# Patient Record
Sex: MALE | Race: WHITE | NOT HISPANIC OR LATINO | Employment: OTHER | URBAN - METROPOLITAN AREA
[De-identification: names, ages, dates, MRNs, and addresses within clinical notes are randomized per-mention and may not be internally consistent; named-entity substitution may affect disease eponyms.]

---

## 2017-03-22 ENCOUNTER — MEDICAL CORRESPONDENCE (OUTPATIENT)
Dept: HEALTH INFORMATION MANAGEMENT | Facility: CLINIC | Age: 72
End: 2017-03-22

## 2017-03-22 ENCOUNTER — OFFICE VISIT (OUTPATIENT)
Dept: FAMILY MEDICINE | Facility: CLINIC | Age: 72
End: 2017-03-22

## 2017-03-22 VITALS
HEIGHT: 64 IN | TEMPERATURE: 97.6 F | BODY MASS INDEX: 33.73 KG/M2 | DIASTOLIC BLOOD PRESSURE: 94 MMHG | WEIGHT: 197.6 LBS | OXYGEN SATURATION: 97 % | HEART RATE: 106 BPM | SYSTOLIC BLOOD PRESSURE: 153 MMHG

## 2017-03-22 DIAGNOSIS — J45.909 UNCOMPLICATED ASTHMA, UNSPECIFIED ASTHMA SEVERITY: ICD-10-CM

## 2017-03-22 DIAGNOSIS — F41.8 DEPRESSION WITH ANXIETY: ICD-10-CM

## 2017-03-22 DIAGNOSIS — K21.9 GASTROESOPHAGEAL REFLUX DISEASE WITHOUT ESOPHAGITIS: Primary | ICD-10-CM

## 2017-03-22 DIAGNOSIS — R73.03 PREDIABETES: ICD-10-CM

## 2017-03-22 DIAGNOSIS — I26.99 OTHER PULMONARY EMBOLISM WITHOUT ACUTE COR PULMONALE, UNSPECIFIED CHRONICITY (H): ICD-10-CM

## 2017-03-22 DIAGNOSIS — B37.2 YEAST INFECTION OF THE SKIN: ICD-10-CM

## 2017-03-22 DIAGNOSIS — E03.4 HYPOTHYROIDISM DUE TO ACQUIRED ATROPHY OF THYROID: Primary | ICD-10-CM

## 2017-03-22 DIAGNOSIS — E78.5 HYPERLIPIDEMIA, UNSPECIFIED HYPERLIPIDEMIA TYPE: ICD-10-CM

## 2017-03-22 DIAGNOSIS — J45.40 MODERATE PERSISTENT ASTHMA, UNCOMPLICATED: ICD-10-CM

## 2017-03-22 DIAGNOSIS — N18.30 CKD (CHRONIC KIDNEY DISEASE) STAGE 3, GFR 30-59 ML/MIN (H): ICD-10-CM

## 2017-03-22 DIAGNOSIS — D64.9 ANEMIA, UNSPECIFIED TYPE: ICD-10-CM

## 2017-03-22 DIAGNOSIS — E03.4 HYPOTHYROIDISM DUE TO ACQUIRED ATROPHY OF THYROID: ICD-10-CM

## 2017-03-22 DIAGNOSIS — E78.5 HYPERLIPIDEMIA LDL GOAL <130: ICD-10-CM

## 2017-03-22 LAB
% GRANULOCYTES: 71.1 %G (ref 40–75)
BUN SERPL-MCNC: 27 MG/DL (ref 7–21)
CALCIUM SERPL-MCNC: 9.9 MG/DL (ref 8.5–10.1)
CHLORIDE SERPLBLD-SCNC: 105.4 MMOL/L (ref 98–110)
CHOLEST SERPL-MCNC: 249.3 MG/DL (ref 0–200)
CHOLEST/HDLC SERPL: 6.8 {RATIO} (ref 0–5)
CO2 SERPL-SCNC: 26.7 MMOL/L (ref 20–32)
CREAT SERPL-MCNC: 1.4 MG/DL (ref 0.7–1.3)
GFR SERPL CREATININE-BSD FRML MDRD: 52.9 ML/MIN/1.7 M2
GLUCOSE SERPL-MCNC: 101.9 MG'DL (ref 70–99)
GRANULOCYTES #: 4.3 K/UL (ref 1.6–8.3)
HBA1C MFR BLD: 5.7 % (ref 4.1–5.7)
HCT VFR BLD AUTO: 42.4 % (ref 40–53)
HDLC SERPL-MCNC: 36.4 MG/DL
HEMOGLOBIN: 13.4 G/DL (ref 13.3–17.7)
LDLC SERPL CALC-MCNC: 178 MG/DL (ref 0–129)
LYMPHOCYTES # BLD AUTO: 1.3 K/UL (ref 0.8–5.3)
LYMPHOCYTES NFR BLD AUTO: 21.2 %L (ref 20–48)
MCH RBC QN AUTO: 30.4 PG (ref 26.5–35)
MCHC RBC AUTO-ENTMCNC: 31.6 G/DL (ref 32–36)
MCV RBC AUTO: 96.2 FL (ref 78–100)
MID #: 0.5 K/UL (ref 0–2.2)
MID %: 7.7 %M (ref 0–20)
PLATELET # BLD AUTO: 255 K/UL (ref 150–450)
POTASSIUM SERPL-SCNC: 3.8 MMOL/DL (ref 3.2–4.6)
RBC # BLD AUTO: 4.4 M/UL (ref 4.4–5.9)
SODIUM SERPL-SCNC: 142.7 MMOL/L (ref 132–142)
TRIGL SERPL-MCNC: 175.5 MG/DL (ref 0–150)
TSH SERPL DL<=0.05 MIU/L-ACNC: 14.7 UIU/ML (ref 0.3–5)
VLDL CHOLESTEROL: 35.1 MG/DL (ref 7–32)
WBC # BLD AUTO: 6 K/UL (ref 4–11)

## 2017-03-22 RX ORDER — PANTOPRAZOLE SODIUM 40 MG/1
40 TABLET, DELAYED RELEASE ORAL
Qty: 30 TABLET | Status: CANCELLED | OUTPATIENT
Start: 2017-03-22

## 2017-03-22 RX ORDER — FAMOTIDINE 40 MG/1
40 TABLET, FILM COATED ORAL AT BEDTIME
Qty: 30 TABLET | Refills: 1 | Status: SHIPPED | OUTPATIENT
Start: 2017-03-22 | End: 2017-08-08

## 2017-03-22 RX ORDER — WARFARIN SODIUM 5 MG/1
5 TABLET ORAL DAILY
Qty: 30 TABLET | Refills: 3 | Status: CANCELLED | OUTPATIENT
Start: 2017-03-22

## 2017-03-22 RX ORDER — SIMVASTATIN 40 MG
40 TABLET ORAL DAILY
Qty: 90 TABLET | Refills: 0 | Status: CANCELLED | OUTPATIENT
Start: 2017-03-22

## 2017-03-22 RX ORDER — LEVOTHYROXINE SODIUM 200 UG/1
TABLET ORAL
Qty: 90 TABLET | Refills: 1 | Status: SHIPPED | OUTPATIENT
Start: 2017-03-22 | End: 2017-03-23

## 2017-03-22 RX ORDER — FLUOXETINE 40 MG/1
40 CAPSULE ORAL DAILY
Qty: 90 CAPSULE | Refills: 1 | Status: SHIPPED | OUTPATIENT
Start: 2017-03-22 | End: 2018-02-06

## 2017-03-22 RX ORDER — ATORVASTATIN CALCIUM 80 MG/1
80 TABLET, FILM COATED ORAL DAILY
Qty: 90 TABLET | Refills: 1 | Status: SHIPPED | OUTPATIENT
Start: 2017-03-22 | End: 2018-02-06

## 2017-03-22 RX ORDER — ALBUTEROL SULFATE 90 UG/1
2 AEROSOL, METERED RESPIRATORY (INHALATION) EVERY 6 HOURS
Qty: 1 INHALER | Refills: 11 | Status: SHIPPED | OUTPATIENT
Start: 2017-03-22 | End: 2018-02-06

## 2017-03-22 NOTE — PATIENT INSTRUCTIONS
1)  Stop the Warfarin  --Keep taking the aspirin   --Keep going for walks.    2)  For the acid reflux  --Stop taking the pantoprazole  --Start taking the famotidine  3)  For the depression  --Increase the dose of prozac to 40mg per day.    4)  For the stomach and bowels:  --Eat more greens!  --Taking 1/2 cap of metamucil everyday.    --Cream for on your bottom-put on bottom and scrotum.    5)  Come back in 2-3 weeks to see Dr. Nowak or Dr Pruett to discuss the rest of your lab results and bottom.

## 2017-03-22 NOTE — MR AVS SNAPSHOT
After Visit Summary   3/22/2017    Michael D McArdle    MRN: [id number]           Patient Information     Date Of Birth          1945        Visit Information        Provider Department      3/22/2017 2:30 PM Jacqueline Nowak MD [facility]        Today's Diagnoses     Gastroesophageal reflux disease without esophagitis    -  1    Hypothyroidism due to acquired atrophy of thyroid        CKD (chronic kidney disease) stage 3, GFR 30-59 ml/min        Anemia, unspecified type        Prediabetes        Hyperlipidemia, unspecified hyperlipidemia type        Hyperlipidemia LDL goal <130        Depression        Moderate persistent asthma, uncomplicated        Other pulmonary embolism without acute cor pulmonale, unspecified chronicity (H)        Depression with anxiety          Care Instructions    1)  Stop the Warfarin  --Keep taking the aspirin   --Keep going for walks.    2)  For the acid reflux  --Stop taking the pantoprazole  --Start taking the famotidine  3)  For the depression  --Increase the dose of prozac to 40mg per day.    4)  For the stomach and bowels:  --Eat more greens!  --Taking 1/2 cap of metamucil everyday.    --Cream for on your bottom-put on bottom and scrotum.    5)  Come back in 2-3 weeks to see Dr. Nowak or Dr Pruett to discuss the rest of your lab results and bottom.          Follow-ups after your visit        Who to contact     Please call your clinic at [phone] to:    Ask questions about your health    Make or cancel appointments    Discuss your medicines    Learn about your test results    Speak to your doctor   If you have compliments or concerns about an experience at your clinic, or if you wish to file a complaint, please contact [facility] Patient Relations at [phone] or email us at [email or web address]         Additional Information About Your Visit        MyChart Information     [employer] gives you secure access to  "your electronic health record. If you see a primary care provider, you can also send messages to your care team and make appointments. If you have questions, please call your primary care clinic.  If you do not have a primary care provider, please call [phone] and they will assist you.      [facility] is an electronic gateway that provides easy, online access to your medical records. With [facility], you can request a clinic appointment, read your test results, renew a prescription or communicate with your care team.     To access your existing account, please contact your [facility] or call [phone] for assistance.        Care EveryWhere ID     This is your Care EveryWhere ID. This could be used by other organizations to access your [place] medical records  [id number]        Your Vitals Were     Pulse Temperature Height Pulse Oximetry BMI (Body Mass Index)       106 97.6  F (36.4  C) (Oral) 5' 4.25\" (163.2 cm) 97% 33.65 kg/m2        Blood Pressure from Last 3 Encounters:   03/22/17 (!) 153/94   10/24/16 (!) 160/93   09/20/16 (!) 155/92    Weight from Last 3 Encounters:   03/22/17 197 lb 9.6 oz (89.6 kg)   10/24/16 206 lb (93.4 kg)   09/20/16 206 lb 3.2 oz (93.5 kg)              We Performed the Following     Basic Metabolic Panel ([place])     CBC with Diff Plt ([facility])     Hemoglobin A1c ([facility])     Lipid Panel ([place])     TSH  Sensitive ([facility])          Today's Medication Changes          These changes are accurate as of: 3/22/17  3:16 PM.  If you have any questions, ask your nurse or doctor.               Start taking these medicines.        Dose/Directions    atorvastatin 80 MG tablet   Commonly known as:  LIPITOR   Used for:  Hyperlipidemia LDL goal <130   Started by:  Jacqueline Nowak MD        Dose:  80 mg   Take 1 tablet (80 mg) by mouth daily   Quantity:  90 tablet   Refills:  1       famotidine 40 MG tablet   Commonly known as:  PEPCID   Used for:  " Gastroesophageal reflux disease without esophagitis   Started by:  Jacqueline Nowak MD        Dose:  40 mg   Take 1 tablet (40 mg) by mouth At Bedtime   Quantity:  30 tablet   Refills:  1         These medicines have changed or have updated prescriptions.        Dose/Directions    FLUoxetine 40 MG capsule   Commonly known as:  PROzac   This may have changed:    - medication strength  - how much to take   Used for:  Depression with anxiety   Changed by:  Jacqueline Nowak MD        Dose:  40 mg   Take 1 capsule (40 mg) by mouth daily   Quantity:  90 capsule   Refills:  1       levothyroxine 200 MCG tablet   Commonly known as:  SYNTHROID/LEVOTHROID   This may have changed:  Another medication with the same name was removed. Continue taking this medication, and follow the directions you see here.   Used for:  Hypothyroidism due to acquired atrophy of thyroid   Changed by:  Jacqueline Nowak MD        Take one tablet daily.  Also take a 25 mcg tablet daily for a total dose of 225 mcg per day   Quantity:  90 tablet   Refills:  1       warfarin 5 MG tablet   Commonly known as:  COUMADIN   This may have changed:  Another medication with the same name was removed. Continue taking this medication, and follow the directions you see here.   Used for:  Other pulmonary embolism without acute cor pulmonale, unspecified chronicity (H)   Changed by:  Nereyda Eisenberg, [facility]        Dose:  5 mg   Take 1 tablet (5 mg) by mouth daily   Quantity:  30 tablet   Refills:  3         Stop taking these medicines if you haven't already. Please contact your care team if you have questions.     LORazepam 2 MG tablet   Commonly known as:  ATIVAN   Stopped by:  Jacqueline Nowak MD           pantoprazole 40 MG EC tablet   Commonly known as:  PROTONIX   Stopped by:  Jacqueline Nowak MD                Where to get your medicines      These medications were sent to [facility] [address] [facility] [address] AT [facility]  [address]  [address] [facility] [address]    Hours:  24-hours Phone:  [phone]     albuterol 108 (90 BASE) MCG/ACT Inhaler    Aspirin 81 MG Tbdp    atorvastatin 80 MG tablet    beclomethasone 80 MCG/ACT Inhaler    famotidine 40 MG tablet    FLUoxetine 40 MG capsule    levothyroxine 200 MCG tablet                Primary Care Provider Office Phone # Fax #    Ervin Pruett [phone]       [facility] [place] [address] [facility] [address]        Thank you!     Thank you for choosing [facility]  for your care. Our goal is always to provide you with excellent care. Hearing back from our patients is one way we can continue to improve our services. Please take a few minutes to complete the written survey that you may receive in the mail after your visit with us. Thank you!             Your Updated Medication List - Protect others around you: Learn how to safely use, store and throw away your medicines at www.disposemymeds.org.          This list is accurate as of: 3/22/17  3:16 PM.  Always use your most recent med list.                   Brand Name Dispense Instructions for use    ACETAMINOPHEN PO      Take 500 mg by mouth       AEROCHAMBER MV Misc     1 Device    1 Device as needed Use as directed       albuterol 108 (90 BASE) MCG/ACT Inhaler    PROAIR HFA/PROVENTIL HFA/VENTOLIN HFA    1 Inhaler    Inhale 2 puffs into the lungs every 6 hours       Aspirin 81 MG Tbdp     100 tablet    Take 1 tablet by mouth daily       atorvastatin 80 MG tablet    LIPITOR    90 tablet    Take 1 tablet (80 mg) by mouth daily       azelastine 0.05 % Soln ophthalmic solution    OPTIVAR    1 Bottle    Apply 1 drop to eye 2 times daily       beclomethasone 80 MCG/ACT Inhaler    QVAR    1 Inhaler    Inhale 1 puff into the lungs 2 times daily       famotidine 40 MG tablet    PEPCID    30 tablet    Take 1 tablet (40 mg) by mouth At Bedtime       FLUoxetine 40 MG capsule    PROzac    90  capsule    Take 1 capsule (40 mg) by mouth daily       levothyroxine 200 MCG tablet    SYNTHROID/LEVOTHROID    90 tablet    Take one tablet daily.  Also take a 25 mcg tablet daily for a total dose of 225 mcg per day       nicotine 21 MG/24HR 24 hr patch    RA NICOTINE    30 patch    Place 1 patch onto the skin every 24 hours       simvastatin 40 MG tablet    ZOCOR    90 tablet    Take 1 tablet (40 mg) by mouth daily       warfarin 5 MG tablet    COUMADIN    30 tablet    Take 1 tablet (5 mg) by mouth daily

## 2017-03-22 NOTE — LETTER
March 23, 2017      Michael D McArdle  [address]   [place] [address]        Dear Isiah,    Please see below for your test results.    Here is a copy of your lab results.  Your thyroid testing is still too low.  You need a higher dose of thyroid medication.  I have sent a second pill that I want you to take IN ADDITION TO your regular thyroid medication.  Your kidney tests are stable.  Your cholesterol is high, but I think the new cholesterol medication will help with that.  Your hemoglobin and diabetes tests are good.  We'll talk about these results and review the plan at your follow up visit.        Resulted Orders   TSH  Sensitive ([facility])   Result Value Ref Range    TSH 14.70 (H) 0.30 - 5.00 uIU/mL    Narrative    Test performed by:  [facility]  [address] [place] [address]   Basic Metabolic Panel ([place])   Result Value Ref Range    Urea Nitrogen 27.0 (H) 7.0 - 21.0 mg/dL    Calcium 9.9 8.5 - 10.1 mg/dL    Chloride 105.4 98.0 - 110.0 mmol/L    Carbon Dioxide 26.7 20.0 - 32.0 mmol/L    Creatinine 1.4 (H) 0.7 - 1.3 mg/dL    Glucose 101.9 (H) 70.0 - 99.0 mg'dL    Potassium 3.8 3.2 - 4.6 mmol/dL    Sodium 142.7 (H) 132.0 - 142.0 mmol/L    GFR Estimate 52.9 (L) >60.0 mL/min/1.7 m2    GFR Estimate If Black 64.1 >60.0 mL/min/1.7 m2   CBC with Diff Plt ([facility])   Result Value Ref Range    WBC 6.0 4.0 - 11.0 K/uL    Lymphocytes # 1.3 0.8 - 5.3 K/uL    % Lymphocytes 21.2 20.0 - 48.0 %L    Mid # 0.5 0.0 - 2.2 K/uL    Mid % 7.7 0.0 - 20.0 %M    GRANULOCYTES # 4.3 1.6 - 8.3 K/uL    % Granulocytes 71.1 40.0 - 75.0 %G    RBC 4.4 4.4 - 5.9 M/uL    Hemoglobin 13.4 13.3 - 17.7 g/dL    Hematocrit 42.4 40.0 - 53.0 %    MCV 96.2 78.0 - 100.0 fL    MCH 30.4 26.5 - 35.0    MCHC 31.6 (L) 32.0 - 36.0 g/dL    Platelets 255.0 150.0 - 450.0 K/uL   Lipid Panel ([place])   Result Value Ref Range    Cholesterol 249.3 (H) 0.0 - 200.0 mg/dL    Cholesterol/HDL Ratio 6.8 (H) 0.0 - 5.0    HDL  Cholesterol 36.4 (L) >40.0 mg/dL    LDL Cholesterol Calculated 178 (H) 0 - 129 mg/dL    Triglycerides 175.5 (H) 0.0 - 150.0 mg/dL    VLDL Cholesterol 35.1 (H) 7.0 - 32.0 mg/dL   Hemoglobin A1c ([facility])   Result Value Ref Range    Hemoglobin A1C 5.7 4.1 - 5.7 %       If you have any questions, please call the clinic to make an appointment.    Sincerely,    Jacqueline Nowak MD

## 2017-03-23 DIAGNOSIS — E03.4 HYPOTHYROIDISM DUE TO ACQUIRED ATROPHY OF THYROID: ICD-10-CM

## 2017-03-23 DIAGNOSIS — J45.40 MODERATE PERSISTENT ASTHMA, UNCOMPLICATED: ICD-10-CM

## 2017-03-23 RX ORDER — LEVOTHYROXINE SODIUM 25 UG/1
25 TABLET ORAL DAILY
Qty: 90 TABLET | Refills: 3 | Status: SHIPPED | OUTPATIENT
Start: 2017-03-23 | End: 2018-02-06

## 2017-03-23 RX ORDER — LEVOTHYROXINE SODIUM 200 UG/1
TABLET ORAL
Qty: 90 TABLET | Refills: 1 | Status: SHIPPED | OUTPATIENT
Start: 2017-03-23 | End: 2018-02-06

## 2017-03-23 NOTE — PROGRESS NOTES
"    There are no exam notes on file for this visit.  Chief Complaint   Patient presents with     Physical     Blood pressure (!) 153/94, pulse 106, temperature 97.6  F (36.4  C), temperature source Oral, height 5' 4.25\" (163.2 cm), weight 197 lb 9.6 oz (89.6 kg), SpO2 97 %.    Patient Active Problem List   Diagnosis     Chronic Reflux esophagitis     Depressive disorder, not elsewhere classified     Diastolic Dysfunction      Fainting (Sync[place])     smoking      Prediabetes     Asthma     Hyperlipidemia     Obese     Adenocarcinoma of rectum (H)     Pulmonary embolism and infarction (H)     Esophageal reflux     Hypothyroidism due to acquired atrophy of thyroid     ACP (advance care planning)     CKD (chronic kidney disease) stage 3, GFR 30-59 ml/min       SUBJECTIVE:  This is a 71-year-old male (w/chronic medical problems listed above) who presents to clinic today for complete physical.  He has been out of town for the last couple of months vacationing in [place].  He reports that he had a great time, but now that he is back in town he would like to get a checkup.  There are a number of issues going on that were discussed today beside the normal physical.     1.  He continues to have pain in the ankle.  He was told that he can't have surgery because he wouldn't do well.  They recommended keeping an eye on the ankle, despite the fact that bone juts out and give him pain, as he is able to get around okay.  He uses a walker w/seat that he got from friends.  He continues to remain active and he walks two miles t.i.d.  He has a [place] that he traverses from assisted living to a nearby shopping center, and he can make it through most of the walk, although sometimes he has to take a break, based on his feet.  He had some difficulty w/episodes of passing out previously, but he said these were very few now.  He had one in the last month to month and a half, and he feels they have gotten better.     2.  He has history of " "colon cancer.  He said he went back to see the specialists in November.  They did some type of rectal testing, which patient said wasn't a colonoscopy, but he described it as \"a tube being put in his bottom while they released air\".  He was unsure about the results, but following that procedure, he had worsening difficulty with stool incontinence.  He chronically wears adult diapers.  Stools are frequently very soft and have small pieces and them.  He had pain when he had a few bowel movements.  Actually, he got occasional blood, which he said came from known hemorrhoids in the area; sometimes the bleeding was quite significant.  He used fiber, mostly Fiber One bars, as well as powdered fiber because when he got constipated, it was very uncomfortable to have bowel movements.  He denied eating greens, because they could affect his INR.  He continued to express frustration today.  It looks raw and irritated outside of his bottom, and he has pain on the back of his testicles from this as well.  He would like refills of Pullups.      3.  He has been on Coumadin secondary to a large bilateral PE two years ago following colon cancer surgery.  Because of that, he was coded x2 and he was on chronic Coumadin ever since; however, with the recent trip to [place], he stopped taking Coumadin because he didn't bring it with him, No difficulty on the trip. He still takes aspirin daily.  He takes significant walks on daily basis.  He changed his diet and avoids greens because of the Coumadin.  He denies lower extremity swelling or calf pain.  No increased difficulty w/breathing, although he already had difficulty breathing at baseline.  We discussed whether or not he should continue on warfarin.  He had only one episode of clots, and it was provoked, but it was a significant episode.  He is okay with stopping Coumadin today, but I think it would be good for him to discuss this with Dr. Pruett, his primary, in the future.   " "    4.  He reported breathing was okay.  He gets tired sometimes w/walking.  He reported regularly using his inhalers, both QVAR and albuterol; however, when he went to [place], he forgot to bring the inhalers and he didn't use them at all during that time.  He had an occasional cough, but no significant troubles.     5.  He moved into a new assisted living apartment.  He was in an efficiency apartment, but now he is in a one bedroom in the same complex.  He really likes the  change.  He continues to get multiple home services, including meals, cleaning services, safety checks, and monthly vitals.  He continues to administer his own medications because he \"doesn't trust them to do this for him.\"  He eats his midday meal out at the shopping center, the other two meals in the dining frances. He is no longer involved with his wife, but he is involved with his son and daughter.  He is quite excited that she graduated with an LPN degree and recently had a baby.  He spends lots of time reading books and playing video games, especially Wii golf and bowling.  He enjoys being around people.  He plans to get involved with Cancer Center again, and doing support groups for other people who have cancer.     6.  He has more difficulty with depression.  He said that he tries to stay upbeat, but it is difficult.  He had more episodes recently during the last month where he \"flew off the handle.\"  He began seeing a psychologist, and has seen them for a month;  he felt that it was helpful.  He took Prozac for a while and he wondered if he could increase the dose.  He would like to have a little more zip.  He noticed that he slept up to 14 hours a day.  He felt better when he was out and spending time with people, but that has been more difficult recently due to his increasing depression symptoms.       7.  He continues to smoke a pipe.  He had one pouch, which lasted him two days.  He didn't like the patches, so he DC'd them.  He didn't " like the gum, but he was okay with trying lozenges.  He would like to continue to work on cutting down on smoking.  We extensively discussed the risks of blood clots increasing w/cigarette use.       8.  Medications.  He brought list today of what he takes.  We compared this w/current list;  And list was updated with what he actually takes at home.     OBJECTIVE:   VITAL SIGNS:  Reviewed.  Blood pressure was elevated today, although on recheck it was better at 145/89.  Pulse was mildly tachycardic initially;  it came down during the visit and was about 95 when I listened to him.  O2 sats were 97%.   GENERAL:  Comfortable, pleasant gentleman in no acute distress.  He walked very slowly and appeared to be in some discomfort when doing so.   PSYCHIATRIC:  Mood and affect were appropriate.  He was bright and engaged today.   HEENT:  Pupils were equal and reactive.  He is due to see the eye doctor and he'll do this.  He has a new hearing aid in the left ear, which was helpful.  He had cerumen present on that side.  Right side  appeared normal.  Bilateral mild nasal congestion.  No sinus tenderness.  Throat was clear and nonerythematous.   NECK:  No palpable lymphadenopathy.  Thyroid was normal on palpation.   CARDIAC:  Heart was tachycardic in the 90s.  No murmurs, rubs, or gallops.   RESPIRATORY:  Overall, lungs were clear.  Air flow was somewhat diminished bilaterally at the bases.  No rhonchi or wheezes.   ABDOMEN:  Belly was soft.  There was tenderness bilaterally in the lower quadrant adjacent to a surgical scar.  It was a little worse in the right lower quadrant than the left.  There was some guarding, but no rebound.  No CVA tenderness.  Bowel sounds were present and active.   GENITOURINARY:  On genital exam, penis was normal, w/o ulcerations or lesions.  There was a rash in the back of the scrotum that extended downward to the rectal opening.  It was red and erythematous, consistent w/yeast vs contact irritant.    EXTREMITIES:  Warm and well-perfused.  No lower extremity edema.  No tenderness of the calves.  Negative Homans' sign.  Pulses were strong and symmetric bilaterally.  Strength was 5/5 in the upper and lower extremities.     LABORATORY DATA:  Please see listed below.     ASSESSMENT AND PLAN:  This is a 71-year-old male presenting for general physical w/other f/u needed today.  We started to address multiple issues:   1.  Hypertension.  Blood pressure was still somewhat elevated on recheck.  We'll see if as pain in his bottom improves, blood pressure returns to normal, but I would consider adding additional agent if still elevated at his next visit.   2.  Acid reflux.  He feels that the ranitidine that he takes OTC works better than omeprazole.  Since this isn't covered by insurance, we'll try prescribing famotine and DC pantoprazole.   3.  History of bilateral PE, on warfarin.  He hasn't taken this for a while.  He only had one clot, which was provoke.  I'm not sure of his current cancer status, but he has been off of the Coumadin for a while and I think it was worth discussion about whether it was necessary.  We'll stop Coumadin today.  We'll continue aspirin.   4.  CAD Risk. We'll switch statins over to high dose, given elevated LDL levels. Continue aspirin.  Discussed smoking cessation.  Continues to smoke a pipe, but cutting back on amount.  Encouraged cessation.     4.  Worsening depression.  He'll continue with Psychology.  We'll increase Prozac to 40 mg daily   5.  Chronic diarrhea.  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  Now that he isn't on Coumadin, I think that he will be able to eat more greens. Increase fiber.   He'll take 1/2 cup of powdered Metamucil every day.  I'll prescribe Nystatin/Stomahesive combination for his bottom and scrotum.  I think this will help significantly with the rash and possible yeast infection.    6.  Hypothyroidism.  Recheck thryoid  levels.  He is supposed to be on an additional 25mcg, which he is not taking.  We will likely need to restart this, but best if accompanied by good patient education.      We didn't have time to discuss the rest of his chronic medical problems.  I recommend f/u to see me or Dr. Pruett for follow up in two or three weeks.     I spent 45 min with patient >50% on counseling and coordination of care.      Jacqueline Nowak      Results for orders placed or performed in visit on 03/22/17   TSH  Sensitive ([facility])   Result Value Ref Range    TSH 14.70 (H) 0.30 - 5.00 uIU/mL    Narrative    Test performed by:  [facility] [address] [place] [address]   Basic Metabolic Panel ([place])   Result Value Ref Range    Urea Nitrogen 27.0 (H) 7.0 - 21.0 mg/dL    Calcium 9.9 8.5 - 10.1 mg/dL    Chloride 105.4 98.0 - 110.0 mmol/L    Carbon Dioxide 26.7 20.0 - 32.0 mmol/L    Creatinine 1.4 (H) 0.7 - 1.3 mg/dL    Glucose 101.9 (H) 70.0 - 99.0 mg'dL    Potassium 3.8 3.2 - 4.6 mmol/dL    Sodium 142.7 (H) 132.0 - 142.0 mmol/L    GFR Estimate 52.9 (L) >60.0 mL/min/1.7 m2    GFR Estimate If Black 64.1 >60.0 mL/min/1.7 m2   CBC with Diff Plt ([facility])   Result Value Ref Range    WBC 6.0 4.0 - 11.0 K/uL    Lymphocytes # 1.3 0.8 - 5.3 K/uL    % Lymphocytes 21.2 20.0 - 48.0 %L    Mid # 0.5 0.0 - 2.2 K/uL    Mid % 7.7 0.0 - 20.0 %M    GRANULOCYTES # 4.3 1.6 - 8.3 K/uL    % Granulocytes 71.1 40.0 - 75.0 %G    RBC 4.4 4.4 - 5.9 M/uL    Hemoglobin 13.4 13.3 - 17.7 g/dL    Hematocrit 42.4 40.0 - 53.0 %    MCV 96.2 78.0 - 100.0 fL    MCH 30.4 26.5 - 35.0    MCHC 31.6 (L) 32.0 - 36.0 g/dL    Platelets 255.0 150.0 - 450.0 K/uL   Lipid Panel ([place])   Result Value Ref Range    Cholesterol 249.3 (H) 0.0 - 200.0 mg/dL    Cholesterol/HDL Ratio 6.8 (H) 0.0 - 5.0    HDL Cholesterol 36.4 (L) >40.0 mg/dL    LDL Cholesterol Calculated 178 (H) 0 - 129 mg/dL    Triglycerides 175.5 (H) 0.0 - 150.0 mg/dL    VLDL Cholesterol 35.1 (H)  7.0 - 32.0 mg/dL   Hemoglobin A1c ([facility])   Result Value Ref Range    Hemoglobin A1C 5.7 4.1 - 5.7 %           Patient Instructions   1)  Stop the Warfarin  --Keep taking the aspirin   --Keep going for walks.    2)  For the acid reflux  --Stop taking the pantoprazole  --Start taking the famotidine  3)  For the depression  --Increase the dose of prozac to 40mg per day.    4)  For the stomach and bowels:  --Eat more greens!  --Taking 1/2 cap of metamucil everyday.    --Cream for on your bottom-put on bottom and scrotum.    5)  Come back in 2-3 weeks to see Dr. Nowak or Dr Pruett to discuss the rest of your lab results and bottom.

## 2017-03-23 NOTE — TELEPHONE ENCOUNTER
Qvar not covered by insurance.  Will prescribe Arnuity Ellipta.  Prescription sent to pharmacy.      Jacqueline Nowak

## 2017-03-23 NOTE — PROGRESS NOTES
Michael D McArdle-    Here is a copy of your lab results.  Your thyroid testing is still too low.  You need a higher dose of thyroid medication.  I have sent a second pill that I want you to take IN ADDITION TO your regular thyroid medication.  Your kidney tests are stable.  Your cholesterol is high, but I think the new cholesterol medication will help with that.  Your hemoglobin and diabetes tests are good.  We'll talk about these results and review the plan at your follow up visit.      Jacqueline Nowak    Please send results to patient.

## 2017-03-24 ASSESSMENT — ASTHMA QUESTIONNAIRES: ACT_TOTALSCORE: 17

## 2017-04-13 ENCOUNTER — OFFICE VISIT (OUTPATIENT)
Dept: FAMILY MEDICINE | Facility: CLINIC | Age: 72
End: 2017-04-13

## 2017-04-13 VITALS
HEART RATE: 83 BPM | TEMPERATURE: 97.6 F | WEIGHT: 194 LBS | BODY MASS INDEX: 33.04 KG/M2 | OXYGEN SATURATION: 98 % | DIASTOLIC BLOOD PRESSURE: 76 MMHG | SYSTOLIC BLOOD PRESSURE: 136 MMHG

## 2017-04-13 DIAGNOSIS — Z00.00 PREVENTATIVE HEALTH CARE: Primary | ICD-10-CM

## 2017-04-13 DIAGNOSIS — Z23 NEED FOR VACCINATION: ICD-10-CM

## 2017-04-13 NOTE — MR AVS SNAPSHOT
After Visit Summary   4/13/2017    Michael D McArdle    MRN: [id number]           Patient Information     Date Of Birth          1945        Visit Information        Provider Department      4/13/2017 1:30 PM Jacqueline Nowak MD [facility]        Today's Diagnoses     Preventative health care    -  1      Care Instructions    We will wait for the oncology results to discuss coumadin  Schedule the AAA screening.  Pneumonia shot today  Blood pressure is good today--please have the nurse check once weekly  Continue to take the increased dose of the thyroid medicine.    For kidneys:  Drink more fluid, watch blood pressure, keep cutting down on the pipe smoking.    Keep it up with the therapist.      Follow up in 1 month.  Will recheck BP and thyroid and discuss results.          Follow-ups after your visit        Future tests that were ordered for you today     Open Future Orders        Priority Expected Expires Ordered    [employer] AAA SCREENING Routine  4/13/2018 4/13/2017            Who to contact     Please call your clinic at [phone] to:    Ask questions about your health    Make or cancel appointments    Discuss your medicines    Learn about your test results    Speak to your doctor   If you have compliments or concerns about an experience at your clinic, or if you wish to file a complaint, please contact [facility] Patient Relations at [phone] or email us at [email or web address]         Additional Information About Your Visit        [employer] Information     [employer] gives you secure access to your electronic health record. If you see a primary care provider, you can also send messages to your care team and make appointments. If you have questions, please call your primary care clinic.  If you do not have a primary care provider, please call [phone] and they will assist you.      We is an electronic gateway that provides  easy, online access to your medical records. With [employer], you can request a clinic appointment, read your test results, renew a prescription or communicate with your care team.     To access your existing account, please contact your [facility] or call [phone] for assistance.        Care EveryWhere ID     This is your Care EveryWhere ID. This could be used by other organizations to access your [place] medical records  [id number]        Your Vitals Were     Pulse Temperature Pulse Oximetry BMI (Body Mass Index)          83 97.6  F (36.4  C) (Oral) 98% 33.04 kg/m2         Blood Pressure from Last 3 Encounters:   04/13/17 136/76   03/22/17 (!) 153/94   10/24/16 (!) 160/93    Weight from Last 3 Encounters:   04/13/17 194 lb (88 kg)   03/22/17 197 lb 9.6 oz (89.6 kg)   10/24/16 206 lb (93.4 kg)               Primary Care Provider Office Phone # Fax #    Ervin Pruett [phone]       [address] [address]        Thank you!     Thank you for choosing [facility]  for your care. Our goal is always to provide you with excellent care. Hearing back from our patients is one way we can continue to improve our services. Please take a few minutes to complete the written survey that you may receive in the mail after your visit with us. Thank you!             Your Updated Medication List - Protect others around you: Learn how to safely use, store and throw away your medicines at www.disposemymeds.org.          This list is accurate as of: 4/13/17  1:51 PM.  Always use your most recent med list.                   Brand Name Dispense Instructions for use    ACETAMINOPHEN PO      Take 500 mg by mouth       albuterol 108 (90 BASE) MCG/ACT Inhaler    PROAIR HFA/PROVENTIL HFA/VENTOLIN HFA    1 Inhaler    Inhale 2 puffs into the lungs every 6 hours       Aspirin 81 MG Tbdp     100 tablet    Take 1 tablet by mouth daily       atorvastatin 80  MG tablet    LIPITOR    90 tablet    Take 1 tablet (80 mg) by mouth daily       BUTT PASTE - REGULAR    DR LOVE POOP GOOP BUTT PASTE FORMULA    105 g    Please combine stomahesive and nystatin per pharmacy recipe.  Apply up to 6x daily PRN for rectal irritation.       famotidine 40 MG tablet    PEPCID    30 tablet    Take 1 tablet (40 mg) by mouth At Bedtime       FLUoxetine 40 MG capsule    PROzac    90 capsule    Take 1 capsule (40 mg) by mouth daily       fluticasone furoate 200 MCG/ACT inhalation powder    ARNUITY ELLIPTA    3 Inhaler    Inhale 1 puff into the lungs daily       * levothyroxine 200 MCG tablet    SYNTHROID/LEVOTHROID    90 tablet    Take one tablet daily.  Also take a 25 mcg tablet daily for a total dose of 225 mcg per day       * levothyroxine 25 MCG tablet    SYNTHROID/LEVOTHROID    90 tablet    Take 1 tablet (25 mcg) by mouth daily       * Notice:  This list has 2 medication(s) that are the same as other medications prescribed for you. Read the directions carefully, and ask your doctor or other care provider to review them with you.

## 2017-04-13 NOTE — PATIENT INSTRUCTIONS
We will wait for the oncology results to discuss coumadin  Schedule the AAA screening.  Pneumonia shot today  Blood pressure is good today--please have the nurse check once weekly  Continue to take the increased dose of the thyroid medicine.    For kidneys:  Drink more fluid, watch blood pressure, keep cutting down on the pipe smoking.    Keep it up with the therapist.      Follow up in 1 month.  Will recheck BP and thyroid and discuss results.      Your referral has been scheduled for:    [facility]  [address] [place] [address]  [phone]  Ultrasound (AAA)  Date: Monday April 17 2018  Time: 9:15 AM   Nothing to eat or drink 6 hours prior to exam.    If you have any questions or need to reschedule please call the number listed above.  Any other concerns please call our referral coordinator at [phone]    Le  Care Coordinator     GAVE TO PATIENT

## 2017-04-14 ASSESSMENT — PATIENT HEALTH QUESTIONNAIRE - PHQ9: SUM OF ALL RESPONSES TO PHQ QUESTIONS 1-9: 7

## 2017-04-14 NOTE — PROGRESS NOTES
There are no exam notes on file for this visit.  Chief Complaint   Patient presents with     Results     Blood pressure 136/76, pulse 83, temperature 97.6  F (36.4  C), temperature source Oral, weight 194 lb (88 kg), SpO2 98 %.    SUBJECTIVE:  This 71-year-old gentleman, well-known to me, with past medical history of hypothyroidism, asthma, prediabetes, hypertension, and colon cancer with possible metastasis, and history of bilateral PE.  He presents today for followup after his complete physical 2 weeks ago.     Mental health.  He does feel tired all the time and he is sleeping 12-14 hours but still feels tired when he wakes up.  He continues to see a counselor regularly and finds this helpful.  The increase in his antidepressants and has been only mildly helpful for him.  Has increase his thyroid dose as instructed, but has not noted improvement with this either.   He continues to walk regularly.  He is doing a one mile loop 4 times a day and feels better when he is moving, as well as when he is around other people.  He continues to have intermittent neuropathy symptoms as well as aching pain in his ankle, but it does not slow him down from remaining active.     Loose Stools:   Also his bottom is feeling much better with the new cream.  There is less irritation and skin breakdown going on there.  Still has a lot of loose soft stools 4-5 times a day and has to wear adult diapers with that.  He has been using more fiber and this has been somewhat helpful, but again still having significant difficulties with diarrhea.      Hypertension:  He is very happy that his blood pressure is better today.  It was 136/76.  He reports taking his medication regularly.  It is typically in the 150s/90s at home.  He shares that his daughter recently got her LPN degree and so will be helping with checking information more regularly.  He is okay with doing blood pressure at least weekly to see whether or not he is at goal.     He  continues to smoke.  He is working on cutting back.  He is doing less than a bowl of tobacco now, but he is not interested in cutting down further he reports today.  He reports that he is using his inhalers regularly and taking his controller and he needs to use his albuterol almost every day at night.  He has allergy symptoms, which she says happens at this time of year every year.       We discussed his Coumadin again today.  He denies any lower extremity edema.  No swelling, no increased difficulty breathing.  Discussed options such as Cymbalta and he is not interested in that.  He does have concerns given the anemia that his mother had on pain medication and that this might be a problem for him.  I was unable to get copies of records from the  folks.  He did sign a release today to get information from his oncologist.  He is scheduled for followup with them.     OBJECTIVE:   VITAL SIGNS:  Reviewed.  Blood pressure 136/76, pulse 83, O2 sats 98% on room air.  Weight is 194 pounds.   GENERAL:  Comfortable-appearing,  gentleman, in no acute distress.   PSYCHIATRIC:  Mood and affect are bright and engaging.  He is in good spirits today.   CARDIAC:  Heart is regular rate and rhythm, no murmurs, rubs or gallops.   RESPIRATORY:  Lungs are clear to auscultation bilaterally, no crackles or wheezes.   EXTREMITIES:  Warm and well-perfused.  No lower extremity edema.     LABORATORY DATA:  These were discussed from his last visit.  They showed a creatinine of 1.4, which was discussed with him.  Glucose is good.  TSH shows a low thyroid levels.  No significant anemia and the rest of his electrolytes are normal.      ASSESSMENT:  A 71-year-old gentleman presenting for followup today.   1.  Major depression.  Continue with the same dose of the fluoxetine.  Continue with his regular therapy.  I encouraged him to continue to remain connected with activities that give him dionicio.   2.  Chronic kidney disease.  Discussed  that if his creatinine continues to increase that keeping his blood pressure under control and quitting smoking as well as getting enough food on a regular basis to help with this.   3.  Hypothyroidism.  Continue increased dose of levothyroxine.  He'll return in the next few weeks to recheck that level after he has been stable on the medication.   4.  Asthma.  Using appropriately.  He is taking albuterol.  It would be good to check his inhaler technique at the time of his next visit.   5.  Gastrointestinal symptoms secondary to his colon surgery.  Continue to increase fiber.  Rash is improving.  We will have him sign a release of the oncologist today to try to get the reports and see if there is residual colon cancer present.  Should restart coumadin if the cancer is present.  He will follow up in one month for a recheck.   6. Preventative.  Referral placed for AAA screening.      Jacqueline Nowak    Spoke with Dr. Pruett, pt's primary.  May have had more than 1 episode of clotting, and most recent imaging per Dr. Pruett's knowledge was suspicious for metastatic disease, so coumadin may be the safest choice.  Recommend scheduled Imodium for stools if these are still a problem.      Jacqueline Nowak        Patient Instructions   We will wait for the oncology results to discuss coumadin  Schedule the AAA screening.  Pneumonia shot today  Blood pressure is good today--please have the nurse check once weekly  Continue to take the increased dose of the thyroid medicine.    For kidneys:  Drink more fluid, watch blood pressure, keep cutting down on the pipe smoking.    Keep it up with the therapist.      Follow up in 1 month.  Will recheck BP and thyroid and discuss results.      Your referral has been scheduled for:    [facility]  [address] [place] [address]  [phone]  Ultrasound (AAA)  Date: Monday April 17 2018  Time: 9:15 AM   Nothing to eat or drink 6 hours prior to exam.    If you have  any questions or need to reschedule please call the number listed above.  Any other concerns please call our referral coordinator at [phone]    Le  Care Coordinator     GAVE TO PATIENT

## 2017-04-17 DIAGNOSIS — Z00.00 PREVENTATIVE HEALTH CARE: ICD-10-CM

## 2017-04-17 NOTE — PROGRESS NOTES
Michael D McArdle-    Your AAA screening was normal.  This is good news.  No further follow up needed.      Jacqueline Nowak    Please send results to patient.

## 2017-04-17 NOTE — LETTER
April 18, 2017      Michael D McArdle  [address]   [facility] [address]        Dear Isiah,    Your AAA screening was normal.  This is good news.  No further follow up needed.       If you have any questions, please call the clinic to make an appointment.    Sincerely,    Jacqueline Nowak MD

## 2017-08-08 DIAGNOSIS — K21.9 GASTROESOPHAGEAL REFLUX DISEASE WITHOUT ESOPHAGITIS: ICD-10-CM

## 2017-08-09 RX ORDER — FAMOTIDINE 40 MG/1
40 TABLET, FILM COATED ORAL AT BEDTIME
Qty: 90 TABLET | Refills: 3 | Status: SHIPPED | OUTPATIENT
Start: 2017-08-09 | End: 2018-12-13 | Stop reason: ALTCHOICE

## 2017-10-09 ENCOUNTER — OFFICE VISIT (OUTPATIENT)
Dept: FAMILY MEDICINE | Facility: CLINIC | Age: 72
End: 2017-10-09

## 2017-10-09 VITALS
HEART RATE: 96 BPM | BODY MASS INDEX: 33.28 KG/M2 | SYSTOLIC BLOOD PRESSURE: 144 MMHG | TEMPERATURE: 97.7 F | DIASTOLIC BLOOD PRESSURE: 74 MMHG | WEIGHT: 195.4 LBS

## 2017-10-09 DIAGNOSIS — R03.0 ELEVATED SYSTOLIC BLOOD PRESSURE READING WITHOUT DIAGNOSIS OF HYPERTENSION: Primary | ICD-10-CM

## 2017-10-09 DIAGNOSIS — G57.11 LATERAL FEMORAL CUTANEOUS NEUROPATHY, RIGHT: ICD-10-CM

## 2017-10-09 NOTE — MR AVS SNAPSHOT
After Visit Summary   10/9/2017    Michael D McArdle    MRN: [id number]           Patient Information     Date Of Birth          1945        Visit Information        Provider Department      10/9/2017 10:40 AM Ervin Pruett MD [facility]        Care Instructions    Please make an appointment to see your cancer doctor.  If he gets another CT scan, please ask them to send me a copy of the results.    Let's follow your blood pressure and let us know the results            Follow-ups after your visit        Who to contact     Please call your clinic at [phone] to:    Ask questions about your health    Make or cancel appointments    Discuss your medicines    Learn about your test results    Speak to your doctor   If you have compliments or concerns about an experience at your clinic, or if you wish to file a complaint, please contact [facility] Patient Relations at [phone] or email us at [email or web address]         Additional Information About Your Visit        MyChart Information     [employer] gives you secure access to your electronic health record. If you see a primary care provider, you can also send messages to your care team and make appointments. If you have questions, please call your primary care clinic.  If you do not have a primary care provider, please call [phone] and they will assist you.      [employer] is an electronic gateway that provides easy, online access to your medical records. With [employer], you can request a clinic appointment, read your test results, renew a prescription or communicate with your care team.     To access your existing account, please contact your [facility] or call [phone] for assistance.        Care EveryWhere ID     This is your Care EveryWhere ID. This could be used by other organizations to access your [place] medical records  [id number]        Your Vitals Were      Pulse Temperature BMI (Body Mass Index)             96 97.7  F (36.5  C) (Oral) 33.28 kg/m2          Blood Pressure from Last 3 Encounters:   10/09/17 144/74   04/13/17 136/76   03/22/17 (!) 153/94    Weight from Last 3 Encounters:   10/09/17 195 lb 6.4 oz (88.6 kg)   04/13/17 194 lb (88 kg)   03/22/17 197 lb 9.6 oz (89.6 kg)              Today, you had the following     No orders found for display       Primary Care Provider Office Phone # Fax #    Ervin Pruett [phone]       [address] [address]        Equal Access to Services     LA ALMEIDA : Duglas herzogo Sotyrone, waaxda luqadaha, qaybta kaalmada adeegyada, omar sales . So [facility] [phone].    ATENCIÓN: Si habla español, tiene a garcia disposición servicios gratuitos de asistencia lingüística. Jez al [phone].    We comply with applicable federal civil rights laws and [place] laws. We do not discriminate on the basis of race, color, national origin, age, disability, sex, sexual orientation, or gender identity.            Thank you!     Thank you for choosing [facility]  for your care. Our goal is always to provide you with excellent care. Hearing back from our patients is one way we can continue to improve our services. Please take a few minutes to complete the written survey that you may receive in the mail after your visit with us. Thank you!             Your Updated Medication List - Protect others around you: Learn how to safely use, store and throw away your medicines at www.disposemymeds.org.          This list is accurate as of: 10/9/17 11:32 AM.  Always use your most recent med list.                   Brand Name Dispense Instructions for use Diagnosis    ACETAMINOPHEN PO      Take 500 mg by mouth        albuterol 108 (90 BASE) MCG/ACT Inhaler    PROAIR HFA/PROVENTIL HFA/VENTOLIN HFA    1 Inhaler    Inhale 2 puffs into the lungs every 6 hours     Moderate persistent asthma, uncomplicated       Aspirin 81 MG Tbdp     100 tablet    Take 1 tablet by mouth daily    Prediabetes       atorvastatin 80 MG tablet    LIPITOR    90 tablet    Take 1 tablet (80 mg) by mouth daily    Hyperlipidemia LDL goal <130       BUTT PASTE - REGULAR    DR LOVE POOP GONICOLA BUTT PASTE FORMULA    105 g    Please combine stomahesive and nystatin per pharmacy recipe.  Apply up to 6x daily PRN for rectal irritation.    Yeast infection of the skin       famotidine 40 MG tablet    PEPCID    90 tablet    Take 1 tablet (40 mg) by mouth At Bedtime    Gastroesophageal reflux disease without esophagitis       FLUoxetine 40 MG capsule    PROzac    90 capsule    Take 1 capsule (40 mg) by mouth daily    Depression with anxiety       fluticasone furoate 200 MCG/ACT inhalation powder    ARNUITY ELLIPTA    3 Inhaler    Inhale 1 puff into the lungs daily    Moderate persistent asthma, uncomplicated       * levothyroxine 200 MCG tablet    SYNTHROID/LEVOTHROID    90 tablet    Take one tablet daily.  Also take a 25 mcg tablet daily for a total dose of 225 mcg per day    Hypothyroidism due to acquired atrophy of thyroid       * levothyroxine 25 MCG tablet    SYNTHROID/LEVOTHROID    90 tablet    Take 1 tablet (25 mcg) by mouth daily    Hypothyroidism due to acquired atrophy of thyroid       * Notice:  This list has 2 medication(s) that are the same as other medications prescribed for you. Read the directions carefully, and ask your doctor or other care provider to review them with you.

## 2017-10-09 NOTE — PATIENT INSTRUCTIONS
Please make an appointment to see your cancer doctor.  If he gets another CT scan, please ask them to send me a copy of the results.    Let's follow your blood pressure and let us know the results

## 2017-10-09 NOTE — PROGRESS NOTES
"There are no exam notes on file for this visit.  Chief Complaint   Patient presents with     RECHECK     f/u on Blood pressure and needs all medication refilled      Blood pressure 144/74, pulse 96, temperature 97.7  F (36.5  C), temperature source Oral, weight 195 lb 6.4 oz (88.6 kg).    Patient comes in today for a blood pressure check.    He lives in an assisted living facility. They check his blood pressure about once a week. Unfortunately, he forgot to bring his log with blood pressures. He believes that they have run around 144/80.    The patient uses a walker and walks about 4 times a day. He notes that occasionally he gets \"dizzy\" when getting up. He describes this as a sensation of lightheadedness. It resolves after he stands for a minute. He is not had any syncopal episodes. He does not have any headache, chest pain, or change in vision.    He also complains of discomfort on his right side. Scribed this as his \"hip\". But he points to his anterior superior iliac crest as the source of his pain.    Associated with this he has a discomfort in his right upper leg. He can't lay on that side at night. He describes this as a warmth, numbness, or a \"sharp needle \"and sedation. It is located in his anterior and lateral thigh on the right. This seems to be worse after he sleeps or redness. He has been taking acetaminophen for this, and this is helpful. He also uses a heating pad and massage chair which he also finds helpful.    The patient tells me to bend about a year since he followed up with his oncologist. The patient is not interested in stopping smoking today.    Objective: This is a well-nourished well-developed male who is in no acute distress. His vital signs are as noted above and revealed an elevated blood pressure. His heart has a regular rate and rhythm. His lungs are clear to auscultation. There are no wheezes, rales, or rhonchi. Straight leg test is negative. Internal and external rotation of the hips " is not painful. He points to the distribution of the lateral femoral cutaneous nerve as the source for his discomfort. His knees have a normal range of motion.    Assessment: #1 elevated blood pressure #2 lateral femoral cutaneous neuropathy    Plan: I'm concerned about this anterior superior iliac crest discomfort. I would like the patient to follow-up with his oncologist. The patient is agreeable to this. I believe the patient likely needs another CT scan of the abdomen and pelvis to characterize his colonic cancer. I discussed with the patient that if the oncologist does not want any further imaging studies, I will order them myself to evaluate this iliac pain. I will hold off on ordering the CT today, in case then oncologist wants a different study. The patient is to follow-up with me after he sees his oncologist.    I discussed with the patient that the current guidelines allow for systolic blood pressures up to 150 in his age group. He will continue to monitor his blood pressures weekly at the assisted living facility. He will bring his log in at the time of his next visit so that we can review his pressures.    Elevated systolic blood pressure reading without diagnosis of hypertension    Lateral femoral cutaneous neuropathy, right    The patient was actively involved in the decision making process, and all the questions were answered to their satisfaction prior to leaving.

## 2017-11-28 ENCOUNTER — TRANSFERRED RECORDS (OUTPATIENT)
Dept: HEALTH INFORMATION MANAGEMENT | Facility: CLINIC | Age: 72
End: 2017-11-28

## 2017-12-05 ENCOUNTER — TRANSFERRED RECORDS (OUTPATIENT)
Dept: HEALTH INFORMATION MANAGEMENT | Facility: CLINIC | Age: 72
End: 2017-12-05

## 2018-01-31 ENCOUNTER — TRANSFERRED RECORDS (OUTPATIENT)
Dept: HEALTH INFORMATION MANAGEMENT | Facility: CLINIC | Age: 73
End: 2018-01-31

## 2018-02-01 ENCOUNTER — TRANSFERRED RECORDS (OUTPATIENT)
Dept: HEALTH INFORMATION MANAGEMENT | Facility: CLINIC | Age: 73
End: 2018-02-01

## 2018-02-06 ENCOUNTER — OFFICE VISIT (OUTPATIENT)
Dept: FAMILY MEDICINE | Facility: CLINIC | Age: 73
End: 2018-02-06
Payer: COMMERCIAL

## 2018-02-06 VITALS
SYSTOLIC BLOOD PRESSURE: 147 MMHG | HEART RATE: 89 BPM | WEIGHT: 194 LBS | TEMPERATURE: 97.7 F | DIASTOLIC BLOOD PRESSURE: 75 MMHG | BODY MASS INDEX: 33.04 KG/M2 | OXYGEN SATURATION: 97 %

## 2018-02-06 DIAGNOSIS — E03.4 HYPOTHYROIDISM DUE TO ACQUIRED ATROPHY OF THYROID: ICD-10-CM

## 2018-02-06 DIAGNOSIS — D72.819 LEUKOPENIA, UNSPECIFIED TYPE: Primary | ICD-10-CM

## 2018-02-06 DIAGNOSIS — E78.5 HYPERLIPIDEMIA LDL GOAL <130: ICD-10-CM

## 2018-02-06 DIAGNOSIS — F41.8 DEPRESSION WITH ANXIETY: ICD-10-CM

## 2018-02-06 DIAGNOSIS — B37.2 YEAST INFECTION OF THE SKIN: ICD-10-CM

## 2018-02-06 DIAGNOSIS — R10.84 ABDOMINAL PAIN, GENERALIZED: ICD-10-CM

## 2018-02-06 DIAGNOSIS — R73.03 PREDIABETES: ICD-10-CM

## 2018-02-06 DIAGNOSIS — J45.40 MODERATE PERSISTENT ASTHMA, UNCOMPLICATED: ICD-10-CM

## 2018-02-06 LAB
BASOPHILS # BLD AUTO: 0 THOU/UL (ref 0–0.2)
BASOPHILS NFR BLD AUTO: 1 % (ref 0–2)
EOSINOPHIL # BLD AUTO: 0 THOU/UL (ref 0–0.4)
EOSINOPHIL NFR BLD AUTO: 1 % (ref 0–6)
ERYTHROCYTE [DISTWIDTH] IN BLOOD BY AUTOMATED COUNT: 13.2 % (ref 11–14.5)
HCT VFR BLD AUTO: 37.3 % (ref 40–54)
HGB BLD-MCNC: 12.5 G/DL (ref 14–18)
LYMPHOCYTES # BLD AUTO: 0.8 THOU/UL (ref 0.8–4.4)
LYMPHOCYTES NFR BLD AUTO: 15 % (ref 20–40)
MCH RBC QN AUTO: 31.2 PG (ref 27–34)
MCHC RBC AUTO-ENTMCNC: 33.5 G/DL (ref 32–36)
MCV RBC AUTO: 93 FL (ref 80–100)
MONOCYTES # BLD AUTO: 0.6 THOU/UL (ref 0–0.9)
MONOCYTES NFR BLD AUTO: 11 % (ref 2–10)
NEUTROPHILS # BLD AUTO: 4 THOU/UL (ref 2–7.7)
NEUTROPHILS NFR BLD AUTO: 73 % (ref 50–70)
PLATELET # BLD AUTO: 243 THOU/UL (ref 140–440)
PMV BLD AUTO: 10.4 FL (ref 8.5–12.5)
RBC # BLD AUTO: 4.01 MILL/UL (ref 4.4–6.2)
WBC # BLD AUTO: 5.5 THOU/UL (ref 4–11)

## 2018-02-06 RX ORDER — LEVOTHYROXINE SODIUM 25 UG/1
25 TABLET ORAL DAILY
Qty: 90 TABLET | Refills: 3 | Status: SHIPPED | OUTPATIENT
Start: 2018-02-06 | End: 2018-05-22

## 2018-02-06 RX ORDER — ATORVASTATIN CALCIUM 80 MG/1
80 TABLET, FILM COATED ORAL DAILY
Qty: 90 TABLET | Refills: 1 | Status: SHIPPED | OUTPATIENT
Start: 2018-02-06 | End: 2018-02-07

## 2018-02-06 RX ORDER — LEVOTHYROXINE SODIUM 200 UG/1
TABLET ORAL
Qty: 90 TABLET | Refills: 1 | Status: SHIPPED | OUTPATIENT
Start: 2018-02-06 | End: 2019-02-26

## 2018-02-06 RX ORDER — FLUOXETINE 40 MG/1
40 CAPSULE ORAL DAILY
Qty: 90 CAPSULE | Refills: 1 | Status: SHIPPED | OUTPATIENT
Start: 2018-02-06 | End: 2019-04-04

## 2018-02-06 RX ORDER — POLYETHYLENE GLYCOL 3350 17 G/17G
POWDER, FOR SOLUTION ORAL
Qty: 510 G | Refills: 1 | COMMUNITY
Start: 2018-02-06 | End: 2024-03-11

## 2018-02-06 RX ORDER — ALBUTEROL SULFATE 90 UG/1
2 AEROSOL, METERED RESPIRATORY (INHALATION) EVERY 6 HOURS
Qty: 1 INHALER | Refills: 11 | Status: SHIPPED | OUTPATIENT
Start: 2018-02-06 | End: 2018-05-22

## 2018-02-06 NOTE — PROGRESS NOTES
There are no exam notes on file for this visit.  Chief Complaint   Patient presents with     RECHECK     f/u alvarado eddeital he is still having issues going to the bathroom      Blood pressure 147/75, pulse 89, temperature 97.7  F (36.5  C), temperature source Oral, weight 194 lb (88 kg), SpO2 97 %.      Patient comes in today to follow-up from his recent hospitalization.    The patient was hospitalized between 1/31/2018 and 2/1/2018.  At that the patient had a CT scan which showed a question of a early partial small bowel obstruction.  It was thought to be distal to the area when he has an abdominal hernia.  During the hospitalization general surgery was consulted.  They recommended that the patient to lose about 40 pounds to minimize risk for future hernia repair.  They thought that the hernia was not at all contributing to the patient's symptoms.  They did not think the patient had a partial partial small bowel obstruction.  During that hospitalization the patient did have an AK I which responded to IV fluids.  Please see scanned discharge summary for further suggestions.    The patient also had a CBC during his hospitalization.  His initial WBC was 7100 but the follow-up CBC at the day of discharge was 2900.    Since discharge from the hospital the patient has been using MiraLAX.  He notes that he takes it every other day.  He notes that with this he has a regular small soft bowel movements.    The patient tells me that he has recently seen his oncologist and was told that he is currently cancer free.  He was instructed to return to clinic in 8 months for follow-up.    The patient notes that he does have some abdominal discomfort if he sits for too long, or if he coughs.  He points to the area of his ventral hernia as the source for his pain.    The patient brings with him a blood pressures obtained at his facility.  They include: 144/84, 117/75, 117/78, 131/75, 144/81, 111/65.    The patient is quite happy that  his daughter is going to graduate in May and become an RN.    The patient tells me that his wife Nai had an injection in her voice box which then paralyzed her vocal cords which then caused her to have a tracheostomy on the patient tells me that she is currently terminal.    Objective: This is a well-developed obese male who is in no acute distress.  Vital signs are as noted above and are within normal limits.  His heart has a regular rate and rhythm.  His lungs are clear to auscultation.  There are no wheezes, rales, or rhonchi.  His abdomen is soft.  He has no tenderness, guarding, or rebound.  He has no masses or organomegaly.  He does have a ventral hernia.    Assessment: #1 generalized abdominal pain with question of very early partial small bowel obstruction #2 constipation #3 leukopenia noted on the day of discharge from the hospital #4 hyperlipidemia    Plan: For the leukopenia I will check a CBC with differential today.  For his hyperlipidemia the patient atorvastatin is refilled.  For the abdominal pain, I would like the patient just to continue with his MiraLAX.  He should call or return to clinic if he gets any further symptoms or if this is not effective.    Patient's medication for his thyroid and asthma are refilled for him.  I have also refilled his aspirin and fluoxetine.    Leukopenia, unspecified type  -     CBC w/ Diff and Plt ([facility])    Hyperlipidemia LDL goal <130  -     atorvastatin (LIPITOR) 80 MG tablet; Take 1 tablet (80 mg) by mouth daily    Abdominal pain, generalized    Hypothyroidism due to acquired atrophy of thyroid  -     levothyroxine (SYNTHROID/LEVOTHROID) 200 MCG tablet; Take one tablet daily.  Also take a 25 mcg tablet daily for a total dose of 225 mcg per day  -     levothyroxine (SYNTHROID/LEVOTHROID) 25 MCG tablet; Take 1 tablet (25 mcg) by mouth daily    Moderate persistent asthma, uncomplicated  -     fluticasone furoate (ARNUITY ELLIPTA) 200 MCG/ACT inhalation  powder; Inhale 1 puff into the lungs daily  -     albuterol (PROAIR HFA/PROVENTIL HFA/VENTOLIN HFA) 108 (90 BASE) MCG/ACT Inhaler; Inhale 2 puffs into the lungs every 6 hours    Depression with anxiety  -     FLUoxetine (PROZAC) 40 MG capsule; Take 1 capsule (40 mg) by mouth daily    Prediabetes  -     Aspirin 81 MG TBDP; Take 1 tablet by mouth daily    The patient was actively involved in the decision making process, and all the questions were answered to their satisfaction prior to leaving.

## 2018-02-06 NOTE — MR AVS SNAPSHOT
After Visit Summary   2/6/2018    Michael D McArdle    MRN: [id number]           Patient Information     Date Of Birth          1945        Visit Information        Provider Department      2/6/2018 10:20 AM Ervin Pruett MD [facility]        Today's Diagnoses     Leukopenia, unspecified type    -  1    Hyperlipidemia LDL goal <130        Abdominal pain, generalized        Hypothyroidism due to acquired atrophy of thyroid          Care Instructions    Arnuity Ellipta (orange/white inhaler) is your CONTROLLER inhaler to use every single day.  Never use any extra puffs of this one    Albuterol (Proair; red inhaler) is your RESCUE inhaler.  Only use it when you have trouble breathing, coughing or wheezing.    Thank you for coming to [facility].  **If you had lab testing today and your results are reassuring or normal they will be be mailed to you within 7 days.   **If the lab tests need quick action we will call you with the results.  The phone number we will call with results is # [phone] (home) . If this is not the best number please call our clinic and change the number.  If you need any refills please call your pharmacy and they will contact us.  If you have any concerns about today's visit or wish to schedule another appointment please call our office during normal business hours [phone] (8-5:00 M-F)  If you have urgent medical concerns please call [phone] at any time of the day.  If you a medical emergency please call [phone]  Again thank you for choosing [facility] and please let us know how we can best partner with you to improve you and your family's health.              Follow-ups after your visit        Who to contact     Please call your clinic at [phone] to:    Ask questions about your health    Make or cancel appointments    Discuss your medicines    Learn about your test results    Speak to your doctor   If you have compliments or concerns about an  experience at your clinic, or if you wish to file a complaint, please contact [facility] Patient Relations at [phone] or email us at [email or web address]         Additional Information About Your Visit        [employer] Information     [employer] gives you secure access to your electronic health record. If you see a primary care provider, you can also send messages to your care team and make appointments. If you have questions, please call your primary care clinic.  If you do not have a primary care provider, please call [phone] and they will assist you.      [employer] is an electronic gateway that provides easy, online access to your medical records. With [employer], you can request a clinic appointment, read your test results, renew a prescription or communicate with your care team.     To access your existing account, please contact your [facility] or call [phone] for assistance.        Care EveryWhere ID     This is your Care EveryWhere ID. This could be used by other organizations to access your [facility] medical records  [id number]        Your Vitals Were     Pulse Temperature Pulse Oximetry BMI (Body Mass Index)          89 97.7  F (36.5  C) (Oral) 97% 33.04 kg/m2         Blood Pressure from Last 3 Encounters:   02/06/18 147/75   10/09/17 144/74   04/13/17 136/76    Weight from Last 3 Encounters:   02/06/18 194 lb (88 kg)   10/09/17 195 lb 6.4 oz (88.6 kg)   04/13/17 194 lb (88 kg)              We Performed the Following     CBC w/ Diff and Plt ([facility])          Where to get your medicines      These medications were sent to [id number] - [place], [place] - [address] AT [facility] [address]  [address], [facility] [address]    Hours:  24-hours Phone:  [phone]     atorvastatin 80 MG tablet          Primary Care Provider Office Phone # Fax #    Ervin Pruett [phone]  [phone]       [facility] [address] [facility] [address]        Equal Access to Services     LA ALMEIDA : Hadii julieta hutton hadcassjeannie Sotyrone, waaxda luqadaha, qaybta kaalmada raadgamaliel, omar patel lukecat biswaschristinakaya ponce. So [facility] [phone].    ATENCIÓN: Si habla español, tiene a garcia disposición servicios gratuitos de asistencia lingüística. Llame al [phone].    We comply with applicable federal civil rights laws and [place] laws. We do not discriminate on the basis of race, color, national origin, age, disability, sex, sexual orientation, or gender identity.            Thank you!     Thank you for choosing [facility]  for your care. Our goal is always to provide you with excellent care. Hearing back from our patients is one way we can continue to improve our services. Please take a few minutes to complete the written survey that you may receive in the mail after your visit with us. Thank you!             Your Updated Medication List - Protect others around you: Learn how to safely use, store and throw away your medicines at www.disposemymeds.org.          This list is accurate as of 2/6/18 10:46 AM.  Always use your most recent med list.                   Brand Name Dispense Instructions for use Diagnosis    ACETAMINOPHEN PO      Take 500 mg by mouth        albuterol 108 (90 BASE) MCG/ACT Inhaler    PROAIR HFA/PROVENTIL HFA/VENTOLIN HFA    1 Inhaler    Inhale 2 puffs into the lungs every 6 hours    Moderate persistent asthma, uncomplicated       Aspirin 81 MG Tbdp     100 tablet    Take 1 tablet by mouth daily    Prediabetes       atorvastatin 80 MG tablet    LIPITOR    90 tablet    Take 1 tablet (80 mg) by mouth daily    Hyperlipidemia LDL goal <130       BUTT PASTE - REGULAR    DR LOVE POOP GOOP BUTT PASTE FORMULA    105 g    Please combine stomahesive and nystatin per pharmacy recipe.  Apply up to 6x daily PRN for rectal irritation.    Yeast infection of the skin       famotidine  40 MG tablet    PEPCID    90 tablet    Take 1 tablet (40 mg) by mouth At Bedtime    Gastroesophageal reflux disease without esophagitis       FLUoxetine 40 MG capsule    PROzac    90 capsule    Take 1 capsule (40 mg) by mouth daily    Depression with anxiety       fluticasone furoate 200 MCG/ACT inhalation powder    ARNUITY ELLIPTA    3 Inhaler    Inhale 1 puff into the lungs daily    Moderate persistent asthma, uncomplicated       * levothyroxine 200 MCG tablet    SYNTHROID/LEVOTHROID    90 tablet    Take one tablet daily.  Also take a 25 mcg tablet daily for a total dose of 225 mcg per day    Hypothyroidism due to acquired atrophy of thyroid       * levothyroxine 25 MCG tablet    SYNTHROID/LEVOTHROID    90 tablet    Take 1 tablet (25 mcg) by mouth daily    Hypothyroidism due to acquired atrophy of thyroid       MIRALAX powder   Generic drug:  polyethylene glycol     510 g    Take 1 capful every other day        * Notice:  This list has 2 medication(s) that are the same as other medications prescribed for you. Read the directions carefully, and ask your doctor or other care provider to review them with you.

## 2018-02-06 NOTE — PATIENT INSTRUCTIONS
Arnuity Ellipta (orange/white inhaler) is your CONTROLLER inhaler to use every single day.  Never use any extra puffs of this one    Albuterol (Proair; red inhaler) is your RESCUE inhaler.  Only use it when you have trouble breathing, coughing or wheezing.    Thank you for coming to [facility].  **If you had lab testing today and your results are reassuring or normal they will be be mailed to you within 7 days.   **If the lab tests need quick action we will call you with the results.  The phone number we will call with results is # [phone] (home) . If this is not the best number please call our clinic and change the number.  If you need any refills please call your pharmacy and they will contact us.  If you have any concerns about today's visit or wish to schedule another appointment please call our office during normal business hours [phone] (8-5:00 M-F)  If you have urgent medical concerns please call [phone] at any time of the day.  If you a medical emergency please call [phone]  Again thank you for choosing [facility] and please let us know how we can best partner with you to improve you and your family's health.

## 2018-02-06 NOTE — LETTER
February 8, 2018      Michael D McArdle  [address]   [facility] [address]        Dear Isiah,    Please see below for your test results.  Your wbc count was low in the hospital.  It is now normal.  We do not need to follow this up further     Resulted Orders   CBC w/ Diff and Plt ([facility])   Result Value Ref Range    WBC 5.5 4.0 - 11.0 thou/uL    RBC 4.01 (L) 4.40 - 6.20 mill/uL    Hemoglobin 12.5 (L) 14.0 - 18.0 g/dL    Hematocrit 37.3 (L) 40.0 - 54.0 %    MCV 93 80 - 100 fL    MCH 31.2 27.0 - 34.0 pg    MCHC 33.5 32.0 - 36.0 g/dL    RDW 13.2 11.0 - 14.5 %    Platelets 243 140 - 440 thou/uL    Mean Platelet Volume 10.4 8.5 - 12.5 fL    % Neutrophils 73 (H) 50 - 70 %    % Lymphocytes 15 (L) 20 - 40 %    % Monocytes 11 (H) 2 - 10 %    % Eosinophils 1 0 - 6 %    % Basophils 1 0 - 2 %    Neutrophils (Absolute) 4.0 2.0 - 7.7 thou/uL    Lymphs (Absolute) 0.8 0.8 - 4.4 thou/uL    Monocytes(Absolute) 0.6 0.0 - 0.9 thou/uL    Eos (Absolute) 0.0 0.0 - 0.4 thou/uL    Baso (Absolute) 0.0 0.0 - 0.2 thou/uL    Narrative    Test performed by:  [facility] [address] [place] [address]       If you have any questions, please call the clinic to make an appointment.    Sincerely,    Ervin Pruett MD

## 2018-02-06 NOTE — PROGRESS NOTES
Medication Management Note                                                       Isiah was referred by Dr. Can for pharmacy services for med management.    MEDICATION REVIEW:  Discussed all medication indications, dosage and effectiveness, adverse effects, and adherence with patient/caregiver.    Pt had meds with them: no  Pt had med list with them: no  Pt was knowledgeable about meds: yes  Medications set up by: self  Medications administered by someone else (e.g., LTCF): No  Pt uses a medication box or automated dispenser: no- he has a system with his bottles lined up that he has used for a long time and it works for him  Called pharmacy to obtain or clarify med list:  no  Called HHN or LTCF to obtain or clarify med list:  no    Medication Discrepancies  Medications on EMR med list that pt is NOT taking:  none  Medications pt IS taking that are NOT on EMR med list (e.g., from specialist, hospital): yes, miralax (Rx in hospital)  OTC meds/ dietary supplements pt taking on own that are NOT on EMR med list:  none  Dosage listed differently than how patient is taking: yes, taking Arnuity Ellipta 2 puffs at a time  Frequency listed differently than how patient is taking: yes, taking Arnuity Ellipta only PRN, not regularly, using albuterol 1 puff once daily regularly  Duplicate medication on list (two occurrences of the same medication):  none  TOTAL NUMBER OF MEDICATION DISCREPANCIES:  4    Subjective                                                       Patient reports the following problems or concerns with their medications:  yes, Miralax not covered so had to get it OTC; said it was very expensive  Patient reports the following adverse reactions to medications:  none  Pt reports missing doses:  never  Additional subjective information (e.g., reason for visit, frequency of PRNs, reasons meds were D/C ed):    He was confused and switched which inhaler was his controller vs rescue.  Using the Proair as a  controller 1 puff once daily (plus 2 puffs as needed 2-3x/wk) and using the Arnuity 2 puffs as needed (has symptoms and uses about 2-3x/wk)    Objective                                                           Patient Active Problem List   Diagnosis     Chronic Reflux esophagitis     Depressive disorder, not elsewhere classified     Diastolic Dysfunction      Fainting (Syncope)     smoking      Prediabetes     Asthma     Hyperlipidemia     Obese     Adenocarcinoma of rectum (H)     Pulmonary embolism and infarction (H)     Esophageal reflux     Hypothyroidism due to acquired atrophy of thyroid     ACP (advance care planning)     CKD (chronic kidney disease) stage 3, GFR 30-59 ml/min       Current Outpatient Prescriptions   Medication Sig Dispense Refill     atorvastatin (LIPITOR) 80 MG tablet Take 1 tablet (80 mg) by mouth daily 90 tablet 1     polyethylene glycol (MIRALAX) powder Take 1 capful every other day 510 g 1     levothyroxine (SYNTHROID/LEVOTHROID) 200 MCG tablet Take one tablet daily.  Also take a 25 mcg tablet daily for a total dose of 225 mcg per day 90 tablet 1     fluticasone furoate (ARNUITY ELLIPTA) 200 MCG/ACT inhalation powder Inhale 1 puff into the lungs daily 3 Inhaler 3     levothyroxine (SYNTHROID/LEVOTHROID) 25 MCG tablet Take 1 tablet (25 mcg) by mouth daily 90 tablet 3     FLUoxetine (PROZAC) 40 MG capsule Take 1 capsule (40 mg) by mouth daily 90 capsule 1     Aspirin 81 MG TBDP Take 1 tablet by mouth daily 100 tablet 3     albuterol (PROAIR HFA/PROVENTIL HFA/VENTOLIN HFA) 108 (90 BASE) MCG/ACT Inhaler Inhale 2 puffs into the lungs every 6 hours 1 Inhaler 11     famotidine (PEPCID) 40 MG tablet Take 1 tablet (40 mg) by mouth At Bedtime 90 tablet 3     [DISCONTINUED] levothyroxine (SYNTHROID/LEVOTHROID) 200 MCG tablet Take one tablet daily.  Also take a 25 mcg tablet daily for a total dose of 225 mcg per day 90 tablet 1     [DISCONTINUED] levothyroxine (SYNTHROID/LEVOTHROID) 25 MCG tablet  Take 1 tablet (25 mcg) by mouth daily 90 tablet 3     BUTT PASTE - REGULAR (DR LOVE POOP GOOP BUTT PASTE FORMULA) Please combine stomahesive and nystatin per pharmacy recipe.  Apply up to 6x daily PRN for rectal irritation. 105 g 1     [DISCONTINUED] FLUoxetine (PROZAC) 40 MG capsule Take 1 capsule (40 mg) by mouth daily 90 capsule 1     [DISCONTINUED] albuterol (PROAIR HFA/PROVENTIL HFA/VENTOLIN HFA) 108 (90 BASE) MCG/ACT Inhaler Inhale 2 puffs into the lungs every 6 hours 1 Inhaler 11     [DISCONTINUED] atorvastatin (LIPITOR) 80 MG tablet Take 1 tablet (80 mg) by mouth daily 90 tablet 1     ACETAMINOPHEN PO Take 500 mg by mouth         Social History   Substance Use Topics     Smoking status: Current Every Day Smoker     Types: Pipe     Smokeless tobacco: Never Used      Comment: has been cut down a lot, is on the nicotine patch     Alcohol use 0.0 oz/week     0 Standard drinks or equivalent per week         Lab Results   Component Value Date    A1C 5.7 03/22/2017    A1C 5.5 04/09/2014     Last Basic Metabolic Panel:  Lab Results   Component Value Date    .7 03/22/2017      Lab Results   Component Value Date    POTASSIUM 3.8 03/22/2017     Lab Results   Component Value Date    CHLORIDE 105.4 03/22/2017     Lab Results   Component Value Date    ABDIRASHID 9.9 03/22/2017     Lab Results   Component Value Date    CO2 26.7 03/22/2017     Lab Results   Component Value Date    BUN 27.0 03/22/2017     Lab Results   Component Value Date    CR 1.4 03/22/2017     Lab Results   Component Value Date    .9 03/22/2017       In the hospital, his WBC count dropped in 1 day: 7.1 on 1/31, then 2.9 on 2/1.      BP Readings from Last 3 Encounters:   02/06/18 147/75   10/09/17 144/74   04/13/17 136/76       The 10-year ASCVD risk score (Richmondfrancisco javier KING Jr, et al., 2013) is: 36.3%    Values used to calculate the score:      Age: 72 years      Sex: Male      Is Non- : No      Diabetic: No      Tobacco smoker: Yes    "   Systolic Blood Pressure: 147 mmHg      Is BP treated: No      HDL Cholesterol: 36.4 mg/dL      Total Cholesterol: 249.3 mg/dL    PHQ-9 score:    PHQ-9 SCORE 4/13/2017   Total Score -   Total Score 7         Assessment                                                       SBO -  controlled     Miralax is expensive for him- he got brand name b/c he didn't know there was generic and was told to get \"Miralax\"    Asthma      Using his inhalers incorrectly (using albuterol as controller & Arnuity as rescue)    Low WBC      Day of D/C, WBC was 2.9      Plan/Recommendations                                                       Updated medication list in the EMR; deleted meds patient no longer taking and added meds patient is now taking, and changed doses where there was a dose discrepancy.    All medications were reviewed and found to be indicated, effective, safe and convenient/ affordable unless drug therapy problem(s) was/were identified, as are described below.      Completed at this visit     SBO    Educated pt that there is generic Miralax, so he can get this for much less expensive from now on    Asthma    Educated pt on proper use of inhalers- Arnuity is a controller and should never be used as a rescue, and albuterol should only be used as a rescue and never as a controller.  Pt verbalized understanding.    Low WBC    Recommend check WBC today; informed Dr. Pruett.    Options for treatment and/or follow-up care were reviewed with the patient.  Isiah was engaged and actively involved in the decision making process, verbalized understanding of the options discussed, and was satisfied with the final plan.    Follow-up                                                       Patient was provided with written instructions/medication list via AVS.     Dr. Can was provided the recommendations above  in clinic today and Dr. Can was available for supervision during this visit and is the authorizing " prescriber for this visit through the pharmacist collaborative practice agreement.    Therese Robles, JasonD      Drug therapy problems identified  1. Med: miralax - Compliance - cost is a barrier - Resolution: Educate patient on generic; resolved  2. Med: Arnuity - Compliance  - using incorrectly - Resolution: Educate patient; resolved   3. Med: albuterol - Compliance - using incorrectly - Resolution: Educate patient; resolved     # of medical conditions addressed: 2  # of medications addressed: 9  # of medication discrepancies identified: 4  # of DTP identified: 3  Time spent: 20 minutes  Level of service: 3NC

## 2018-02-07 DIAGNOSIS — E78.5 HYPERLIPIDEMIA LDL GOAL <130: ICD-10-CM

## 2018-02-07 RX ORDER — ATORVASTATIN CALCIUM 80 MG/1
80 TABLET, FILM COATED ORAL DAILY
Qty: 90 TABLET | Refills: 1 | Status: SHIPPED | OUTPATIENT
Start: 2018-02-07 | End: 2019-03-02

## 2018-02-07 NOTE — PROGRESS NOTES
Your wbc count was low in the hospital.  It is now normal.  We do not need to follow this up further

## 2018-02-27 ENCOUNTER — MEDICAL CORRESPONDENCE (OUTPATIENT)
Dept: HEALTH INFORMATION MANAGEMENT | Facility: CLINIC | Age: 73
End: 2018-02-27

## 2018-02-27 ENCOUNTER — TRANSFERRED RECORDS (OUTPATIENT)
Dept: HEALTH INFORMATION MANAGEMENT | Facility: CLINIC | Age: 73
End: 2018-02-27

## 2018-05-22 ENCOUNTER — MEDICAL CORRESPONDENCE (OUTPATIENT)
Dept: HEALTH INFORMATION MANAGEMENT | Facility: CLINIC | Age: 73
End: 2018-05-22

## 2018-05-22 ENCOUNTER — OFFICE VISIT (OUTPATIENT)
Dept: FAMILY MEDICINE | Facility: CLINIC | Age: 73
End: 2018-05-22
Payer: COMMERCIAL

## 2018-05-22 VITALS
OXYGEN SATURATION: 95 % | HEART RATE: 81 BPM | SYSTOLIC BLOOD PRESSURE: 163 MMHG | TEMPERATURE: 97.5 F | BODY MASS INDEX: 34.27 KG/M2 | RESPIRATION RATE: 20 BRPM | WEIGHT: 201.2 LBS | DIASTOLIC BLOOD PRESSURE: 90 MMHG

## 2018-05-22 DIAGNOSIS — J45.40 MODERATE PERSISTENT ASTHMA, UNCOMPLICATED: ICD-10-CM

## 2018-05-22 DIAGNOSIS — E78.5 HYPERLIPIDEMIA LDL GOAL <100: ICD-10-CM

## 2018-05-22 DIAGNOSIS — E03.4 HYPOTHYROIDISM DUE TO ACQUIRED ATROPHY OF THYROID: ICD-10-CM

## 2018-05-22 DIAGNOSIS — B37.2 YEAST INFECTION OF THE SKIN: ICD-10-CM

## 2018-05-22 DIAGNOSIS — Z86.2 HISTORY OF ANEMIA: ICD-10-CM

## 2018-05-22 DIAGNOSIS — J44.1 COPD EXACERBATION (H): Primary | ICD-10-CM

## 2018-05-22 DIAGNOSIS — N18.30 CKD (CHRONIC KIDNEY DISEASE) STAGE 3, GFR 30-59 ML/MIN (H): ICD-10-CM

## 2018-05-22 LAB
ALBUMIN SERPL BCP-MCNC: 4.1 G/DL (ref 3.5–5)
ALP SERPL-CCNC: 75 U/L (ref 45–120)
ALT SERPL W/O P-5'-P-CCNC: 11 U/L (ref 0–45)
ANION GAP SERPL CALCULATED.3IONS-SCNC: 11 MMOL/L (ref 5–18)
AST SERPL-CCNC: 19 U/L (ref 0–40)
BASOPHILS # BLD AUTO: 0 THOU/UL (ref 0–0.2)
BASOPHILS NFR BLD AUTO: 1 % (ref 0–2)
BILIRUB SERPL-MCNC: 0.6 MG/DL (ref 0–1)
BUN SERPL-MCNC: 24 MG/DL (ref 8–28)
CALCIUM SERPL-MCNC: 9.3 MG/DL (ref 8.5–10.5)
CHLORIDE SERPL-SCNC: 106 MMOL/L (ref 98–107)
CHOLEST SERPL-MCNC: 220 MG/DL
CO2 SERPL-SCNC: 22 MMOL/L (ref 22–31)
CREAT SERPL-MCNC: 1.45 MG/DL (ref 0.7–1.3)
EOSINOPHIL # BLD AUTO: 0.1 THOU/UL (ref 0–0.4)
EOSINOPHIL NFR BLD AUTO: 3 % (ref 0–6)
ERYTHROCYTE [DISTWIDTH] IN BLOOD BY AUTOMATED COUNT: 13.1 % (ref 11–14.5)
FASTING?: NO
GLUCOSE SERPL-MCNC: 106 MG/DL (ref 70–125)
HCT VFR BLD AUTO: 41 % (ref 40–54)
HDLC SERPL-MCNC: 31 MG/DL
HGB BLD-MCNC: 13.5 G/DL (ref 14–18)
LDLC SERPL CALC-MCNC: 171 MG/DL
LYMPHOCYTES # BLD AUTO: 1 THOU/UL (ref 0.8–4.4)
LYMPHOCYTES NFR BLD AUTO: 20 % (ref 20–40)
MCH RBC QN AUTO: 30.8 PG (ref 27–34)
MCHC RBC AUTO-ENTMCNC: 32.9 G/DL (ref 32–36)
MCV RBC AUTO: 93 FL (ref 80–100)
MONOCYTES # BLD AUTO: 0.5 THOU/UL (ref 0–0.9)
MONOCYTES NFR BLD AUTO: 10 % (ref 2–10)
NEUTROPHILS # BLD AUTO: 3.2 THOU/UL (ref 2–7.7)
NEUTROPHILS NFR BLD AUTO: 67 % (ref 50–70)
PLATELET # BLD AUTO: 263 THOU/UL (ref 140–440)
PMV BLD AUTO: 10.6 FL (ref 8.5–12.5)
POTASSIUM SERPL-SCNC: 4.4 MMOL/L (ref 3.5–5)
PROT SERPL-MCNC: 7.4 G/DL (ref 6–8)
RBC # BLD AUTO: 4.39 MILL/UL (ref 4.4–6.2)
SODIUM SERPL-SCNC: 139 MMOL/L (ref 136–145)
TRIGL SERPL-MCNC: 89 MG/DL
TSH SERPL DL<=0.05 MIU/L-ACNC: 2.97 UIU/ML (ref 0.3–5)
WBC # BLD AUTO: 4.8 THOU/UL (ref 4–11)

## 2018-05-22 RX ORDER — LEVOFLOXACIN 500 MG/1
500 TABLET, FILM COATED ORAL DAILY
Qty: 7 TABLET | Refills: 0 | Status: SHIPPED | OUTPATIENT
Start: 2018-05-22 | End: 2019-04-26

## 2018-05-22 RX ORDER — ALBUTEROL SULFATE 90 UG/1
2 AEROSOL, METERED RESPIRATORY (INHALATION) EVERY 6 HOURS
Qty: 1 INHALER | Refills: 11 | Status: SHIPPED | OUTPATIENT
Start: 2018-05-22 | End: 2019-11-06

## 2018-05-22 RX ORDER — PREDNISONE 20 MG/1
40 TABLET ORAL DAILY
Qty: 14 TABLET | Refills: 0 | Status: SHIPPED | OUTPATIENT
Start: 2018-05-22 | End: 2018-05-29

## 2018-05-22 RX ORDER — LEVOTHYROXINE SODIUM 25 UG/1
25 TABLET ORAL DAILY
Qty: 90 TABLET | Refills: 3 | Status: SHIPPED | OUTPATIENT
Start: 2018-05-22 | End: 2018-07-13

## 2018-05-22 NOTE — PATIENT INSTRUCTIONS
COPD Flare    You have had a flare-up of your COPD.  COPD, or chronic obstructive pulmonary disease, is a common lung disease. It causes your airways to become irritated and narrower. This makes it harder for you to breathe. Emphysema and chronic bronchitis are both types of COPD. This is a chronic condition, which means you always have it. Sometimes it gets worse. When this happens, it is called a flare-up.  Symptoms of COPD  People with COPD may have symptoms most of the time. In a flare-up, your symptoms get worse. These symptoms may mean you are having a flare-up:    Shortness of breath, shallow or rapid breathing, or wheezing that gets worse    Lung infection    Cough that gets worse    More mucus, thicker mucus or mucus of a different color    Tiredness, decreased energy, or trouble doing your usual activities    Fever    Chest tightness    Your symptoms don t get better even when you use your usual medicines, inhalers, and nebulizer    Trouble talking    You feel confused  Causes of flare-ups  Unfortunately, a flare-up can happen even though you did everything right, and you followed your doctor s instructions. Some causes of flare-ups are:    Smoking or secondhand smoke    Colds, the flu, or respiratory infections    Air pollution    Sudden change in the weather    Dust, irritating chemicals, or strong fumes    Not taking your medicines as prescribed  Home care  Here are some things you can do at home to treat a flare-up:    Try not to panic. This makes it harder to breathe, and keeps you from doing the right things.    Don t smoke or be around others who are smoking.    Try to drink more fluids than usual during a flare-up, unless your doctor has told you not to because of heart and kidney problems. More fluids can help loosen the mucus.    Use your inhalers and nebulizer, if you have one, as you have been told to.    If you were given antibiotics, take them until they are used up or your doctor tells you  to stop. It s important to finish the antibiotics, even though you feel better. This will make sure the infection has cleared.    If you were given prednisone or another steroid, finish it even if you feel better.  Preventing a flare-up  Even though flare-ups happen, the best way to treat one is to prevent it before it starts. Here are some pointers:    Don t smoke or be around others who are smoking.    Take your medicines as you have been told.    Talk with your doctor about getting a flu shot every year. Also find out if you need a pneumonia shot.    If there is a weather advisory warning to stay indoors, try to stay inside when possible.    Try to eat healthy and get plenty of sleep.    Try to avoid things that usually set you off, like dust, chemical fumes, hairsprays, or strong perfumes.  Follow-up care  Follow up with your healthcare provider, or as advised.  If a culture was done, you will be told if your treatment needs to be changed. You can call as directed for the results.  If X-rays were done, you will be notified of any new findings that may affect your care.  Call 911  Call 911 if any of these occur:    You have trouble breathing    You feel confused or it s difficult to wake you up    You faint or lose consciousness    You have a rapid heart rate    You have new pain in your chest, arm, shoulder, neck or upper back  When to seek medical advice  Call your healthcare provider right away if any of these occur:    Wheezing or shortness of breath gets worse    You need to use your inhalers more often than usual without relief    Fever of 100.4 F (38 C) or higher, or as directed by your healthcare provider    Coughing up lots of dark-colored or bloody mucus (sputum)    Chest pain with each breath    You do not start to get better within 24 hours    Swelling of your ankles gets worse    Dizziness or weakness  Date Last Reviewed: 9/1/2016 2000-2017 [employer]. [address] [facility],  [place] [address]. All rights reserved. This information is not intended as a substitute for professional medical care. Always follow your healthcare professional's instructions.

## 2018-05-22 NOTE — LETTER
May 23, 2018      Michael D McArdle  [address]  [facility] [facility] [address]        Dear Isiah,    Please see below for your test results.   Your kidney tests are about the same as they were a year ago.     Your cholesterol is not good.  Your cholesterol levels, together with your age and smoking, give you a 43% chance of a heart attack or stroke in the next ten years.  Please try to be consistent in taking the Lipitor (which helps bring down your cholesterol).     The 10-year ASCVD risk score (Do FERNANDO Jr, et al., 2013) is: 43.1%     Values used to calculate the score:       Age: 73 years       Sex: Male       Is Non- : No       Diabetic: No       Tobacco smoker: Yes       Systolic Blood Pressure: 163 mmHg       Is BP treated: No       HDL Cholesterol: 31 mg/dL       Total Cholesterol: 220 mg/dL     Your thyroid test is good.     You have a very mild anemia which is improved over the past 3 months     Resulted Orders   TSH  Sensitive ([facility])   Result Value Ref Range    TSH 2.97 0.30 - 5.00 uIU/mL    Narrative    Test performed by:  [facility]  [address] [place] [address]   CBC w/ Diff and Plt ([facility])   Result Value Ref Range    WBC 4.8 4.0 - 11.0 thou/uL    RBC 4.39 (L) 4.40 - 6.20 mill/uL    Hemoglobin 13.5 (L) 14.0 - 18.0 g/dL    Hematocrit 41.0 40.0 - 54.0 %    MCV 93 80 - 100 fL    MCH 30.8 27.0 - 34.0 pg    MCHC 32.9 32.0 - 36.0 g/dL    RDW 13.1 11.0 - 14.5 %    Platelets 263 140 - 440 thou/uL    Mean Platelet Volume 10.6 8.5 - 12.5 fL    % Neutrophils 67 50 - 70 %    % Lymphocytes 20 20 - 40 %    % Monocytes 10 2 - 10 %    % Eosinophils 3 0 - 6 %    % Basophils 1 0 - 2 %    Neutrophils (Absolute) 3.2 2.0 - 7.7 thou/uL    Lymphs (Absolute) 1.0 0.8 - 4.4 thou/uL    Monocytes(Absolute) 0.5 0.0 - 0.9 thou/uL    Eos (Absolute) 0.1 0.0 - 0.4 thou/uL    Baso (Absolute) 0.0 0.0 - 0.2 thou/uL    Narrative    Test performed by:  [facility]  [address] [place] [address]   Lipid [place] ([facility]) - Results > 1 hr   Result Value Ref Range    Cholesterol 220 (H) <=199 mg/dL    Triglycerides 89 <=149 mg/dL    HDL Cholesterol 31 (L) >=40 mg/dL    LDL Cholesterol Calculated 171 (H) <=129 mg/dL    Fasting? No     Narrative    Test performed by:  [facility]  [address] [place] [address]   Comprehensive Metabolic ([facility]) - Results > 1 hr   Result Value Ref Range    Sodium 139 136 - 145 mmol/L    Potassium 4.4 3.5 - 5.0 mmol/L    Chloride 106 98 - 107 mmol/L    CO2, Total 22 22 - 31 mmol/L    Anion Gap 11 5 - 18 mmol/L    Glucose 106 70 - 125 mg/dL    Urea Nitrogen 24 8 - 28 mg/dL    Creatinine 1.45 (H) 0.70 - 1.30 mg/dL    GFR Estimate If Black 58 (L) >60 mL/min/1.73m2    GFR Estimate 48 (L) >60 mL/min/1.73m2    Bilirubin Total 0.6 0.0 - 1.0 mg/dL    Calcium 9.3 8.5 - 10.5 mg/dL    Protein Total 7.4 6.0 - 8.0 g/dL    Albumin 4.1 3.5 - 5.0 g/dL    Alkaline Phosphatase 75 45 - 120 U/L    AST (SGOT) 19 0 - 40 U/L    ALT (SGPT) 11 0 - 45 U/L    Narrative    Test performed by:  [facility]  [address] [place] [address]  Fasting Glucose reference range is 70-99 mg/dL per  American Diabetes Association (ADA) guidelines.       If you have any questions, please call the clinic to make an appointment.    Sincerely,    Ervin Pruett MD

## 2018-05-22 NOTE — PROGRESS NOTES
"There are no exam notes on file for this visit.  Chief Complaint   Patient presents with     Cough     started 2 weeks ago, plus he is SOB as well      Hemorrhoids     his bottom hurts so bad he feels like it is hemorrhoids     Blood pressure 163/90, pulse 81, temperature 97.5  F (36.4  C), temperature source Oral, resp. rate 20, weight 201 lb 3.2 oz (91.3 kg), SpO2 95 %.    Patient comes in today with 2 primary concerns.    Patient's first concern is that he has had a cough.  This is been present over the past 3 weeks.  He believes it is gradually worsening.  Associated with this, is shortness of breath with activity.  He states that his cough produces either green or dark brown sputum.  He has not had fever or chills.  He has noticed wheezing.  He notes that sometimes she coughs so much that his stomach muscles hurt.  The cough seems to be worse at night.    This patient's second concern is that he believes he has hemorrhoids.  He does not want me to examine him today, but would like a prescription for butt paste.  He tells me that this is the only thing that is been effective for him in the past.  He does note hard stools, and has been using his MiraLAX only every other day.    He has a follow-up appointment with his oncologist the first of the month.  He sees the oncologist every 6 months.  He tells me that his last CT scan was 1 January 2018.  And it was \"fine\".    The patient is currently continuing to live in assisted living.  He uses his walker all of the time now he has joint meals together, but recently has been having brought up to his room because he does not want to give this illness to other people at his living situation.    The patient has not had any falls in the last 2 months      Objective: This is a well-nourished, well-developed, male who is in no acute distress.  His vital signs are as noted above, and a refill of reveal an elevated blood pressure.  His heart has a regular rate and rhythm " without murmurs, rubs, or gallops.  His lungs are clear to auscultation.  There are no wheezes, rales, or rhonchi.  Breath sounds are distant.  He does not have peripheral edema.  His abdomen is soft with positive bowel sounds.  There is no tenderness, guarding, or rebound.    Assessment: #1 exacerbation of COPD in a patient with penicillin allergies #2 multiple medical problems including CKD, hypothyroidism, #3 bleeding and perirectal pain consistent with hemorrhoids    Plan: The patient's last PFTs were in 2014.  It showed only mild disease at that time.  Patient does continue to smoke.  The he is not interested in further interventions to try and decrease his smoking.  I do believe this illness is consistent with an exacerbation of COPD.  + GOLD criteria.  Therefore I will start him both on prednisone and levofloxacin.  I would like the patient to have short-term follow-up with me next week.    For his hemorrhoids the patient will increase his MiraLAX to daily use and to try the butt paste.  Again we will follow this up next week.  Patient is to call or return to clinic if he has any further questions or concerns.    COPD exacerbation (H)  -     predniSONE (DELTASONE) 20 MG tablet; Take 2 tablets (40 mg) by mouth daily for 7 days  -     levofloxacin (LEVAQUIN) 500 MG tablet; Take 1 tablet (500 mg) by mouth daily    Hypothyroidism due to acquired atrophy of thyroid  -     levothyroxine (SYNTHROID/LEVOTHROID) 25 MCG tablet; Take 1 tablet (25 mcg) by mouth daily  -     TSH  Sensitive ([facility])    Yeast infection of the skin    Moderate persistent asthma, uncomplicated  -     albuterol (PROAIR HFA/PROVENTIL HFA/VENTOLIN HFA) 108 (90 Base) MCG/ACT Inhaler; Inhale 2 puffs into the lungs every 6 hours  -     fluticasone furoate (ARNUITY ELLIPTA) 200 MCG/ACT inhalation powder; Inhale 1 puff into the lungs daily    CKD (chronic kidney disease) stage 3, GFR 30-59 ml/min  -     Comprehensive Metabolic Panel  ([place])    Hyperlipidemia LDL goal <100  -     Comprehensive Metabolic Panel ([place])  -     Lipid [place] ([facility]) - Results > 1 hr    History of anemia  -     CBC w/ Diff and Plt ([facility])    The patient was actively involved in the decision making process, and all the questions were answered to their satisfaction prior to leaving.

## 2018-05-22 NOTE — MR AVS SNAPSHOT
After Visit Summary   5/22/2018    Michael D McArdle    MRN: [id number]           Patient Information     Date Of Birth          1945        Visit Information        Provider Department      5/22/2018 8:00 AM Ervin Pruett MD [facility]        Today's Diagnoses     COPD exacerbation (H)    -  1    Hypothyroidism due to acquired atrophy of thyroid        Yeast infection of the skin        Moderate persistent asthma, uncomplicated        CKD (chronic kidney disease) stage 3, GFR 30-59 ml/min        Hyperlipidemia LDL goal <100        History of anemia          Care Instructions      COPD Flare    You have had a flare-up of your COPD.  COPD, or chronic obstructive pulmonary disease, is a common lung disease. It causes your airways to become irritated and narrower. This makes it harder for you to breathe. Emphysema and chronic bronchitis are both types of COPD. This is a chronic condition, which means you always have it. Sometimes it gets worse. When this happens, it is called a flare-up.  Symptoms of COPD  People with COPD may have symptoms most of the time. In a flare-up, your symptoms get worse. These symptoms may mean you are having a flare-up:    Shortness of breath, shallow or rapid breathing, or wheezing that gets worse    Lung infection    Cough that gets worse    More mucus, thicker mucus or mucus of a different color    Tiredness, decreased energy, or trouble doing your usual activities    Fever    Chest tightness    Your symptoms don t get better even when you use your usual medicines, inhalers, and nebulizer    Trouble talking    You feel confused  Causes of flare-ups  Unfortunately, a flare-up can happen even though you did everything right, and you followed your doctor s instructions. Some causes of flare-ups are:    Smoking or secondhand smoke    Colds, the flu, or respiratory infections    Air pollution    Sudden change in the weather    Dust, irritating chemicals, or strong  fumes    Not taking your medicines as prescribed  Home care  Here are some things you can do at home to treat a flare-up:    Try not to panic. This makes it harder to breathe, and keeps you from doing the right things.    Don t smoke or be around others who are smoking.    Try to drink more fluids than usual during a flare-up, unless your doctor has told you not to because of heart and kidney problems. More fluids can help loosen the mucus.    Use your inhalers and nebulizer, if you have one, as you have been told to.    If you were given antibiotics, take them until they are used up or your doctor tells you to stop. It s important to finish the antibiotics, even though you feel better. This will make sure the infection has cleared.    If you were given prednisone or another steroid, finish it even if you feel better.  Preventing a flare-up  Even though flare-ups happen, the best way to treat one is to prevent it before it starts. Here are some pointers:    Don t smoke or be around others who are smoking.    Take your medicines as you have been told.    Talk with your doctor about getting a flu shot every year. Also find out if you need a pneumonia shot.    If there is a weather advisory warning to stay indoors, try to stay inside when possible.    Try to eat healthy and get plenty of sleep.    Try to avoid things that usually set you off, like dust, chemical fumes, hairsprays, or strong perfumes.  Follow-up care  Follow up with your healthcare provider, or as advised.  If a culture was done, you will be told if your treatment needs to be changed. You can call as directed for the results.  If X-rays were done, you will be notified of any new findings that may affect your care.  Call 911  Call 911 if any of these occur:    You have trouble breathing    You feel confused or it s difficult to wake you up    You faint or lose consciousness    You have a rapid heart rate    You have new pain in your chest, arm, shoulder,  neck or upper back  When to seek medical advice  Call your healthcare provider right away if any of these occur:    Wheezing or shortness of breath gets worse    You need to use your inhalers more often than usual without relief    Fever of 100.4 F (38 C) or higher, or as directed by your healthcare provider    Coughing up lots of dark-colored or bloody mucus (sputum)    Chest pain with each breath    You do not start to get better within 24 hours    Swelling of your ankles gets worse    Dizziness or weakness  Date Last Reviewed: 9/1/2016 2000-2017 [employer]. [address] [place] [address]. All rights reserved. This information is not intended as a substitute for professional medical care. Always follow your healthcare professional's instructions.                Follow-ups after your visit        Who to contact     Please call your clinic at [phone] to:    Ask questions about your health    Make or cancel appointments    Discuss your medicines    Learn about your test results    Speak to your doctor            Additional Information About Your Visit        [employer] Information     [employer] gives you secure access to your electronic health record. If you see a primary care provider, you can also send messages to your care team and make appointments. If you have questions, please call your primary care clinic.  If you do not have a primary care provider, please call [phone] and they will assist you.      [employer] is an electronic gateway that provides easy, online access to your medical records. With [employer], you can request a clinic appointment, read your test results, renew a prescription or communicate with your care team.     To access your existing account, please contact your [facility] or call [phone] for assistance.        Care EveryWhere ID     This is your Care EveryWhere ID. This could be used by other organizations to access your  [facility] medical records  [id number]        Your Vitals Were     Pulse Temperature Respirations Pulse Oximetry BMI (Body Mass Index)       81 97.5  F (36.4  C) (Oral) 20 95% 34.27 kg/m2        Blood Pressure from Last 3 Encounters:   05/22/18 163/90   02/06/18 147/75   10/09/17 144/74    Weight from Last 3 Encounters:   05/22/18 201 lb 3.2 oz (91.3 kg)   02/06/18 194 lb (88 kg)   10/09/17 195 lb 6.4 oz (88.6 kg)              We Performed the Following     CBC w/ Diff and Plt ([facility])     Comprehensive Metabolic Panel ([place])     Lipid [place] ([facility]) - Results > 1 hr     [facility]          Today's Medication Changes          These changes are accurate as of 5/22/18  8:16 AM.  If you have any questions, ask your nurse or doctor.               Start taking these medicines.        Dose/Directions    levofloxacin 500 MG tablet   Commonly known as:  LEVAQUIN   Used for:  COPD exacerbation (H)   Started by:  Ervin Pruett MD        Dose:  500 mg   Take 1 tablet (500 mg) by mouth daily   Quantity:  7 tablet   Refills:  0       predniSONE 20 MG tablet   Commonly known as:  DELTASONE   Used for:  COPD exacerbation (H)   Started by:  Ervin Pruett MD        Dose:  40 mg   Take 2 tablets (40 mg) by mouth daily for 7 days   Quantity:  14 tablet   Refills:  0            Where to get your medicines      These medications were sent to [facility] [address] [place] [address] [address] AT [facility] [address]  [address], [facility] [address]     Phone:  [phone]     albuterol 108 (90 Base) MCG/ACT Inhaler    fluticasone furoate 200 MCG/ACT inhalation powder    levofloxacin 500 MG tablet    levothyroxine 25 MCG tablet    predniSONE 20 MG tablet                Primary Care Provider Office Phone # Fax #    Ervin Pruett [phone]       [address] [place] [address]        Equal Access to Services     LA ALMEIDA AH: Duglas  julieta Ramirez, wadonavanda luqadaha, qaybta kaalgeorgette trevino, omar idiin hayjaskarancat biswaschristinakaya sales kris. So [facility] [phone].    ATENCIÓN: Si habla español, tiene a garcia disposición servicios gratuitos de asistencia lingüística. Abiolaame al [phone].    We comply with applicable federal civil rights laws and [place] laws. We do not discriminate on the basis of race, color, national origin, age, disability, sex, sexual orientation, or gender identity.            Thank you!     Thank you for choosing [facility]  for your care. Our goal is always to provide you with excellent care. Hearing back from our patients is one way we can continue to improve our services. Please take a few minutes to complete the written survey that you may receive in the mail after your visit with us. Thank you!             Your Updated Medication List - Protect others around you: Learn how to safely use, store and throw away your medicines at www.disposemymeds.org.          This list is accurate as of 5/22/18  8:16 AM.  Always use your most recent med list.                   Brand Name Dispense Instructions for use Diagnosis    ACETAMINOPHEN PO      Take 500 mg by mouth        albuterol 108 (90 Base) MCG/ACT Inhaler    PROAIR HFA/PROVENTIL HFA/VENTOLIN HFA    1 Inhaler    Inhale 2 puffs into the lungs every 6 hours    Moderate persistent asthma, uncomplicated       Aspirin 81 MG Tbdp     100 tablet    Take 1 tablet by mouth daily    Prediabetes       atorvastatin 80 MG tablet    LIPITOR    90 tablet    Take 1 tablet (80 mg) by mouth daily    Hyperlipidemia LDL goal <130       BUTT PASTE - REGULAR    DR LOVE POOP GOOP BUTT PASTE FORMULA    105 g    Please combine stomahesive and nystatin per pharmacy recipe.  Apply up to 6x daily PRN for rectal irritation.    Yeast infection of the skin       famotidine 40 MG tablet    PEPCID    90 tablet    Take 1 tablet (40 mg) by mouth At Bedtime    Gastroesophageal reflux disease without  esophagitis       FLUoxetine 40 MG capsule    PROzac    90 capsule    Take 1 capsule (40 mg) by mouth daily    Depression with anxiety       fluticasone furoate 200 MCG/ACT inhalation powder    ARNUITY ELLIPTA    3 Inhaler    Inhale 1 puff into the lungs daily    Moderate persistent asthma, uncomplicated       levofloxacin 500 MG tablet    LEVAQUIN    7 tablet    Take 1 tablet (500 mg) by mouth daily    COPD exacerbation (H)       * levothyroxine 200 MCG tablet    SYNTHROID/LEVOTHROID    90 tablet    Take one tablet daily.  Also take a 25 mcg tablet daily for a total dose of 225 mcg per day    Hypothyroidism due to acquired atrophy of thyroid       * levothyroxine 25 MCG tablet    SYNTHROID/LEVOTHROID    90 tablet    Take 1 tablet (25 mcg) by mouth daily    Hypothyroidism due to acquired atrophy of thyroid       MIRALAX powder   Generic drug:  polyethylene glycol     510 g    Take 1 capful every other day        predniSONE 20 MG tablet    DELTASONE    14 tablet    Take 2 tablets (40 mg) by mouth daily for 7 days    COPD exacerbation (H)       * Notice:  This list has 2 medication(s) that are the same as other medications prescribed for you. Read the directions carefully, and ask your doctor or other care provider to review them with you.

## 2018-05-29 ENCOUNTER — RECORDS - HEALTHEAST (OUTPATIENT)
Dept: ADMINISTRATIVE | Facility: OTHER | Age: 73
End: 2018-05-29

## 2018-05-29 ENCOUNTER — OFFICE VISIT (OUTPATIENT)
Dept: FAMILY MEDICINE | Facility: CLINIC | Age: 73
End: 2018-05-29
Payer: COMMERCIAL

## 2018-05-29 VITALS
BODY MASS INDEX: 34.34 KG/M2 | SYSTOLIC BLOOD PRESSURE: 158 MMHG | DIASTOLIC BLOOD PRESSURE: 91 MMHG | OXYGEN SATURATION: 96 % | WEIGHT: 201.6 LBS | HEART RATE: 84 BPM | TEMPERATURE: 97.6 F

## 2018-05-29 DIAGNOSIS — J44.9 CHRONIC OBSTRUCTIVE PULMONARY DISEASE, UNSPECIFIED COPD TYPE (H): Primary | ICD-10-CM

## 2018-05-29 DIAGNOSIS — B37.2 YEAST INFECTION OF THE SKIN: ICD-10-CM

## 2018-05-29 DIAGNOSIS — E78.5 HYPERLIPIDEMIA, UNSPECIFIED HYPERLIPIDEMIA TYPE: ICD-10-CM

## 2018-05-29 NOTE — PROGRESS NOTES
There are no exam notes on file for this visit.  Chief Complaint   Patient presents with     RECHECK     f/u from last week he said he is feeling a lot better     Medication Request     needs a Rx for butt paste      Blood pressure (!) 158/91, pulse 84, temperature 97.6  F (36.4  C), temperature source Oral, weight 201 lb 9.6 oz (91.4 kg), SpO2 96 %.  Patient comes in today to follow-up his recent COPD exacerbation.    The patient states that he is much better.  His cough has improved.  He still gets shortness of breath as he walks up a hill, but the shortness of breath is also improved.  Patient notes that the hot weather is currently quite bothersome and makes his breathing worse, so he is spending a lot of time indoors with air conditioning.    The patient tells me that he would like a prescription for butt paste.  I sent one to his pharmacy at the time of the last visit.  Apparently there was some problem with this, so he would like another one.  This is printed for him and given to him today.    The patient would like me to review the results of his lab work from the last visit.    Objective: This is a well-developed, male who is in no acute distress.  His vital signs reveal an elevated blood pressure today.  His heart has a regular rate and rhythm.  There are no murmurs, rubs, or gallops.  His lungs are clear to auscultation.  There are no wheezes, rales, or rhonchi.  He does not have peripheral edema.  All of the laboratory work noted below was reviewed with the patient, and my result notes were read to him.        Results for orders placed or performed in visit on 05/22/18   TSH  Sensitive ([facility])   Result Value Ref Range    TSH 2.97 0.30 - 5.00 uIU/mL    Narrative    Test performed by:  [facility]  [address] [place] [address]   CBC w/ Diff and Plt ([facility])   Result Value Ref Range    WBC 4.8 4.0 - 11.0 thou/uL    RBC 4.39 (L) 4.40 - 6.20 mill/uL    Hemoglobin 13.5 (L) 14.0 -  18.0 g/dL    Hematocrit 41.0 40.0 - 54.0 %    MCV 93 80 - 100 fL    MCH 30.8 27.0 - 34.0 pg    MCHC 32.9 32.0 - 36.0 g/dL    RDW 13.1 11.0 - 14.5 %    Platelets 263 140 - 440 thou/uL    Mean Platelet Volume 10.6 8.5 - 12.5 fL    % Neutrophils 67 50 - 70 %    % Lymphocytes 20 20 - 40 %    % Monocytes 10 2 - 10 %    % Eosinophils 3 0 - 6 %    % Basophils 1 0 - 2 %    Neutrophils (Absolute) 3.2 2.0 - 7.7 thou/uL    Lymphs (Absolute) 1.0 0.8 - 4.4 thou/uL    Monocytes(Absolute) 0.5 0.0 - 0.9 thou/uL    Eos (Absolute) 0.1 0.0 - 0.4 thou/uL    Baso (Absolute) 0.0 0.0 - 0.2 thou/uL    Narrative    Test performed by:  [facility]  [address] [place] [address]   Lipid [place] ([facility]) - Results > 1 hr   Result Value Ref Range    Cholesterol 220 (H) <=199 mg/dL    Triglycerides 89 <=149 mg/dL    HDL Cholesterol 31 (L) >=40 mg/dL    LDL Cholesterol Calculated 171 (H) <=129 mg/dL    Fasting? No     Narrative    Test performed by:  [facility]  [address] [place] [address]   Comprehensive Metabolic ([facility]) - Results > 1 hr   Result Value Ref Range    Sodium 139 136 - 145 mmol/L    Potassium 4.4 3.5 - 5.0 mmol/L    Chloride 106 98 - 107 mmol/L    CO2, Total 22 22 - 31 mmol/L    Anion Gap 11 5 - 18 mmol/L    Glucose 106 70 - 125 mg/dL    Urea Nitrogen 24 8 - 28 mg/dL    Creatinine 1.45 (H) 0.70 - 1.30 mg/dL    GFR Estimate If Black 58 (L) >60 mL/min/1.73m2    GFR Estimate 48 (L) >60 mL/min/1.73m2    Bilirubin Total 0.6 0.0 - 1.0 mg/dL    Calcium 9.3 8.5 - 10.5 mg/dL    Protein Total 7.4 6.0 - 8.0 g/dL    Albumin 4.1 3.5 - 5.0 g/dL    Alkaline Phosphatase 75 45 - 120 U/L    AST (SGOT) 19 0 - 40 U/L    ALT (SGPT) 11 0 - 45 U/L    Narrative    Test performed by:  [facility] [address] [place] [address]  Fasting Glucose reference range is 70-99 mg/dL per  American Diabetes Association (ADA) guidelines.     Assessment: #1 COPD improved #2 yeast infection of the  skin #3 hyperlipidemia with very high ASCVD risk    Plan: Again I discussed the importance of adherence with the Lipitor to the patient.  We also discussed the imperative of stopping smoking.  He voices understanding and will make an attempt to do so.  He is not ready to set a quit date.  A written prescription for butt paste is given to him.  He has not had pulmonary function tests since 2014.  These are ordered today.    Patient will follow-up with me in 3-6 months, sooner as needed.      Chronic obstructive pulmonary disease, unspecified COPD type (H)  -     Pulmonary Function Test (PFT) Referral; Future    Yeast infection of the skin  -     BUTT PASTE - REGULAR (DR LOVE POOP GOOP BUTT PASTE FORMULA); Please combine stomahesive and nystatin per pharmacy recipe.  Apply up to 6x daily PRN for rectal irritation.    Hyperlipidemia, unspecified hyperlipidemia type      Patient Instructions   Stop at the  to get the appointment for the pulmonary function tests    Thank you for coming to [facility].    The phone number we will call with results is # [phone] (home) . If this is not the best number please call our clinic and change the number.  If you need any refills please call your pharmacy and they will contact us.  If you have any concerns about today's visit or wish to schedule another appointment please call our office during normal business hours [phone] (8-5:00 M-F)  If you have urgent medical concerns please call [phone] at any time of the day.  If you a medical emergency please call [phone]  Again thank you for choosing [facility] and please let us know how we can best partner with you to improve you and your family's health.    PFT (PULMONARY):  Orders and demographics faxed to [facility] and they will contact patient for scheduling.  PH:  [phone]   FAX:  [phone]  Jennifer Thompson  5/29/18        The patient was actively involved in the decision making process,  and all the questions were answered to their satisfaction prior to leaving.

## 2018-05-29 NOTE — PATIENT INSTRUCTIONS
Stop at the  to get the appointment for the pulmonary function tests    Thank you for coming to [facility].    The phone number we will call with results is # [phone] (home) . If this is not the best number please call our clinic and change the number.  If you need any refills please call your pharmacy and they will contact us.  If you have any concerns about today's visit or wish to schedule another appointment please call our office during normal business hours [phone] (8-5:00 M-F)  If you have urgent medical concerns please call [phone] at any time of the day.  If you a medical emergency please call [phone]  Again thank you for choosing [facility] and please let us know how we can best partner with you to improve you and your family's health.    PFT (PULMONARY):  Orders and demographics faxed to [facility] and they will contact patient for scheduling.  PH:  [phone]   FAX:  [phone]  Jennifer Thompson  5/29/18

## 2018-05-29 NOTE — MR AVS SNAPSHOT
After Visit Summary   5/29/2018    Michael D McArdle    MRN: [id number]           Patient Information     Date Of Birth          1945        Visit Information        Provider Department      5/29/2018 10:00 AM Ervin Pruett MD [facility]        Today's Diagnoses     Chronic obstructive pulmonary disease, unspecified COPD type (H)    -  1    Yeast infection of the skin          Care Instructions    Stop at the  to get the appointment for the pulmonary function tests    Thank you for coming to [facility].    The phone number we will call with results is # [phone] (home) . If this is not the best number please call our clinic and change the number.  If you need any refills please call your pharmacy and they will contact us.  If you have any concerns about today's visit or wish to schedule another appointment please call our office during normal business hours [phone] (8-5:00 M-F)  If you have urgent medical concerns please call [phone] at any time of the day.  If you a medical emergency please call [phone]  Again thank you for choosing [facility] and please let us know how we can best partner with you to improve you and your family's health.              Follow-ups after your visit        Additional Services     Pulmonary Function Test (PFT) Referral       Patient to stop at the [facility] Desk    Reason for Referral: COPD     needed: No  Language: English    May leave message on voicemail: Yes    ([place] Only) Referral should be tracked (Yes/No)?                  Your next 10 appointments already scheduled     May 29, 2018 10:00 AM CDT   Return Visit with Ervin Pruett MD   [facility] ([facility])    [address] [place] [address]   [phone]              Future tests that were ordered for you today     Open Future Orders        Priority Expected Expires Ordered    Pulmonary Function Test (PFT) Referral Routine  5/29/2019  5/29/2018            Who to contact     Please call your clinic at [phone] to:    Ask questions about your health    Make or cancel appointments    Discuss your medicines    Learn about your test results    Speak to your doctor            Additional Information About Your Visit        [employer] Information     [employer] gives you secure access to your electronic health record. If you see a primary care provider, you can also send messages to your care team and make appointments. If you have questions, please call your primary care clinic.  If you do not have a primary care provider, please call [phone] and they will assist you.      [employer] is an electronic gateway that provides easy, online access to your medical records. With [employer], you can request a clinic appointment, read your test results, renew a prescription or communicate with your care team.     To access your existing account, please contact your [facility] or call [phone] for assistance.        Care EveryWhere ID     This is your Care EveryWhere ID. This could be used by other organizations to access your [place] medical records  [id number]        Your Vitals Were     Pulse Temperature Pulse Oximetry BMI (Body Mass Index)          84 97.6  F (36.4  C) (Oral) 96% 34.34 kg/m2         Blood Pressure from Last 3 Encounters:   05/29/18 (!) 158/91   05/22/18 163/90   02/06/18 147/75    Weight from Last 3 Encounters:   05/29/18 201 lb 9.6 oz (91.4 kg)   05/22/18 201 lb 3.2 oz (91.3 kg)   02/06/18 194 lb (88 kg)                 Where to get your medicines      Some of these will need a paper prescription and others can be bought over the counter.  Ask your nurse if you have questions.     Bring a paper prescription for each of these medications     BUTT PASTE - REGULAR          Primary Care Provider Office Phone # Fax #    Ervin Pruett -[phone]       [address] [place] [address]         Equal Access to Services     [facility] : Hadii julieta hutton raulito Ramirez, waaxda luqadaha, qaybta kaalmaamy trevino, omar ponce. So [facility] [phone].    ATENCIÓN: Si habla español, tiene a garcia disposición servicios gratuitos de asistencia lingüística. Abiolaame al [phone].    We comply with applicable federal civil rights laws and [place] laws. We do not discriminate on the basis of race, color, national origin, age, disability, sex, sexual orientation, or gender identity.            Thank you!     Thank you for choosing [facility]  for your care. Our goal is always to provide you with excellent care. Hearing back from our patients is one way we can continue to improve our services. Please take a few minutes to complete the written survey that you may receive in the mail after your visit with us. Thank you!             Your Updated Medication List - Protect others around you: Learn how to safely use, store and throw away your medicines at www.disposemymeds.org.          This list is accurate as of 5/29/18  9:53 AM.  Always use your most recent med list.                   Brand Name Dispense Instructions for use Diagnosis    ACETAMINOPHEN PO      Take 500 mg by mouth        albuterol 108 (90 Base) MCG/ACT Inhaler    PROAIR HFA/PROVENTIL HFA/VENTOLIN HFA    1 Inhaler    Inhale 2 puffs into the lungs every 6 hours    Moderate persistent asthma, uncomplicated       Aspirin 81 MG Tbdp     100 tablet    Take 1 tablet by mouth daily    Prediabetes       atorvastatin 80 MG tablet    LIPITOR    90 tablet    Take 1 tablet (80 mg) by mouth daily    Hyperlipidemia LDL goal <130       BUTT PASTE - REGULAR    DR LOVE POOP GONICOLA BUTT PASTE FORMULA    105 g    Please combine stomahesive and nystatin per pharmacy recipe.  Apply up to 6x daily PRN for rectal irritation.    Yeast infection of the skin       famotidine 40 MG tablet    PEPCID    90 tablet    Take 1 tablet (40 mg) by mouth At  Bedtime    Gastroesophageal reflux disease without esophagitis       FLUoxetine 40 MG capsule    PROzac    90 capsule    Take 1 capsule (40 mg) by mouth daily    Depression with anxiety       fluticasone furoate 200 MCG/ACT inhalation powder    ARNUITY ELLIPTA    3 Inhaler    Inhale 1 puff into the lungs daily    Moderate persistent asthma, uncomplicated       levofloxacin 500 MG tablet    LEVAQUIN    7 tablet    Take 1 tablet (500 mg) by mouth daily    COPD exacerbation (H)       * levothyroxine 200 MCG tablet    SYNTHROID/LEVOTHROID    90 tablet    Take one tablet daily.  Also take a 25 mcg tablet daily for a total dose of 225 mcg per day    Hypothyroidism due to acquired atrophy of thyroid       * levothyroxine 25 MCG tablet    SYNTHROID/LEVOTHROID    90 tablet    Take 1 tablet (25 mcg) by mouth daily    Hypothyroidism due to acquired atrophy of thyroid       MIRALAX powder   Generic drug:  polyethylene glycol     510 g    Take 1 capful every other day        predniSONE 20 MG tablet    DELTASONE    14 tablet    Take 2 tablets (40 mg) by mouth daily for 7 days    COPD exacerbation (H)       * Notice:  This list has 2 medication(s) that are the same as other medications prescribed for you. Read the directions carefully, and ask your doctor or other care provider to review them with you.

## 2018-06-27 ENCOUNTER — HOSPITAL ENCOUNTER (OUTPATIENT)
Dept: RESPIRATORY THERAPY | Facility: HOSPITAL | Age: 73
Discharge: HOME | End: 2018-06-27
Attending: FAMILY MEDICINE

## 2018-06-27 ENCOUNTER — TRANSFERRED RECORDS (OUTPATIENT)
Dept: HEALTH INFORMATION MANAGEMENT | Facility: CLINIC | Age: 73
End: 2018-06-27

## 2018-06-27 DIAGNOSIS — J44.9 CHRONIC OBSTRUCTIVE PULMONARY DISEASE, UNSPECIFIED COPD TYPE (H): ICD-10-CM

## 2018-06-27 LAB — HGB BLD-MCNC: 12.5 G/DL (ref 14–18)

## 2018-07-13 ENCOUNTER — OFFICE VISIT (OUTPATIENT)
Dept: FAMILY MEDICINE | Facility: CLINIC | Age: 73
End: 2018-07-13
Payer: COMMERCIAL

## 2018-07-13 VITALS
RESPIRATION RATE: 16 BRPM | OXYGEN SATURATION: 98 % | TEMPERATURE: 98.2 F | SYSTOLIC BLOOD PRESSURE: 145 MMHG | WEIGHT: 200.2 LBS | DIASTOLIC BLOOD PRESSURE: 90 MMHG | BODY MASS INDEX: 34.1 KG/M2 | HEART RATE: 88 BPM
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
## 2018-07-13 DIAGNOSIS — E03.4 HYPOTHYROIDISM DUE TO ACQUIRED ATROPHY OF THYROID: ICD-10-CM

## 2018-07-13 RX ORDER — LEVOTHYROXINE SODIUM 25 UG/1
25 TABLET ORAL DAILY
Qty: 90 TABLET | Refills: 3 | Status: SHIPPED | OUTPATIENT
Start: 2018-07-13 | End: 2019-08-22

## 2018-07-13 ASSESSMENT — PAIN SCALES - GENERAL: PAINLEVEL: SEVERE PAIN (6)

## 2018-07-13 NOTE — MR AVS SNAPSHOT
After Visit Summary   7/13/2018    Michael D McArdle    MRN: [id number]           Patient Information     Date Of Birth          1945        Visit Information        Provider Department      7/13/2018 3:30 PM Ervin Pruett MD [facility]        Today's Diagnoses     Hypothyroidism due to acquired atrophy of thyroid          Care Instructions    Come back for your wellness visits     * HEAD INJURY, no wake-up (Adult)    You have had a head injury. It does not appear serious at this time. Sometimes symptoms of a more serious problem (concussion, bruising or bleeding in the brain) may appear later. Therefore, watch for the warning signs listed below.  HOME CARE:      During the next 24 hours someone must stay with you to check for the signs below. It is not necessary to stay awake or be awakened during the night.    If you have swelling of the face or scalp, apply an ice pack (ice cubes in a plastic bag, wrapped in a towel) for 20 minutes. Do this every 1-2 hours until the swelling starts to go down.    You may use acetaminophen (Tylenol) 650-1000 mg every 6 hours or ibuprofen (Motrin, Advil) 600 mg every 6-8 hours with food to control pain, if you are able to take these medicines. [NOTE: If you have chronic liver or kidney disease or ever had a stomach ulcer or GI bleeding, talk with your doctor before using these medicines.] Do not take aspirin after a head injury.    For the next 24 hours:  ? Do not take alcohol, sedatives or medicines that make you sleepy.  ? Do not drive or operate machinery.  ? Avoid strenuous activities. No lifting or straining.    If you have had any symptoms of a concussion today (nausea, vomiting, dizziness, confusion, headache, memory loss or if you were knocked out), do not return to sports or any activity that could result in another head injury until 2 full weeks after all symptoms are gone and you have been cleared by your doctor. A second head injury before  fully recovering from the first one can lead to serious brain injury.  FOLLOW UP with your doctor if symptoms are not improving after 24 hours, or as directed.  GET PROMPT MEDICAL ATTENTION if any of the following warning signs occur:    Repeated vomiting    Severe or worsening headache or dizziness    Unusual drowsiness, or unable to awaken as usual    Confusion or change in behavior or speech, memory loss, blurred vision    Convulsion (seizure)    Increasing scalp or face swelling    Redness, warmth or pus from the swollen area    Fluid drainage or bleeding from the nose or ears    1552-3697 [employer]. [address] [place] [address]. All rights reserved. This information is not intended as a substitute for professional medical care. Always follow your healthcare professional's instructions.  This information has been modified by your health care provider with permission from the publisher.            Follow-ups after your visit        Your next 10 appointments already scheduled     Jul 19, 2018  9:00 AM CDT   65+ Annual Wellness with Ervin Pruett MD   [facility] ([facility])    [address] [place] [address]   [phone]              Who to contact     Please call your clinic at [phone] to:    Ask questions about your health    Make or cancel appointments    Discuss your medicines    Learn about your test results    Speak to your doctor            Additional Information About Your Visit        [employer] Information     [employer] gives you secure access to your electronic health record. If you see a primary care provider, you can also send messages to your care team and make appointments. If you have questions, please call your primary care clinic.  If you do not have a primary care provider, please call [phone] and they will assist you.      [employer] is an electronic gateway that provides easy, online access to your medical records. With [employer], you can  request a clinic appointment, read your test results, renew a prescription or communicate with your care team.     To access your existing account, please contact your [facility] or call [phone] for assistance.        Care EveryWhere ID     This is your Care EveryWhere ID. This could be used by other organizations to access your [place] medical records  [id number]        Your Vitals Were     Pulse Temperature Respirations Pulse Oximetry BMI (Body Mass Index)       88 98.2  F (36.8  C) (Oral) 16 98% 34.1 kg/m2        Blood Pressure from Last 3 Encounters:   07/13/18 145/90   05/29/18 (!) 158/91   05/22/18 163/90    Weight from Last 3 Encounters:   07/13/18 200 lb 3.2 oz (90.8 kg)   05/29/18 201 lb 9.6 oz (91.4 kg)   05/22/18 201 lb 3.2 oz (91.3 kg)              Today, you had the following     No orders found for display         Where to get your medicines      These medications were sent to [email or web address] [address] [place], [place] [address] AT [facility] [address]  [address] [facility] [address]     Phone:  [phone]     levothyroxine 25 MCG tablet          Primary Care Provider Office Phone # Fax #    Ervin Pruett [phone]       [address] [place] [address]        Equal Access to Services     JOSE ALBERTO ALMEIDA : Hadii aad ku hadasho Soomaali, waaxda luqadaha, qaybta kaalmada belinda, omar ponce. So [facility] [phone].    ATENCIÓN: Si habla español, tiene a garcia disposición servicios gratuitos de asistencia lingüística. Llame al [phone].    We comply with applicable federal civil rights laws and [place] laws. We do not discriminate on the basis of race, color, national origin, age, disability, sex, sexual orientation, or gender identity.            Thank you!     Thank you for choosing [facility]  for your care. Our goal is always to provide you with excellent care.  Hearing back from our patients is one way we can continue to improve our services. Please take a few minutes to complete the written survey that you may receive in the mail after your visit with us. Thank you!             Your Updated Medication List - Protect others around you: Learn how to safely use, store and throw away your medicines at www.disposemymeds.org.          This list is accurate as of 7/13/18  3:31 PM.  Always use your most recent med list.                   Brand Name Dispense Instructions for use Diagnosis    ACETAMINOPHEN PO      Take 500 mg by mouth        albuterol 108 (90 Base) MCG/ACT Inhaler    PROAIR HFA/PROVENTIL HFA/VENTOLIN HFA    1 Inhaler    Inhale 2 puffs into the lungs every 6 hours    Moderate persistent asthma, uncomplicated       Aspirin 81 MG Tbdp     100 tablet    Take 1 tablet by mouth daily    Prediabetes       atorvastatin 80 MG tablet    LIPITOR    90 tablet    Take 1 tablet (80 mg) by mouth daily    Hyperlipidemia LDL goal <130       BUTT PASTE - REGULAR    DR LOVE POOP GOOP BUTT PASTE FORMULA    105 g    Please combine stomahesive and nystatin per pharmacy recipe.  Apply up to 6x daily PRN for rectal irritation.    Yeast infection of the skin       famotidine 40 MG tablet    PEPCID    90 tablet    Take 1 tablet (40 mg) by mouth At Bedtime    Gastroesophageal reflux disease without esophagitis       FLUoxetine 40 MG capsule    PROzac    90 capsule    Take 1 capsule (40 mg) by mouth daily    Depression with anxiety       fluticasone furoate 200 MCG/ACT inhalation powder    ARNUITY ELLIPTA    3 Inhaler    Inhale 1 puff into the lungs daily    Moderate persistent asthma, uncomplicated       levofloxacin 500 MG tablet    LEVAQUIN    7 tablet    Take 1 tablet (500 mg) by mouth daily    COPD exacerbation (H)       * levothyroxine 200 MCG tablet    SYNTHROID/LEVOTHROID    90 tablet    Take one tablet daily.  Also take a 25 mcg tablet daily for a total dose of 225 mcg per day     Hypothyroidism due to acquired atrophy of thyroid       * levothyroxine 25 MCG tablet    SYNTHROID/LEVOTHROID    90 tablet    Take 1 tablet (25 mcg) by mouth daily    Hypothyroidism due to acquired atrophy of thyroid       MIRALAX powder   Generic drug:  polyethylene glycol     510 g    Take 1 capful every other day        * Notice:  This list has 2 medication(s) that are the same as other medications prescribed for you. Read the directions carefully, and ask your doctor or other care provider to review them with you.

## 2018-07-13 NOTE — PATIENT INSTRUCTIONS
Come back for your wellness visits     * HEAD INJURY, no wake-up (Adult)    You have had a head injury. It does not appear serious at this time. Sometimes symptoms of a more serious problem (concussion, bruising or bleeding in the brain) may appear later. Therefore, watch for the warning signs listed below.  HOME CARE:      During the next 24 hours someone must stay with you to check for the signs below. It is not necessary to stay awake or be awakened during the night.    If you have swelling of the face or scalp, apply an ice pack (ice cubes in a plastic bag, wrapped in a towel) for 20 minutes. Do this every 1-2 hours until the swelling starts to go down.    You may use acetaminophen (Tylenol) 650-1000 mg every 6 hours or ibuprofen (Motrin, Advil) 600 mg every 6-8 hours with food to control pain, if you are able to take these medicines. [NOTE: If you have chronic liver or kidney disease or ever had a stomach ulcer or GI bleeding, talk with your doctor before using these medicines.] Do not take aspirin after a head injury.    For the next 24 hours:  ? Do not take alcohol, sedatives or medicines that make you sleepy.  ? Do not drive or operate machinery.  ? Avoid strenuous activities. No lifting or straining.    If you have had any symptoms of a concussion today (nausea, vomiting, dizziness, confusion, headache, memory loss or if you were knocked out), do not return to sports or any activity that could result in another head injury until 2 full weeks after all symptoms are gone and you have been cleared by your doctor. A second head injury before fully recovering from the first one can lead to serious brain injury.  FOLLOW UP with your doctor if symptoms are not improving after 24 hours, or as directed.  GET PROMPT MEDICAL ATTENTION if any of the following warning signs occur:    Repeated vomiting    Severe or worsening headache or dizziness    Unusual drowsiness, or unable to awaken as usual    Confusion or change  in behavior or speech, memory loss, blurred vision    Convulsion (seizure)    Increasing scalp or face swelling    Redness, warmth or pus from the swollen area    Fluid drainage or bleeding from the nose or ears    7198-6871 [employer]. [address] [place] [address], [place] [address]. All rights reserved. This information is not intended as a substitute for professional medical care. Always follow your healthcare professional's instructions.  This information has been modified by your health care provider with permission from the publisher.

## 2018-07-16 NOTE — PROGRESS NOTES
There are no exam notes on file for this visit.    SUBJECTIVE  Michael D McArdle is a 73 year old male with past medical history significant for    Patient Active Problem List   Diagnosis     Chronic Reflux esophagitis     Depressive disorder, not elsewhere classified     Diastolic Dysfunction      Fainting (Syncope)     smoking      Prediabetes     Asthma     Hyperlipidemia     Obese     Adenocarcinoma of rectum (H)     Pulmonary embolism and infarction (H)     Esophageal reflux     Hypothyroidism due to acquired atrophy of thyroid     ACP (advance care planning)     CKD (chronic kidney disease) stage 3, GFR 30-59 ml/min       Others present at the visit:  None    Presents for   Chief Complaint   Patient presents with     RECHECK     f/u [facility], his eye is still in pain, he still has some pain in his head, had a dual headache yesterday and all he has been doing is sleeping      Subjective: The patient comes into follow-up with me following a fall. The patient was seen in the emergency department on 7/8/18 for this fall. Please see that ER visit for further information.    The patient was walking outside his senior living center with a friend, drinking a cup of coffee. He started to cough while drinking the coffee, then lost his balance, and sustained a mechanical fall forward onto the sidewalk with trauma to the right side of his face, and an abrasion to his right knee. The patient did not lose consciousness. EMS was called, and the patient was transported to the emergency department.    While in the emergency department, the patient complained of facial pain. He rated this as a 3 out of 10 in severity. He denied dizziness, chest pain, or dyspnea.    The ER visit was remarkable for a CT scan of the head which showed a small right infraorbital soft tissue swelling, but no intracranial hemorrhage, mass lesions, hydrocephalus, or CT evidence for an acute infarct. A CT of his facial bones and mandible showed  no facial bone or mandible fracture. A CT scan of his C-spine showed stable cervicals spine compared to 2/27/18. No fractures or subluxations were seen. Apparently, the patient refused chest x-ray and knee x-ray.    Also in the emergency department the patient had a hemoglobin of 13.2, and a BMP which was remarkable for a minimally elevated creatinine at 1.42 and a elevated glucose at 164. A troponin was negative.    Since discharge from the hospital, the patient is done quite well. He has not had dizziness or loss of consciousness. He has not had nausea vomiting. He has not had any alteration in his neurologic status. He does not have any diplopia or decreased vision. A neurologic review of systems is completely within normal limits.    Also, since that visit, the patient has not had chest pain, shortness of breath, or recurrence of his fall.    The patient requests a refill of his 25  g levothyroxine.      OBJECTIVE:  Vitals: /90  Pulse 88  Temp 98.2  F (36.8  C) (Oral)  Resp 16  Wt 200 lb 3.2 oz (90.8 kg)  SpO2 98%  BMI 34.1 kg/m2  BMI= Body mass index is 34.1 kg/(m^2).    This is a well-nourished, well-developed, male who is in no acute distress. His vital signs are as noted above, and are within normal limits, with the exception of a mildly elevated blood pressure, and his weight. His heart has a regular rate and rhythm. I do not appreciate any murmurs, rubs, or gallops. His lungs are clear to auscultation. I do not appreciate any wheezes, rales, or rhonchi. The patient does have an ecchymosis above his right eye. His extraocular movements are intact. His pupils were equal round and reactive to light. His visual acuity is grossly within normal limits. His cranial nerves II through XII are within normal limits.      ASSESSMENT AND PLAN:      Fall, initial encounter    The patient and I discussed precautions to take after a fall. He voices understanding, and all of his questions are answered to his  satisfaction prior to him leaving. He is actively involved in decision making process. The patient does relay a history of mechanical fall, and I do not believe that further workup for syncope is indicated at this point. He will call or return to clinic if he has recurrence of this fall, or if he develops a syncopal episodes.    Hypothyroidism due to acquired atrophy of thyroid  -     levothyroxine (SYNTHROID/LEVOTHROID) 25 MCG tablet; Take 1 tablet (25 mcg) by mouth daily        Patient Instructions   Come back for your wellness visits     * HEAD INJURY, no wake-up (Adult)    You have had a head injury. It does not appear serious at this time. Sometimes symptoms of a more serious problem (concussion, bruising or bleeding in the brain) may appear later. Therefore, watch for the warning signs listed below.  HOME CARE:      During the next 24 hours someone must stay with you to check for the signs below. It is not necessary to stay awake or be awakened during the night.    If you have swelling of the face or scalp, apply an ice pack (ice cubes in a plastic bag, wrapped in a towel) for 20 minutes. Do this every 1-2 hours until the swelling starts to go down.    You may use acetaminophen (Tylenol) 650-1000 mg every 6 hours or ibuprofen (Motrin, Advil) 600 mg every 6-8 hours with food to control pain, if you are able to take these medicines. [NOTE: If you have chronic liver or kidney disease or ever had a stomach ulcer or GI bleeding, talk with your doctor before using these medicines.] Do not take aspirin after a head injury.    For the next 24 hours:  ? Do not take alcohol, sedatives or medicines that make you sleepy.  ? Do not drive or operate machinery.  ? Avoid strenuous activities. No lifting or straining.    If you have had any symptoms of a concussion today (nausea, vomiting, dizziness, confusion, headache, memory loss or if you were knocked out), do not return to sports or any activity that could result in  another head injury until 2 full weeks after all symptoms are gone and you have been cleared by your doctor. A second head injury before fully recovering from the first one can lead to serious brain injury.  FOLLOW UP with your doctor if symptoms are not improving after 24 hours, or as directed.  GET PROMPT MEDICAL ATTENTION if any of the following warning signs occur:    Repeated vomiting    Severe or worsening headache or dizziness    Unusual drowsiness, or unable to awaken as usual    Confusion or change in behavior or speech, memory loss, blurred vision    Convulsion (seizure)    Increasing scalp or face swelling    Redness, warmth or pus from the swollen area    Fluid drainage or bleeding from the nose or ears    [id number] [employer]. [address] [place] [address]. All rights reserved. This information is not intended as a substitute for professional medical care. Always follow your healthcare professional's instructions.  This information has been modified by your health care provider with permission from the publisher.        Follow up in 3-6 months for routine healthcare    Ervin Pruett

## 2018-07-23 ENCOUNTER — OFFICE VISIT (OUTPATIENT)
Dept: FAMILY MEDICINE | Facility: CLINIC | Age: 73
End: 2018-07-23
Payer: COMMERCIAL

## 2018-07-23 VITALS
DIASTOLIC BLOOD PRESSURE: 80 MMHG | OXYGEN SATURATION: 96 % | TEMPERATURE: 97.6 F | BODY MASS INDEX: 33.42 KG/M2 | HEART RATE: 85 BPM | RESPIRATION RATE: 12 BRPM | HEIGHT: 65 IN | WEIGHT: 200.6 LBS | SYSTOLIC BLOOD PRESSURE: 153 MMHG

## 2018-07-23 DIAGNOSIS — Z00.00 ENCOUNTER FOR WELLNESS EXAMINATION: Primary | ICD-10-CM

## 2018-07-23 NOTE — PATIENT INSTRUCTIONS
MEDICARE PERSONAL PREVENTIVE SERVICES PLAN - IMMUNIZATIONS     Here are your recommended immunizations.  Take this home for your reference.                                                    IMMUNIZATIONS Description Recommend today?     Influenza (Flu shot) Prevents flu; should get every year No: is not indicated today.   PCV 13 Pneumonia vaccination; you get it once No: is not indicated today.   PPSV 23 Second pneumonia vaccination; usually get it 1 year after PCV 13 No: is not indicated today.   Zoster (Shingles) Prevents shingles; you get it once  (Check with Part D insurance for coverage, must receive at a pharmacy, not clinic) You should get this done at a pharmacy   Tetanus Prevents tetanus; once every 10 years No; is up to date.

## 2018-07-23 NOTE — MR AVS SNAPSHOT
After Visit Summary   7/23/2018    Michael D McArdle    MRN: [id number]           Patient Information     Date Of Birth          1945        Visit Information        Provider Department      7/23/2018 3:30 PM Ervin Pruett MD [facility]        Care Instructions    MEDICARE PERSONAL PREVENTIVE SERVICES PLAN - IMMUNIZATIONS     Here are your recommended immunizations.  Take this home for your reference.                                                    IMMUNIZATIONS Description Recommend today?     Influenza (Flu shot) Prevents flu; should get every year No: is not indicated today.   PCV 13 Pneumonia vaccination; you get it once No: is not indicated today.   PPSV 23 Second pneumonia vaccination; usually get it 1 year after PCV 13 No: is not indicated today.   Zoster (Shingles) Prevents shingles; you get it once  (Check with Part D insurance for coverage, must receive at a pharmacy, not clinic) You should get this done at a pharmacy   Tetanus Prevents tetanus; once every 10 years No; is up to date.               Follow-ups after your visit        Who to contact     Please call your clinic at [phone] to:    Ask questions about your health    Make or cancel appointments    Discuss your medicines    Learn about your test results    Speak to your doctor            Additional Information About Your Visit        [employer] Information     [employer] gives you secure access to your electronic health record. If you see a primary care provider, you can also send messages to your care team and make appointments. If you have questions, please call your primary care clinic.  If you do not have a primary care provider, please call [phone] and they will assist you.      [employer] is an electronic gateway that provides easy, online access to your medical records. With [employer], you can request a clinic appointment, read your test results, renew a prescription or communicate with your care team.     To  "access your existing account, please contact your [facility] or call [phone] for assistance.        Care EveryWhere ID     This is your Care EveryWhere ID. This could be used by other organizations to access your [facility] medical records  [id number]        Your Vitals Were     Pulse Temperature Respirations Height Pulse Oximetry BMI (Body Mass Index)    85 97.6  F (36.4  C) (Oral) 12 5' 5\" (165.1 cm) 96% 33.38 kg/m2       Blood Pressure from Last 3 Encounters:   07/23/18 153/80   07/13/18 145/90   05/29/18 (!) 158/91    Weight from Last 3 Encounters:   07/23/18 200 lb 9.6 oz (91 kg)   07/13/18 200 lb 3.2 oz (90.8 kg)   05/29/18 201 lb 9.6 oz (91.4 kg)              Today, you had the following     No orders found for display       Primary Care Provider Office Phone # Fax #    Ervin Pruett [phone]       [address] [place] [address]        Equal Access to Services     [facility] : Hadii julieta ku hadasho Sotyrone, waaxda luqadaha, qaybta kaalmada belinda, omar ponce. So [facility] [phone].    ATENCIÓN: Si habla español, tiene a garcia disposición servicios gratuitos de asistencia lingüística. Jez al [phone].    We comply with applicable federal civil rights laws and [place] laws. We do not discriminate on the basis of race, color, national origin, age, disability, sex, sexual orientation, or gender identity.            Thank you!     Thank you for choosing [facility]  for your care. Our goal is always to provide you with excellent care. Hearing back from our patients is one way we can continue to improve our services. Please take a few minutes to complete the written survey that you may receive in the mail after your visit with us. Thank you!             Your Updated Medication List - Protect others around you: Learn how to safely use, store and throw away your medicines at www.disposemymeds.org.        "   This list is accurate as of 7/23/18  4:04 PM.  Always use your most recent med list.                   Brand Name Dispense Instructions for use Diagnosis    ACETAMINOPHEN PO      Take 500 mg by mouth        albuterol 108 (90 Base) MCG/ACT Inhaler    PROAIR HFA/PROVENTIL HFA/VENTOLIN HFA    1 Inhaler    Inhale 2 puffs into the lungs every 6 hours    Moderate persistent asthma, uncomplicated       Aspirin 81 MG Tbdp     100 tablet    Take 1 tablet by mouth daily    Prediabetes       atorvastatin 80 MG tablet    LIPITOR    90 tablet    Take 1 tablet (80 mg) by mouth daily    Hyperlipidemia LDL goal <130       BUTT PASTE - REGULAR    DR LOVE POOP GOOP BUTT PASTE FORMULA    105 g    Please combine stomahesive and nystatin per pharmacy recipe.  Apply up to 6x daily PRN for rectal irritation.    Yeast infection of the skin       famotidine 40 MG tablet    PEPCID    90 tablet    Take 1 tablet (40 mg) by mouth At Bedtime    Gastroesophageal reflux disease without esophagitis       FLUoxetine 40 MG capsule    PROzac    90 capsule    Take 1 capsule (40 mg) by mouth daily    Depression with anxiety       fluticasone furoate 200 MCG/ACT inhalation powder    ARNUITY ELLIPTA    3 Inhaler    Inhale 1 puff into the lungs daily    Moderate persistent asthma, uncomplicated       levofloxacin 500 MG tablet    LEVAQUIN    7 tablet    Take 1 tablet (500 mg) by mouth daily    COPD exacerbation (H)       * levothyroxine 200 MCG tablet    SYNTHROID/LEVOTHROID    90 tablet    Take one tablet daily.  Also take a 25 mcg tablet daily for a total dose of 225 mcg per day    Hypothyroidism due to acquired atrophy of thyroid       * levothyroxine 25 MCG tablet    SYNTHROID/LEVOTHROID    90 tablet    Take 1 tablet (25 mcg) by mouth daily    Hypothyroidism due to acquired atrophy of thyroid       MIRALAX powder   Generic drug:  polyethylene glycol     510 g    Take 1 capful every other day        * Notice:  This list has 2 medication(s) that are  the same as other medications prescribed for you. Read the directions carefully, and ask your doctor or other care provider to review them with you.

## 2018-07-23 NOTE — PROGRESS NOTES
65+ Annual Wellness Visit         HPI     This 73 year old male presents as an established patient of  Ervin Pruett who presents for a Subsequent Medicare Wellness Visit    Other issues patient wants to be addressed today:    Chief Complaint   Patient presents with     Wellness Visit     65+ Wellness visit       Patient Active Problem List   Diagnosis     Chronic Reflux esophagitis     Depressive disorder, not elsewhere classified     Diastolic Dysfunction      Fainting (Syncope)     smoking      Prediabetes     Asthma     Hyperlipidemia     Obese     Adenocarcinoma of rectum (H)     Pulmonary embolism and infarction (H)     Esophageal reflux     Hypothyroidism due to acquired atrophy of thyroid     ACP (advance care planning)     CKD (chronic kidney disease) stage 3, GFR 30-59 ml/min       Past Medical History:   Diagnosis Date     Cancer (H)      Depressive disorder      Kidney disease      Status post rotator cuff repair      Thyroid disease      Uncomplicated asthma         Family History   Problem Relation Age of Onset     Breast Cancer Mother      Heart Failure Father      Diabetes Other      daughters      Cancer Other      wife      HEART DISEASE No family hx of      Coronary Artery Disease No family hx of      Hypertension No family hx of      Hyperlipidemia No family hx of      Cerebrovascular Disease No family hx of      Colon Cancer No family hx of      Prostate Cancer No family hx of      Other Cancer No family hx of      Depression No family hx of      Anxiety Disorder No family hx of      Mental Illness No family hx of      Substance Abuse No family hx of      Anesthesia Reaction No family hx of      Asthma No family hx of      Osteoperosis No family hx of      Genetic Disorder No family hx of      Thyroid Disease No family hx of      Obesity No family hx of      Unknown/Adopted No family hx of          Problem List, Family History and past Medical History reviewed and unchanged/updated.  There  "are no exam notes on file for this visit.        Frailty Assessment            1. (1 for Yes, 0 for No) : Yes    2. (1 for Yes, 0 for No) : Yes    3. (If \"Yes\" and >5% weight loss, then score 1.  Score 0, if <5% weight loss or \"No\" weight loss) :No    4. (\"1\" or \"2\" are scored 1, others 0) : yes    5. Count number of illnesses. (0-4 = score 0, 5-11= score 1) : total =5    Total score: 4    Frailty screen score (3-5 = frail; consider interdisciplinary assessment and care.  1-2 = prefrail; at risk for adverse health events; 0 = robust)      EVALUATION OF COGNITIVE FUNCTION     Mood/affect:Good most of the time. Sometimes doesn't feel good, has to force himself exercise sometimes which he used to enjoy  Appearance:Normal  Family member/caregiver input: Normal    Mini Cog Scoring   3 points   Clock Draw Test result:  Normal   Cognitive screen is:Negative      Other Assessments     CV Risk based on Pooled Cohort Risk (consider assessing every 4-6 years; consider statin in patients with 10-yr risk > 7.5%):   The 10-year ASCVD risk score (Do DC Jr, et al., 2013) is: 39.6%    Values used to calculate the score:      Age: 73 years      Sex: Male      Is Non- : No      Diabetic: No      Tobacco smoker: Yes      Systolic Blood Pressure: 153 mmHg      Is BP treated: No      HDL Cholesterol: 31 mg/dL      Total Cholesterol: 220 mg/dL     Advance Directives: Discussed with patient and family as appropriate.  Has patient completed advance directives and/or a living will?  No. Has had discussion with family about his wishes but does not have any documentation.  We discussed the importance of documentation    Immunization History   Administered Date(s) Administered     FLU 6-35 months 10/14/2009     HEPA 03/03/2000, 01/27/2012     HepB 11/03/1999, 03/03/2000, 12/17/2001     Influenza (IIV3) PF 12/08/2006, 11/18/2008, 11/22/2010, 10/25/2011, 09/11/2012     Pneumo Conj 13-V (2010&after) 11/18/2015     " "Pneumococcal 23 valent 11/17/2006, 04/13/2017     Tdap (Adacel,Boostrix) 01/27/2012     Reviewed Immunization Record Today         Physical Exam   Vitals: /80 (BP Location: Left arm, Patient Position: Sitting, Cuff Size: Adult Regular)  Pulse 85  Temp 97.6  F (36.4  C) (Oral)  Resp 12  Ht 5' 5\" (165.1 cm)  Wt 200 lb 9.6 oz (91 kg)  SpO2 96%  BMI 33.38 kg/m2  BMI= Body mass index is 33.38 kg/(m^2).  EXAM:  Constitutional: healthy, alert, no distress and over weight   Cardiovascular: negative, PMI normal. No lifts, heaves, or thrills. RRR. No murmurs, clicks gallops or rub  Respiratory: Percussion normal. Good diaphragmatic excursion. Lungs clear  Head: Normocephalic. No masses, lesions, tenderness or abnormalities  Neck: Neck supple. No adenopathy. Thyroid symmetric, normal size,  ENT: ENT exam normal, no neck nodes or sinus tenderness  Abdomen: Abdomen soft, non-tender. BS normal. No masses, organomegaly, positive findings: right lower ventral wall hernia  NEURO: Gait normal. Reflexes normal and symmetric. Sensation grossly WNL., positive findings: muscular weakness in left lower extremity 4/5 strength  SKIN: Darker pigmentation on arms bilaterally. Patches of hyperpigmentation on lower right arm on extensor surface.   LYMPH: Normal cervical lymph nodes  JOINT/EXTREMITIES: extremities normal-gait normal and normal muscle tone. Small pink lesion on right lateral malleolus due to hardware from previous ankle fracture. No pain with palpation or surrounding erythema.        Assessment and Plan       Reviewed Preventive Services and Plan form with patient as specified in Patient Instructions.    Positive findings on assessment:  Right ventral wall hernia noted. FRAIL score of 4, consider interdisciplinary assessment and care.     Encounter for wellness examination    Options for treatment and follow-up care were reviewed with the Michael D McArdle and/or guardian engaged in the decision making process and " verbalized understanding of the options discussed and agreed with the final plan.    Susanna Gonzalez, MS3  [employer]    The medical student acted as a scribe and the encounter documented above was performed completely by me and the documentation accurately reflects the work I have performed today. MD Ervin Riddle MD

## 2018-07-24 NOTE — NURSING NOTE
Medicare Wellness Visit  Health Risk Assessment        Visual Acuity:  Right Eye: 10/20   Left Eye: 10/32  Both Eyes: 10/20        FALL RISK ASSESSMENT 7/23/2018   Fallen 2 or more times in the past year? Yes   Any fall with injury in the past year? No   Timed Up and Go Test/Seconds (13.5 is a fall risk; contact physician) 15            Health Risk Assessment / Review of Systems     Constitutional: Any fevers or night sweats? No     Eyes:  Vision problems    YES     Hearing Do you feel you have hearing loss?   YES     Cardiovascular: Any chest pain, fast or irregular heart beat, calf pain with walking?     No           Respiratory:   Any breathing problems or cough?    YES     Gastrointestinal: Any stomach or stool problems?    YES Acid reflux     Genitourinary: Do you have difficulty controlling urination?   No     Muscles and Joints: Any joint stiffness or soreness?   No     Skin: Any concerning lesions or moles?   No     Nervous System: Any loss of strength or feeling, numbness or tingling, shaking, dizziness, or headache?   YES getting up from chair or bed, dizzy    Mental Health: Any depression, anxiety or problems sleeping?     YES sleep too much    Cognition: Do you have any problems with your memory?  No     PHQ-2 Score:   PHQ-2 ( 1999 Pfizer) 7/23/2018 9/20/2016   Q1: Little interest or pleasure in doing things 1 0   Q2: Feeling down, depressed or hopeless 1 0   PHQ-2 Score 2 0       PHQ-9 Score:   No flowsheet data found.         Medical Care     What other specialists or organizations are involved in your medical care?    Patient Care Team       Relationship Specialty Notifications Start End    Ervin Pruett MD PCP - General Family Practice  3/31/14     Phone: [phone] Fax: [phone]         [address] [place] [address]    Reina Parada     5/8/15     Comment:  Care Coordinator    Phone: [phone] Fax: [phone]                     Social History / Home Safety     Social History  "  Substance Use Topics     Smoking status: Current Every Day Smoker     Types: Pipe     Smokeless tobacco: Never Used      Comment: has been cut down a lot, is on the nicotine patch     Alcohol use 0.0 oz/week     0 Standard drinks or equivalent per week     Marital Status:  Who lives in your household? Self    Does your home have any of the following safety concerns? Loose rugs in the hallway, no grab bars in the bathroom, no handrails on the stairs or have poorly lit areas?  No     Do you feel threatened or controlled by a partner, ex-partner or anyone in your life? No     Has anyone hurt you physically, for example by pushing, hitting, slapping or kicking you   or forcing you to have sex? No          Functional Status     Do you need help with dressing yourself, bathing, or walking? No    Do you need help with the phone, transportation, shopping, preparing meals, housework, laundry, medications or managing money? YES DTR help      Risk Behaviors and Healthy Habits     History   Smoking Status     Current Every Day Smoker     Types: Pipe   Smokeless Tobacco     Never Used     Comment: has been cut down a lot, is on the nicotine patch     How many servings of fruits and vegetables do you eat a day? 3-4    Exercise: 6-7 days/week for an average of Daily walk 3 miles walking      Do you frequently drive without a seatbelt?  Yes    Do you use any other drugs?  YES Pipe        Do you use alcohol?No      Frailty Assessment            1. By yourself and note using aids, do you have difficulty walking up 10 steps without resting?   YES  (1 for Yes, 0 for No)    2. By yourself and not using mobility aids, do you have any difficulty walking several hundred yards?  YES  (1 for Yes, 0 for No)    3. Have you lost 10 or more pounds unintentionally in the previous year? No  (If \"Yes\" and >5% weight loss, then score 1.  Score 0, if <5% weight loss or \"No\" weight loss)    4. How much of the times during the past 3 weeks did you " "feel tired? 1. All of the time (\"1\" or \"2\" are scored 1, others 0)    5.  A doctor told the patient they had the following illnesses:  High blood pressure  Chronic lung disease  Asthma  Kidney disease  Cancer (not minor skin cancer) (0-4 = score 0, 5-11= score 1)      Praveena Hatfield CMA    "

## 2018-08-03 ENCOUNTER — TRANSFERRED RECORDS (OUTPATIENT)
Dept: HEALTH INFORMATION MANAGEMENT | Facility: CLINIC | Age: 73
End: 2018-08-03

## 2018-08-08 ENCOUNTER — TRANSFERRED RECORDS (OUTPATIENT)
Dept: HEALTH INFORMATION MANAGEMENT | Facility: CLINIC | Age: 73
End: 2018-08-08

## 2018-10-11 ENCOUNTER — MEDICAL CORRESPONDENCE (OUTPATIENT)
Dept: HEALTH INFORMATION MANAGEMENT | Facility: CLINIC | Age: 73
End: 2018-10-11

## 2018-12-13 ENCOUNTER — OFFICE VISIT (OUTPATIENT)
Dept: FAMILY MEDICINE | Facility: CLINIC | Age: 73
End: 2018-12-13
Payer: COMMERCIAL

## 2018-12-13 ENCOUNTER — RECORDS - HEALTHEAST (OUTPATIENT)
Dept: ADMINISTRATIVE | Facility: OTHER | Age: 73
End: 2018-12-13

## 2018-12-13 VITALS
SYSTOLIC BLOOD PRESSURE: 146 MMHG | WEIGHT: 205.2 LBS | HEART RATE: 107 BPM | BODY MASS INDEX: 34.15 KG/M2 | RESPIRATION RATE: 24 BRPM | DIASTOLIC BLOOD PRESSURE: 94 MMHG | TEMPERATURE: 97.6 F | OXYGEN SATURATION: 95 %

## 2018-12-13 DIAGNOSIS — K43.9 VENTRAL HERNIA WITHOUT OBSTRUCTION OR GANGRENE: Primary | ICD-10-CM

## 2018-12-13 DIAGNOSIS — K21.9 GASTROESOPHAGEAL REFLUX DISEASE WITHOUT ESOPHAGITIS: ICD-10-CM

## 2018-12-13 NOTE — PROGRESS NOTES
"There are no exam notes on file for this visit.  Chief Complaint   Patient presents with     RECHECK     he is feeling a little better, but going to the bathroon he still hurts to go and has some pain in the abdominal area      Referral   Patient comes in today to follow-up his recent emergency department visit.  Patient was seen in the emergency department at [facility] approximately 10 days ago.  Please see that note for further details.  That note is reviewed, and discussed with the patient in detail.  The patient's recollection of this encounter is that he was quite constipated.  He also developed abdominal pain.  Patient tells me that he \"passed out\" because he was weak.  He tells me that he was given IV fluids in the emergency department, and that imaging at that point revealed that he had a problem with his hernia.    The patient and I reviewed the fact that he is previously been seen by surgery and they did not believe that repair of his ventral hernia was indicated.  The patient is quite sure that the discomfort that he had was at least partially related to his hernia.  He notes that the hernia was sticking out quite a bit, and that this was quite uncomfortable for him.  He tells me that the ER physician recommended that he be reevaluated by surgery.    The patient will follow up with his oncologist in July.  At that point it will be 3years since his cancer, and the patient tells me that he will be released from the oncologist care if he remains cancer free at that visit.    The patient tells me that he is been under stress recently.  He has been seen by a counselor, who he continues to see every 2 weeks.  He states this is quite helpful for him.  He notes that his Prozac was recently increased to 40 mg.  He finds this helpful as well.    Objective: This is a well-nourished, well-developed, male who is in no acute distress.  His initial vital signs revealed an elevated blood pressure.  Blood " pressure rechecked by me reveals a mildly elevated blood pressure as noted below.  Blood pressure (!) 146/94, pulse 107, temperature 97.6  F (36.4  C), temperature source Oral, resp. rate 24, weight 93.1 kg (205 lb 3.2 oz), SpO2 95 %.  His heart has a regular rate and rhythm.  There are no, rubs, or gallops.  His lungs are clear to auscultation.  There are no wheezes, rales, or rhonchi.  His abdomen is soft.  There is a horizontal right lower quadrant scar with a ventral hernia present.  I do not appreciate any tenderness at this site.  The hernia is easily reducible.  Otherwise, his abdomen does not have any tenderness, guarding, rebound.  I do not appreciate any masses or organomegaly.    Assessment and plan:    Ventral hernia without obstruction or gangrene  -     GENERAL SURG ADULT REFERRAL; Future    The patient and I discussed the surgeon's reluctance to operate on this ventral hernia.  We discussed the fact that both the surgeon and I thought it was likely asymptomatic.  The patient is quite sure this is contributing to his symptoms.  The patient and I also discussed the fact that at his current weight, and habitus, it will be a surgery that has a higher likelihood of failure.  He voices understanding of this.  Nonetheless, he would like to see another surgeon for another opinion of repair is indicated.  A referral for this is placed.    Gastroesophageal reflux disease without esophagitis  -     ranitidine (ZANTAC) 150 MG tablet; Take 1 tablet (150 mg) by mouth 2 times daily    Patient has been on ranitidine, and this is controlled he has gastroesophageal reflux.  He has run out of the ranitidine, and would like more.  A refill for this is placed.    Patient will follow up with me if I can be of further assistance.        Patient Instructions   Have your assisted living check your blood pressure a few times a week, and let us know the results in about a month    Stop at the  for the referral to  surgery    Thank you for coming to [facility].    The phone number we will call with results is # [phone] (home) . If this is not the best number please call our clinic and change the number.  If you need any refills please call your pharmacy and they will contact us.  If you have any concerns about today's visit or wish to schedule another appointment please call our office during normal business hours [phone] (8-5:00 M-F)  If you have urgent medical concerns please call [phone] at any time of the day.  If you a medical emergency please call [phone]  Again thank you for choosing [facility] and please let us know how we can best partner with you to improve you and your family's health.          The patient was actively involved in the decision making process, and all the questions were answered to their satisfaction prior to leaving.

## 2018-12-13 NOTE — PATIENT INSTRUCTIONS
Have your assisted living check your blood pressure a few times a week, and let us know the results in about a month    Stop at the  for the referral to surgery    Thank you for coming to [facility].    The phone number we will call with results is # [phone] (home) . If this is not the best number please call our clinic and change the number.  If you need any refills please call your pharmacy and they will contact us.  If you have any concerns about today's visit or wish to schedule another appointment please call our office during normal business hours [phone] (8-5:00 M-F)  If you have urgent medical concerns please call [phone] at any time of the day.  If you a medical emergency please call [phone]  Again thank you for choosing [facility] and please let us know how we can best partner with you to improve you and your family's health.    GENERAL SURGERY CONSULT:  Call patient after 11:00 AM  Jennifer Thompson  12/13/18  Orders, demographics and progress notes faxed to:  [facility]  PH:  [phone]   FAX:  [phone]  They will contact patient to schedule.  Jennifer Thompson  1/3/18

## 2019-01-04 ENCOUNTER — AMBULATORY - HEALTHEAST (OUTPATIENT)
Dept: SURGERY | Facility: CLINIC | Age: 74
End: 2019-01-04

## 2019-01-04 DIAGNOSIS — K40.20 BILATERAL INGUINAL HERNIA: ICD-10-CM

## 2019-02-26 DIAGNOSIS — E03.4 HYPOTHYROIDISM DUE TO ACQUIRED ATROPHY OF THYROID: ICD-10-CM

## 2019-02-26 RX ORDER — LEVOTHYROXINE SODIUM 200 UG/1
TABLET ORAL
Qty: 90 TABLET | Refills: 1 | Status: SHIPPED | OUTPATIENT
Start: 2019-02-26 | End: 2020-01-28

## 2019-02-27 ENCOUNTER — TELEPHONE (OUTPATIENT)
Dept: FAMILY MEDICINE | Facility: CLINIC | Age: 74
End: 2019-02-27

## 2019-02-27 NOTE — TELEPHONE ENCOUNTER
[facility] phone call message- general phone call:    Reason for call: Trisha would like to report that pt fell.  He has syncope issues.  His family was present when it happened.  He is taking some male enhancement pills that he found down the road.  She advised him to bring them to his doctor to see if they were safe to take with his BP. She are wondering if this might be part of the problem.    Return call needed: Yes    OK to leave a message on voice mail? Yes    Primary language: English      needed? No    Call taken on February 27, 2019 at 10:13 AM by Morgan Ca

## 2019-02-27 NOTE — TELEPHONE ENCOUNTER
ELEN w/ Nurse Rico--pt should be seen in clinic to discuss this.     [facility] w/ the pt--should schedule an appt in clinic given syncope issues and medications questions.     Routed to Dr. Montgomery. /GARTH Tavarez

## 2019-02-28 DIAGNOSIS — E78.5 HYPERLIPIDEMIA LDL GOAL <130: ICD-10-CM

## 2019-03-02 RX ORDER — ATORVASTATIN CALCIUM 80 MG/1
80 TABLET, FILM COATED ORAL DAILY
Qty: 90 TABLET | Refills: 1 | Status: SHIPPED | OUTPATIENT
Start: 2019-03-02 | End: 2019-11-06

## 2019-04-04 DIAGNOSIS — Z00.00 HEALTH CARE MAINTENANCE: Primary | ICD-10-CM

## 2019-04-04 DIAGNOSIS — J45.40 MODERATE PERSISTENT ASTHMA, UNCOMPLICATED: ICD-10-CM

## 2019-04-04 DIAGNOSIS — F41.8 DEPRESSION WITH ANXIETY: ICD-10-CM

## 2019-04-04 RX ORDER — ASPIRIN 81 MG/1
81 TABLET, CHEWABLE ORAL DAILY
Qty: 90 TABLET | Refills: 3 | Status: SHIPPED | OUTPATIENT
Start: 2019-04-04 | End: 2022-08-30

## 2019-04-04 RX ORDER — FLUOXETINE 40 MG/1
40 CAPSULE ORAL DAILY
Qty: 90 CAPSULE | Refills: 1 | Status: SHIPPED | OUTPATIENT
Start: 2019-04-04 | End: 2020-01-28

## 2019-04-09 ENCOUNTER — OFFICE VISIT (OUTPATIENT)
Dept: FAMILY MEDICINE | Facility: CLINIC | Age: 74
End: 2019-04-09
Payer: COMMERCIAL

## 2019-04-09 DIAGNOSIS — R26.81 UNSTEADY GAIT: ICD-10-CM

## 2019-04-09 DIAGNOSIS — R73.03 PREDIABETES: ICD-10-CM

## 2019-04-09 DIAGNOSIS — K43.9 VENTRAL HERNIA WITHOUT OBSTRUCTION OR GANGRENE: ICD-10-CM

## 2019-04-09 DIAGNOSIS — E03.4 HYPOTHYROIDISM DUE TO ACQUIRED ATROPHY OF THYROID: ICD-10-CM

## 2019-04-09 DIAGNOSIS — H91.93 DECREASED HEARING OF BOTH EARS: ICD-10-CM

## 2019-04-09 DIAGNOSIS — E78.00 PURE HYPERCHOLESTEROLEMIA: ICD-10-CM

## 2019-04-09 DIAGNOSIS — Z00.00 MEDICARE ANNUAL WELLNESS VISIT, SUBSEQUENT: Primary | ICD-10-CM

## 2019-04-09 DIAGNOSIS — H54.3 DECREASED VISION IN BOTH EYES: ICD-10-CM

## 2019-04-09 LAB
ALBUMIN SERPL-MCNC: 4.5 MG/DL (ref 3.3–4.9)
ALP SERPL-CCNC: 61.7 U/L (ref 40–150)
ALT SERPL-CCNC: <15 U/L (ref 0–45)
AST SERPL-CCNC: 18.3 U/L (ref 0–55)
BILIRUB SERPL-MCNC: 0.4 MG/DL (ref 0.2–1.3)
BUN SERPL-MCNC: 24.9 MG/DL (ref 7–21)
CALCIUM SERPL-MCNC: 9.6 MG/DL (ref 8.5–10.1)
CHLORIDE SERPLBLD-SCNC: 107.5 MMOL/L (ref 98–110)
CHOLEST SERPL-MCNC: 199.6 MG/DL (ref 0–200)
CHOLEST/HDLC SERPL: 6.1 {RATIO} (ref 0–5)
CO2 SERPL-SCNC: 27.3 MMOL/L (ref 20–32)
CREAT SERPL-MCNC: 1.4 MG/DL (ref 0.7–1.3)
GFR SERPL CREATININE-BSD FRML MDRD: 52.7 ML/MIN/1.7 M2
GLUCOSE SERPL-MCNC: 114.5 MG'DL (ref 70–99)
HBA1C MFR BLD: 5.7 % (ref 4.1–5.7)
HDLC SERPL-MCNC: 32.6 MG/DL
LDLC SERPL CALC-MCNC: 148 MG/DL (ref 0–129)
POTASSIUM SERPL-SCNC: 4.1 MMOL/DL (ref 3.2–4.6)
PROT SERPL-MCNC: 7.3 G/DL (ref 6.8–8.8)
SODIUM SERPL-SCNC: 141.3 MMOL/L (ref 132–142)
TRIGL SERPL-MCNC: 93.8 MG/DL (ref 0–150)
TSH SERPL DL<=0.05 MIU/L-ACNC: 3.25 UIU/ML (ref 0.3–5)
VLDL CHOLESTEROL: 18.8 MG/DL (ref 7–32)

## 2019-04-09 NOTE — PROGRESS NOTES
65+ Annual Wellness Visit         HPI     This 73 year old male presents as an established patient  Ervin Pruett who presents for a Subsequent Medicare Wellness Visit    Other issues patient wants to be addressed today:    Chief Complaint   Patient presents with     Wellness Visit         Patient Active Problem List   Diagnosis     Chronic Reflux esophagitis     Depressive disorder, not elsewhere classified     Diastolic Dysfunction      Fainting (Syncope)     smoking      Prediabetes     Asthma     Hyperlipidemia     Obese     Adenocarcinoma of rectum (H)     Pulmonary embolism and infarction (H)     Esophageal reflux     Hypothyroidism due to acquired atrophy of thyroid     ACP (advance care planning)     CKD (chronic kidney disease) stage 3, GFR 30-59 ml/min (H)       Past Medical History:   Diagnosis Date     Cancer (H)      Depressive disorder      Kidney disease      Status post rotator cuff repair      Thyroid disease      Uncomplicated asthma         Family History   Problem Relation Age of Onset     Breast Cancer Mother      Heart Failure Father      Diabetes Other         daughters      Cancer Other         wife      Heart Disease No family hx of      Coronary Artery Disease No family hx of      Hypertension No family hx of      Hyperlipidemia No family hx of      Cerebrovascular Disease No family hx of      Colon Cancer No family hx of      Prostate Cancer No family hx of      Other Cancer No family hx of      Depression No family hx of      Anxiety Disorder No family hx of      Mental Illness No family hx of      Substance Abuse No family hx of      Anesthesia Reaction No family hx of      Asthma No family hx of      Osteoporosis No family hx of      Genetic Disorder No family hx of      Thyroid Disease No family hx of      Obesity No family hx of      Unknown/Adopted No family hx of          Problem List, Family History and past Medical History reviewed and unchanged/updated.  Nursing Notes:    Jayla Kaplan  4/9/2019 10:05 AM  Signed  Medicare Wellness Visit  Health Risk Assessment        Visual Acuity:  Right Eye: 10/40   Left Eye: NA  Both Eyes: 10/40        FALL RISK ASSESSMENT 4/9/2019 7/23/2018   Fallen 2 or more times in the past year? Yes Yes   Any fall with injury in the past year? Yes No   Timed Up and Go Test/Seconds (13.5 is a fall risk; contact physician) 30.95 15            Health Risk Assessment / Review of Systems     Constitutional: Any fevers or night sweats? No     Eyes:  Vision problems    YES     Hearing Do you feel you have hearing loss?   YES     Cardiovascular: Any chest pain, fast or irregular heart beat, calf pain with walking?      NO       Respiratory:   Any breathing problems or cough?    YES SOB while walking, asthma     Gastrointestinal: Any stomach or stool problems?   No      Genitourinary: Do you have difficulty controlling urination?   No     Muscles and Joints: Any joint stiffness or soreness?   No     Skin: Any concerning lesions or moles?   No     Nervous System: Any loss of strength or feeling, numbness or tingling, shaking, dizziness, or headache?   YES tingling in his hands and legs     Mental Health: Any depression, anxiety or problems sleeping?     YES depression and anxiety    Cognition: Do you have any problems with your memory?  No     PHQ-2 Score:   PHQ-2 ( 1999 Pfizer) 4/9/2019 7/23/2018   Q1: Little interest or pleasure in doing things 0 1   Q2: Feeling down, depressed or hopeless 0 1   PHQ-2 Score 0 2       PHQ-9 Score:   [facility] Follow-up to PHQ 4/13/2017   PHQ-9 9. Suicide Ideation past 2 weeks Not at all            Medical Care     What other specialists or organizations are involved in your medical care?    Patient Care Team       Relationship Specialty Notifications Start End    Ervin Pruett MD PCP - General Family Practice  3/31/14     Phone: [phone] Fax: [phone]         [address] [place] [address]    Reina Parada    5/8/[birth year]      Care Coordinator    Phone: [phone] Fax: [phone]                     Social History / Home Safety     Social History     Tobacco Use     Smoking status: Current Every Day Smoker     Types: Pipe     Smokeless tobacco: Never Used     Tobacco comment: has been cut down a lot, is on the nicotine patch   Substance Use Topics     Alcohol use: Yes     Alcohol/week: 0.0 oz     Marital Status:Single  Who lives in your household? himself    Does your home have any of the following safety concerns? Loose rugs in the hallway, no grab bars in the bathroom, no handrails on the stairs or have poorly lit areas?  No     Do you feel threatened or controlled by a partner, ex-partner or anyone in your life? No     Has anyone hurt you physically, for example by pushing, hitting, slapping or kicking you   or forcing you to have sex? No          Functional Status     Do you need help with dressing yourself, bathing, or walking? YES walker     Do you need help with the phone, transportation, shopping, preparing meals, housework, laundry, medications or managing money?No       Risk Behaviors and Healthy Habits     History   Smoking Status     Current Every Day Smoker     Types: Pipe   Smokeless Tobacco     Never Used     Comment: has been cut down a lot, is on the nicotine patch     How many servings of fruits and vegetables do you eat a day? Every meal he has veggies and fruit in the morning    Exercise: 6-7 days/week for an average of 45-60 minutes walking      Do you frequently drive without a seatbelt? No     Do you use any other drugs? No         Do you use alcohol?No      Frailty Assessment            1. By yourself and note using aids, do you have difficulty walking up 10 steps without resting?  No  (1 for Yes, 0 for No)    2. By yourself and not using mobility aids, do you have any difficulty walking several hundred yards? No  (1 for Yes, 0 for No)    3. Have you lost 10 or more pounds unintentionally in the previous year?   "YES    (If \"Yes\" and >5% weight loss, then score 1.  Score 0, if <5% weight loss or \"No\" weight loss)    4. How much of the times during the past 3 weeks did you feel tired? 2. Most of the time (\"1\" or \"2\" are scored 1, others 0)    5.  A doctor told the patient they had the following illnesses:  Asthma  Kidney disease (0-4 = score 0, 5-11= score 1)        Jayla Kaplan        Frailty Assessment            1.  0 for No    2. 1 for Yes    3. If \"Yes\" and >5% weight loss, then score 1.      4. \"1\" or \"2\" are scored 1    5. Count number of illnesses. 0-4 = score 0    Total score: 3    Frailty screen score 3-5 = frail; consider interdisciplinary assessment and care.         EVALUATION OF COGNITIVE FUNCTION     Mood/affect:Normal  Appearance:Normal  Family member/caregiver input: None are available for discussion.    Mini Cog Scoring   3 points Patient will return to clinic for a MiniCog or MOCA  Clock Draw Test result:  Normal     Cognitive screen is:Negative      Other Assessments     CV Risk based on Pooled Cohort Risk (consider assessing every 4-6 years; consider statin in patients with 10-yr risk > 7.5%):   The 10-year ASCVD risk score (Do FERNANDO Jr., et al., 2013) is: 37.2%    Values used to calculate the score:      Age: 73 years      Sex: Male      Is Non- : No      Diabetic: No      Tobacco smoker: Yes      Systolic Blood Pressure: 146 mmHg      Is BP treated: No      HDL Cholesterol: 31 mg/dL      Total Cholesterol: 220 mg/dL    Advance Directives: Discussed with patient and family as appropriate.  Has patient completed advance directives and/or a living will?  no.  This was discussed with the patient.  He is not willing to complete formal advanced directives at this time.  He has discussed his wishes with his daughter, who he would want to be his primary decision maker if he did not have capacity to make decisions himself.    Immunization History   Administered Date(s) Administered     " FLU 6-35 months 10/14/2009     HEPA 03/03/2000, 01/27/2012     HepB 11/03/1999, 03/03/2000, 12/17/2001     Influenza (IIV3) PF 12/08/2006, 11/18/2008, 11/22/2010, 10/25/2011, 09/11/2012     Pneumo Conj 13-V (2010&after) 11/18/2015     Pneumococcal 23 valent 11/17/2006, 04/13/2017     Tdap (Adacel,Boostrix) 01/27/2012     Reviewed Immunization Record Today         Physical Exam     Vitals: There were no vitals taken for this visit.  BMI= There is no height or weight on file to calculate BMI.  EXAM:  Constitutional: healthy, alert, no distress and over weight  The abdomen is soft.  There are positive bowel sounds.  There is no guarding or rebound.  There is a large ventral hernia present.  This is easily reducible, and nontender.        Assessment and Plan       Reviewed Preventive Services and Plan form with patient as specified in Patient Instructions.    Positive findings on assessment:    Annual Medicare Wellness Visit    Pure hypercholesterolemia  -     Lipid Panel ([facility])  -     Comprehensive Metabolic Panel ([facility])    The patient has a diagnosis of hypercholesterolemia.  He is at very high risk for having a cardiovascular event.  We will recheck his lipid panel today.    The patient tells me that he has been adherent with his atorvastatin.  He has had problems with adherence in the past.    Hypothyroidism due to acquired atrophy of thyroid  -     Thyroid Independence ([facility])    The patient is on chronic thyroid replacement.  He needs to have a follow-up thyroid test performed.  He is willing to have that done today.    Prediabetes  -     Hemoglobin A1c ([facility])    The patient has a diagnosis of prediabetes.  We will check an A1c today to ensure that this has not progressed.    Decreased vision in both eyes  -     OPHTHALMOLOGY ADULT REFERRAL; Future    The patient complains of decreased vision in both eyes.  On examination today we are unable to get visual acuity on the left eye.  His right eye is also  not good.  He is referred to ophthalmology for their evaluation, and potential treatment.    Decreased hearing of both ears  -     OTOLARYNGOLOGY REFERRAL; Future    The patient has a long story about how he originally had hearing aids.  These apparently have been misplaced.  Additionally, the patient believes that his hearing has worsened over the past year.  He is willing to have an audiology evaluation, and to be refitted for hearing aids.  Referral is placed.    Ventral hernia without obstruction or gangrene  -     GENERAL SURG ADULT REFERRAL; Future    The patient is convinced that his ventral hernia is causing to have him to have abdominal discomfort.  His exam today is quite benign, other than the presence of the hernia.  I discussed with him that his surgeon told him that the surgery was unlikely to improve his symptoms, and that he was a poor surgical candidate due to his weight, and his comorbidities.  Nonetheless, the patient would like to have another opinion.  He would like to see the surgeon again.  Referral is placed.    Unsteady gait  -     order for DME; Equipment being ordered: four wheeled walker    The patient has an unsteady gait.  He has used a walker for years.  He does have a history of falls.  Apparently, his walker is no longer functional, and he has been borrowing a friend's walker.  A prescription for a new walker is provided to the patient.  He will obtain this from his medical supply company.    Options for treatment and follow-up care were reviewed with the Michael D McArdle and/or guardian engaged in the decision making process and verbalized understanding of the options discussed and agreed with the final plan.    Ervin Pruett MD

## 2019-04-09 NOTE — PATIENT INSTRUCTIONS
MEDICARE PERSONAL PREVENTIVE SERVICES PLAN - IMMUNIZATIONS     Here are your recommended immunizations.  Take this home for your reference.                                                    IMMUNIZATIONS Description Recommend today?     Influenza (Flu shot) Prevents flu; should get every year Recommeded and patient declined.    PCV 13 Pneumonia vaccination; you get it once No; is up to date.   PPSV 23 Second pneumonia vaccination; usually get it 1 year after PCV 13 No; is up to date.   Zoster (Shingles) Prevents shingles; you get it once  (Check with Part D insurance for coverage, must receive at a pharmacy, not clinic) Recommeded and patient declined.    Tetanus Prevents tetanus; once every 10 years No; is up to date.       MEDICARE PERSONAL PREVENTIVE SERVICES PLAN - SERVICES     Review these tests with your doctor then decide which ones you want and take this page home for your reference                                                    IMMUNIZATIONS Description Recommend today?     Hepatitis B  If you have any of the following risk factors you should be immunized for hepatitis B: severe kidney disease, people who live in the same house as a carrier of Hepatitis B virus, people who live in  institutions (e.g. nursing homes or group homes), homosexual men, patients with hemophilia who received Factor VIII or IX concentrates, abusers of illicit injectable drugs  The patient declines this today.     SCREENING TESTS     Description   Year of Last Screening   Recommended Today?   Heart disease screening blood tests    Cholesterol level Reducing cholesterol can reduce your risk of heart attacks by 25%.  Screening is recommended yearly if you are at risk of heart disease otherwise every 4-5 years 2018 Yes; Recommended and ordered.   Diabetes screening tests    Hemoglobin A1c blood test   Finding and treating diabetes early can reduce complications.  Screening recommended/covered yearly if you have high blood pressure,  high cholesterol, obesity (BMI >30), or a history of high blood glucose tests; or 2 of the following: family history of diabetes, overweight (BMI >25 but <30), age 65 years or older, and a history of diabetes of pregnancy or gave birth to baby weighing more than 9 lbs. 2018 Yes; Recommended and ordered.   Hepatitis B screening Finding hepatitis B early can reduce complications.  Screening is recommended for persons with selected risk factors.  The patient declines this today.   Hepatitis C screening Finding hepatitis C early can reduce complications.  Screening is recommended for all persons born from 1945 through 1965 and for those with selected other risk factors.   The patient declines this today.   HIV screening Finding HIV early can reduce complications.  Screening is recommended for persons with risk factors for HIV infection.  The patient declines this today.   Glaucoma screening Early detection of glaucoma can prevent blindness.   Please talk to your eye doctor about this.       SCREENING TESTS     Description   Year of Last Screening   Recommended Today?   Colorectal cancer screening    Fecal occult blood test     Screening colonoscopy Screening for colon cancer has been shown to reduce death from colon cancer by 25-30%. Screening recommended to start at 50 years and continuing until age 75 years.    The patient declines this today.he has a history of colorectal cancer.  He will follow-up with his oncologist in July.  He believes that he does not want any further screening at this point.   Breast Cancer Screening (women)    Mammogram Mammogram screening for breast cancer has been shown to reduce the risk of dying from breast cancer and prolong life. Screening is recommended every 1-2 years for women aged 50 to 74 years.   No: is not indicated today.   Cervical Cancer screening (women)    Pap Cervical pap smears can reduce cervical cancer. Screening is recommended annually if high risk (history of abnormal  pap smears) otherwise every 2-3 years, stop screening at 65 years of age if history of normal paps.  No: is not indicated today.   Screening for Osteoporosis:  Bone mass measurements (women)    Dexa Scan Screening and treating Osteoporosis can reduce the risk of hip and spine fractures. Screening is recommended in women 65 years or older and in women and men at risk of osteoporosis.  No: is not indicated today.   Screening for Lung Cancer     Low-dose CT scanning Screening can reduce mortality in persons aged 55-80 who have smoked at least 30 pack-years and who are either still smoking or have quit in the past 15 years.  The patient declines this today.   Abdominal Aortic Aneurysm (AAA) screening    Ultrasound (US)   An aneurysm treated before rupture is very safe -a ruptured aneurysm can be fatal.  Screening  by US for AAA is limited to patients who meet one of the following criteria:    Men who are 65-75 years old and have smoked more than 100 cigarettes in their lifetime    Anyone with a family history of abdominal aortic aneurysm  The patient declines this today.             MEDICARE WELLNESS EXAM PATIENT INSTRUCTIONS    Yearly exam:     See your health care provider every year in order to review changes in your health, review medicines that you take, and discuss preventive care needs such as immunizations and cancer screening.    Get a flu shot each year.     Advance Directives:    If you have not done so, you are encouraged to complete advance directives and/or a living will.   More information about advance directives can be found at: http://www.[place]med.org/advocacy/Key-Issues/Advance-Directives    Nutrition:     Eat at least 5 servings of fruits and vegetables each day.     Eat whole-grain bread, whole-wheat pasta and brown rice instead of white grains and rice.     Talk to your doctor about Calcium and Vitamin D.     Lifestyle:    Exercise for at least 150 minutes a week (30 minutes a day, 5 days a week).  This will help you control your weight and prevent disease.     Limit alcohol to one drink per day.     If you smoke, try to quit - your doctor will be happy to help.     Wear sunscreen to prevent skin cancer.     See your dentist every six months for an exam and cleaning.     See your eye doctor every 1 to 2 years to screen for conditions such as glaucoma, macular degeneration and cataracts.              Your opthamology referral information:  [facility] [address] [address]  [phone]    DATE: Wednesday, May 15th  TIME: 10:45am with Dr Miramontes      Your otolaryngology referral information:  [facility]  [address] [place], [place] [id number] [phone]    DATE: Wednesday, April 24th  TIME: 10am with an audiologist and then Dr Jin Wiley.    If you have any questions or need to help rescheduling this appointment please call us at [phone].    Diane      April 9, 2019  Per patient would like appointment any day of the week mid morning. Ness FORD    GENERAL SURG ADULT REFERRAL     [address], [address]  [place] [address]  Phone: [phone]    Appointment: Monday April 15, 2019  Arrival Time: 10:05am  Provider: Dr. Trevino    Please bring Insurance and ID card.  Ness FORD  Patient aware of appointment, no further questions at this time.

## 2019-04-09 NOTE — NURSING NOTE
Medicare Wellness Visit  Health Risk Assessment        Visual Acuity:  Right Eye: 10/40   Left Eye: NA  Both Eyes: 10/40        FALL RISK ASSESSMENT 4/9/2019 7/23/2018   Fallen 2 or more times in the past year? Yes Yes   Any fall with injury in the past year? Yes No   Timed Up and Go Test/Seconds (13.5 is a fall risk; contact physician) 30.95 15            Health Risk Assessment / Review of Systems     Constitutional: Any fevers or night sweats? No     Eyes:  Vision problems    YES     Hearing Do you feel you have hearing loss?   YES     Cardiovascular: Any chest pain, fast or irregular heart beat, calf pain with walking?      NO       Respiratory:   Any breathing problems or cough?    YES SOB while walking, asthma     Gastrointestinal: Any stomach or stool problems?   No      Genitourinary: Do you have difficulty controlling urination?   No     Muscles and Joints: Any joint stiffness or soreness?   No     Skin: Any concerning lesions or moles?   No     Nervous System: Any loss of strength or feeling, numbness or tingling, shaking, dizziness, or headache?   YES tingling in his hands and legs     Mental Health: Any depression, anxiety or problems sleeping?     YES depression and anxiety    Cognition: Do you have any problems with your memory?  No     PHQ-2 Score:   PHQ-2 ( 1999 [employer]) 4/9/2019 7/23/2018   Q1: Little interest or pleasure in doing things 0 1   Q2: Feeling down, depressed or hopeless 0 1   PHQ-2 Score 0 2       PHQ-9 Score:   [facility] Follow-up to PHQ 4/13/2017   PHQ-9 9. Suicide Ideation past 2 weeks Not at all            Medical Care     What other specialists or organizations are involved in your medical care?    Patient Care Team       Relationship Specialty Notifications Start End    Ervin Pruett MD PCP - General Family Practice  3/31/14     Phone: [phone] Fax: [phone]         [address] [place] [address]    Reina Parada    5/8/15     Care Coordinator    Phone: [phone] Fax:  "[phone]                     Social History / Home Safety     Social History     Tobacco Use     Smoking status: Current Every Day Smoker     Types: Pipe     Smokeless tobacco: Never Used     Tobacco comment: has been cut down a lot, is on the nicotine patch   Substance Use Topics     Alcohol use: Yes     Alcohol/week: 0.0 oz     Marital Status:Single  Who lives in your household? himself    Does your home have any of the following safety concerns? Loose rugs in the hallway, no grab bars in the bathroom, no handrails on the stairs or have poorly lit areas?  No     Do you feel threatened or controlled by a partner, ex-partner or anyone in your life? No     Has anyone hurt you physically, for example by pushing, hitting, slapping or kicking you   or forcing you to have sex? No          Functional Status     Do you need help with dressing yourself, bathing, or walking? YES walker     Do you need help with the phone, transportation, shopping, preparing meals, housework, laundry, medications or managing money?No       Risk Behaviors and Healthy Habits     History   Smoking Status     Current Every Day Smoker     Types: Pipe   Smokeless Tobacco     Never Used     Comment: has been cut down a lot, is on the nicotine patch     How many servings of fruits and vegetables do you eat a day? Every meal he has veggies and fruit in the morning    Exercise: 6-7 days/week for an average of 45-60 minutes walking      Do you frequently drive without a seatbelt? No     Do you use any other drugs? No         Do you use alcohol?No      Frailty Assessment            1. By yourself and note using aids, do you have difficulty walking up 10 steps without resting?  No  (1 for Yes, 0 for No)    2. By yourself and not using mobility aids, do you have any difficulty walking several hundred yards? No  (1 for Yes, 0 for No)    3. Have you lost 10 or more pounds unintentionally in the previous year?  YES    (If \"Yes\" and >5% weight loss, then " "score 1.  Score 0, if <5% weight loss or \"No\" weight loss)    4. How much of the times during the past 3 weeks did you feel tired? 2. Most of the time (\"1\" or \"2\" are scored 1, others 0)    5.  A doctor told the patient they had the following illnesses:  Asthma  Kidney disease (0-4 = score 0, 5-11= score 1)        Jayla Kaplan  "

## 2019-04-09 NOTE — LETTER
April 10, 2019      Michael D McArdle  [address]   [place] [address]        Dear Isiah,    Please see below for your test results.  Your thyroid test results are normal.     Your liver tests are also normal.     Your kidney test is about the same that it is been over the past 2 years.     Your cholesterol results are not good.  Have you been taking your atorvastatin?  If not, please resume taking your atorvastatin.  This is important to help prevent heart attacks and strokes.     The A1c is a test for diabetes.  Your result is normal.     Resulted Orders   Lipid Panel ([facility])   Result Value Ref Range    Cholesterol 199.6 0.0 - 200.0 mg/dL    Cholesterol/HDL Ratio 6.1 (H) 0.0 - 5.0    HDL Cholesterol 32.6 (L) >40.0 mg/dL    LDL Cholesterol Calculated 148 (H) 0 - 129 mg/dL    Triglycerides 93.8 0.0 - 150.0 mg/dL    VLDL Cholesterol 18.8 7.0 - 32.0 mg/dL   Comprehensive Metabolic Panel ([facility])   Result Value Ref Range    Albumin 4.5 3.3 - 4.9 mg/dL    Alkaline Phosphatase 61.7 40.0 - 150.0 U/L    ALT <15 0.0 - 45.0 U/L    AST 18.3 0.0 - 55.0 U/L    Bilirubin Total 0.4 0.2 - 1.3 mg/dL    Urea Nitrogen 24.9 (H) 7.0 - 21.0 mg/dL    Calcium 9.6 8.5 - 10.1 mg/dL    Chloride 107.5 98.0 - 110.0 mmol/L    Carbon Dioxide 27.3 20.0 - 32.0 mmol/L    Creatinine 1.4 (H) 0.7 - 1.3 mg/dL    Glucose 114.5 (H) 70.0 - 99.0 mg'dL    Potassium 4.1 3.2 - 4.6 mmol/dL    Sodium 141.3 132.0 - 142.0 mmol/L    Protein Total 7.3 6.8 - 8.8 g/dL    GFR Estimate 52.7 (L) >60.0 mL/min/1.7 m2    GFR Estimate If Black 63.7 >60.0 mL/min/1.7 m2   Hemoglobin A1c ([facility] )   Result Value Ref Range    Hemoglobin A1C 5.7 4.1 - 5.7 %   Thyroid [facility] ([facility])   Result Value Ref Range    TSH 3.25 0.30 - 5.00 uIU/mL    Narrative    Test performed by:  [facility] [address] [place] [address]       If you have any questions, please call the clinic to make an appointment.    Sincerely,    Ervin Pruett MD

## 2019-04-10 NOTE — RESULT ENCOUNTER NOTE
Your thyroid test results are normal.    Your liver tests are also normal.    Your kidney test is about the same that it is been over the past 2 years.    Your cholesterol results are not good.  Have you been taking your atorvastatin?  If not, please resume taking your atorvastatin.  This is important to help prevent heart attacks and strokes.    The A1c is a test for diabetes.  Your result is normal.

## 2019-04-15 ENCOUNTER — OFFICE VISIT - HEALTHEAST (OUTPATIENT)
Dept: SURGERY | Facility: CLINIC | Age: 74
End: 2019-04-15

## 2019-04-15 DIAGNOSIS — K43.2 INCISIONAL HERNIA, WITHOUT OBSTRUCTION OR GANGRENE: ICD-10-CM

## 2019-04-15 ASSESSMENT — MIFFLIN-ST. JEOR: SCORE: 1566.33

## 2019-04-26 ENCOUNTER — OFFICE VISIT (OUTPATIENT)
Dept: FAMILY MEDICINE | Facility: CLINIC | Age: 74
End: 2019-04-26
Payer: COMMERCIAL

## 2019-04-26 VITALS
BODY MASS INDEX: 33.61 KG/M2 | OXYGEN SATURATION: 96 % | HEART RATE: 83 BPM | TEMPERATURE: 97.6 F | DIASTOLIC BLOOD PRESSURE: 94 MMHG | SYSTOLIC BLOOD PRESSURE: 170 MMHG | WEIGHT: 202 LBS | RESPIRATION RATE: 20 BRPM

## 2019-04-26 DIAGNOSIS — Z01.818 PREOP GENERAL PHYSICAL EXAM: Primary | ICD-10-CM

## 2019-04-26 LAB
% GRANULOCYTES: 77.4 %G (ref 40–75)
BUN SERPL-MCNC: 19.8 MG/DL (ref 7–21)
CALCIUM SERPL-MCNC: 8.9 MG/DL (ref 8.5–10.1)
CHLORIDE SERPLBLD-SCNC: 105.2 MMOL/L (ref 98–110)
CO2 SERPL-SCNC: 23.3 MMOL/L (ref 20–32)
CREAT SERPL-MCNC: 1.3 MG/DL (ref 0.7–1.3)
GFR SERPL CREATININE-BSD FRML MDRD: 57.4 ML/MIN/1.7 M2
GLUCOSE SERPL-MCNC: 101.7 MG'DL (ref 70–99)
GRANULOCYTES #: 4.6 K/UL (ref 1.6–8.3)
HCT VFR BLD AUTO: 40.3 % (ref 40–53)
HEMOGLOBIN: 12.7 G/DL (ref 13.3–17.7)
LYMPHOCYTES # BLD AUTO: 0.9 K/UL (ref 0.8–5.3)
LYMPHOCYTES NFR BLD AUTO: 15.2 %L (ref 20–48)
MCH RBC QN AUTO: 30.6 PG (ref 26.5–35)
MCHC RBC AUTO-ENTMCNC: 31.5 G/DL (ref 32–36)
MCV RBC AUTO: 97.2 FL (ref 78–100)
MID #: 0.4 K/UL (ref 0–2.2)
MID %: 7.4 %M (ref 0–20)
PLATELET # BLD AUTO: 229 K/UL (ref 150–450)
POTASSIUM SERPL-SCNC: 3.7 MMOL/DL (ref 3.2–4.6)
RBC # BLD AUTO: 4.2 M/UL (ref 4.4–5.9)
SODIUM SERPL-SCNC: 139.7 MMOL/L (ref 132–142)
WBC # BLD AUTO: 5.9 K/UL (ref 4–11)

## 2019-04-26 NOTE — PROGRESS NOTES
[address] [address]  Phone: [phone]  Fax: [phone]    4/26/2019    Adult PRE-OP Evaluation:    Michael D McArdle, 1945 presents for pre-operative evaluation and assessment as requested by Dr. Calderon, prior to undergoing surgery/procedure for treatment of  incisional hernia .    Proposed procedure: Repair of incisional hernia    Date of Surgery/ Procedure: 5/10/19  Hospital/Surgical Facility: [facility], Fax: [phone]     Primary Physician: Ervin Pruett  Type of Anesthesia Anticipated: General  History of anesthesia complications: NONE  History of  abnormal bleeding: NONE   History of blood transfusions: NO  Patient has a Health Care Directive or Living Will:  NO    Preoperative Questions   1. NO - Do you have a history of heart attack, stroke, stent, bypass or surgery on an artery in the head, neck, heart or legs?  2. NO - Do you ever have any pain or discomfort in your chest?  3. NO - Have you ever had a severe pain across the front of your chest lasting for half an hour or more?  4. NO - Do you have a history of Congestive Heart Failure?  5. YES - Are you troubled by shortness of breath when: walking on the level/ up a slight hill/ at night? Short of breath walking up hills  6. YES - Does your chest ever sound wheezy or whistling? Has wheezing sometimes  7. NO - Do you currently have a cold, bronchitis or other respiratory infection?  8. NO - Have you had a cold, bronchitis or other respiratory infection within the last 2 weeks?  9. YES - Do you usually have a cough? Occasional cough  10. NO - Do you sometimes get pains in the calves of your legs when you walk?  11. YES - Do you or anyone in your family have previous history of blood clots? Had blood clot with PE after colon resection  12. NO - Do you or does anyone in your family have a serious bleeding problem such as prolonged bleeding following surgeries or cuts?  13. NO - Have you ever had  problems with anemia or been told to take iron pills?  14. NO - Have you had any abnormal blood loss such as black, tarry or bloody stools, or abnormal vaginal bleeding?  15. NO - Have you ever had a blood transfusion?  16. NO - Have you or any of your relatives ever had problems with anesthesia?  17. NO - Do you have sleep apnea, excessive snoring or daytime drowsiness?  18. NO - Do you have any prosthetic heart valves?  19. NO - Do you have prosthetic joints?  20. NO - Is there any chance that you may be pregnant?    Patient Active Problem List   Diagnosis     Chronic Reflux esophagitis     Depressive disorder, not elsewhere classified     Diastolic Dysfunction      Fainting (Syncope)     smoking      Prediabetes     Asthma     Hyperlipidemia     Obese     Adenocarcinoma of rectum (H)     Pulmonary embolism and infarction (H)     Esophageal reflux     Hypothyroidism due to acquired atrophy of thyroid     ACP (advance care planning)     CKD (chronic kidney disease) stage 3, GFR 30-59 ml/min (H)     Decreased vision in both eyes     Decreased hearing of both ears     Ventral hernia     Unsteady gait         Current Outpatient Medications on File Prior to Visit:  ACETAMINOPHEN PO Take 500 mg by mouth   albuterol (PROAIR HFA/PROVENTIL HFA/VENTOLIN HFA) 108 (90 Base) MCG/ACT Inhaler Inhale 2 puffs into the lungs every 6 hours   aspirin (ASA) 81 MG chewable tablet Take 1 tablet (81 mg) by mouth daily Chew and swallow 1 tablet daily   Aspirin 81 MG TBDP Take 1 tablet by mouth daily   atorvastatin (LIPITOR) 80 MG tablet Take 1 tablet (80 mg) by mouth daily   BUTT PASTE - REGULAR (DR LOVE POOP GOOP BUTT PASTE FORMULA) Please combine stomahesive and nystatin per pharmacy recipe.  Apply up to 6x daily PRN for rectal irritation.   FLUoxetine (PROZAC) 40 MG capsule Take 1 capsule (40 mg) by mouth daily   fluticasone furoate (ARNUITY ELLIPTA) 200 MCG/ACT inhalation powder Inhale 1 puff into the lungs daily   levothyroxine  (SYNTHROID/LEVOTHROID) 200 MCG tablet Take one tablet daily.  Also take a 25 mcg tablet daily for a total dose of 225 mcg per day   levothyroxine (SYNTHROID/LEVOTHROID) 25 MCG tablet Take 1 tablet (25 mcg) by mouth daily   order for DME Equipment being ordered: four wheeled walker   polyethylene glycol (MIRALAX) powder Take 1 capful every other day   ranitidine (ZANTAC) 150 MG tablet Take 1 tablet (150 mg) by mouth 2 times daily     No current facility-administered medications on file prior to visit.     OTC products: ranitidine and acetamoniphen    Allergies   Allergen Reactions     Penicillins Swelling     Latex Allergy: NO    Social History     Socioeconomic History     Marital status:      Spouse name: Not on file     Number of children: Not on file     Years of education: Not on file     Highest education level: Not on file   Occupational History     Not on file   Social Needs     Financial resource strain: Not on file     Food insecurity:     Worry: Not on file     Inability: Not on file     Transportation needs:     Medical: Not on file     Non-medical: Not on file   Tobacco Use     Smoking status: Current Every Day Smoker     Types: Pipe     Smokeless tobacco: Never Used     Tobacco comment: has been cut down a lot, is on the nicotine patch   Substance and Sexual Activity     Alcohol use: Yes     Alcohol/week: 0.0 oz     Drug use: No     Sexual activity: Yes     Partners: Female   Lifestyle     Physical activity:     Days per week: Not on file     Minutes per session: Not on file     Stress: Not on file   Relationships     Social connections:     Talks on phone: Not on file     Gets together: Not on file     Attends [employer] service: Not on file     Active member of club or organization: Not on file     Attends meetings of clubs or organizations: Not on file     Relationship status: Not on file     Intimate partner violence:     Fear of current or ex partner: Not on file     Emotionally abused: Not  on file     Physically abused: Not on file     Forced sexual activity: Not on file   Other Topics Concern     Not on file   Social History Narrative     Not on file       REVIEW OF SYSTEMS:   Constitutional, HEENT, cardiovascular, pulmonary, gi and gu systems are negative, except as otherwise noted.    EXAM:     Patient Vitals for the past 24 hrs:   BP Temp Temp src Pulse Resp SpO2 Weight   04/26/19 1434 (!) 170/94 97.6  F (36.4  C) Oral 83 20 96 % 91.6 kg (202 lb)     Body mass index is 33.61 kg/m .  GENERAL: healthy, alert and no distress  EYES: Eyes grossly normal to inspection, extraocular movements - intact, and PERRL  HENT: ear canals- normal; TMs- normal; Nose- normal; Mouth- no ulcers, no lesions  NECK: no tenderness, no adenopathy, no asymmetry, no masses, no stiffness; thyroid- normal to palpation  RESP: lungs clear to auscultation - no rales, no rhonchi, no wheezes  CV: regular rates and rhythm, normal S1 S2, no S3 or S4 and no murmur, no click or rub -  ABDOMEN: soft, no tenderness, no  hepatosplenomegaly, no masses, normal bowel sounds, moderate incisional hernia which is nontender  MS: extremities- no gross deformities noted, no edema  SKIN: no suspicious lesions, no rashes  NEURO: strength and tone- normal, sensory exam- grossly normal, mentation- intact, speech- normal, reflexes- symmetric  BACK: no CVA tenderness, no paralumbar tenderness  PSYCH: Alert and oriented; speech- coherent , normal rate and volume; able to articulate logical thoughts  LYMPHATICS: ant. cervical- normal, post. cervical- normal    DIAGNOSTICS:      Results for orders placed or performed in visit on 04/26/19   CBC with Diff Plt ([facility])   Result Value Ref Range    WBC 5.9 4.0 - 11.0 K/uL    Lymphocytes # 0.9 0.8 - 5.3 K/uL    % Lymphocytes 15.2 (L) 20.0 - 48.0 %L    Mid # 0.4 0.0 - 2.2 K/uL    Mid % 7.4 0.0 - 20.0 %M    GRANULOCYTES # 4.6 1.6 - 8.3 K/uL    % Granulocytes 77.4 (H) 40.0 - 75.0 %G    RBC 4.2 (L) 4.4 - 5.9 M/uL     Hemoglobin 12.7 (L) 13.3 - 17.7 g/dL    Hematocrit 40.3 40.0 - 53.0 %    MCV 97.2 78.0 - 100.0 fL    MCH 30.6 26.5 - 35.0    MCHC 31.5 (L) 32.0 - 36.0 g/dL    Platelets 229.0 150.0 - 450.0 K/uL   Basic Metabolic Panel ([facility])  - Results < 1 hr   Result Value Ref Range    Urea Nitrogen 19.8 7.0 - 21.0 mg/dL    Calcium 8.9 8.5 - 10.1 mg/dL    Chloride 105.2 98.0 - 110.0 mmol/L    Carbon Dioxide 23.3 20.0 - 32.0 mmol/L    Creatinine 1.3 0.7 - 1.3 mg/dL    Glucose 101.7 (H) 70.0 - 99.0 mg'dL    Potassium 3.7 3.2 - 4.6 mmol/dL    Sodium 139.7 132.0 - 142.0 mmol/L    GFR Estimate 57.4 (L) >60.0 mL/min/1.7 m2    GFR Estimate If Black 69.4 >60.0 mL/min/1.7 m2        EKG: NSR, LAD, IVCD    RISK ASSESSMENT:     Cardiovascular Risk:  -Patient is able to perform ADL's without assistance without chest pain.  -The patient does not have chest pain with exertion.  -Patient does not have a history of congestive heart failure.    -The patient does not have a history of stroke and does not have a history of valvular disease.    Pulmonary Risk:  -In terms of risk factors for pulmonary complication, the patient is older then 60    Perioperative Complications:  -The patient has a history of bleeding or clotting problems in the past.    -The patient has had the following complications of surgery: DVT with PE and incisional hernia following colon CA resection.    -The patient does not have a family history of any anesthesia or surgical complications.      IMPRESSION:   Reason for surgery/procedure: Incisional hernia    The proposed surgical procedure is considered INTERMEDIATE risk.    For above listed surgery and anesthesia:   Patient is at moderate risk for surgery/procedure and perioperative/procedure complications.    RECOMMENDATIONS:     Labs:  See above    Fasting:  Must be NPO after [place] preoperatively per surgical recommendations.    Preop Plan:  --Approval given to proceed with proposed procedure, without further diagnostic  evaluation    Medications:  Patient should take their regular medications the morning of surgery unless otherwise instructed.    Hold aspirin 7 days prior to surgery.      Ervin Pruett MD    Please contact our office if there are any further questions or information required about this patient.

## 2019-05-09 ENCOUNTER — ANESTHESIA - HEALTHEAST (OUTPATIENT)
Dept: SURGERY | Facility: HOSPITAL | Age: 74
End: 2019-05-09

## 2019-05-10 ENCOUNTER — MEDICAL CORRESPONDENCE (OUTPATIENT)
Dept: HEALTH INFORMATION MANAGEMENT | Facility: CLINIC | Age: 74
End: 2019-05-10

## 2019-05-10 ENCOUNTER — SURGERY - HEALTHEAST (OUTPATIENT)
Dept: SURGERY | Facility: HOSPITAL | Age: 74
End: 2019-05-10

## 2019-05-10 ASSESSMENT — MIFFLIN-ST. JEOR
SCORE: 1563.61
SCORE: 1566.16

## 2019-05-11 ASSESSMENT — MIFFLIN-ST. JEOR: SCORE: 1577.22

## 2019-05-23 ENCOUNTER — OFFICE VISIT - HEALTHEAST (OUTPATIENT)
Dept: SURGERY | Facility: CLINIC | Age: 74
End: 2019-05-23

## 2019-05-23 DIAGNOSIS — Z48.89 POSTOPERATIVE VISIT: ICD-10-CM

## 2019-05-23 ASSESSMENT — MIFFLIN-ST. JEOR: SCORE: 1531.86

## 2019-05-30 ENCOUNTER — OFFICE VISIT (OUTPATIENT)
Dept: FAMILY MEDICINE | Facility: CLINIC | Age: 74
End: 2019-05-30
Payer: COMMERCIAL

## 2019-05-30 VITALS
DIASTOLIC BLOOD PRESSURE: 79 MMHG | HEART RATE: 96 BPM | TEMPERATURE: 98.5 F | SYSTOLIC BLOOD PRESSURE: 160 MMHG | WEIGHT: 194 LBS | OXYGEN SATURATION: 96 % | RESPIRATION RATE: 20 BRPM | BODY MASS INDEX: 32.28 KG/M2

## 2019-05-30 DIAGNOSIS — I10 ESSENTIAL HYPERTENSION: ICD-10-CM

## 2019-05-30 DIAGNOSIS — K43.9 VENTRAL HERNIA WITHOUT OBSTRUCTION OR GANGRENE: Primary | ICD-10-CM

## 2019-05-30 RX ORDER — AMLODIPINE BESYLATE 2.5 MG/1
2.5 TABLET ORAL DAILY
Qty: 30 TABLET | Refills: 1 | Status: SHIPPED | OUTPATIENT
Start: 2019-05-30 | End: 2019-06-19

## 2019-05-30 NOTE — PROGRESS NOTES
There are no exam notes on file for this visit.  Chief Complaint   Patient presents with     Surgical Followup       Subjective: The patient comes in today to follow-up his recent hernia surgery.    The patient states he is doing quite well.  He notes that he was vomiting the day after surgery.  He also notes that he had problems with oxygen saturation.  He tells me that he has already had a follow-up visit with the surgeon, and that he has a follow-up on July 23, 2019.  He tells me that the surgeon is quite [place] with his progress.  He is quite pleased with his progress.    The patient tells me that during the hospitalization there was concern for sleep apnea.  I do not see this reflected in the discharge summary which I reviewed, nor the discharge summary from the hospitalist which I also reviewed.  Patient otherwise does not have any complaints.  He is not currently taking any narcotic pain medications.  Patient tells me that his pain is well controlled, and that the narcotics made him constipated.    Objective:    Blood pressure 160/79, pulse 96, temperature 98.5  F (36.9  C), temperature source Oral, resp. rate 20, weight 88 kg (194 lb), SpO2 96 %.  Body mass index is 32.28 kg/m .    General:  Well nourished, and in no acute distress.  The vital signs are reviewed  Heart:  RRR.  There are no murmurs, rubs, or gallups,   Lungs:  CTAB, no wheezes/rales/rhonchi, good air movement  Abdomen: soft, no tenderness, no masses, no guarding, no rebound, BS normal.  There are 3 small incisions which are healing well on his anterior abdominal wall  Extremities: no cyanosis, edema or clubbing  Psych: Euthymic           Assessment and plan:        Ventral hernia without obstruction or gangrene    The patient is doing quite well from his surgery.  He did have episode of hyperkalemia, as well as problems with wheezing during his hospitalization.  He did have a postoperative ileus, and required a nasogastric tube.  This was  subsequently removed, and he was discharged from the hospital without complications on the postoperative day 3.    As noted above, there is no mention of obstructive sleep apnea.  However, the patient does have a suggestive history, and I would like to have a sleep study performed.  The patient tells me that he is not willing to do that at this point, but will consider it in the future.    Essential hypertension  -     amLODIPine (NORVASC) 2.5 MG tablet; Take 1 tablet (2.5 mg) by mouth daily    The patient's blood pressures have been consistently elevated.  The patient blames this on our office.  He is otherwise asymptomatic from this point.  He is willing to start on antihypertensive.  The patient currently lives in assisted living.  We will have the assisted-living check his blood pressure 3 times a week, and have the patient return to see me in 2 weeks.  I will start him on a very small dose of amlodipine (2.5 mg), and see how he reacts to this.    We will readdress the issue of a sleep study at the time of his next visit.        Patient Instructions     Please check your blood pressure three times a week, and bring the results with you for your next visit  Come back to see me in about two weeks  Start taking the amlodipine to help your blood pressure    Thank you for coming to [facility].  **If you had lab testing today and your results are reassuring or normal they will be be mailed to you within 7 days.   **If the lab tests need quick action we will call you with the results.  The phone number we will call with results is # [phone] (home) . If this is not the best number please call our clinic and change the number.  If you need any refills please call your pharmacy and they will contact us.  If you have any concerns about today's visit or wish to schedule another appointment please call our office during normal business hours [phone] (8-5:00 M-F)  If you have urgent medical concerns please call  [phone] at any time of the day.  If you a medical emergency please call 911  Again thank you for choosing [facility] and please let us know how we can best partner with you to improve you and your family's health.    Patient Education     What is High Blood Pressure?  High blood pressure (also called hypertension) is known as the  silent killer.  This is because most of the time it doesn t cause symptoms. In fact, many people don t know they have it until other problems develop. In most cases, high blood pressure often requires lifelong treatment.  Understanding blood pressure  The circulatory system is made up of the heart and blood vessels that carry blood through the body. Your heart is the pump for this system. With each heartbeat (contraction), the heart sends blood out through large blood vessels called arteries. Blood pressure is a measure of how hard the moving blood pushes against the walls of the arteries.  High blood pressure can harm your health  In a healthy blood vessel, the blood moves smoothly through the vessel and puts normal pressure on the vessel walls.       High blood pressure occurs when blood pushes too hard against artery walls. This causes damage to the artery walls and then the formation of scar tissue as it heals. This makes the arteries stiff and weak. Plaque sticks to the scarred tissue narrowing and hardening the arteries. High blood pressure also causes your heart to work harder to get blood out to the body. High blood pressure raises your risk of heart attack, also known as acute myocardial infarction, or AMI, heart failure, and stroke. It can also lead to kidney disease, and blindness. In general, if you have high blood pressure, keeping your blood pressure below 130/80 mmHg may help prevent these problems. Your healthcare provider may prescribe medicine to help control blood pressure if lifestyle changes are not enough.  It's important to know your blood pressure numbers.  Blood pressure measurements are given as 2 numbers. Systolic blood pressure is the upper number. This is the pressure when the heart contracts. Diastolic blood pressure is the lower number. This is the pressure when the heart relaxes between beats.  Blood pressure is categorized as normal, elevated, or stage 1 or stage 2 high blood pressure:    Normal blood pressure is systolic of less than 120 and diastolic of less than 80 (120/80)    Elevated blood pressure is systolic of 120 to 129 and diastolic less than 80    Stage 1 high blood pressure is systolic is 130 to 139 or diastolic between 80 to 89    Stage 2 high blood pressure is when systolic is 140 or higher or the diastolic is 90 or higher  High blood pressure is diagnosed when multiple, separate readings show blood pressure above 130/80 mmHg. Talk with your healthcare provider if you have questions or concerns about your blood pressure readings.  Measuring blood pressure  An example of a blood pressure measurement is 120/70 (120 over 70). The top number is the pressure of blood against the artery walls during a heartbeat (systolic). The bottom number is the pressure of blood against artery walls between heartbeats (diastolic). Talk with your healthcare provider to find out what your blood pressure goals should be.   Controlling blood pressure  If your blood pressure is too high, work with your doctor on a plan for lowering it. Below are steps you can take that will help lower your blood pressure.    Choose heart-healthy foods. Eating healthier meals helps you control your blood pressure. Ask your doctor about the DASH eating plan. This plan helps reduce blood pressure by limiting the amount of sodium (salt) you have in your diet. DASH also encourages eating plenty of fruits and vegetables, low-fat or non-fat dairy, whole-grains, and foods high in fiber, and low in fat. This also provides an enhanced amount of potassium which can also help lower blood  "pressure.    Reduce sodium. Reducing sodium in your diet reduces fluid retention. Fluid retention caused by too much salt increases blood volume and blood pressure. The American Heart Association s (AHA) \"ideal\" sodium intake recommendation is 1,500 milligrams per day.  However, since American's eat so much salt, the AHA says a positive change can occur by cutting back to even 2,400 milligrams of sodium a day.    Maintain a healthy weight. Being overweight makes you more likely to have high blood pressure. Losing excess weight helps lower blood pressure.    Exercise regularly. Daily exercise helps your heart and blood vessels work better and stay healthier. It can help lower your blood pressure.    Stop smoking. Smoking increases blood pressure and damages blood vessels.    Limit alcohol. Drinking too much alcohol can raise blood pressure. Men should have no more than 2 drinks a day. Women should have no more than 1. (A drink is equal to 1 beer, or a small glass of wine, or a shot of liquor.)    Control stress. Stress makes your heart work harder and beat faster. Controlling stress helps you control your blood pressure.  Facts about high blood pressure    Feeling OK does not mean that blood pressure is under control. Likewise, feeling bad doesn t mean it s out of control. The only way to know for sure is to check your pressure regularly.    Medicine is only one part of controlling high blood pressure. You also need to manage your weight, get regular exercise, and adjust your eating habits.    High blood pressure is usually a lifelong problem. But it can be controlled with healthy lifestyle changes and medicine.    Hypertension is not the same as stress. Although stress may be a factor in high blood pressure, it s only one part of the story.    Blood pressure medicines need to be taken every day. Stopping suddenly may cause a dangerous increase in pressure.   Date Last Reviewed: 4/27/2016 2000-2018 [employer]. [address] [place] [address]. All rights reserved. This information is not intended as a substitute for professional medical care. Always follow your healthcare professional's instructions.               The patient was actively involved in the decision making process, and all the questions were answered to their satisfaction prior to leaving.

## 2019-05-30 NOTE — PATIENT INSTRUCTIONS
Please check your blood pressure three times a week, and bring the results with you for your next visit  Come back to see me in about two weeks  Start taking the amlodipine to help your blood pressure    Thank you for coming to [facility].  **If you had lab testing today and your results are reassuring or normal they will be be mailed to you within 7 days.   **If the lab tests need quick action we will call you with the results.  The phone number we will call with results is # [phone] (home) . If this is not the best number please call our clinic and change the number.  If you need any refills please call your pharmacy and they will contact us.  If you have any concerns about today's visit or wish to schedule another appointment please call our office during normal business hours [phone] (8-5:00 M-F)  If you have urgent medical concerns please call [phone] at any time of the day.  If you a medical emergency please call [phone]  Again thank you for choosing [facility] and please let us know how we can best partner with you to improve you and your family's health.    Patient Education     What is High Blood Pressure?  High blood pressure (also called hypertension) is known as the  silent killer.  This is because most of the time it doesn t cause symptoms. In fact, many people don t know they have it until other problems develop. In most cases, high blood pressure often requires lifelong treatment.  Understanding blood pressure  The circulatory system is made up of the heart and blood vessels that carry blood through the body. Your heart is the pump for this system. With each heartbeat (contraction), the heart sends blood out through large blood vessels called arteries. Blood pressure is a measure of how hard the moving blood pushes against the walls of the arteries.  High blood pressure can harm your health  In a healthy blood vessel, the blood moves smoothly through the vessel and puts normal  pressure on the vessel walls.       High blood pressure occurs when blood pushes too hard against artery walls. This causes damage to the artery walls and then the formation of scar tissue as it heals. This makes the arteries stiff and weak. Plaque sticks to the scarred tissue narrowing and hardening the arteries. High blood pressure also causes your heart to work harder to get blood out to the body. High blood pressure raises your risk of heart attack, also known as acute myocardial infarction, or AMI, heart failure, and stroke. It can also lead to kidney disease, and blindness. In general, if you have high blood pressure, keeping your blood pressure below 130/80 mmHg may help prevent these problems. Your healthcare provider may prescribe medicine to help control blood pressure if lifestyle changes are not enough.  It's important to know your blood pressure numbers. Blood pressure measurements are given as 2 numbers. Systolic blood pressure is the upper number. This is the pressure when the heart contracts. Diastolic blood pressure is the lower number. This is the pressure when the heart relaxes between beats.  Blood pressure is categorized as normal, elevated, or stage 1 or stage 2 high blood pressure:    Normal blood pressure is systolic of less than 120 and diastolic of less than 80 (120/80)    Elevated blood pressure is systolic of 120 to 129 and diastolic less than 80    Stage 1 high blood pressure is systolic is 130 to 139 or diastolic between 80 to 89    Stage 2 high blood pressure is when systolic is 140 or higher or the diastolic is 90 or higher  High blood pressure is diagnosed when multiple, separate readings show blood pressure above 130/80 mmHg. Talk with your healthcare provider if you have questions or concerns about your blood pressure readings.  Measuring blood pressure  An example of a blood pressure measurement is 120/70 (120 over 70). The top number is the pressure of blood against the artery  "walls during a heartbeat (systolic). The bottom number is the pressure of blood against artery walls between heartbeats (diastolic). Talk with your healthcare provider to find out what your blood pressure goals should be.   Controlling blood pressure  If your blood pressure is too high, work with your doctor on a plan for lowering it. Below are steps you can take that will help lower your blood pressure.    Choose heart-healthy foods. Eating healthier meals helps you control your blood pressure. Ask your doctor about the DASH eating plan. This plan helps reduce blood pressure by limiting the amount of sodium (salt) you have in your diet. DASH also encourages eating plenty of fruits and vegetables, low-fat or non-fat dairy, whole-grains, and foods high in fiber, and low in fat. This also provides an enhanced amount of potassium which can also help lower blood pressure.    Reduce sodium. Reducing sodium in your diet reduces fluid retention. Fluid retention caused by too much salt increases blood volume and blood pressure. The American Heart Association s (AHA) \"ideal\" sodium intake recommendation is 1,500 milligrams per day.  However, since American's eat so much salt, the AHA says a positive change can occur by cutting back to even 2,400 milligrams of sodium a day.    Maintain a healthy weight. Being overweight makes you more likely to have high blood pressure. Losing excess weight helps lower blood pressure.    Exercise regularly. Daily exercise helps your heart and blood vessels work better and stay healthier. It can help lower your blood pressure.    Stop smoking. Smoking increases blood pressure and damages blood vessels.    Limit alcohol. Drinking too much alcohol can raise blood pressure. Men should have no more than 2 drinks a day. Women should have no more than 1. (A drink is equal to 1 beer, or a small glass of wine, or a shot of liquor.)    Control stress. Stress makes your heart work harder and beat " faster. Controlling stress helps you control your blood pressure.  Facts about high blood pressure    Feeling OK does not mean that blood pressure is under control. Likewise, feeling bad doesn t mean it s out of control. The only way to know for sure is to check your pressure regularly.    Medicine is only one part of controlling high blood pressure. You also need to manage your weight, get regular exercise, and adjust your eating habits.    High blood pressure is usually a lifelong problem. But it can be controlled with healthy lifestyle changes and medicine.    Hypertension is not the same as stress. Although stress may be a factor in high blood pressure, it s only one part of the story.    Blood pressure medicines need to be taken every day. Stopping suddenly may cause a dangerous increase in pressure.   Date Last Reviewed: 4/27/2016 2000-2018 [employer]. [address] [place] [address], [place] [address]. All rights reserved. This information is not intended as a substitute for professional medical care. Always follow your healthcare professional's instructions.

## 2019-06-19 ENCOUNTER — OFFICE VISIT (OUTPATIENT)
Dept: FAMILY MEDICINE | Facility: CLINIC | Age: 74
End: 2019-06-19
Payer: COMMERCIAL

## 2019-06-19 VITALS
DIASTOLIC BLOOD PRESSURE: 77 MMHG | HEART RATE: 82 BPM | WEIGHT: 196.6 LBS | OXYGEN SATURATION: 97 % | SYSTOLIC BLOOD PRESSURE: 144 MMHG | BODY MASS INDEX: 32.72 KG/M2 | TEMPERATURE: 97.5 F | RESPIRATION RATE: 14 BRPM

## 2019-06-19 DIAGNOSIS — I10 ESSENTIAL HYPERTENSION: ICD-10-CM

## 2019-06-19 DIAGNOSIS — G47.30 SLEEP APNEA, UNSPECIFIED TYPE: Primary | ICD-10-CM

## 2019-06-19 RX ORDER — AMLODIPINE BESYLATE 2.5 MG/1
2.5 TABLET ORAL DAILY
Qty: 90 TABLET | Refills: 1 | Status: SHIPPED | OUTPATIENT
Start: 2019-06-19 | End: 2019-07-19

## 2019-06-19 ASSESSMENT — PATIENT HEALTH QUESTIONNAIRE - PHQ9
SUM OF ALL RESPONSES TO PHQ QUESTIONS 1-9: 6
5. POOR APPETITE OR OVEREATING: NOT AT ALL

## 2019-06-19 ASSESSMENT — ANXIETY QUESTIONNAIRES
1. FEELING NERVOUS, ANXIOUS, OR ON EDGE: NOT AT ALL
5. BEING SO RESTLESS THAT IT IS HARD TO SIT STILL: NOT AT ALL
2. NOT BEING ABLE TO STOP OR CONTROL WORRYING: NOT AT ALL
6. BECOMING EASILY ANNOYED OR IRRITABLE: NOT AT ALL
3. WORRYING TOO MUCH ABOUT DIFFERENT THINGS: NOT AT ALL
GAD7 TOTAL SCORE: 0
7. FEELING AFRAID AS IF SOMETHING AWFUL MIGHT HAPPEN: NOT AT ALL

## 2019-06-19 NOTE — PROGRESS NOTES
Nursing Notes:   Jw Perez CMA  6/19/2019 11:14 AM  Signed  Primary Care PTSD Screen    In your life, have you ever had any experience that was so frightening, horrible, or upsetting that, in the past month, you...    1.) Have had nightmares about it or thought about it when you did not want to? NO    2.) Tried hard not to think about it or went out of your way to avoid situations that remind you of it? NO    3.) Were constantly on guard, watchful, or easily startled? NO    4.) Felt numb or detached from others, activities, or your surroundings? NO        Have you experienced sustained periods of feeling uncharacteristically energetic?  NO    Have you had periods of not sleeping, but not feeling tired?  NO    Have you felt that your thoughts were racing and couldn't be slowed down?  NO    Have you had periods where you were excessive in sexual interest, spending money, or taking unusual risks?  NO        Chief Complaint   Patient presents with     other     come here today to talk about irregular blood pressure per patient.      other     want to talk about sleep study per patient.      Medication Reconciliation     reviewed.      Refill Request     need a prescription for some medication per patient.        Subjective: The patient returns today for follow-up of his previous visit.    At that visit, we started amlodipine 2.5 mg/day.  Subsequently, he has had his blood pressure checked on 5 occasions at his assisted living center.  He brings with him a sheet with those values.  The average systolic blood pressure, including today's reading, is 135.    The patient and I discussed goals for adequate treatment of hypertension.  He would prefer to stay with the current therapy at this point.  He is tolerating the medication well without side effects.  He is quite happy that his blood pressure has improved.        Objective:    Blood pressure 144/77, pulse 82, temperature 97.5  F (36.4  C), temperature source Oral,  resp. rate 14, weight 89.2 kg (196 lb 9.6 oz), SpO2 97 %.  Body mass index is 32.72 kg/m .    General:  Well nourished, and in no acute distress.  The vital signs are reviewed  Psych: Euthymic           Assessment and plan:        Sleep apnea, unspecified type  -     SLEEP EVALUATION & MANAGEMENT REFERRAL - ADULT -Other (Respond in commments) (Most convenient location); Future    Patient did have presumed sleep apnea during his recent hospitalization for ventral hernia repair.  He  has now healed well enough from that surgery that he feels comfortable proceeding with a sleep study.  This is ordered for him today.  The importance of follow-up was discussed with him.    Essential hypertension  -     order for DME; Equipment being ordered: automatic blood pressure monitor  -     amLODIPine (NORVASC) 2.5 MG tablet; Take 1 tablet (2.5 mg) by mouth daily    As noted above the patient prefers to continue with the current dose of amlodipine.  The patient would like to follow-up with me in 1 month.  I have ordered a blood pressure cuff as DME so that he can begin checking his blood pressures at home.  He will check them once or twice a week, and bring those results with him at the time of his next visit in 1 month.    Patient will call return to clinic sooner if I can be of further assistance.        Patient Instructions   Thank you for coming to [facility].  **If you had lab testing today and your results are reassuring or normal they will be be mailed to you within 7 days.   **If the lab tests need quick action we will call you with the results.  The phone number we will call with results is # [phone] (home) . If this is not the best number please call our clinic and change the number.  If you need any refills please call your pharmacy and they will contact us.  If you have any concerns about today's visit or wish to schedule another appointment please call our office during normal business hours [phone]  (8-5:00 M-F)  If you have urgent medical concerns please call [phone] at any time of the day.  If you a medical emergency please call [phone]  Again thank you for choosing [facility] and please let us know how we can best partner with you to improve you and your family's health.    Patient Education     Step-by-Step  Checking Your Blood Pressure    Date Last Reviewed: 4/27/2016 2000-2018 [employer]. [address] [place] [address]. All rights reserved. This information is not intended as a substitute for professional medical care. Always follow your healthcare professional's instructions.               The patient was actively involved in the decision making process, and all the questions were answered to their satisfaction prior to leaving.

## 2019-06-19 NOTE — PATIENT INSTRUCTIONS
Thank you for coming to [facility].  **If you had lab testing today and your results are reassuring or normal they will be be mailed to you within 7 days.   **If the lab tests need quick action we will call you with the results.  The phone number we will call with results is # [phone] (home) . If this is not the best number please call our clinic and change the number.  If you need any refills please call your pharmacy and they will contact us.  If you have any concerns about today's visit or wish to schedule another appointment please call our office during normal business hours [phone] (8-5:00 M-F)  If you have urgent medical concerns please call [phone] at any time of the day.  If you a medical emergency please call [phone]  Again thank you for choosing [facility] and please let us know how we can best partner with you to improve you and your family's health.    Patient Education     Step-by-Step  Checking Your Blood Pressure    Date Last Reviewed: 4/27/2016 2000-2018 [employer]. [address] [place] [address]. All rights reserved. This information is not intended as a substitute for professional medical care. Always follow your healthcare professional's instructions.           SLEEP EVALUATION & MANAGEMENT REFERRAL - ADULT  June 25, 2019 Referral, demographics and medication list faxed to [facility] at [phone] who will contact patient to schedule. Phones are answered at [phone] from 8:30 am to 5:00 pm Monday - Friday. Jayla Kaplan    [facility]  Phone: [phone]  Fax: [phone]    [facility]  [address], [address]  [place] [address]    [place] [facility]   [address], [address]  [place] [address]    Faxed Referral over, they will contact the patient. SHAWN Pineda

## 2019-06-19 NOTE — NURSING NOTE
Primary Care PTSD Screen    In your life, have you ever had any experience that was so frightening, horrible, or upsetting that, in the past month, you...    1.) Have had nightmares about it or thought about it when you did not want to? NO    2.) Tried hard not to think about it or went out of your way to avoid situations that remind you of it? NO    3.) Were constantly on guard, watchful, or easily startled? NO    4.) Felt numb or detached from others, activities, or your surroundings? NO        Have you experienced sustained periods of feeling uncharacteristically energetic?  NO    Have you had periods of not sleeping, but not feeling tired?  NO    Have you felt that your thoughts were racing and couldn't be slowed down?  NO    Have you had periods where you were excessive in sexual interest, spending money, or taking unusual risks?  NO

## 2019-06-20 ASSESSMENT — ANXIETY QUESTIONNAIRES: GAD7 TOTAL SCORE: 0

## 2019-06-20 ASSESSMENT — ASTHMA QUESTIONNAIRES: ACT_TOTALSCORE: 13

## 2019-06-21 ENCOUNTER — TELEPHONE (OUTPATIENT)
Dept: FAMILY MEDICINE | Facility: CLINIC | Age: 74
End: 2019-06-21

## 2019-06-21 DIAGNOSIS — R26.81 UNSTEADY GAIT: ICD-10-CM

## 2019-06-21 NOTE — TELEPHONE ENCOUNTER
[facility] phone call message- general phone call:    Reason for call: For   He needs a call back re a 4 wheel walker he needs a prescription for one sent to a supply store for him in [place] fax #[phone].    Return call needed: Yes    OK to leave a message on voice mail? Yes    Primary language: English      needed? No    Call taken on June 21, 2019 at 10:54 AM by Ana Coburn

## 2019-06-24 DIAGNOSIS — R55 SYNCOPE AND COLLAPSE: Primary | ICD-10-CM

## 2019-06-24 DIAGNOSIS — R26.81 UNSTEADY GAIT: ICD-10-CM

## 2019-06-24 NOTE — TELEPHONE ENCOUNTER
Patient would like the new Rx to go to [facility].   Fax# [phone]  SHAWN Pineda

## 2019-06-27 ENCOUNTER — TRANSFERRED RECORDS (OUTPATIENT)
Dept: HEALTH INFORMATION MANAGEMENT | Facility: CLINIC | Age: 74
End: 2019-06-27

## 2019-06-27 ENCOUNTER — AMBULATORY - HEALTHEAST (OUTPATIENT)
Dept: SLEEP MEDICINE | Facility: CLINIC | Age: 74
End: 2019-06-27

## 2019-06-27 DIAGNOSIS — R26.81 UNSTEADY GAIT: Primary | ICD-10-CM

## 2019-06-27 DIAGNOSIS — H54.3 DECREASED VISION IN BOTH EYES: ICD-10-CM

## 2019-06-27 DIAGNOSIS — I50.33 DIASTOLIC CHF, ACUTE ON CHRONIC (H): ICD-10-CM

## 2019-06-27 DIAGNOSIS — R55 SYNCOPE AND COLLAPSE: ICD-10-CM

## 2019-06-27 DIAGNOSIS — G47.30 SLEEP APNEA: ICD-10-CM

## 2019-06-27 NOTE — PROGRESS NOTES
Request for reprint of DME.  Printed and sent to medical supply in [facility]

## 2019-07-11 ENCOUNTER — TRANSFERRED RECORDS (OUTPATIENT)
Dept: HEALTH INFORMATION MANAGEMENT | Facility: CLINIC | Age: 74
End: 2019-07-11

## 2019-07-19 ENCOUNTER — OFFICE VISIT (OUTPATIENT)
Dept: FAMILY MEDICINE | Facility: CLINIC | Age: 74
End: 2019-07-19
Payer: COMMERCIAL

## 2019-07-19 VITALS
HEART RATE: 73 BPM | SYSTOLIC BLOOD PRESSURE: 162 MMHG | DIASTOLIC BLOOD PRESSURE: 88 MMHG | BODY MASS INDEX: 33.18 KG/M2 | RESPIRATION RATE: 20 BRPM | WEIGHT: 199.4 LBS | OXYGEN SATURATION: 97 % | TEMPERATURE: 97.4 F

## 2019-07-19 DIAGNOSIS — I10 ESSENTIAL HYPERTENSION: ICD-10-CM

## 2019-07-19 LAB
BUN SERPL-MCNC: 15.3 MG/DL (ref 7–21)
CALCIUM SERPL-MCNC: 9.1 MG/DL (ref 8.5–10.1)
CHLORIDE SERPLBLD-SCNC: 106.2 MMOL/L (ref 98–110)
CO2 SERPL-SCNC: 25.9 MMOL/L (ref 20–32)
CREAT SERPL-MCNC: 1.4 MG/DL (ref 0.7–1.3)
GFR SERPL CREATININE-BSD FRML MDRD: 52.5 ML/MIN/1.7 M2
GLUCOSE SERPL-MCNC: 111.5 MG'DL (ref 70–99)
POTASSIUM SERPL-SCNC: 3.9 MMOL/DL (ref 3.2–4.6)
SODIUM SERPL-SCNC: 136.7 MMOL/L (ref 132–142)

## 2019-07-19 RX ORDER — AMLODIPINE BESYLATE 5 MG/1
5 TABLET ORAL DAILY
Qty: 90 TABLET | Refills: 3 | Status: SHIPPED | OUTPATIENT
Start: 2019-07-19 | End: 2020-08-07

## 2019-07-19 NOTE — PATIENT INSTRUCTIONS
Please stop by the pharmacy to  the new medication  Call us when you know when the eye surgery will be and set up a preoperative examination    Thank you for coming to [facility].  **If you had lab testing today and your results are reassuring or normal they will be be mailed to you within 7 days.   **If the lab tests need quick action we will call you with the results.  The phone number we will call with results is # [phone] (home) . If this is not the best number please call our clinic and change the number.  If you need any refills please call your pharmacy and they will contact us.  If you have any concerns about today's visit or wish to schedule another appointment please call our office during normal business hours [phone] (8-5:00 M-F)  If you have urgent medical concerns please call [phone] at any time of the day.  If you a medical emergency please call [phone]  Again thank you for choosing [facility] and please let us know how we can best partner with you to improve you and your family's health.

## 2019-07-19 NOTE — RESULT ENCOUNTER NOTE
Your kidney test is about the same as it has been over the past 2 years.    Your electrolytes (blood salts) are all normal.    This is good news.  Please continue on the new blood pressure medicine like we discussed at your office visit.    I will see her back for the preoperative exam after you get your surgery scheduled.

## 2019-07-19 NOTE — PROGRESS NOTES
"There are no exam notes on file for this visit.  Chief Complaint   Patient presents with     Hypertension     f/u bp       Subjective: The patient comes in today to follow-up several medical issues.    The patient tells me that he has seen his eye doctor.  He tells me that he has \"severe\" cataracts, and that he needs surgery.  He tells me that the eye doctor has told him that he will not need to wear glasses following the surgery.  He has an appointment next week with the surgeon (Dr. Reynolds) to set a date for the surgery.  He has been told that he will need a preoperative examination prior to that.    The patient tells me that he has contacted the people for a sleep study.  His study is set up for September 19.    The patient tells me that he has been taking his blood pressure at night.  He does not bring the meter with him.  He does not bring a log with him.  He tells me that the blood pressure ranges from 138-139/70.    The patient tells me that he has decreased his coffee consumption down to 2 cups of coffee per day.  He otherwise has no complaints.    Objective:    Blood pressure 162/88, pulse 73, temperature 97.4  F (36.3  C), temperature source Oral, resp. rate 20, weight 90.4 kg (199 lb 6.4 oz), SpO2 97 %.  Body mass index is 33.18 kg/m .    General:  Well nourished, and in no acute distress.  The vital signs are reviewed, and reveal a elevated blood pressure despite repeat testing  Psych: Euthymic     Assessment and plan:      Essential hypertension  -     amLODIPine (NORVASC) 5 MG tablet; Take 1 tablet (5 mg) by mouth daily  -     Basic Metabolic Panel ([place])    With today's elevated blood pressure, and with no evidence of low blood pressures at home, I feel comfortable increasing his amlodipine.  We will go up to 5 mg/day.  Patient will  his prescription today, and discard the previous pills.    The patient believes that his preoperative examination will be in the next few weeks.  We will recheck " his blood pressure at that visit.  If his blood pressure is not yet well controlled, will increase his amlodipine to 10 mg/day.    The patient's last BMP showed an elevation of his creatinine.  I would like to check another BMP today to ensure that his creatinine is stable if not improved.  Patient is willing to do so and will stop in the lab on his way out today for that blood test.      Patient Instructions   Please stop by the pharmacy to  the new medication  Call us when you know when the eye surgery will be and set up a preoperative examination    Thank you for coming to [facility].  **If you had lab testing today and your results are reassuring or normal they will be be mailed to you within 7 days.   **If the lab tests need quick action we will call you with the results.  The phone number we will call with results is # [phone] (home) . If this is not the best number please call our clinic and change the number.  If you need any refills please call your pharmacy and they will contact us.  If you have any concerns about today's visit or wish to schedule another appointment please call our office during normal business hours [phone] (8-5:00 M-F)  If you have urgent medical concerns please call [phone] at any time of the day.  If you a medical emergency please call [phone]  Again thank you for choosing [facility] and please let us know how we can best partner with you to improve you and your family's health.        The patient was actively involved in the decision making process, and all the questions were answered to their satisfaction prior to leaving.

## 2019-07-19 NOTE — LETTER
July 22, 2019      Michael D McArdle  [address]   [place] [address]        Dear Isiah,    Please see below for your test results.   Your kidney test is about the same as it has been over the past 2 years.     Your electrolytes (blood salts) are all normal.     This is good news.  Please continue on the new blood pressure medicine like we discussed at your office visit.     I will see her back for the preoperative exam after you get your surgery scheduled.     Resulted Orders   Basic Metabolic Panel ([place])   Result Value Ref Range    Urea Nitrogen 15.3 7.0 - 21.0 mg/dL    Calcium 9.1 8.5 - 10.1 mg/dL    Chloride 106.2 98.0 - 110.0 mmol/L    Carbon Dioxide 25.9 20.0 - 32.0 mmol/L    Creatinine 1.4 (H) 0.7 - 1.3 mg/dL    Glucose 111.5 (H) 70.0 - 99.0 mg'dL    Potassium 3.9 3.2 - 4.6 mmol/dL    Sodium 136.7 132.0 - 142.0 mmol/L    GFR Estimate 52.5 (L) >60.0 mL/min/1.7 m2    GFR Estimate If Black 63.5 >60.0 mL/min/1.7 m2       If you have any questions, please call the clinic to make an appointment.    Sincerely,    Ervin Pruett MD

## 2019-07-29 ENCOUNTER — TRANSFERRED RECORDS (OUTPATIENT)
Dept: HEALTH INFORMATION MANAGEMENT | Facility: CLINIC | Age: 74
End: 2019-07-29

## 2019-07-29 ENCOUNTER — RECORDS - HEALTHEAST (OUTPATIENT)
Dept: ADMINISTRATIVE | Facility: OTHER | Age: 74
End: 2019-07-29

## 2019-07-31 ENCOUNTER — OFFICE VISIT (OUTPATIENT)
Dept: FAMILY MEDICINE | Facility: CLINIC | Age: 74
End: 2019-07-31
Payer: COMMERCIAL

## 2019-07-31 VITALS
TEMPERATURE: 97.5 F | RESPIRATION RATE: 18 BRPM | OXYGEN SATURATION: 97 % | WEIGHT: 199 LBS | SYSTOLIC BLOOD PRESSURE: 127 MMHG | DIASTOLIC BLOOD PRESSURE: 81 MMHG | BODY MASS INDEX: 33.12 KG/M2 | HEART RATE: 83 BPM

## 2019-07-31 DIAGNOSIS — R55 SYNCOPE AND COLLAPSE: ICD-10-CM

## 2019-07-31 DIAGNOSIS — S05.91XD RIGHT EYE INJURY, SUBSEQUENT ENCOUNTER: Primary | ICD-10-CM

## 2019-07-31 RX ORDER — ERYTHROMYCIN 5 MG/G
1 OINTMENT OPHTHALMIC DAILY
COMMUNITY
Start: 2019-07-29 | End: 2019-08-28

## 2019-07-31 NOTE — PROGRESS NOTES
S: Michael D McArdle is a 74 year old male who returns for follow up of right eye trauma, s/p fall 3 days ago, Was in ED/CAT  Head was  negative  Apparently passed out and hit left side of head    Because of ey pain He Was seen yesterday at Urgent care, eye was flushed out and given erythromycin, today he feels better Reports fluorecin was negative for abrasions   Visin is normal/ has cataracts ans wear glasses  Patients states that main concern today is  Follow-up  Right eye trauma     PMHX/PSHX/MEDS/ALLERGIES/SHX/FHX reviewed and updated in Epic.      ROS:  General: No fevers, chills  Head: No headache  Ears: No acute change in hearing.    CV: No chest pain or palpitations.  Resp: No shortness of breath.  No cough. No hemoptysis.  GI: No nausea, vomiting, constipation, diarrhea  : No urinary pains    O: /81   Pulse 83   Temp 97.5  F (36.4  C) (Oral)   Resp 18   Wt 90.3 kg (199 lb)   SpO2 97%   BMI 33.12 kg/m     Gen:  Well nourished and in NAD  HEENT: PERRLA; TMs normal color and landmarks; nasopharynx pink and moist; oropharynx pink and moist  R eye: diffuse subconjunctival hemorrhage EOM: intact  Neck: supple without lymphadenopathy  CV:  RRR  - no murmurs, rubs, or gallups,   Pulm:  CTAB, no wheezes/rales/rhonchi, good air entry   ABD: soft, nontender, no masses, no rebound, BS intact throughout  Extrem: no cyanosis, edema or clubbing  Psych: Euthymic   Walks with cane, neuro intact-ED [facility]   CT/head Ok  Fluorescin negative for abrasions-Urgent care   1 Right eye contusion    S/p Fall Tia    Erythromycin   ophthalmology 8/2/19  .    RTC to see PCP coordination of care or sooner if develops new or worsening symptoms.    Gilberto Jones

## 2019-08-22 DIAGNOSIS — E03.4 HYPOTHYROIDISM DUE TO ACQUIRED ATROPHY OF THYROID: ICD-10-CM

## 2019-08-22 RX ORDER — LEVOTHYROXINE SODIUM 25 UG/1
25 TABLET ORAL DAILY
Qty: 90 TABLET | Refills: 3 | Status: SHIPPED | OUTPATIENT
Start: 2019-08-22 | End: 2022-08-03

## 2019-08-28 ENCOUNTER — OFFICE VISIT (OUTPATIENT)
Dept: FAMILY MEDICINE | Facility: CLINIC | Age: 74
End: 2019-08-28
Payer: COMMERCIAL

## 2019-08-28 VITALS
RESPIRATION RATE: 16 BRPM | TEMPERATURE: 97.6 F | DIASTOLIC BLOOD PRESSURE: 88 MMHG | SYSTOLIC BLOOD PRESSURE: 160 MMHG | HEART RATE: 78 BPM | OXYGEN SATURATION: 98 % | BODY MASS INDEX: 33.38 KG/M2 | WEIGHT: 200.6 LBS

## 2019-08-28 DIAGNOSIS — Z01.818 PREOP GENERAL PHYSICAL EXAM: Primary | ICD-10-CM

## 2019-08-28 ASSESSMENT — PATIENT HEALTH QUESTIONNAIRE - PHQ9: SUM OF ALL RESPONSES TO PHQ QUESTIONS 1-9: 6

## 2019-08-28 NOTE — NURSING NOTE
Have you experienced sustained periods of feeling uncharacteristically energetic?  NO    Have you had periods of not sleeping, but not feeling tired?  NO    Have you felt that your thoughts were racing and couldn't be slowed down?  NO    Have you had periods where you were excessive in sexual interest, spending money, or taking unusual risks?  NO

## 2019-08-28 NOTE — PROGRESS NOTES
[address] [address]  Phone: [phone]  Fax: [phone]    8/28/2019    Adult PRE-OP Evaluation:    Michael D McArdle, 1945 presents for pre-operative evaluation and assessment as requested by Dr. Reynolds , prior to undergoing surgery/procedure for treatment of bilateral cataracts  .    Proposed procedure: Phaco w/IOL both eyes    Date of Surgery/ Procedure: 9/10/19 & 9/24/19  Hospital/Surgical Facility: [facility], Fax: [phone]     Primary Physician: Ervin Pruett  Type of Anesthesia Anticipated: to be determined  History of anesthesia complications: NONE  History of  abnormal bleeding: NONE   History of blood transfusions: NO  Patient has a Health Care Directive or Living Will:  NO    Preoperative Questions   1. NO - Do you have a history of heart attack, stroke, stent, bypass or surgery on an artery in the head, neck, heart or legs?  2. NO - Do you ever have any pain or discomfort in your chest?  3. NO - Have you ever had a severe pain across the front of your chest lasting for half an hour or more?  4. NO - Do you have a history of Congestive Heart Failure?  5. YES - Are you troubled by shortness of breath when: walking on the level/ up a slight hill/ at night? SOB walking up a hill  6. NO - Does your chest ever sound wheezy or whistling?  7. NO - Do you currently have a cold, bronchitis or other respiratory infection?  8. NO - Have you had a cold, bronchitis or other respiratory infection within the last 2 weeks?  9. NO - Do you usually have a cough?  10. NO - Do you sometimes get pains in the calves of your legs when you walk?  11. YES - Do you or anyone in your family have previous history of blood clots? Hx blood clots after colon cancer surgery   12. NO - Do you or does anyone in your family have a serious bleeding problem such as prolonged bleeding following surgeries or cuts?  13. NO - Have you ever had problems with anemia or been  told to take iron pills?  14. NO - Have you had any abnormal blood loss such as black, tarry or bloody stools, or abnormal vaginal bleeding?  15. NO - Have you ever had a blood transfusion?  16. NO - Have you or any of your relatives ever had problems with anesthesia?  17. YES - Do you have sleep apnea, excessive snoring or daytime drowsiness? Will have a sleep study 9/19/19  18. NO - Do you have any prosthetic heart valves?  19. NO - Do you have prosthetic joints?  20. NO - Is there any chance that you may be pregnant?    Patient Active Problem List   Diagnosis     Chronic Reflux esophagitis     Depressive disorder, not elsewhere classified     Diastolic Dysfunction      Fainting (Syncope)     smoking      Prediabetes     Asthma     Hyperlipidemia     Obese     Adenocarcinoma of rectum (H)     Pulmonary embolism and infarction (H)     Esophageal reflux     Hypothyroidism due to acquired atrophy of thyroid     ACP (advance care planning)     CKD (chronic kidney disease) stage 3, GFR 30-59 ml/min (H)     Decreased vision in both eyes     Decreased hearing of both ears     Ventral hernia     Unsteady gait         Current Outpatient Medications on File Prior to Visit:  ACETAMINOPHEN PO Take 500 mg by mouth   albuterol (PROAIR HFA/PROVENTIL HFA/VENTOLIN HFA) 108 (90 Base) MCG/ACT Inhaler Inhale 2 puffs into the lungs every 6 hours   amLODIPine (NORVASC) 5 MG tablet Take 1 tablet (5 mg) by mouth daily   aspirin (ASA) 81 MG chewable tablet Take 1 tablet (81 mg) by mouth daily Chew and swallow 1 tablet daily   atorvastatin (LIPITOR) 80 MG tablet Take 1 tablet (80 mg) by mouth daily   BUTT PASTE - REGULAR (DR LOVE POOP GOOP BUTT PASTE FORMULA) Please combine stomahesive and nystatin per pharmacy recipe.  Apply up to 6x daily PRN for rectal irritation. (Patient not taking: Reported on 7/31/2019)   FLUoxetine (PROZAC) 40 MG capsule Take 1 capsule (40 mg) by mouth daily   fluticasone furoate (ARNUITY ELLIPTA) 200 MCG/ACT  inhalation powder Inhale 1 puff into the lungs daily   levothyroxine (SYNTHROID/LEVOTHROID) 200 MCG tablet Take one tablet daily.  Also take a 25 mcg tablet daily for a total dose of 225 mcg per day   levothyroxine (SYNTHROID/LEVOTHROID) 25 MCG tablet Take 1 tablet (25 mcg) by mouth daily   order for DME Equipment being ordered: Rollator with seat and brakes.   order for DME Equipment being ordered: automatic blood pressure monitor   polyethylene glycol (MIRALAX) powder Take 1 capful every other day   ranitidine (ZANTAC) 150 MG tablet Take 1 tablet (150 mg) by mouth 2 times daily (Patient taking differently: Take 150 mg by mouth At Bedtime )     No current facility-administered medications on file prior to visit.     OTC products: Occasional acetaminophen    Allergies   Allergen Reactions     Penicillins Swelling     Latex Allergy: NO    Social History     Socioeconomic History     Marital status:      Spouse name: None     Number of children: None     Years of education: None     Highest education level: None   Occupational History     None   Social Needs     Financial resource strain: None     Food insecurity:     Worry: None     Inability: None     Transportation needs:     Medical: None     Non-medical: None   Tobacco Use     Smoking status: Current Every Day Smoker     Types: Pipe     Smokeless tobacco: Never Used     Tobacco comment: has been cut down a lot, is on the nicotine patch   Substance and Sexual Activity     Alcohol use: Yes     Alcohol/week: 0.0 oz     Drug use: No     Sexual activity: Yes     Partners: Female   Lifestyle     Physical activity:     Days per week: None     Minutes per session: None     Stress: None   Relationships     Social connections:     Talks on phone: None     Gets together: None     Attends Anglican service: None     Active member of club or organization: None     Attends meetings of clubs or organizations: None     Relationship status: None     Intimate partner  violence:     Fear of current or ex partner: None     Emotionally abused: None     Physically abused: None     Forced sexual activity: None   Other Topics Concern     None   Social History Narrative     None       REVIEW OF SYSTEMS:   Constitutional, HEENT, cardiovascular, pulmonary, gi and gu systems are negative, except as otherwise noted.    EXAM:     Patient Vitals for the past 24 hrs:   BP Temp Temp src Pulse Resp SpO2 Height Weight   08/28/19 0838 (!) 160/88 -- -- 78 -- -- -- --   08/28/19 0835 (!) 148/88 97.6  F (36.4  C) Oral 78 16 98 % -- 91 kg (200 lb 9.6 oz)     Body mass index is 33.38 kg/m .  GENERAL: healthy, alert and no distress  EYES: Eyes grossly normal to inspection, extraocular movements - intact, and PERRL  HENT: ear canals- normal; TMs- normal; Nose- normal; Mouth- no ulcers, no lesions  NECK: no tenderness, no adenopathy, no asymmetry, no masses, no stiffness; thyroid- normal to palpation  RESP: lungs clear to auscultation - no rales, no rhonchi, no wheezes  CV: regular rates and rhythm, normal S1 S2, no S3 or S4 and no murmur, no click or rub -  ABDOMEN: soft, no tenderness, no  hepatosplenomegaly, no masses, normal bowel sounds, scars from previous colon surgery, and previous colostomy  MS: extremities- no gross deformities noted, no edema  SKIN: no suspicious lesions, no rashes  NEURO: strength and tone- normal, sensory exam- grossly normal, mentation- intact, speech- normal, reflexes- symmetric  BACK: no CVA tenderness, no paralumbar tenderness  PSYCH: Alert and oriented; speech- coherent , normal rate and volume; able to articulate logical thoughts  LYMPHATICS: ant. cervical- normal, post. cervical- normal    DIAGNOSTICS:      No labs or EKG required for low risk surgery (cataract, skin procedure, breast biopsy, etc)    RISK ASSESSMENT:     Cardiovascular Risk:  -Patient is able to perform ADL's without assistance without chest pain.  -The patient does not have chest pain with  exertion.  -Patient does not have a history of congestive heart failure.    -The patient does not have a history of stroke and does not have a history of valvular disease.    Pulmonary Risk:  -In terms of risk factors for pulmonary complication, the patient has COPD and is older then 60    Perioperative Complications:  -The patient has a history of bleeding or clotting problems in the past.    -The patient has had the following complications of surgery: DVT with PE, delirium.    -The patient does not have a family history of any anesthesia or surgical complications.      IMPRESSION:   Reason for surgery/procedure: Bilateral catacts    The proposed surgical procedure is considered LOW risk.    For above listed surgery and anesthesia:   Patient is at low risk for surgery/procedure and perioperative/procedure complications.    RECOMMENDATIONS:     Labs:  None needed    Fasting:  NPO for 12 hours prior to surgery    Preop Plan:  --Approval given to proceed with proposed procedure, without further diagnostic evaluation    Medications:  Patient should take their regular medications the morning of surgery unless otherwise instructed.      Ervin Pruett MD    Please contact our office if there are any further questions or information required about this patient.

## 2019-08-29 ASSESSMENT — ASTHMA QUESTIONNAIRES: ACT_TOTALSCORE: 16

## 2019-10-03 ENCOUNTER — HEALTH MAINTENANCE LETTER (OUTPATIENT)
Age: 74
End: 2019-10-03

## 2019-10-29 ENCOUNTER — DOCUMENTATION ONLY (OUTPATIENT)
Dept: FAMILY MEDICINE | Facility: CLINIC | Age: 74
End: 2019-10-29

## 2019-10-29 ENCOUNTER — MEDICAL CORRESPONDENCE (OUTPATIENT)
Dept: HEALTH INFORMATION MANAGEMENT | Facility: CLINIC | Age: 74
End: 2019-10-29

## 2019-10-29 NOTE — PROGRESS NOTES
To be completed in Nursing note:    Please reference list for forms that require a visit for completion.  Please remind patients that providers are given 3-5 business days to complete and return forms.      Form type: Michael Montano    Date form received: 10/28/19    Date form completed by Physician:10/29/19    How was form returned to patient (mailed, faxed, or at  for patient to ): Faxed     Date form mailed/faxed/left at  for patient and sent to HIM for scanning: 10/29/19      Once form is left for patient, faxed, or mailed PCS will then close the documentation only encounter.

## 2019-11-06 DIAGNOSIS — J45.40 MODERATE PERSISTENT ASTHMA, UNCOMPLICATED: ICD-10-CM

## 2019-11-06 DIAGNOSIS — E78.5 HYPERLIPIDEMIA LDL GOAL <130: ICD-10-CM

## 2019-11-06 RX ORDER — ATORVASTATIN CALCIUM 80 MG/1
80 TABLET, FILM COATED ORAL DAILY
Qty: 90 TABLET | Refills: 1 | Status: SHIPPED | OUTPATIENT
Start: 2019-11-06 | End: 2020-05-04

## 2019-11-06 RX ORDER — ALBUTEROL SULFATE 90 UG/1
2 AEROSOL, METERED RESPIRATORY (INHALATION) EVERY 6 HOURS
Qty: 1 INHALER | Refills: 11 | Status: SHIPPED | OUTPATIENT
Start: 2019-11-06 | End: 2023-02-14

## 2019-11-26 ENCOUNTER — MEDICAL CORRESPONDENCE (OUTPATIENT)
Dept: HEALTH INFORMATION MANAGEMENT | Facility: CLINIC | Age: 74
End: 2019-11-26

## 2020-01-27 DIAGNOSIS — F41.8 DEPRESSION WITH ANXIETY: ICD-10-CM

## 2020-01-27 DIAGNOSIS — E03.4 HYPOTHYROIDISM DUE TO ACQUIRED ATROPHY OF THYROID: ICD-10-CM

## 2020-01-28 RX ORDER — LEVOTHYROXINE SODIUM 200 UG/1
TABLET ORAL
Qty: 90 TABLET | Refills: 1 | Status: SHIPPED | OUTPATIENT
Start: 2020-01-28 | End: 2020-04-27

## 2020-01-28 RX ORDER — FLUOXETINE 40 MG/1
CAPSULE ORAL
Qty: 90 CAPSULE | Refills: 1 | Status: SHIPPED | OUTPATIENT
Start: 2020-01-28 | End: 2020-08-07

## 2020-02-05 ENCOUNTER — TELEPHONE (OUTPATIENT)
Dept: FAMILY MEDICINE | Facility: CLINIC | Age: 75
End: 2020-02-05

## 2020-02-05 DIAGNOSIS — K21.9 GASTROESOPHAGEAL REFLUX DISEASE WITHOUT ESOPHAGITIS: Primary | ICD-10-CM

## 2020-02-05 NOTE — TELEPHONE ENCOUNTER
[facility] phone call message- medication clarification/question:    Full Medication Name: ranitidine (ZANTAC) 150 MG tablet   Dose: Take 1 tablet (150 mg) by mouth 2 times daily     Question: Pt was told to stop taking the medication above and that something new would be called to the pharmacy below.  So far nothing has been called in.  Can Dr Pruett please take care of this. Please call pt when completed.    Pharmacy confirmed as    [id number] [place] [id number] AT [facility] [address]  [facility] - [place], [place] - [address]: Yes    OK to leave a message on voice mail? Yes    Primary language: English      needed? No    Call taken on February 5, 2020 at 12:56 PM by Morgan Ca

## 2020-02-06 RX ORDER — FAMOTIDINE 40 MG/1
40 TABLET, FILM COATED ORAL DAILY
Qty: 90 TABLET | Refills: 3 | Status: SHIPPED | OUTPATIENT
Start: 2020-02-06 | End: 2022-10-18

## 2020-03-09 ENCOUNTER — TELEPHONE (OUTPATIENT)
Dept: PHARMACY | Facility: PHYSICIAN GROUP | Age: 75
End: 2020-03-09

## 2020-03-09 ENCOUNTER — TELEPHONE (OUTPATIENT)
Dept: FAMILY MEDICINE | Facility: CLINIC | Age: 75
End: 2020-03-09

## 2020-03-09 DIAGNOSIS — J44.9 COPD (CHRONIC OBSTRUCTIVE PULMONARY DISEASE) (H): Primary | ICD-10-CM

## 2020-03-09 NOTE — TELEPHONE ENCOUNTER
Date of discharge: 05[birth year]03[birth year]  Facility of discharge: [facility]  Patient concerns about condition: Concerns include STILL WHEEZING A LITTLE BIT.  Patient concerns about medications: No concerns at this time.  Full med reconciliation will be completed at clinic visit.  Patient concerns about transitioning: Concerns include feeling tired but doing okay.  Clinic office visit appointment date: 3/12/20 with Dr. Olivares  Patient reminded to bring all medications (prescription and over-the-counter) to clinic appointment: Yes     Routed to Dr. Olivares for FYI. No action needed.    SHAWN Leal

## 2020-03-09 NOTE — TELEPHONE ENCOUNTER
Pt called; he was discharged from hospital yesterday and got his nebulizer solution prescription but not his nebulizer machine.  Per pt request I sent Rx for nebulizer machine to [facility] for them to deliver.  Called pt and left message to let him know.  Therese Robles, Pharm.D.

## 2020-03-10 ENCOUNTER — MEDICAL CORRESPONDENCE (OUTPATIENT)
Dept: HEALTH INFORMATION MANAGEMENT | Facility: CLINIC | Age: 75
End: 2020-03-10

## 2020-03-11 ENCOUNTER — TELEPHONE (OUTPATIENT)
Dept: FAMILY MEDICINE | Facility: CLINIC | Age: 75
End: 2020-03-11

## 2020-03-11 NOTE — TELEPHONE ENCOUNTER
Mary from [facility] is calling in and wanting the chart notes/face to face notes from the RX you had sent for the nebulizer machine. Please call her back or fax the notes.   Phone: [phone]  Fax: [phone]  Thank you,  /[facility]

## 2020-03-12 ENCOUNTER — OFFICE VISIT (OUTPATIENT)
Dept: PHARMACY | Facility: PHYSICIAN GROUP | Age: 75
End: 2020-03-12
Payer: COMMERCIAL

## 2020-03-12 ENCOUNTER — OFFICE VISIT (OUTPATIENT)
Dept: FAMILY MEDICINE | Facility: CLINIC | Age: 75
End: 2020-03-12
Payer: COMMERCIAL

## 2020-03-12 VITALS
HEART RATE: 106 BPM | SYSTOLIC BLOOD PRESSURE: 152 MMHG | RESPIRATION RATE: 16 BRPM | BODY MASS INDEX: 33.38 KG/M2 | WEIGHT: 200.6 LBS | OXYGEN SATURATION: 97 % | DIASTOLIC BLOOD PRESSURE: 69 MMHG | TEMPERATURE: 98 F

## 2020-03-12 DIAGNOSIS — J44.9 COPD (CHRONIC OBSTRUCTIVE PULMONARY DISEASE) (H): ICD-10-CM

## 2020-03-12 DIAGNOSIS — Z09 HOSPITAL DISCHARGE FOLLOW-UP: Primary | ICD-10-CM

## 2020-03-12 DIAGNOSIS — J44.9 CHRONIC OBSTRUCTIVE PULMONARY DISEASE, UNSPECIFIED COPD TYPE (H): ICD-10-CM

## 2020-03-12 DIAGNOSIS — J44.1 COPD EXACERBATION (H): ICD-10-CM

## 2020-03-12 PROCEDURE — 99207 ZZC NO CHARGE LOS: CPT | Performed by: PHARMACIST

## 2020-03-12 NOTE — PROGRESS NOTES
Hospitalization Follow-up Visit         [facility]       Hospital Follow-up Visit:    Hospital:  [facility]   Date of Admission: 3/7/2020  Date of Discharge: 3/8/2020  Reason(s) for Admission: COPD exacerbation            Problems taking medications regularly:  None       Post Discharge Medication Reconciliation: discharge medications reconciled and changed, per note/orders (see AVS).       Problems adhering to non-medication therapy:  None       Medications reviewed by: by PharmD    Summary of hospitalization:  [facility] hospital discharge summary reviewed  Diagnostic Tests/Treatments reviewed.  Follow up needed: none  Other Healthcare Providers Involved in Patient s Care:         None  Update since discharge: improved. Coughing less. Breathing better. Has been able to decrease his nebulizer frequency to three times daily. He was unable to get a neb machine upon discharge and needs a new script sent. Notes elevated blood pressures once in a great while. Notes that he has been under more stress lately. He took his amlodipine last night last.  States home BPs typically run 130s/60-70s. Denies any low blood pressures at home. Plan of care communicated with patient               Review of Systems:   CONSTITUTIONAL: no fatigue, no unexpected change in weight  SKIN: no worrisome rashes, no worrisome moles, no worrisome lesions  EYES: no acute vision problems or changes  ENT: no ear problems, no mouth problems, no throat problems  RESP: no significant cough, no shortness of breath  CV: no chest pain, no palpitations, no new or worsening peripheral edema  GI: no nausea, no vomiting, no constipation, no diarrhea            Physical Exam:     Vitals:    03/12/20 0933 03/12/20 0935   BP: (!) 164/80 (!) 152/69   Pulse: 106    Resp: 16    Temp: 98  F (36.7  C)    TempSrc: Oral    SpO2: 97%    Weight: 91 kg (200 lb 9.6 oz)      Body mass index is 33.38 kg/m .    GENERAL: healthy, alert, well nourished, well hydrated, no distress  HENT:  Eyes - PERRL, no injection, Nose- normal; Mouth- no ulcers, no lesions  NECK: no tenderness, no adenopathy, no asymmetry, no masses, no stiffness  RESP: lungs clear to auscultation - no rales, no rhonchi, no wheezes  CV: regular rates and rhythm, normal S1 S2, no S3 or S4 and no murmur, no click or rub -  SKIN: no suspicious lesions, no rashes  PSYCH: Alert and oriented times 3; speech- coherent , normal rate and volume; able to articulate logical thoughts, able to abstract reason, no tangential thoughts, no hallucinations or delusions, affect- normal  EXTREMITIES: No peripheral edema  NEURO: Ambulates normally         Results:   Results from the last 24 hours No results found for this or any previous visit (from the past 24 hour(s)).    Assessment and Plan      Isiah was seen today for follow up.    Diagnoses and all orders for this visit:    Hospital discharge follow-up    COPD exacerbation (H)  -     order for DME; Equipment being ordered: Nebulizer and supplies (mask and tubing)    Improving after recent hospitalization for COPD exacerbation. Finish prednisone and Levaquin course. Continue inhalers unchanged. DME sent for neb machine. Follow up for annual exam in 1 month or sooner if needed.     Options for treatment and follow-up care were reviewed with the patient  Michael D McArdle   engaged in the decision making process and verbalized understanding of the options discussed and agreed with the final plan.      Kristine Olivares MD PGY-3    I discussed this patient with Dr. Henderson, attending faculty.

## 2020-03-12 NOTE — PROGRESS NOTES
Preceptor attestation:  Vital signs reviewed: BP (!) 152/69   Pulse 106   Temp 98  F (36.7  C) (Oral)   Resp 16   Wt 91 kg (200 lb 9.6 oz)   SpO2 97%   BMI 33.38 kg/m      Patient seen, evaluated, and discussed with the resident.  I have verified the content of the note, which accurately reflects my assessment of the patient and the plan of care.    Supervising physician: Bertha Henderson MD  [facility]

## 2020-03-12 NOTE — PATIENT INSTRUCTIONS
Follow up for your Medicare annual wellness visit in April.    Call [facility] if you have not yet received your nebulizer supplies: [phone]

## 2020-03-12 NOTE — PROGRESS NOTES
MTM ENCOUNTER  SUBJECTIVE/OBJECTIVE:                           Michael D McArdle is a 74 year old male seen for a TCM visit.  He was discharged from [facility] on 3/8/20 for a COPD exacerbation. Today's visit is a co-visit with Dr. Olivares. Patient is seeing provider after our visit today.     Chief Complaint: TCM follow-up after hospitalization for COPD exacerbation.  Personal Healthcare Goals: Thinking about smoking cessation    Allergies/ADRs: Reviewed in Epic  Tobacco:  reports that he has been smoking pipe. He has never used smokeless tobacco.Tobacco Cessation Action Plan: Information offered: Patient not interested at this time  Alcohol: Less than 1 beverage / month  Caffeine: 1 pot of coffee/day  Activity: walks roughly 4 miles/day  PMH: Reviewed in Epic    Medication Adherence/Access: no issues reported  Patient uses pill box(es) and has assistance with medication administration: family member.  Patient takes medications 2 time(s) per day.   Per patient, misses medication 0 times per week.     COPD: Current medications: Short-Acting Bronchodilator: Albuterol MDI, Nebs and pt reports using MDI 2 puff two times per day.  ICS - Qvar 80 mcg 1 puff(s) twice daily.   Pt is not experiencing side effects.   Pt reports the following symptoms: increased need of albuterol.  Pt does not have an COPD Action Plan on file.   Has spirometry been completed: No  Pt was discharged on levofloxacin 250 mg daily, prednisone 40 mg daily, guaifenesin 200 mg every 4-6h, and and albuterol nebulizer. Finishing the Levofloxacin and prednisone today. Uses the guaifenesin a couple times per day. Cough has been improving. Pt does not have a working nebulizer machine and has been using a borrowed nebulizer from his assisted living facility.      Hypertension: Current medications include amlodipine 5 mg daily. Patient does self-monitor BP. Home BP monitoring in range of 130's systolic over 80's diastolic.Patient reports no current  "medication side effects. BP's in clinic were elevated. During admission, BP's were well controlled: at or <130/80.   BP Readings from Last 3 Encounters:   03/12/20 (!) 152/69   08/28/19 (!) 160/88   07/31/19 127/81     Hyperlipidemia: Current therapy includes atorvastatin 80 mg once daily.  Pt reports some muscle pain, but unsure if it is from atorvastatin. Pt does walk roughly 4 miles per day.  The 10-year ASCVD risk score (Do KING Jr., et al., 2013) is: 45.7%*    Values used to calculate the score:      Age: 74 years      Sex: Male      Is Non- : No      Diabetic: No      Tobacco smoker: Yes      Systolic Blood Pressure: 152 mmHg      Is BP treated: Yes      HDL Cholesterol: 32.6 mg/dL      Total Cholesterol: 6.1 mmol/L*      * - Cholesterol units were assumed for this score calculation     Pre-diabetes: Pt is not currently taking any medications. Recent A1c's categorize him as a pre-diabetic.   Aspirin: Taking 81mg daily and denies side effects  Diet/Exercise: walks roughly 4 miles/day  Lab Results   Component Value Date    A1C 5.7 04/09/2019    A1C 5.7 03/22/2017    A1C 5.5 04/09/2014     Tobacco Use Disorder: Pt is not currently taking any medications. He smokes 2-3 bowls/day of tobacco out of his pipe. Has tried nicotine patches and gum in the past and is not interested in any tobacco cessation today.      Immunization: Pt is currently due for his annual flu shot, but declined.     BP Readings from Last 1 Encounters:   03/12/20 (!) 152/69     Pulse Readings from Last 1 Encounters:   03/12/20 106     Wt Readings from Last 1 Encounters:   03/12/20 200 lb 9.6 oz (91 kg)     Ht Readings from Last 1 Encounters:   07/23/18 5' 5\" (1.651 m)     Estimated body mass index is 33.38 kg/m  as calculated from the following:    Height as of 7/23/18: 5' 5\" (1.651 m).    Weight as of an earlier encounter on 3/12/20: 200 lb 9.6 oz (91 kg).    Temp Readings from Last 1 Encounters:   03/12/20 98  F (36.7 "  C) (Oral)          ASSESSMENT:                            Medication Adherence: excellent, no issues identified    COPD: Needs Improvement. Patient would benefit from no medication changes at this time, spirometry to be performed at next vist, immunizations:Flu and smoking cessation. Called [facility] to address nebulizer machine and they requested a prescription and note face-to-face contact with provider.Discussed and defer aid in nebulizer machine for patient.    Hypertension: Needs Improvement. Patient is not meeting BP goal of < 130/80mmHg.  Pt would benefit from the following changes - increasing the dose of amlodipine to 10 mg daily.    Hyperlipidemia: Stable. Pt is on high intensity statin which is indicated based on 2019 ACC/AHA guidelines for lipid management. Pt would benefit from checking lipids at next annual wellness visit in April.     Pre-diabetes: Stable. Declined flue vaccine.    Tobacco Use Disorder: Needs improvement. Pt would benefit from a discussion regarding smoking cessation at annual wellness visit in April.     Immunization: Needs improvement. Pt would benefit from receiving a flu shot at next visit.     PLAN:                          Post Discharge Medication Reconciliation Status: discharge medications reconciled and changed, per note/orders (see AVS).    Plan    Increase amlodipine to 10mg      Medication issues to be addressed at a future visit    Check lipids and A1c     Assess at home blood pressure    Determine willingness to talk about smoking cessation and receiving an influenza vaccinatinon      I spent 20 minutes with this patient today. All changes were made via collaborative practice agreement with Dr. Olivares. A copy of the visit note was provided to the patient's primary care provider.    Will follow up in 1 month.    The patient was given a summary of these recommendations. See Provider note/AVS from today.     George Reyes, PharmD Student      I was present with  the pharmacy student who participated in the service and in the documentation of this note. I have verified the history, personally performed the medical decision making, and have verified the content of the note, which accurately reflects my assessment of the patient and the plan of care.   Makayla Resendiz, [facility], PharmD

## 2020-03-12 NOTE — PROGRESS NOTES
DME (Durable Medical Equipment) Orders and Documentation     The patient was assessed and it was determined the patient is in need of the following listed DME Supplies/Equipment. This patient was evaluated by me in the hospital on admission day and discharge day of 3/8/2020 and as confirmed by my signature on H&P and discharge summary. Patient has history of COPD requiring respiratory medications delivered by nebulizer machine and therefore requires a nebulizer for home.     Kristine Olivares MD

## 2020-03-13 NOTE — PROGRESS NOTES
I have verified the content of the note, which accurately reflects my assessment of the patient and the plan of care.   Nereyda Eisenberg, [facility], PharmD

## 2020-03-16 NOTE — TELEPHONE ENCOUNTER
It looks like Dr. Olivares took care of this at his office visit on 3/12/20.  Therese Robles, Pharm.D.

## 2020-03-17 ENCOUNTER — TELEPHONE (OUTPATIENT)
Dept: FAMILY MEDICINE | Facility: CLINIC | Age: 75
End: 2020-03-17

## 2020-04-25 DIAGNOSIS — E03.4 HYPOTHYROIDISM DUE TO ACQUIRED ATROPHY OF THYROID: ICD-10-CM

## 2020-04-27 RX ORDER — LEVOTHYROXINE SODIUM 200 UG/1
TABLET ORAL
Qty: 90 TABLET | Refills: 1 | Status: SHIPPED | OUTPATIENT
Start: 2020-04-27 | End: 2022-08-03

## 2020-05-02 DIAGNOSIS — E78.5 HYPERLIPIDEMIA LDL GOAL <130: ICD-10-CM

## 2020-05-04 RX ORDER — ATORVASTATIN CALCIUM 80 MG/1
TABLET, FILM COATED ORAL
Qty: 90 TABLET | Refills: 0 | Status: SHIPPED | OUTPATIENT
Start: 2020-05-04 | End: 2023-09-27

## 2020-05-13 ENCOUNTER — TELEPHONE (OUTPATIENT)
Dept: FAMILY MEDICINE | Facility: CLINIC | Age: 75
End: 2020-05-13

## 2020-05-13 NOTE — TELEPHONE ENCOUNTER
"[facility] Answering Service Pager Note    I received an answering service page at 515pm regarding \"seizure.\"    I called Christophe and we spoke on the phone.  The patient reported that he experienced a brief period of feeling very shaky and out of control.  He states that it seems to him like he blacks out during this time.  His daughter lives near him and witnessed him during 1 of these episodes.  She states that she was worried that it might have been a seizure and encouraged him to go see his doctor soon as he can.  He has had a few of these episodes of the last couple weeks, thinks that they are becoming more frequent.  He does have history of reporting similar episodes in the past, I do see documentation that there is a very similar description given by his primary care doctor in 2016.  Christophe states that sometimes when these shaking episodes happen he will slide out of a chair, but he generally has enough warning to make sure that he is safe and has not been injured by a fall or any other events during the episodes.  He states that he never experiences any pain, shortness of breath, or confusion during or following any of these episodes.  He does not think he needs to come to the hospital, but would rather have an appointment scheduled with his primary doctor tomorrow if possible.  I agree with him that he does not need to present to the hospital at this time, I encouraged him to call the clinic tomorrow morning during business hours to schedule a plan and I see that multiple providers have appointments available throughout the day.  We did discuss that if he has any more concerning episodes, or experiences more concerning symptoms such as confusion, chest pain, shortness of breath, or sustained an injury he will present to the emergency department at [facility].    Christophe stated understanding and agreement with this plan.    Hugo Wilkes MD  PGY2  [facility]  Pager: [phone]    "

## 2020-05-14 ENCOUNTER — RECORDS - HEALTHEAST (OUTPATIENT)
Dept: ADMINISTRATIVE | Facility: OTHER | Age: 75
End: 2020-05-14

## 2020-05-14 ENCOUNTER — VIRTUAL VISIT (OUTPATIENT)
Dept: FAMILY MEDICINE | Facility: CLINIC | Age: 75
End: 2020-05-14
Payer: COMMERCIAL

## 2020-05-14 VITALS — BODY MASS INDEX: 31.99 KG/M2 | WEIGHT: 192 LBS | HEIGHT: 65 IN

## 2020-05-14 DIAGNOSIS — R56.9 SEIZURE-LIKE ACTIVITY (H): Primary | ICD-10-CM

## 2020-05-14 DIAGNOSIS — J44.9 CHRONIC OBSTRUCTIVE PULMONARY DISEASE, UNSPECIFIED COPD TYPE (H): ICD-10-CM

## 2020-05-14 RX ORDER — ALBUTEROL SULFATE 0.83 MG/ML
2.5 SOLUTION RESPIRATORY (INHALATION) EVERY 6 HOURS PRN
Qty: 1 VIAL | Refills: 11 | Status: SHIPPED | OUTPATIENT
Start: 2020-05-14 | End: 2022-08-30

## 2020-05-14 ASSESSMENT — MIFFLIN-ST. JEOR: SCORE: 1527.79

## 2020-05-14 NOTE — PROGRESS NOTES
-tremors last night shaking 40 sec. Had a bad one last night  -last week 3 in a row  -will happen unexpectedly I nmultiple settings  -notes shaking  -talked to manjeet last night.  -no meds  -    Occurring   saaw a neurologist, had a brain scan, wihtout work up.  -afraid to go anywhere.  -going for at least 10 years  -had a fall last June an hit his head. Concussion    -didn't have an EEG  -having 3-4 episodes in a week, more than in past.  -typically last 30-40 sec  -describes that he blacks out and that his   -blacks out for a few seconds.  -happens at any time of the day.  -has never happened at night when laying in bed.  -no presycoopal episodes.  -both arms and your legs shake all over.  -not painful.  -daughter had to hold him down.  -told him to see a doctor.  -no loss of bowel or bladder control  -no tongue biting  -takes about a minute for him to feel back to normal.  -feels good after  -/71 checked by his daughter, temp.   -lives in assisted living.  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   -rectal cancer, he was supposed to go in for CAT scan but hasn't been able to go in. Has been in remission for 5 years.

## 2020-05-14 NOTE — PROGRESS NOTES
Preceptor Attestation:   I talked to the patient on the phone. I discussed the patient with the resident. I have verified the content of the note, which accurately reflects my assessment of the patient and the plan of care.   Supervising Physician:  Gilberto Jones MD.

## 2020-05-14 NOTE — PATIENT INSTRUCTIONS
1.  We will call you to set up the referral for neurology.  2.  We will also call you to set up the MRI of the brain.    20   MRI BRAIN  [facility]   [phone]  Fax Orders to [phone]     Order faxed to [phone], they will contact patient to schedule.     Teressa York    20   NEUROLOGY ADULT REFERRAL   [facility] [place]  Phone: [phone]  Fax: [phone]    Referral, demographics, office note and medication list faxed to [phone].     Teressa York

## 2020-06-01 ENCOUNTER — TRANSFERRED RECORDS (OUTPATIENT)
Dept: HEALTH INFORMATION MANAGEMENT | Facility: CLINIC | Age: 75
End: 2020-06-01

## 2020-06-04 ENCOUNTER — HOSPITAL ENCOUNTER (OUTPATIENT)
Dept: MRI IMAGING | Facility: HOSPITAL | Age: 75
Discharge: HOME | End: 2020-06-04
Attending: STUDENT IN AN ORGANIZED HEALTH CARE EDUCATION/TRAINING PROGRAM

## 2020-06-04 DIAGNOSIS — R56.9 SEIZURE-LIKE ACTIVITY (H): ICD-10-CM

## 2020-06-04 LAB
CREAT BLD-MCNC: 1.6 MG/DL (ref 0.7–1.3)
GFR SERPL CREATININE-BSD FRML MDRD: 42 ML/MIN/1.73M2

## 2020-06-05 ENCOUNTER — TELEPHONE (OUTPATIENT)
Dept: FAMILY MEDICINE | Facility: CLINIC | Age: 75
End: 2020-06-05

## 2020-06-05 NOTE — TELEPHONE ENCOUNTER
Attempted to call patient to inform him of his MRI results. Left message to return call and speak with triage RN if he wishes to review results. There are no findings to explain his syncopal episodes. He should follow up with Neurology like we discussed at our visit in May. If he has further questions, please have him schedule a phone follow up. Thanks.    Kristine Olivares MD

## 2020-07-20 ENCOUNTER — DOCUMENTATION ONLY (OUTPATIENT)
Dept: FAMILY MEDICINE | Facility: CLINIC | Age: 75
End: 2020-07-20

## 2020-07-20 ENCOUNTER — MEDICAL CORRESPONDENCE (OUTPATIENT)
Dept: HEALTH INFORMATION MANAGEMENT | Facility: CLINIC | Age: 75
End: 2020-07-20

## 2020-07-20 NOTE — PROGRESS NOTES
To be completed in Nursing note:    Please reference list for forms that require a visit for completion.  Please remind patients that providers are given 3-5 business days to complete and return forms.      Form type:  [employer] : Prescription/ Certificate of Medical Necessity     Date form received:  2020    Date form completed by Physician:  2020    How was form returned to patient (mailed, faxed, or at  for patient to ):  Fax to [phone]    Date form mailed/faxed/left at  for patient and sent to HIM for [email or web address]          Once form is left for patient, faxed, or mailed PCS will then close the documentation only encounter.

## 2020-07-23 ENCOUNTER — DOCUMENTATION ONLY (OUTPATIENT)
Dept: FAMILY MEDICINE | Facility: CLINIC | Age: 75
End: 2020-07-23

## 2020-07-23 ENCOUNTER — MEDICAL CORRESPONDENCE (OUTPATIENT)
Dept: HEALTH INFORMATION MANAGEMENT | Facility: CLINIC | Age: 75
End: 2020-07-23

## 2020-07-23 NOTE — PROGRESS NOTES
To be completed in Nursing note:    Please reference list for forms that require a visit for completion.  Please remind patients that providers are given 3-5 business days to complete and return forms.      Form type:  [employer]: Prescription / Certificate of Medical Necessity     Date form received:  2020    Date form completed by Physician:  2020    How was form returned to patient (mailed, faxed, or at  for patient to ):  Fax to [phone]    Date form mailed/faxed/left at  for patient and sent to HIM for [email or web address]         Once form is left for patient, faxed, or mailed PCS will then close the documentation only encounter.

## 2020-09-29 ENCOUNTER — RECORDS - HEALTHEAST (OUTPATIENT)
Dept: LAB | Facility: CLINIC | Age: 75
End: 2020-09-29

## 2020-09-30 LAB
ALBUMIN SERPL-MCNC: 4 G/DL (ref 3.5–5)
ALP SERPL-CCNC: 75 U/L (ref 45–120)
ALT SERPL W P-5'-P-CCNC: 12 U/L (ref 0–45)
ANION GAP SERPL CALCULATED.3IONS-SCNC: 9 MMOL/L (ref 5–18)
AST SERPL W P-5'-P-CCNC: 18 U/L (ref 0–40)
BILIRUB SERPL-MCNC: 0.5 MG/DL (ref 0–1)
BUN SERPL-MCNC: 23 MG/DL (ref 8–28)
CALCIUM SERPL-MCNC: 8.5 MG/DL (ref 8.5–10.5)
CHLORIDE BLD-SCNC: 106 MMOL/L (ref 98–107)
CHOLEST SERPL-MCNC: 138 MG/DL
CO2 SERPL-SCNC: 24 MMOL/L (ref 22–31)
CREAT SERPL-MCNC: 1.51 MG/DL (ref 0.7–1.3)
ERYTHROCYTE [DISTWIDTH] IN BLOOD BY AUTOMATED COUNT: 13.8 % (ref 11–14.5)
FASTING STATUS PATIENT QL REPORTED: ABNORMAL
GFR SERPL CREATININE-BSD FRML MDRD: 45 ML/MIN/1.73M2
GLUCOSE BLD-MCNC: 83 MG/DL (ref 70–125)
HCT VFR BLD AUTO: 35.8 % (ref 40–54)
HDLC SERPL-MCNC: 33 MG/DL
HGB BLD-MCNC: 12.1 G/DL (ref 14–18)
LDLC SERPL CALC-MCNC: 89 MG/DL
MCH RBC QN AUTO: 31.8 PG (ref 27–34)
MCHC RBC AUTO-ENTMCNC: 33.8 G/DL (ref 32–36)
MCV RBC AUTO: 94 FL (ref 80–100)
PLATELET # BLD AUTO: 248 THOU/UL (ref 140–440)
PMV BLD AUTO: 10.8 FL (ref 8.5–12.5)
POTASSIUM BLD-SCNC: 4.2 MMOL/L (ref 3.5–5)
PROT SERPL-MCNC: 6.4 G/DL (ref 6–8)
RBC # BLD AUTO: 3.8 MILL/UL (ref 4.4–6.2)
SODIUM SERPL-SCNC: 139 MMOL/L (ref 136–145)
TRIGL SERPL-MCNC: 81 MG/DL
TSH SERPL DL<=0.005 MIU/L-ACNC: 5.71 UIU/ML (ref 0.3–5)
WBC: 5.1 THOU/UL (ref 4–11)

## 2020-10-01 LAB
25(OH)D3 SERPL-MCNC: 37.4 NG/ML (ref 30–80)
HBA1C MFR BLD: 5.7 %

## 2020-11-07 ENCOUNTER — HEALTH MAINTENANCE LETTER (OUTPATIENT)
Age: 75
End: 2020-11-07

## 2021-01-25 NOTE — PROGRESS NOTES
Your kidney tests are about the same as they were a year ago.    Your cholesterol is not good.  Your cholesterol levels, together with your age and smoking, give you a 43% chance of a heart attack or stroke in the next ten years.  Please try to be consistent in taking the Lipitor (which helps bring down your cholesterol).    The 10-year ASCVD risk score (Do KING Jr, et al., 2013) is: 43.1%    Values used to calculate the score:      Age: 73 years      Sex: Male      Is Non- : No      Diabetic: No      Tobacco smoker: Yes      Systolic Blood Pressure: 163 mmHg      Is BP treated: No      HDL Cholesterol: 31 mg/dL      Total Cholesterol: 220 mg/dL    Your thyroid test is good.    You have a very mild anemia which is improved over the past 3 months Low Risk

## 2021-01-27 ENCOUNTER — RECORDS - HEALTHEAST (OUTPATIENT)
Dept: LAB | Facility: CLINIC | Age: 76
End: 2021-01-27

## 2021-02-04 LAB
ANION GAP SERPL CALCULATED.3IONS-SCNC: 11 MMOL/L (ref 5–18)
BUN SERPL-MCNC: 25 MG/DL (ref 8–28)
CALCIUM SERPL-MCNC: 8.7 MG/DL (ref 8.5–10.5)
CHLORIDE BLD-SCNC: 106 MMOL/L (ref 98–107)
CO2 SERPL-SCNC: 23 MMOL/L (ref 22–31)
CREAT SERPL-MCNC: 1.54 MG/DL (ref 0.7–1.3)
ERYTHROCYTE [DISTWIDTH] IN BLOOD BY AUTOMATED COUNT: 12.7 % (ref 11–14.5)
GFR SERPL CREATININE-BSD FRML MDRD: 44 ML/MIN/1.73M2
GLUCOSE BLD-MCNC: 64 MG/DL (ref 70–125)
HCT VFR BLD AUTO: 36.5 % (ref 40–54)
HGB BLD-MCNC: 12 G/DL (ref 14–18)
MCH RBC QN AUTO: 30.8 PG (ref 27–34)
MCHC RBC AUTO-ENTMCNC: 32.9 G/DL (ref 32–36)
MCV RBC AUTO: 94 FL (ref 80–100)
PLATELET # BLD AUTO: 253 THOU/UL (ref 140–440)
PMV BLD AUTO: 10.8 FL (ref 8.5–12.5)
POTASSIUM BLD-SCNC: 4.3 MMOL/L (ref 3.5–5)
RBC # BLD AUTO: 3.89 MILL/UL (ref 4.4–6.2)
SODIUM SERPL-SCNC: 140 MMOL/L (ref 136–145)
TSH SERPL DL<=0.005 MIU/L-ACNC: 0.56 UIU/ML (ref 0.3–5)
WBC: 5.6 THOU/UL (ref 4–11)

## 2021-02-15 ENCOUNTER — TELEPHONE (OUTPATIENT)
Dept: FAMILY MEDICINE | Facility: CLINIC | Age: 76
End: 2021-02-15

## 2021-02-15 NOTE — TELEPHONE ENCOUNTER
Tried calling pt but no answer.  Need to ask if pt has received his COVID 19 vaccine or if he is interested in receiving COVID 19 vaccine at the clinic on Saturday, 2/20/21./NG

## 2021-03-29 ENCOUNTER — MEDICAL CORRESPONDENCE (OUTPATIENT)
Dept: HEALTH INFORMATION MANAGEMENT | Facility: CLINIC | Age: 76
End: 2021-03-29

## 2021-03-31 ENCOUNTER — DOCUMENTATION ONLY (OUTPATIENT)
Dept: FAMILY MEDICINE | Facility: CLINIC | Age: 76
End: 2021-03-31

## 2021-03-31 NOTE — PROGRESS NOTES
To be completed in Nursing note:    Please reference list for forms that require a visit for completion.  Please remind patients that providers are given 3-5 business days to complete and return forms.      Form type: [employer] / Standard Written Order: Nebulizer     Date form received: 2021    Date form completed by Physician: 21    How was form returned to patient (mailed, faxed, or at  for patient to ):faxed to [phone]    Date form mailed/faxed/left at  for patient and sent to HIM for [email or web address]      Once form is left for patient, faxed, or mailed PCS will then close the documentation only encounter.

## 2021-04-20 ENCOUNTER — RECORDS - HEALTHEAST (OUTPATIENT)
Dept: LAB | Facility: CLINIC | Age: 76
End: 2021-04-20

## 2021-04-21 LAB
ALBUMIN SERPL-MCNC: 3.7 G/DL (ref 3.5–5)
ALP SERPL-CCNC: 81 U/L (ref 45–120)
ALT SERPL W P-5'-P-CCNC: 10 U/L (ref 0–45)
ANION GAP SERPL CALCULATED.3IONS-SCNC: 8 MMOL/L (ref 5–18)
AST SERPL W P-5'-P-CCNC: 20 U/L (ref 0–40)
BASOPHILS # BLD AUTO: 0 THOU/UL (ref 0–0.2)
BASOPHILS NFR BLD AUTO: 1 % (ref 0–2)
BILIRUB SERPL-MCNC: 0.4 MG/DL (ref 0–1)
BUN SERPL-MCNC: 23 MG/DL (ref 8–28)
CALCIUM SERPL-MCNC: 8.6 MG/DL (ref 8.5–10.5)
CHLORIDE BLD-SCNC: 106 MMOL/L (ref 98–107)
CO2 SERPL-SCNC: 26 MMOL/L (ref 22–31)
CREAT SERPL-MCNC: 1.53 MG/DL (ref 0.7–1.3)
EOSINOPHIL # BLD AUTO: 0.2 THOU/UL (ref 0–0.4)
EOSINOPHIL NFR BLD AUTO: 4 % (ref 0–6)
ERYTHROCYTE [DISTWIDTH] IN BLOOD BY AUTOMATED COUNT: 13.2 % (ref 11–14.5)
GFR SERPL CREATININE-BSD FRML MDRD: 44 ML/MIN/1.73M2
GLUCOSE BLD-MCNC: 130 MG/DL (ref 70–125)
HCT VFR BLD AUTO: 36.9 % (ref 40–54)
HGB BLD-MCNC: 11.8 G/DL (ref 14–18)
IMM GRANULOCYTES # BLD: 0 THOU/UL
IMM GRANULOCYTES NFR BLD: 0 %
IRON SERPL-MCNC: 62 UG/DL (ref 42–175)
LYMPHOCYTES # BLD AUTO: 0.8 THOU/UL (ref 0.8–4.4)
LYMPHOCYTES NFR BLD AUTO: 16 % (ref 20–40)
MCH RBC QN AUTO: 30.2 PG (ref 27–34)
MCHC RBC AUTO-ENTMCNC: 32 G/DL (ref 32–36)
MCV RBC AUTO: 94 FL (ref 80–100)
MONOCYTES # BLD AUTO: 0.4 THOU/UL (ref 0–0.9)
MONOCYTES NFR BLD AUTO: 8 % (ref 2–10)
NEUTROPHILS # BLD AUTO: 3.7 THOU/UL (ref 2–7.7)
NEUTROPHILS NFR BLD AUTO: 72 % (ref 50–70)
PLATELET # BLD AUTO: 242 THOU/UL (ref 140–440)
PMV BLD AUTO: 10.7 FL (ref 8.5–12.5)
POTASSIUM BLD-SCNC: 4 MMOL/L (ref 3.5–5)
PROT SERPL-MCNC: 6.9 G/DL (ref 6–8)
RBC # BLD AUTO: 3.91 MILL/UL (ref 4.4–6.2)
SODIUM SERPL-SCNC: 140 MMOL/L (ref 136–145)
TSH SERPL DL<=0.005 MIU/L-ACNC: 0.2 UIU/ML (ref 0.3–5)
WBC: 5.2 THOU/UL (ref 4–11)

## 2021-05-26 ENCOUNTER — RECORDS - HEALTHEAST (OUTPATIENT)
Dept: ADMINISTRATIVE | Facility: CLINIC | Age: 76
End: 2021-05-26

## 2021-05-27 NOTE — PROGRESS NOTES
HPI:  Michael D McArdle is a 73 y.o. male who was referred to me by Ervin Cyr MD for a ventral hernia.  He presents with complaints of a bulge in the right lower abdominal region with progressive enlargement and increasing dicomfort.   He has noted this for the past several years.  He denies any nausea, vomiting, fevers, chills, bloody bowel movements or any other complaints at this time. Previous surgeries include a knee robotic low anterior resection with diverting loop ileostomy and subsequent take back to the operating room for fascial dehiscence.   He also has the ostomy revision.  This was all performed in 2015 and is since been doing well from a rectal adenocarcinoma standpoint.  However, his bulge is progressively enlarged and causing discomfort as well as nausea and constipation.    Allergies:Penicillins    Past Medical History:   Diagnosis Date     Adenocarcinoma of rectum (H)      Ankle fracture     Left     Asthma      Bipolar disorder (H)      Depressive disorder, not elsewhere classified      Diastolic dysfunction      Fainting      GERD (gastroesophageal reflux disease)      Hyperlipidemia      Hypothyroidism      Lung disease      Obesity      Peripheral neuropathy     from chemo rt. thigh     Prediabetes      Pulmonary embolism and infarction (H)      Smoker        Past Surgical History:   Procedure Laterality Date     COLECTOMY  July 2015    radiation and chemo, 3 colon surgeries     COLOSTOMY  2015    and reversal      ORIF ANKLE FRACTURE Right 2/22/2016    Procedure: ANKLE FRACTURE OPEN REDUCTION INTERNAL FIXATION, RIGHT;  Surgeon: Yolanda Dean MD;  Location: [facility];  Service:      SHOULDER ARTHROSCOPY W/ ROTATOR CUFF REPAIR Left      SHOULDER OPEN ROTATOR CUFF REPAIR Right        CURRENT MEDS:  Current Outpatient Medications   Medication Sig Dispense Refill     acetaminophen (TYLENOL) 500 MG tablet Take 500 mg by mouth every 6 (six) hours as needed for pain or fever.        albuterol (PROVENTIL HFA;VENTOLIN HFA) 90 mcg/actuation inhaler Inhale 2 puffs every 6 (six) hours as needed.        aspirin 81 MG EC tablet Take 81 mg by mouth daily.       beclomethasone (QVAR) 80 mcg/actuation inhaler Inhale 1 puff 2 (two) times a day as needed (shortness of breath/allergies).        FLUoxetine (PROZAC) 40 MG capsule Take 40 mg by mouth daily.  1     levothyroxine (SYNTHROID, LEVOTHROID) 200 MCG tablet Take 200 mcg by mouth daily. Take with 25 mcg tablet for total of 225 mcg daily  1     levothyroxine (SYNTHROID, LEVOTHROID) 25 MCG tablet Take 25 mcg by mouth daily. Take with 200 mcg tablet for total of 225 mcg  3     polyethylene glycol (MIRALAX) 17 gram packet Take 1 packet (17 g total) by mouth every other day.  0     ranitidine (ZANTAC) 75 MG tablet Take 75 mg by mouth 2 (two) times a day as needed for heartburn.       simvastatin (ZOCOR) 40 MG tablet Take 40 mg by mouth bedtime.       No current facility-administered medications for this visit.        History reviewed. No pertinent family history.     reports that he has been smoking.  He has smoked for the past 30.00 years. He has never used smokeless tobacco. He reports that he does not drink alcohol or use drugs.    Review of Systems -   The 12 point review of systems  is within normal limits except for as mentioned above in the HPI.  General ROS: No complaints or constitutional symptoms  Ophthalmic ROS: No complaints of visual changes  Skin: No complaints or symptoms   Endocrine: No complaints or symptoms  Hematologic/Lymphatic: No symptoms or complaints  Psychiatric: No symptoms or complaints  Respiratory ROS: no cough, shortness of breath, or wheezing  Cardiovascular ROS: no chest pain or dyspnea on exertion  Gastrointestinal ROS: As per HPI  Genito-Urinary ROS: no dysuria, trouble voiding, or hematuria  Musculoskeletal ROS: no joint or muscle pain  Neurological ROS: no TIA or stroke symptoms      BP (!) 170/95 (Patient Site:  "Right Arm, Patient Position: Sitting, Cuff Size: Adult Regular)   Pulse (!) 111   Ht 5' 5\" (1.651 m)   Wt 201 lb 9.6 oz (91.4 kg)   SpO2 96%   BMI 33.55 kg/m    Body mass index is 33.55 kg/m .    EXAM:  GENERAL: Well developed male  HEENT: Extra ocular muscles intact, pupils are round and reactive, sclera is anicteric,   NECK:  No obvious masses or deformities  CARDIAC: RRR w/out murmur   CHEST/LUNG: Clear to auscultation bilaterally  ABDOMEN:-Large nonreducible right sided abdominal wall hernia  NEURO: No obvious defects noted.  EXT: No edema, no obvious deformities or any other abnormalities    IMAGES:   CT ABDOMEN PELVIS WO ORAL WO IV CONTRAST  12/3/2018 10:25 PM     INDICATION: RLQ abdominal pain, elevated CR   TECHNIQUE: Routine CT abdomen and pelvis without oral or IV contrast. Multiplanar reformation images (MPR). Dose reduction techniques were used.  COMPARISON: 1/31/2018     FINDINGS:  LUNG BASES: Atelectasis.     ABDOMEN: Parapelvic cysts within the right kidney. There is a tiny nonobstructing stone left kidney unchanged. No ureteric stone or hydronephrosis. Moderate stool in the colon. Hepatic cysts. Spleen, pancreas, adrenal glands negative. Large lower   abdominal right paramedian ventral hernia containing colon and small bowel.     PELVIS: Postsurgical changes in the rectosigmoid with stable presacral soft tissue thickening.     MUSCULOSKELETAL: Scattered degenerative change in the lumbar spine.     IMPRESSION:   CONCLUSION:  1.  Stable lower abdominal moderate-sized abdominal hernia containing large and small bowel just to the right of midline. The bowel contained within the hernia sac stasis though there does not appear to be proximal obstruction     2.  Constipation.     3.  Tiny nonobstructing left renal stone.    Assessment/Plan:    Michael D McArdle is a 73 y.o. male with a moderately sized incisional hernia.  The pathophysiology of these hernias was discussed as were the surgical and " non-operative management strategies.      The risks of surgery were discussed which include, but are not limited to, bleeding, infection, recurrent hernia, chronic pain, poor cosmesis, blood clots, stroke, heart attack and death.  Additionally, the risks of observation were discussed which include, but are not limited to, enlargement of the hernia, incarceration, strangulation, pain and death.  Surgical options including open, laparoscopic and robotic hernia repair were discussed in detail.      He understands everything which was discussed and has consented to proceed with surgery.  We will therefore schedule robotic repair which will likely entail transversus abdominis release bilaterally and formal closure of the hernia accordingly.      Tomi Calderon D.O. [facility]  [phone]  [facility] Department of Surgery

## 2021-05-27 NOTE — PROGRESS NOTES
Hernia education & surgery packet provided to pt.    Alyson Salas CMA ([facility])     Ph: [phone]    Fx: [phone]

## 2021-05-28 ENCOUNTER — RECORDS - HEALTHEAST (OUTPATIENT)
Dept: ADMINISTRATIVE | Facility: CLINIC | Age: 76
End: 2021-05-28

## 2021-05-28 NOTE — ANESTHESIA CARE TRANSFER NOTE
Last vitals:   Vitals:    05/10/19 1142   BP: (!) 187/93   Pulse: 100   Resp: 20   Temp: 37.3  C (99.1  F)   SpO2: 91%     Patient's level of consciousness is drowsy  Spontaneous respirations: yes  Maintains airway independently: yes  Dentition unchanged: yes  Oropharynx: nasal airway in place    QCDR Measures:  ASA# 20 - Surgical Safety Checklist: WHO surgical safety checklist completed prior to induction    PQRS# 430 - Adult PONV Prevention: 4558F - Pt received => 2 anti-emetic agents (different classes) preop & intraop  ASA# 8 - Peds PONV Prevention: NA - Not pediatric patient, not GA or 2 or more risk factors NOT present  PQRS# 424 - Kayla-op Temp Management: 4559F - At least one body temp DOCUMENTED => 35.5C or 95.9F within required timeframe  PQRS# 426 - PACU Transfer Protocol: - Transfer of care checklist used  ASA# 14 - Acute Post-op Pain: ASA14B - Patient did NOT experience pain >= 7 out of 10

## 2021-05-28 NOTE — ANESTHESIA PREPROCEDURE EVALUATION
Anesthesia Evaluation      History of anesthetic complications (throat pain post intubation)     Airway   Mallampati: III  Neck ROM: full  Comment: Thick neck   Pulmonary    (+) asthma ( worse recently 2/2 seasonal allergy flare)  moderate, wheezes, a smoker (30 pack years)    ROS comment: Hx of post-op PE                         Cardiovascular   Exercise tolerance: > or = 4 METS  (+) hypertension, CHF (diastolic dysfunction), , hypercholesterolemia,     ECG reviewed (NSR, LAD, IVCD)  Rhythm: regular  Rate: normal,         Neuro/Psych    (+) neuromuscular disease (peripheral neuropathy),  depression, anxiety/panic attacks,     Endo/Other    (+) hypothyroidism, obesity,      GI/Hepatic/Renal    (+) GERD well controlled,   chronic renal disease (Cr 1.3) CRI,     Comments: Hx of rectal CA s/p LAR w/ ileostomy w/ multiple subsequent abdominal surgeries (DIANA, ostomy revision, etc). Pt now presents with ventral hernia, and is s/f robotic hernia repair today.         Other findings: HCT 40.3, Cr 1.3      Dental    (+) upper dentures and lower dentures                       Anesthesia Plan  Planned anesthetic: general endotracheal  GETA - Size 7 ETT  Pre-op duoneb   Tylenol 1g, Gabapentin 300mg, Oxycodone 10mg in preop  Ketamine 50mg post-induction, redose 0.25mg/kg q1hr up to 100mg for case total for post op pain  Avoid Toradol given CRI  Consented for TAP if requested by surgeon  Decadron 10, Zofran 4 for antiemesis   ASA 3   Induction: intravenous   Anesthetic plan and risks discussed with: patient  Anesthesia plan special considerations: antiemetics,   Post-op plan: routine recovery

## 2021-05-28 NOTE — ANESTHESIA POSTPROCEDURE EVALUATION
Patient: Michael D McArdle  ROBOTIC INCISIONAL HERNIA REPAIR  Anesthesia type: general    Patient location: PACU  Last vitals:   Vitals Value Taken Time   /89 5/10/2019  3:10 PM   Temp 36.4  C (97.6  F) 5/10/2019  2:50 PM   Pulse 90 5/10/2019  3:17 PM   Resp 18 5/10/2019  3:17 PM   SpO2 94 % 5/10/2019  3:17 PM   Vitals shown include unvalidated device data.  Post vital signs: stable  Level of consciousness: awake, responds to simple questions and slow to awake from anesthsia and participate in deep breathing/coughing/IS to imrpove O2 sats  Post-anesthesia pain: pain controlled  Post-anesthesia nausea and vomiting: no  Pulmonary: nasal cannula, slow improvement 2/2 drowsiness and inabilityt o participate in recruitment maneuvers  Cardiovascular: stable and blood pressure at baseline  Hydration: adequate  Anesthetic events: no    QCDR Measures:  ASA# 11 - Kayla-op Cardiac Arrest: ASA11B - Patient did NOT experience unanticipated cardiac arrest  ASA# 12 - Kayla-op Mortality Rate: ASA12B - Patient did NOT die  ASA# 13 - PACU Re-Intubation Rate: ASA13B - Patient did NOT require a new airway mgmt  ASA# 10 - Composite Anes Safety: ASA10A - No serious adverse event    Additional Notes:

## 2021-05-29 NOTE — PROGRESS NOTES
"HPI: Pt is here for follow up of a Robotic repair of Incisional hernia with lysis of adhesions. Had a large hernia measuring 5x15 cm. Surgery was four hours. .   he is doing well.  Pain is well controlled.  No difficulties with the surgical wound/wounds.  he is eating well and denies fever and chills.     He states he had pain upper mid port site for first two days that he did not think was bad. Since then really no pain complaints. Noticing bit sore left abdomen when he lays down but lays on his right side to sleep.       /66 (Patient Site: Right Arm, Patient Position: Sitting, Cuff Size: Adult Large)   Pulse (!) 110   Temp 98.7  F (37.1  C) (Tympanic)   Ht 5' 5\" (1.651 m)   Wt 194 lb (88 kg)   SpO2 96%   BMI 32.28 kg/m      EXAM:  GENERAL:Appears well  ABDOMEN:  Soft, +BS  SURGICAL WOUNDS:  Incisions healing well, no enduration or drainage- Upper central wound is bit wider and has mild fibrin debris after I removed steri strips.       Assessment/Plan: . Doing well after surgery and should follow up as needed.- discussed keeping wounds clean and dry.   Judit Thomas PA-C  [facility] Department of Surgery    "

## 2021-05-31 ENCOUNTER — RECORDS - HEALTHEAST (OUTPATIENT)
Dept: ADMINISTRATIVE | Facility: CLINIC | Age: 76
End: 2021-05-31

## 2021-06-01 ENCOUNTER — RECORDS - HEALTHEAST (OUTPATIENT)
Dept: ADMINISTRATIVE | Facility: CLINIC | Age: 76
End: 2021-06-01

## 2021-06-02 ENCOUNTER — RECORDS - HEALTHEAST (OUTPATIENT)
Dept: ADMINISTRATIVE | Facility: CLINIC | Age: 76
End: 2021-06-02

## 2021-06-02 VITALS — WEIGHT: 201.6 LBS | HEIGHT: 65 IN | BODY MASS INDEX: 33.59 KG/M2

## 2021-06-02 VITALS — WEIGHT: 194 LBS | BODY MASS INDEX: 32.32 KG/M2 | HEIGHT: 65 IN

## 2021-06-02 VITALS — WEIGHT: 204 LBS | BODY MASS INDEX: 33.99 KG/M2 | HEIGHT: 65 IN

## 2021-06-05 ENCOUNTER — RECORDS - HEALTHEAST (OUTPATIENT)
Dept: SCHEDULING | Facility: CLINIC | Age: 76
End: 2021-06-05

## 2021-06-05 DIAGNOSIS — R13.10 DYSPHAGIA: ICD-10-CM

## 2021-06-18 NOTE — PROGRESS NOTES
RESPIRATORY CARE NOTE     Patient Name: Michael D McArdle  Today's Date: 6/27/2018     Complete PFT done. Pt performed tests with good effort. Test results meet ATS criteria. Results scanned into epic. Pt left in no distress.  HGB drawn.      Neetu Fu

## 2021-08-22 ENCOUNTER — LAB REQUISITION (OUTPATIENT)
Dept: LAB | Facility: CLINIC | Age: 76
End: 2021-08-22

## 2021-08-22 ENCOUNTER — LAB REQUISITION (OUTPATIENT)
Dept: LAB | Facility: CLINIC | Age: 76
End: 2021-08-22
Payer: COMMERCIAL

## 2021-08-22 DIAGNOSIS — R53.1 WEAKNESS: ICD-10-CM

## 2021-08-22 DIAGNOSIS — D64.9 ANEMIA, UNSPECIFIED: ICD-10-CM

## 2021-08-23 LAB
ALBUMIN SERPL-MCNC: 3.7 G/DL (ref 3.5–5)
ALP SERPL-CCNC: 93 U/L (ref 45–120)
ALT SERPL W P-5'-P-CCNC: 10 U/L (ref 0–45)
ANION GAP SERPL CALCULATED.3IONS-SCNC: 12 MMOL/L (ref 5–18)
AST SERPL W P-5'-P-CCNC: 16 U/L (ref 0–40)
BASOPHILS # BLD AUTO: 0 10E3/UL (ref 0–0.2)
BASOPHILS NFR BLD AUTO: 1 %
BILIRUB SERPL-MCNC: 0.3 MG/DL (ref 0–1)
BUN SERPL-MCNC: 18 MG/DL (ref 8–28)
CALCIUM SERPL-MCNC: 9.2 MG/DL (ref 8.5–10.5)
CHLORIDE BLD-SCNC: 106 MMOL/L (ref 98–107)
CO2 SERPL-SCNC: 25 MMOL/L (ref 22–31)
CREAT SERPL-MCNC: 1.55 MG/DL (ref 0.7–1.3)
EOSINOPHIL # BLD AUTO: 0.1 10E3/UL (ref 0–0.7)
EOSINOPHIL NFR BLD AUTO: 2 %
ERYTHROCYTE [DISTWIDTH] IN BLOOD BY AUTOMATED COUNT: 13.3 % (ref 10–15)
GFR SERPL CREATININE-BSD FRML MDRD: 43 ML/MIN/1.73M2
GLUCOSE BLD-MCNC: 115 MG/DL (ref 70–125)
HCT VFR BLD AUTO: 37 % (ref 40–53)
HGB BLD-MCNC: 12.2 G/DL (ref 13.3–17.7)
IMM GRANULOCYTES # BLD: 0 10E3/UL
IMM GRANULOCYTES NFR BLD: 0 %
LYMPHOCYTES # BLD AUTO: 0.9 10E3/UL (ref 0.8–5.3)
LYMPHOCYTES NFR BLD AUTO: 20 %
MCH RBC QN AUTO: 31.1 PG (ref 26.5–33)
MCHC RBC AUTO-ENTMCNC: 33 G/DL (ref 31.5–36.5)
MCV RBC AUTO: 94 FL (ref 78–100)
MONOCYTES # BLD AUTO: 0.6 10E3/UL (ref 0–1.3)
MONOCYTES NFR BLD AUTO: 14 %
NEUTROPHILS # BLD AUTO: 2.8 10E3/UL (ref 1.6–8.3)
NEUTROPHILS NFR BLD AUTO: 63 %
NRBC # BLD AUTO: 0 10E3/UL
NRBC BLD AUTO-RTO: 0 /100
PLATELET # BLD AUTO: 239 10E3/UL (ref 150–450)
POTASSIUM BLD-SCNC: 4.6 MMOL/L (ref 3.5–5)
PROT SERPL-MCNC: 7.1 G/DL (ref 6–8)
RBC # BLD AUTO: 3.92 10E6/UL (ref 4.4–5.9)
SODIUM SERPL-SCNC: 143 MMOL/L (ref 136–145)
WBC # BLD AUTO: 4.4 10E3/UL (ref 4–11)

## 2021-08-23 PROCEDURE — 80053 COMPREHEN METABOLIC PANEL: CPT | Mod: ORL | Performed by: PHYSICIAN ASSISTANT

## 2021-08-23 PROCEDURE — 36415 COLL VENOUS BLD VENIPUNCTURE: CPT | Mod: ORL | Performed by: PHYSICIAN ASSISTANT

## 2021-08-23 PROCEDURE — 85025 COMPLETE CBC W/AUTO DIFF WBC: CPT | Mod: ORL | Performed by: PHYSICIAN ASSISTANT

## 2021-08-23 PROCEDURE — P9604 ONE-WAY ALLOW PRORATED TRIP: HCPCS | Mod: ORL | Performed by: PHYSICIAN ASSISTANT

## 2021-09-05 ENCOUNTER — HEALTH MAINTENANCE LETTER (OUTPATIENT)
Age: 76
End: 2021-09-05

## 2021-12-26 ENCOUNTER — HEALTH MAINTENANCE LETTER (OUTPATIENT)
Age: 76
End: 2021-12-26

## 2022-05-04 ENCOUNTER — TRANSFERRED RECORDS (OUTPATIENT)
Dept: HEALTH INFORMATION MANAGEMENT | Facility: CLINIC | Age: 77
End: 2022-05-04

## 2022-05-05 ENCOUNTER — TRANSFERRED RECORDS (OUTPATIENT)
Dept: HEALTH INFORMATION MANAGEMENT | Facility: CLINIC | Age: 77
End: 2022-05-05

## 2022-05-06 ENCOUNTER — TRANSFERRED RECORDS (OUTPATIENT)
Dept: HEALTH INFORMATION MANAGEMENT | Facility: CLINIC | Age: 77
End: 2022-05-06

## 2022-05-08 ENCOUNTER — HOSPITAL ENCOUNTER (EMERGENCY)
Facility: CLINIC | Age: 77
Discharge: LEFT BEFORE DISCHARGE | End: 2022-05-09
Attending: EMERGENCY MEDICINE | Admitting: EMERGENCY MEDICINE
Payer: COMMERCIAL

## 2022-05-08 DIAGNOSIS — R65.10 SIRS (SYSTEMIC INFLAMMATORY RESPONSE SYNDROME) (H): ICD-10-CM

## 2022-05-08 DIAGNOSIS — J44.1 COPD EXACERBATION (H): ICD-10-CM

## 2022-05-08 DIAGNOSIS — J06.9 VIRAL UPPER RESPIRATORY TRACT INFECTION: ICD-10-CM

## 2022-05-08 PROCEDURE — 99285 EMERGENCY DEPT VISIT HI MDM: CPT | Mod: CS,25

## 2022-05-08 PROCEDURE — C9803 HOPD COVID-19 SPEC COLLECT: HCPCS

## 2022-05-08 RX ORDER — IPRATROPIUM BROMIDE AND ALBUTEROL SULFATE 2.5; .5 MG/3ML; MG/3ML
3 SOLUTION RESPIRATORY (INHALATION) ONCE
Status: COMPLETED | OUTPATIENT
Start: 2022-05-09 | End: 2022-05-09

## 2022-05-08 RX ORDER — ALBUTEROL SULFATE 0.83 MG/ML
2.5 SOLUTION RESPIRATORY (INHALATION)
Status: DISCONTINUED | OUTPATIENT
Start: 2022-05-08 | End: 2022-05-09 | Stop reason: HOSPADM

## 2022-05-08 RX ORDER — METHYLPREDNISOLONE SODIUM SUCCINATE 125 MG/2ML
125 INJECTION, POWDER, LYOPHILIZED, FOR SOLUTION INTRAMUSCULAR; INTRAVENOUS ONCE
Status: COMPLETED | OUTPATIENT
Start: 2022-05-09 | End: 2022-05-09

## 2022-05-09 ENCOUNTER — APPOINTMENT (OUTPATIENT)
Dept: RADIOLOGY | Facility: CLINIC | Age: 77
End: 2022-05-09
Attending: EMERGENCY MEDICINE
Payer: COMMERCIAL

## 2022-05-09 VITALS
OXYGEN SATURATION: 95 % | RESPIRATION RATE: 21 BRPM | TEMPERATURE: 100.6 F | HEART RATE: 105 BPM | SYSTOLIC BLOOD PRESSURE: 135 MMHG | DIASTOLIC BLOOD PRESSURE: 66 MMHG

## 2022-05-09 LAB
ALBUMIN SERPL-MCNC: 3.2 G/DL (ref 3.5–5)
ALP SERPL-CCNC: 58 U/L (ref 45–120)
ALT SERPL W P-5'-P-CCNC: <9 U/L (ref 0–45)
ANION GAP SERPL CALCULATED.3IONS-SCNC: 11 MMOL/L (ref 5–18)
AST SERPL W P-5'-P-CCNC: 14 U/L (ref 0–40)
ATRIAL RATE - MUSE: 105 BPM
BACTERIA SPT CULT: NORMAL
BASOPHILS # BLD AUTO: 0 10E3/UL (ref 0–0.2)
BASOPHILS NFR BLD AUTO: 0 %
BILIRUB SERPL-MCNC: 0.5 MG/DL (ref 0–1)
BUN SERPL-MCNC: 24 MG/DL (ref 8–28)
CALCIUM SERPL-MCNC: 8.2 MG/DL (ref 8.5–10.5)
CHLORIDE BLD-SCNC: 105 MMOL/L (ref 98–107)
CO2 SERPL-SCNC: 21 MMOL/L (ref 22–31)
CREAT SERPL-MCNC: 1.53 MG/DL (ref 0.7–1.3)
DIASTOLIC BLOOD PRESSURE - MUSE: 63 MMHG
EOSINOPHIL # BLD AUTO: 0 10E3/UL (ref 0–0.7)
EOSINOPHIL NFR BLD AUTO: 1 %
ERYTHROCYTE [DISTWIDTH] IN BLOOD BY AUTOMATED COUNT: 12.9 % (ref 10–15)
FLUAV RNA SPEC QL NAA+PROBE: NEGATIVE
FLUBV RNA RESP QL NAA+PROBE: NEGATIVE
GFR SERPL CREATININE-BSD FRML MDRD: 46 ML/MIN/1.73M2
GLUCOSE BLD-MCNC: 109 MG/DL (ref 70–125)
GRAM STAIN RESULT: NORMAL
HCT VFR BLD AUTO: 29 % (ref 40–53)
HGB BLD-MCNC: 9.6 G/DL (ref 13.3–17.7)
IMM GRANULOCYTES # BLD: 0 10E3/UL
IMM GRANULOCYTES NFR BLD: 0 %
INTERPRETATION ECG - MUSE: NORMAL
LACTATE SERPL-SCNC: 0.9 MMOL/L (ref 0.7–2)
LYMPHOCYTES # BLD AUTO: 0.8 10E3/UL (ref 0.8–5.3)
LYMPHOCYTES NFR BLD AUTO: 10 %
MCH RBC QN AUTO: 30.3 PG (ref 26.5–33)
MCHC RBC AUTO-ENTMCNC: 33.1 G/DL (ref 31.5–36.5)
MCV RBC AUTO: 92 FL (ref 78–100)
MONOCYTES # BLD AUTO: 0.9 10E3/UL (ref 0–1.3)
MONOCYTES NFR BLD AUTO: 11 %
NEUTROPHILS # BLD AUTO: 6.4 10E3/UL (ref 1.6–8.3)
NEUTROPHILS NFR BLD AUTO: 78 %
NRBC # BLD AUTO: 0 10E3/UL
NRBC BLD AUTO-RTO: 0 /100
P AXIS - MUSE: 27 DEGREES
PLATELET # BLD AUTO: 240 10E3/UL (ref 150–450)
POTASSIUM BLD-SCNC: 3.8 MMOL/L (ref 3.5–5)
PR INTERVAL - MUSE: 122 MS
PROT SERPL-MCNC: 6.4 G/DL (ref 6–8)
QRS DURATION - MUSE: 118 MS
QT - MUSE: 346 MS
QTC - MUSE: 457 MS
R AXIS - MUSE: -53 DEGREES
RBC # BLD AUTO: 3.17 10E6/UL (ref 4.4–5.9)
SARS-COV-2 RNA RESP QL NAA+PROBE: NEGATIVE
SODIUM SERPL-SCNC: 137 MMOL/L (ref 136–145)
SYSTOLIC BLOOD PRESSURE - MUSE: 124 MMHG
T AXIS - MUSE: 64 DEGREES
VENTRICULAR RATE- MUSE: 105 BPM
WBC # BLD AUTO: 8.1 10E3/UL (ref 4–11)

## 2022-05-09 PROCEDURE — 96374 THER/PROPH/DIAG INJ IV PUSH: CPT

## 2022-05-09 PROCEDURE — 250N000013 HC RX MED GY IP 250 OP 250 PS 637: Performed by: EMERGENCY MEDICINE

## 2022-05-09 PROCEDURE — 250N000009 HC RX 250: Performed by: EMERGENCY MEDICINE

## 2022-05-09 PROCEDURE — 83605 ASSAY OF LACTIC ACID: CPT | Performed by: EMERGENCY MEDICINE

## 2022-05-09 PROCEDURE — 80053 COMPREHEN METABOLIC PANEL: CPT | Performed by: EMERGENCY MEDICINE

## 2022-05-09 PROCEDURE — 87205 SMEAR GRAM STAIN: CPT | Performed by: EMERGENCY MEDICINE

## 2022-05-09 PROCEDURE — 94640 AIRWAY INHALATION TREATMENT: CPT

## 2022-05-09 PROCEDURE — 93005 ELECTROCARDIOGRAM TRACING: CPT | Performed by: EMERGENCY MEDICINE

## 2022-05-09 PROCEDURE — 96361 HYDRATE IV INFUSION ADD-ON: CPT

## 2022-05-09 PROCEDURE — 87636 SARSCOV2 & INF A&B AMP PRB: CPT | Performed by: EMERGENCY MEDICINE

## 2022-05-09 PROCEDURE — 71045 X-RAY EXAM CHEST 1 VIEW: CPT

## 2022-05-09 PROCEDURE — 250N000011 HC RX IP 250 OP 636: Performed by: EMERGENCY MEDICINE

## 2022-05-09 PROCEDURE — 87040 BLOOD CULTURE FOR BACTERIA: CPT | Performed by: EMERGENCY MEDICINE

## 2022-05-09 PROCEDURE — 258N000003 HC RX IP 258 OP 636: Performed by: EMERGENCY MEDICINE

## 2022-05-09 PROCEDURE — 85025 COMPLETE CBC W/AUTO DIFF WBC: CPT | Performed by: EMERGENCY MEDICINE

## 2022-05-09 PROCEDURE — 36415 COLL VENOUS BLD VENIPUNCTURE: CPT | Performed by: EMERGENCY MEDICINE

## 2022-05-09 RX ORDER — PREDNISONE 20 MG/1
TABLET ORAL
Qty: 10 TABLET | Refills: 0 | Status: SHIPPED | OUTPATIENT
Start: 2022-05-09 | End: 2022-08-02

## 2022-05-09 RX ORDER — ACETAMINOPHEN 325 MG/1
650 TABLET ORAL ONCE
Status: COMPLETED | OUTPATIENT
Start: 2022-05-09 | End: 2022-05-09

## 2022-05-09 RX ADMIN — IPRATROPIUM BROMIDE AND ALBUTEROL SULFATE 3 ML: 2.5; .5 SOLUTION RESPIRATORY (INHALATION) at 00:15

## 2022-05-09 RX ADMIN — ACETAMINOPHEN 650 MG: 325 TABLET ORAL at 01:10

## 2022-05-09 RX ADMIN — SODIUM CHLORIDE 1000 ML: 9 INJECTION, SOLUTION INTRAVENOUS at 00:45

## 2022-05-09 RX ADMIN — METHYLPREDNISOLONE SODIUM SUCCINATE 125 MG: 125 INJECTION, POWDER, FOR SOLUTION INTRAMUSCULAR; INTRAVENOUS at 00:32

## 2022-05-09 NOTE — ED TRIAGE NOTES
Pt arrives via EMS. IV in place in the L AC with NS running as bolus. Pt is febrile 100.6. Recent pneumonia dx. Hx Stage 4 colon cancer. [facility], has his own apt. A & O x4.      Triage Assessment     Row Name 05/09/22 0001       Triage Assessment (Adult)    Airway WDL WDL       Respiratory WDL    Respiratory WDL X;cough    Cough Frequency frequent    Cough Type productive       Skin Circulation/Temperature WDL    Skin Circulation/Temperature WDL temperature    Skin Temperature warm       Cardiac WDL    Cardiac WDL rhythm    Cardiac Rhythm tachycardic       Peripheral/Neurovascular WDL    Peripheral Neurovascular WDL WDL       Cognitive/Neuro/Behavioral WDL    Cognitive/Neuro/Behavioral WDL WDL

## 2022-05-09 NOTE — DISCHARGE INSTRUCTIONS
You are leaving the ER against my medical recommendations. I recommended that we admit you for your COPD flare up with low oxygen and for your viral upper respiratory tract infection which likely caused the COPD exacerbation. You chose not to be admitted to the hospital, and if you change your mind or feel worse at all, I want for you to call 911 and immediately return to the ER so we can make sure you are medically safe and so we can help you.     You were tested for COVID19 in the ER and we do not have the results of your COVID19 test back yet. Please call your doctor in the morning to follow up with them and so they can look online on the computer system to see the results of your influenza and COVID19 test from the ER. Until then it is very critically important that you stay on quarantine, because if you DO have influenza or COVID19, you are possibly very contagious.

## 2022-05-09 NOTE — ED PROVIDER NOTES
"EMERGENCY DEPARTMENT ENCOUNTER      NAME: Michael D McArdle  AGE: 77 year old male  YOB: 1945  MRN: [id number]  EVALUATION DATE & TIME: 5/8/2022 11:51 PM    PCP: Jeff Gan    ED PROVIDER: Raegan Snyder M.D.      Chief Complaint   Patient presents with     Shortness of Breath     Fever         FINAL IMPRESSION:  1. COPD exacerbation (H)    2. Viral upper respiratory tract infection    3. SIRS (systemic inflammatory response syndrome) (H)          ED COURSE & MEDICAL DECISION MAKING:    ED Course as of 05/09/22 0509   Sun May 08, 2022   2357 Pt with cough and some SOB with known COPD and expiratory wheezing, febrile here in the ED and given tylenol. Pt given albuterol, methylprednisolone and will check CXR, cultures, CBC/lactic acid and chemistry with COVID19 PCR, NS bolus provided and EKG with HR slightly elevated with cough and possible PNA and clinically reassess, pt amenable to plan.   Mon May 09, 2022   0054 Pt spontaneously expectorated large mucous wad after nebulized ablbuterol therapy and sputum culture pending   0055 XR without pneumonia thus no antibiotic therapy begun at this time as patient with very likely viral URTI with superimposed COPD exacerbation with SIRS. Cultures pending and will reassess shortly   0107 Patient clinically reassessed, , sat 96% RA. Patient A&O x4 and notes he refuses to remain in the hospital or be admitted as I medically recommend, despite him having COPD exaceration, and declines further albuterol because \"when I am admitted I get the runaround\" which he further declines to explain. I offered to watch him in the ER overnight as it is 1am and he resides in assisted living, he declines noting \" I have someone to take care of me because I live in assisted living, and besides, my girlfriend is a nurse\" and using phone at bedside to call family members to pick him up. I expressed that he has viral URTI symptoms at this time and with COPD exacerbation I am " concerned fo rhis safety and he declines antibiotics, observation, ED prolonged visit or admission, aware that my recommendation is admission, aware I am concerned he could have deterioration or die from worsening COPD exacerbation or undetected bacterial sepsis, he notes he is aware and feels improved and aware and attributes fever to viral URTI which is certainly most likely etiology but not the only etiology. Patient signed AMA form, given Rx for steroid therapy to accompany his normal albuterol therapy which he uses as needed at assisted living and he does note he will return to the ED if any worsening condition, declines to await culture results or further lab results and COVID19 PCR. Pt without diagnosis of dementia on the chart with him, able to engage in full conversation and express myc oncerns back to me. Patient discharged after being provided with extensive anticipatory guidance and given return precautions, importance of PMD follow-up emphasized.        11:51 PM I met with the patient, obtained history, performed an initial exam, and discussed options and plan for diagnostics and treatment here in the ED. I wore the following PPE: gown, gloves, N95, and eye protection.  1:04 AM I reassessed patient.    Pertinent Labs & Imaging studies reviewed. (See chart for details)    At the conclusion of the encounter I discussed the results of all of the tests and the disposition. The questions were answered. The patient or family acknowledged understanding and was agreeable with the care plan.     5:09 AM Patient called the ED for the results of his COVID19 test, I reviewed test result and updated him that he is COVID19 and influenza negative.    MEDICATIONS GIVEN IN THE EMERGENCY:  Medications   ipratropium - albuterol 0.5 mg/2.5 mg/3 mL (DUONEB) neb solution 3 mL (3 mLs Nebulization Given 5/9/22 0015)   methylPREDNISolone sodium succinate (solu-MEDROL) injection 125 mg (125 mg Intravenous Given 5/9/22 0032)    0.9% sodium chloride BOLUS (0 mLs Intravenous Stopped 5/9/22 0126)   acetaminophen (TYLENOL) tablet 650 mg (650 mg Oral Given 5/9/22 0110)       NEW PRESCRIPTIONS STARTED AT TODAY'S ER VISIT  Discharge Medication List as of 5/9/2022  1:27 AM      START taking these medications    Details   predniSONE (DELTASONE) 20 MG tablet Take two tablets (= 40mg) each day for 5 (five) days, Disp-10 tablet, R-0, E-Prescribe                =================================================================    [facility]    Michael D McArdle is a 77 year old male with PMHx of hyperlipidemia, PE, chronic reflux esophagitis, COPD, asthma, colon cancer, prediabetes, CKD3, who presents to the ED today via EMS with shortness of breath.    Patient reports 1.5 weeks of productive cough with clear sputum and low grade fever. He notes a history of COPD and asthma, and states he took albuterol at home around 1900 with some improvement. At present, patient feels a little wheezy and mildly short of breath. Patient reports he lives at assisted living and notes sick contacts at living facility. He is not currently on steroids or antibiotics. Patient reports he has had COVID preciously and is recovered. Vaccinated against COVID. He is a former smoker. Of note, patient asserts he is full code and has POLST at bedside indicating full code. No other complaints at this time.    REVIEW OF SYSTEMS   All other systems reviewed and are negative except as noted above in HPI.    PAST MEDICAL HISTORY:  Past Medical History:   Diagnosis Date     Cancer (H)      Depressive disorder      Kidney disease      Status post rotator cuff repair      Thyroid disease      Uncomplicated asthma        PAST SURGICAL HISTORY:  Past Surgical History:   Procedure Laterality Date     COLECTOMY  July 2015    radiation and chemo, 3 colon surgeries     COLOSTOMY  2015    and reversal      OPEN REDUCTION INTERNAL FIXATION ANKLE Right 2/22/2016    Procedure: ANKLE FRACTURE OPEN  REDUCTION INTERNAL FIXATION, RIGHT;  Surgeon: Yolanda Dean MD;  Location: [facility];  Service:      OTHER SURGICAL HISTORY      takedown of colostomy     SHOULDER ARTHROSCOPY W/ ROTATOR CUFF REPAIR Left      SHOULDER OPEN ROTATOR CUFF REPAIR Right        CURRENT MEDICATIONS:    predniSONE (DELTASONE) 20 MG tablet  ACETAMINOPHEN PO  albuterol (PROAIR HFA/PROVENTIL HFA/VENTOLIN HFA) 108 (90 Base) MCG/ACT inhaler  albuterol (PROVENTIL) (2.5 MG/3ML) 0.083% neb solution  amLODIPine (NORVASC) 5 MG tablet  aspirin (ASA) 81 MG chewable tablet  atorvastatin (LIPITOR) 80 MG tablet  beclomethasone HFA (QVAR REDIHALER) 80 MCG/ACT inhaler  BUTT PASTE - REGULAR (DR LOVE POOP GOOP BUTT PASTE FORMULA)  famotidine (PEPCID) 40 MG tablet  FLUoxetine (PROZAC) 40 MG capsule  levothyroxine (SYNTHROID/LEVOTHROID) 200 MCG tablet  levothyroxine (SYNTHROID/LEVOTHROID) 25 MCG tablet  order for DME  order for DME  order for DME  order for DME  polyethylene glycol (MIRALAX) powder        ALLERGIES:  Allergies   Allergen Reactions     Penicillins Swelling       FAMILY HISTORY:  Family History   Problem Relation Age of Onset     Breast Cancer Mother      Heart Failure Father      Diabetes Other         daughters      Cancer Other         wife      Heart Disease No family hx of      Coronary Artery Disease No family hx of      Hypertension No family hx of      Hyperlipidemia No family hx of      Cerebrovascular Disease No family hx of      Colon Cancer No family hx of      Prostate Cancer No family hx of      Other Cancer No family hx of      Depression No family hx of      Anxiety Disorder No family hx of      Mental Illness No family hx of      Substance Abuse No family hx of      Anesthesia Reaction No family hx of      Asthma No family hx of      Osteoporosis No family hx of      Genetic Disorder No family hx of      Thyroid Disease No family hx of      Obesity No family hx of      Unknown/Adopted No family hx of        SOCIAL  HISTORY:   Social History     Socioeconomic History     Marital status: Legally    Tobacco Use     Smoking status: Current Every Day Smoker     Types: Pipe     Smokeless tobacco: Never Used     Tobacco comment: has been cut down a lot, is on the nicotine patch   Substance and Sexual Activity     Alcohol use: Yes     Alcohol/week: 0.0 standard drinks     Drug use: No     Sexual activity: Yes     Partners: Female       VITALS:  Patient Vitals for the past 24 hrs:   BP Temp Temp src Pulse Resp SpO2   05/09/22 0055 135/66 -- -- 105 21 95 %   05/09/22 0000 124/63 (!) 100.6  F (38.1  C) Oral 111 22 95 %   05/08/22 2355 134/71 -- -- 113 -- 95 %       PHYSICAL EXAM    GENERAL: Awake, alert.  In no acute distress.   HEENT: Normocephalic, atraumatic.  Pupils equal, round and reactive.  Conjunctiva normal.  EOMI.  NECK: No stridor or apparent deformity.  PULMONARY: Diffuse coarse wheezes during expiratory phase with good air entry. No retractions. No rhonchi or rales. Speaking in full sentences while partially reclined. No tachypnea. SATs 96% on RA.  CARDIO: Regular rate and rhythm.  No significant murmur, rub or gallop.  Radial pulses strong and symmetrical.  ABDOMINAL: Abdomen soft, non-distended and non-tender to palpation.  No CVAT, no palpable hepatosplenomegaly.  EXTREMITIES: No lower extremity swelling or edema.    NEURO: Alert and oriented to person, place and time.  Cranial nerves grossly intact.  No focal motor deficit.  PSYCH: Normal mood and affect  SKIN: No rashes      LAB:  All pertinent labs reviewed and interpreted.  Results for orders placed or performed during the hospital encounter of 05/08/22   XR Chest Port 1 View    Impression    IMPRESSION: No acute abnormality.   Comprehensive metabolic panel   Result Value Ref Range    Sodium 137 136 - 145 mmol/L    Potassium 3.8 3.5 - 5.0 mmol/L    Chloride 105 98 - 107 mmol/L    Carbon Dioxide (CO2) 21 (L) 22 - 31 mmol/L    Anion Gap 11 5 - 18 mmol/L    Urea  Nitrogen 24 8 - 28 mg/dL    Creatinine 1.53 (H) 0.70 - 1.30 mg/dL    Calcium 8.2 (L) 8.5 - 10.5 mg/dL    Glucose 109 70 - 125 mg/dL    Alkaline Phosphatase 58 45 - 120 U/L    AST 14 0 - 40 U/L    ALT <9 0 - 45 U/L    Protein Total 6.4 6.0 - 8.0 g/dL    Albumin 3.2 (L) 3.5 - 5.0 g/dL    Bilirubin Total 0.5 0.0 - 1.0 mg/dL    GFR Estimate 46 (L) >60 mL/min/1.73m2   Lactic acid whole blood   Result Value Ref Range    Lactic Acid 0.9 0.7 - 2.0 mmol/L   Symptomatic; Yes; 4/24/2022 Influenza A/B & SARS-CoV2 (COVID-19) Virus PCR Multiplex Nasopharyngeal    Specimen: Nasopharyngeal; Swab   Result Value Ref Range    Influenza A PCR Negative Negative    Influenza B PCR Negative Negative    SARS CoV2 PCR Negative Negative   CBC with platelets and differential   Result Value Ref Range    WBC Count 8.1 4.0 - 11.0 10e3/uL    RBC Count 3.17 (L) 4.40 - 5.90 10e6/uL    Hemoglobin 9.6 (L) 13.3 - 17.7 g/dL    Hematocrit 29.0 (L) 40.0 - 53.0 %    MCV 92 78 - 100 fL    MCH 30.3 26.5 - 33.0 pg    MCHC 33.1 31.5 - 36.5 g/dL    RDW 12.9 10.0 - 15.0 %    Platelet Count 240 150 - 450 10e3/uL    % Neutrophils 78 %    % Lymphocytes 10 %    % Monocytes 11 %    % Eosinophils 1 %    % Basophils 0 %    % Immature Granulocytes 0 %    NRBCs per 100 WBC 0 <1 /100    Absolute Neutrophils 6.4 1.6 - 8.3 10e3/uL    Absolute Lymphocytes 0.8 0.8 - 5.3 10e3/uL    Absolute Monocytes 0.9 0.0 - 1.3 10e3/uL    Absolute Eosinophils 0.0 0.0 - 0.7 10e3/uL    Absolute Basophils 0.0 0.0 - 0.2 10e3/uL    Absolute Immature Granulocytes 0.0 <=0.4 10e3/uL    Absolute NRBCs 0.0 10e3/uL   ECG 12-LEAD WITH MUSE (LHE)   Result Value Ref Range    Systolic Blood Pressure 124 mmHg    Diastolic Blood Pressure 63 mmHg    Ventricular Rate 105 BPM    Atrial Rate 105 BPM    IN Interval 122 ms    QRS Duration 118 ms     ms    QTc 457 ms    P Axis 27 degrees    R AXIS -53 degrees    T Axis 64 degrees    Interpretation ECG       Sinus tachycardia with Premature supraventricular  complexes  Left anterior fascicular block  Abnormal ECG  When compared with ECG of 08-MAR-2021 18:35,  Premature supraventricular complexes are now Present  ST no longer depressed in Inferior leads  Confirmed by SEE ED PROVIDER NOTE FOR, ECG INTERPRETATION ([id number]),  DILCIA BARRON ([id number]) on 5/9/2022 1:01:36 AM         RADIOLOGY:  Reviewed all pertinent imaging. Please see official radiology report.  XR Chest Port 1 View   Final Result   IMPRESSION: No acute abnormality.            EKG:    Reviewed and interpreted as: Performed at [facility] Emergency Department. 09-May-2022, 00:25:25. Vent. Rate 105 BPM. Sinus tachycardia with premature supraventricular complexes. Compared with ECG of 08-Mar-2021, 18:35. Similar to previous.      I have independently reviewed and interpreted the EKG(s) documented above.        I, Stephenie Olvera, am serving as a scribe to document services personally performed by Dr. Raegan Snyder based on my observation and the provider's statements to me. I, Raegan Snyder MD attest that Stephenie Olvera is acting in a scribe capacity, has observed my performance of the services and has documented them in accordance with my direction.     Raegan Snyder MD  05/09/22 0119       Raegan Snyder MD  05/09/22 0510

## 2022-05-10 ENCOUNTER — PATIENT OUTREACH (OUTPATIENT)
Dept: CARE COORDINATION | Facility: CLINIC | Age: 77
End: 2022-05-10
Payer: COMMERCIAL

## 2022-05-10 DIAGNOSIS — Z71.89 OTHER SPECIFIED COUNSELING: ICD-10-CM

## 2022-05-10 NOTE — PROGRESS NOTES
Clinic Care Coordination Contact    Background: Care Coordination referral placed from [facility] discharge report for reason of patient meeting criteria for a TCM outreach call by [facility] [place] team.    Assessment: Upon chart review, [facility] Team member will cancel/close the referral for TCM outreach due to reason below:    Patient isn't interested in speaking with care team today and disconnected call    Plan: Care Coordination referral for TCM outreach canceled.    Hilda Ramirez MA  [facility] [place], [facility]

## 2022-05-11 ENCOUNTER — DOCUMENTATION ONLY (OUTPATIENT)
Dept: OTHER | Facility: CLINIC | Age: 77
End: 2022-05-11
Payer: COMMERCIAL

## 2022-05-14 LAB — BACTERIA BLD CULT: NO GROWTH

## 2022-05-17 ENCOUNTER — TRANSFERRED RECORDS (OUTPATIENT)
Dept: HEALTH INFORMATION MANAGEMENT | Facility: CLINIC | Age: 77
End: 2022-05-17

## 2022-06-03 ENCOUNTER — MEDICAL CORRESPONDENCE (OUTPATIENT)
Dept: HEALTH INFORMATION MANAGEMENT | Facility: CLINIC | Age: 77
End: 2022-06-03

## 2022-06-10 ENCOUNTER — LAB REQUISITION (OUTPATIENT)
Dept: LAB | Facility: CLINIC | Age: 77
End: 2022-06-10
Payer: COMMERCIAL

## 2022-06-10 DIAGNOSIS — K62.89 OTHER SPECIFIED DISEASES OF ANUS AND RECTUM: ICD-10-CM

## 2022-06-20 LAB — CEA SERPL-MCNC: 3.8 NG/ML (ref 0–3)

## 2022-06-20 PROCEDURE — P9604 ONE-WAY ALLOW PRORATED TRIP: HCPCS | Mod: ORL | Performed by: PHYSICIAN ASSISTANT

## 2022-06-20 PROCEDURE — 82378 CARCINOEMBRYONIC ANTIGEN: CPT | Mod: ORL | Performed by: PHYSICIAN ASSISTANT

## 2022-06-20 PROCEDURE — 36415 COLL VENOUS BLD VENIPUNCTURE: CPT | Mod: ORL | Performed by: PHYSICIAN ASSISTANT

## 2022-07-14 ENCOUNTER — LAB REQUISITION (OUTPATIENT)
Dept: LAB | Facility: CLINIC | Age: 77
End: 2022-07-14
Payer: COMMERCIAL

## 2022-07-14 DIAGNOSIS — Z51.81 ENCOUNTER FOR THERAPEUTIC DRUG LEVEL MONITORING: ICD-10-CM

## 2022-07-21 ENCOUNTER — LAB REQUISITION (OUTPATIENT)
Dept: LAB | Facility: CLINIC | Age: 77
End: 2022-07-21
Payer: COMMERCIAL

## 2022-07-21 DIAGNOSIS — Z51.81 ENCOUNTER FOR THERAPEUTIC DRUG LEVEL MONITORING: ICD-10-CM

## 2022-07-25 LAB
ALBUMIN SERPL BCG-MCNC: 3.9 G/DL (ref 3.5–5.2)
ALP SERPL-CCNC: 87 U/L (ref 40–129)
ALT SERPL W P-5'-P-CCNC: <5 U/L (ref 10–50)
ANION GAP SERPL CALCULATED.3IONS-SCNC: 14 MMOL/L (ref 7–15)
AST SERPL W P-5'-P-CCNC: 15 U/L (ref 10–50)
BASOPHILS # BLD AUTO: 0 10E3/UL (ref 0–0.2)
BASOPHILS NFR BLD AUTO: 1 %
BILIRUB SERPL-MCNC: 0.5 MG/DL
BUN SERPL-MCNC: 22.5 MG/DL (ref 8–23)
CALCIUM SERPL-MCNC: 9 MG/DL (ref 8.8–10.2)
CHLORIDE SERPL-SCNC: 106 MMOL/L (ref 98–107)
CREAT SERPL-MCNC: 1.44 MG/DL (ref 0.67–1.17)
DEPRECATED HCO3 PLAS-SCNC: 21 MMOL/L (ref 22–29)
EOSINOPHIL # BLD AUTO: 0.2 10E3/UL (ref 0–0.7)
EOSINOPHIL NFR BLD AUTO: 4 %
ERYTHROCYTE [DISTWIDTH] IN BLOOD BY AUTOMATED COUNT: 13.5 % (ref 10–15)
GFR SERPL CREATININE-BSD FRML MDRD: 50 ML/MIN/1.73M2
GLUCOSE SERPL-MCNC: 92 MG/DL (ref 70–99)
HCT VFR BLD AUTO: 35.5 % (ref 40–53)
HGB BLD-MCNC: 11.4 G/DL (ref 13.3–17.7)
IMM GRANULOCYTES # BLD: 0 10E3/UL
IMM GRANULOCYTES NFR BLD: 0 %
LYMPHOCYTES # BLD AUTO: 0.9 10E3/UL (ref 0.8–5.3)
LYMPHOCYTES NFR BLD AUTO: 21 %
MCH RBC QN AUTO: 29.8 PG (ref 26.5–33)
MCHC RBC AUTO-ENTMCNC: 32.1 G/DL (ref 31.5–36.5)
MCV RBC AUTO: 93 FL (ref 78–100)
MONOCYTES # BLD AUTO: 0.5 10E3/UL (ref 0–1.3)
MONOCYTES NFR BLD AUTO: 13 %
NEUTROPHILS # BLD AUTO: 2.6 10E3/UL (ref 1.6–8.3)
NEUTROPHILS NFR BLD AUTO: 61 %
NRBC # BLD AUTO: 0 10E3/UL
NRBC BLD AUTO-RTO: 0 /100
PLATELET # BLD AUTO: 259 10E3/UL (ref 150–450)
POTASSIUM SERPL-SCNC: 4 MMOL/L (ref 3.4–5.3)
PROT SERPL-MCNC: 6.8 G/DL (ref 6.4–8.3)
RBC # BLD AUTO: 3.83 10E6/UL (ref 4.4–5.9)
SODIUM SERPL-SCNC: 141 MMOL/L (ref 136–145)
WBC # BLD AUTO: 4.3 10E3/UL (ref 4–11)

## 2022-07-25 PROCEDURE — 36415 COLL VENOUS BLD VENIPUNCTURE: CPT | Mod: ORL | Performed by: NURSE PRACTITIONER

## 2022-07-25 PROCEDURE — 85025 COMPLETE CBC W/AUTO DIFF WBC: CPT | Mod: ORL | Performed by: NURSE PRACTITIONER

## 2022-07-25 PROCEDURE — P9604 ONE-WAY ALLOW PRORATED TRIP: HCPCS | Mod: ORL | Performed by: NURSE PRACTITIONER

## 2022-07-25 PROCEDURE — 80053 COMPREHEN METABOLIC PANEL: CPT | Mod: ORL | Performed by: NURSE PRACTITIONER

## 2022-08-02 ENCOUNTER — OFFICE VISIT (OUTPATIENT)
Dept: FAMILY MEDICINE | Facility: CLINIC | Age: 77
End: 2022-08-02
Payer: COMMERCIAL

## 2022-08-02 VITALS
RESPIRATION RATE: 16 BRPM | WEIGHT: 170.2 LBS | HEART RATE: 101 BPM | SYSTOLIC BLOOD PRESSURE: 147 MMHG | BODY MASS INDEX: 28.32 KG/M2 | TEMPERATURE: 97.8 F | OXYGEN SATURATION: 98 % | DIASTOLIC BLOOD PRESSURE: 74 MMHG

## 2022-08-02 DIAGNOSIS — I26.99 PULMONARY EMBOLISM AND INFARCTION (H): ICD-10-CM

## 2022-08-02 DIAGNOSIS — I25.10 CORONARY ARTERY DISEASE DUE TO LIPID RICH PLAQUE: ICD-10-CM

## 2022-08-02 DIAGNOSIS — J45.40 MODERATE PERSISTENT ASTHMA WITHOUT COMPLICATION: ICD-10-CM

## 2022-08-02 DIAGNOSIS — E03.4 HYPOTHYROIDISM DUE TO ACQUIRED ATROPHY OF THYROID: ICD-10-CM

## 2022-08-02 DIAGNOSIS — F33.1 MODERATE RECURRENT MAJOR DEPRESSION (H): ICD-10-CM

## 2022-08-02 DIAGNOSIS — Z01.818 PRE-OP EXAM: Primary | ICD-10-CM

## 2022-08-02 DIAGNOSIS — Z87.891 PERSONAL HISTORY OF TOBACCO USE, PRESENTING HAZARDS TO HEALTH: ICD-10-CM

## 2022-08-02 DIAGNOSIS — Z01.818 PREOP GENERAL PHYSICAL EXAM: ICD-10-CM

## 2022-08-02 DIAGNOSIS — I25.83 CORONARY ARTERY DISEASE DUE TO LIPID RICH PLAQUE: ICD-10-CM

## 2022-08-02 DIAGNOSIS — N18.31 STAGE 3A CHRONIC KIDNEY DISEASE (H): ICD-10-CM

## 2022-08-02 DIAGNOSIS — C20 ADENOCARCINOMA OF RECTUM (H): ICD-10-CM

## 2022-08-02 DIAGNOSIS — E78.00 PURE HYPERCHOLESTEROLEMIA: ICD-10-CM

## 2022-08-02 LAB
ANION GAP SERPL CALCULATED.3IONS-SCNC: 9 MMOL/L (ref 7–15)
BUN SERPL-MCNC: 19.2 MG/DL (ref 8–23)
CALCIUM SERPL-MCNC: 9.3 MG/DL (ref 8.8–10.2)
CHLORIDE SERPL-SCNC: 106 MMOL/L (ref 98–107)
CREAT SERPL-MCNC: 1.45 MG/DL (ref 0.67–1.17)
DEPRECATED HCO3 PLAS-SCNC: 26 MMOL/L (ref 22–29)
ERYTHROCYTE [DISTWIDTH] IN BLOOD BY AUTOMATED COUNT: 13.4 % (ref 10–15)
GFR SERPL CREATININE-BSD FRML MDRD: 49 ML/MIN/1.73M2
GLUCOSE SERPL-MCNC: 114 MG/DL (ref 70–99)
HCT VFR BLD AUTO: 36.8 % (ref 40–53)
HGB BLD-MCNC: 12 G/DL (ref 13.3–17.7)
HOLD SPECIMEN: NORMAL
MCH RBC QN AUTO: 30.3 PG (ref 26.5–33)
MCHC RBC AUTO-ENTMCNC: 32.6 G/DL (ref 31.5–36.5)
MCV RBC AUTO: 93 FL (ref 78–100)
PLATELET # BLD AUTO: 291 10E3/UL (ref 150–450)
POTASSIUM SERPL-SCNC: 4.4 MMOL/L (ref 3.4–5.3)
RBC # BLD AUTO: 3.96 10E6/UL (ref 4.4–5.9)
SODIUM SERPL-SCNC: 141 MMOL/L (ref 136–145)
T4 FREE SERPL-MCNC: 1.97 NG/DL (ref 0.9–1.7)
TSH SERPL DL<=0.005 MIU/L-ACNC: 0.02 UIU/ML (ref 0.3–4.2)
WBC # BLD AUTO: 5.4 10E3/UL (ref 4–11)

## 2022-08-02 PROCEDURE — 80048 BASIC METABOLIC PNL TOTAL CA: CPT | Performed by: STUDENT IN AN ORGANIZED HEALTH CARE EDUCATION/TRAINING PROGRAM

## 2022-08-02 PROCEDURE — 83540 ASSAY OF IRON: CPT | Performed by: STUDENT IN AN ORGANIZED HEALTH CARE EDUCATION/TRAINING PROGRAM

## 2022-08-02 PROCEDURE — 83550 IRON BINDING TEST: CPT | Performed by: STUDENT IN AN ORGANIZED HEALTH CARE EDUCATION/TRAINING PROGRAM

## 2022-08-02 PROCEDURE — 85027 COMPLETE CBC AUTOMATED: CPT | Performed by: STUDENT IN AN ORGANIZED HEALTH CARE EDUCATION/TRAINING PROGRAM

## 2022-08-02 PROCEDURE — 82728 ASSAY OF FERRITIN: CPT | Performed by: STUDENT IN AN ORGANIZED HEALTH CARE EDUCATION/TRAINING PROGRAM

## 2022-08-02 PROCEDURE — 84443 ASSAY THYROID STIM HORMONE: CPT | Performed by: STUDENT IN AN ORGANIZED HEALTH CARE EDUCATION/TRAINING PROGRAM

## 2022-08-02 PROCEDURE — 36415 COLL VENOUS BLD VENIPUNCTURE: CPT | Performed by: STUDENT IN AN ORGANIZED HEALTH CARE EDUCATION/TRAINING PROGRAM

## 2022-08-02 PROCEDURE — 99215 OFFICE O/P EST HI 40 MIN: CPT | Performed by: STUDENT IN AN ORGANIZED HEALTH CARE EDUCATION/TRAINING PROGRAM

## 2022-08-02 PROCEDURE — 93000 ELECTROCARDIOGRAM COMPLETE: CPT | Performed by: STUDENT IN AN ORGANIZED HEALTH CARE EDUCATION/TRAINING PROGRAM

## 2022-08-02 PROCEDURE — 84439 ASSAY OF FREE THYROXINE: CPT | Performed by: STUDENT IN AN ORGANIZED HEALTH CARE EDUCATION/TRAINING PROGRAM

## 2022-08-02 NOTE — PROGRESS NOTES
[facility] [address] [place] [address]  Phone: [phone]  Fax: [phone]  Primary Provider: Natividad Valerio  Pre-op Performing Provider: JD FLORES    PREOPERATIVE EVALUATION:  Today's date: 8/2/2022    Michael D McArdle is a 77 year old male who presents for a preoperative evaluation.    Surgical Information:  Surgery/Procedure: Colon Surgery  Surgery Location: [facility]  Surgeon: Dr. Bauer  Surgery Date: 8/18/22  Time of Surgery: 7:30 AM  Where patient plans to recover: Other: unknown  Fax number for surgical facility: Note does not need to be faxed, will be available electronically in Epic.    Type of Anesthesia Anticipated: General    Assessment & Plan     The proposed surgical procedure is considered HIGH risk.      Preop general physical exam  Adenocarcinoma of rectum (H)  - TSH with free T4 reflex  - EKG 12-lead ([facility] and [facility] Only)  - Basic Metabolic Panel  - CBC w/ Reflex to Iron Studies    Hypothyroidism due to acquired atrophy of thyroid  Known hypothyroidism with most recent TSH in our chart was low.  We will recheck today and adjust medication as needed.    Stage 3a chronic kidney disease (H)  Recheck renal function.  Has known chronic kidney disease which has been stable.    smoking   He continues to smoke a pipe.  Working on quitting.  Plans to quit during his hospitalization.    Moderate recurrent major depression (H)  Major depression is stable.  Seeing a regular psychologist who comes to his house about every 1 to 2 weeks.  He endorses stable mood despite multiple chronic medical problems.    Moderate persistent asthma without complication  He takes Qvar and albuterol and endorses good symptom control.  Lungs are clear today on a excision sats are appropriate.    Pure hypercholesterolemia  Stable    Pulmonary embolism and infarction (H)  History of pulmonary embolism in 2015 with previous colon surgery well experiencing active  cancer.  He was on anticoagulation for about 18 months.  He is at high risk for blood clots.    Coronary artery disease due to lipid rich plaque  He is at high risk for cardiac complications.  Saw Dr. Marcell Yoon for preop evaluation.  Had echo which was essentially stable with normal EF.  EKGs shows occasional PVCs and some short runs of V. tach on his ACT monitoring.  I am unable to view his stress test, but patient shares that they are recommending a bypass surgery AFTER completion of the colon surgery.  Okay to proceed with this surgery with cardiology approval.    Potential sleep apnea.  He endorses difficulty with shortness of breath at night, but has not undergone testing nor uses a CPAP.  Would recommend incentive spirometry, and oxygen as needed.    Medication Instructions:  Patient is to take all scheduled medications on the day of surgery EXCEPT for modifications listed below:   - aspirin: Discontinue aspirin 7-10 days prior to procedure to reduce bleeding risk. It should be resumed postoperatively.     RECOMMENDATION:  APPROVAL GIVEN to proceed with proposed procedure pending review of lab results.  Has been seen by cardiology and if still cleared per their perspective okay to move forward with procedure.    Review of external notes as documented above   Review of the result(s) of each unique test - ECHO, ACT    Subjective     HPI related to upcoming procedure: Recurrence of colon cancer, will be having a colon resection with ostomy placement.      Preop Questions 8/2/2022   1. Have you ever had a heart attack or stroke? No   2. Have you ever had surgery on your heart or blood vessels, such as a stent placement, a coronary artery bypass, or surgery on an artery in your head, neck, heart, or legs? No   3. Do you have chest pain with activity? No   4. Do you have a history of  heart failure? No   5. Do you currently have a cold, bronchitis or symptoms of other infection? No   6. Do you have a cough,  shortness of breath, or wheezing? YES - Occasional cough.  Using inhalers regularly.  Uses a pipe once in a while.     7. Do you or anyone in your family have previous history of blood clots? YES - Had blood clot - PE in lungs after last surgery, had colon cancer at that time.     8. Do you or does anyone in your family have a serious bleeding problem such as prolonged bleeding following surgeries or cuts? No   9. Have you ever had problems with anemia or been told to take iron pills? No   10. Have you had any abnormal blood loss such as black, tarry or bloody stools? No   11. Have you ever had a blood transfusion? No   12. Are you willing to have a blood transfusion if it is medically needed before, during, or after your surgery? YES - would be willing to get a transfusion if needed, no previous history.    13. Have you or any of your relatives ever had problems with anesthesia? No   14. Do you have sleep apnea, excessive snoring or daytime drowsiness? No   15. Do you have any artifical heart valves or other implanted medical devices like a pacemaker, defibrillator, or continuous glucose monitor? No   16. Do you have artificial joints? No   17. Are you allergic to latex? No       Health Care Directive:  Patient has a Health Care Directive on file.  Daughter Chantal Tenorio is power of  and decision maker during surgery.        Preoperative Review of :   reviewed - no record of controlled substances prescribed.      Status of Chronic Conditions:  ASTHMA - Patient has a longstanding history of moderate-severe Asthma . Patient has been doing well overall noting COUGH and continues on medication regimen consisting of QVAR, and albuterol.  without adverse reactions or side effects.     CAD - Patient has a longstanding history of moderate-severe CAD. Patient denies recent chest pain or NTG use, denies exercise induced dyspnea or PND. Work up this summer with Dr. Jordy Yoon, showing plaque in 2 arteries,  with plan for bipass AFTER colon surgery.  Has cleared Mr. McArdle for surgery.  See most recent ECHO, ACT, and EKG.  Stable symptoms.      DEPRESSION - Patient has a long history of Depression of moderate severity requiring medication for control with recent symptoms being stable.  Has been seeing a psychologist regularly and doing well.  .Current symptoms of depression include increased stress around medical issues, depressed mood, anxiety. Stable symptoms.      HYPERLIPIDEMIA - Patient has a long history of significant Hyperlipidemia requiring medication for treatment with recent good control. Patient reports no problems or side effects with the medication.     HYPERTENSION - Patient has longstanding history of HTN , currently denies any symptoms referable to elevated blood pressure. Specifically denies chest pain, palpitations, dyspnea, orthopnea, PND or peripheral edema. Blood pressure readings have been in normal range. Current medication regimen is amlodipine 5mg.  Patient denies any side effects of medication.     HYPOTHYROIDISM - Patient has a longstanding history of chronic Hypothyroidism. Patient has been doing well, noting no tremor, insomnia, hair loss or changes in skin texture. Continues to take medications as directed, without adverse reactions or side effects. Last TSH   Lab Results   Component Value Date    TSH 0.02 (L) 08/02/2022   . Will recheck TSH level today.      RENAL INSUFFICIENCY - Patient has a longstanding history of moderate-severe chronic renal insufficiency. Last Cr 1.44,  Will recheck renal function today.  .     SLEEP PROBLEM - Patient has a longstanding history of snoring and difficulty breathing at night.  THis is stable.  Does not use CPAP.     Review of Systems  CONSTITUTIONAL: NEGATIVE for fever, chills, Positive change in weight - weight loss  INTEGUMENTARY/SKIN: NEGATIVE for worrisome rashes, moles or lesions  EYES: NEGATIVE for vision changes or irritation  ENT/MOUTH:  NEGATIVE for ear, mouth and throat problems.  Notes some sinus congestion.   RESP: NEGATIVE for significant cough or SOB.  Breathing is stable.  Still smokes a pipe  CV: NEGATIVE for chest pain, palpitations or peripheral edema  GI: Endorses some nausea and frequent loose stools.   : NEGATIVE for frequency, dysuria, or hematuria  MUSCULOSKELETAL: NEGATIVE for significant arthralgias or myalgia  NEURO: Endorses generalized weakness, but no focal symptoms.  No numbness or tingling.    ENDOCRINE: NEGATIVE for temperature intolerance, skin/hair changes. Takes thyroid medication  HEME: NEGATIVE for bleeding problems  PSYCHIATRIC: NEGATIVE for changes in mood or affect. Hx of depression, established with therapy    Patient Active Problem List    Diagnosis Date Noted     Decreased vision in both eyes 04/09/2019     Priority: Medium     Decreased hearing of both ears 04/09/2019     Priority: Medium     Ventral hernia 04/09/2019     Priority: Medium     Unsteady gait 04/09/2019     Priority: Medium     CKD (chronic kidney disease) stage 3, GFR 30-59 ml/min (H) 03/22/2017     Priority: Medium     ACP (advance care planning) 12/22/2016     Priority: Medium     Advance Care Planning 12/22/2016: Receipt of ACP document:  Received: POLST which was signed and dated by provider on 10/24/16.  Document previously scanned on 10/26/16.  Order reviewed and found to be valid.  Code Status needs to be updated to reflect choices in most recent ACP document.  Confirmed/documented designated decision maker(s).  Added by Ryanne Ballesteros RN, Advance Care Planning Liaison.         Hypothyroidism due to acquired atrophy of thyroid 11/19/2015     Priority: Medium     Pulmonary embolism and infarction (H) 06/21/2015     Priority: Medium     Problem list name updated by automated process. Provider to review       Esophageal reflux 06/12/2015     Priority: Medium     Adenocarcinoma of rectum (H) 12/31/2014     Priority: Medium     T3N1 mid rectal  cancer.  See Dr Bauer's note 1/8/15  Anticipate preop chemo and radiation with subsequent low anterior resection with colorectal anastamosis       Obese 04/22/2014     Priority: Medium     Chronic Reflux esophagitis 03/18/2013     Priority: Medium     Moderate recurrent major depression (H) 03/18/2013     Priority: Medium     Diastolic Dysfunction  03/18/2013     Priority: Medium     Fainting (Syncope) 03/18/2013     Priority: Medium     smoking  03/18/2013     Priority: Medium     Prediabetes 03/18/2013     Priority: Medium     Asthma 03/18/2013     Priority: Medium     Problem list name updated by automated process. Provider to review       Hyperlipidemia 03/18/2013     Priority: Medium     Problem list name updated by automated process. Provider to review        Past Medical History:   Diagnosis Date     Cancer (H)      Depressive disorder      Kidney disease      Status post rotator cuff repair      Thyroid disease      Uncomplicated asthma      Past Surgical History:   Procedure Laterality Date     COLECTOMY  July 2015    radiation and chemo, 3 colon surgeries     COLOSTOMY  2015    and reversal      OPEN REDUCTION INTERNAL FIXATION ANKLE Right 2/22/2016    Procedure: ANKLE FRACTURE OPEN REDUCTION INTERNAL FIXATION, RIGHT;  Surgeon: Yolanda Dean MD;  Location: [facility];  Service:      OTHER SURGICAL HISTORY      takedown of colostomy     SHOULDER ARTHROSCOPY W/ ROTATOR CUFF REPAIR Left      SHOULDER OPEN ROTATOR CUFF REPAIR Right      Current Outpatient Medications   Medication Sig Dispense Refill     levothyroxine (SYNTHROID/LEVOTHROID) 200 MCG tablet Take 1 tablet (200 mcg) by mouth daily 90 tablet 1     ACETAMINOPHEN PO Take 500 mg by mouth       albuterol (PROAIR HFA/PROVENTIL HFA/VENTOLIN HFA) 108 (90 Base) MCG/ACT inhaler Inhale 2 puffs into the lungs every 6 hours 1 Inhaler 11     albuterol (PROVENTIL) (2.5 MG/3ML) 0.083% neb solution Take 1 vial (2.5 mg) by nebulization every 6 hours as  needed for shortness of breath / dyspnea or wheezing 1 vial 11     amLODIPine (NORVASC) 5 MG tablet TAKE 1 TABLET BY MOUTH DAILY 90 tablet 3     aspirin (ASA) 81 MG chewable tablet Take 1 tablet (81 mg) by mouth daily Chew and swallow 1 tablet daily 90 tablet 3     atorvastatin (LIPITOR) 80 MG tablet TAKE 1 TABLET(80 MG) BY MOUTH DAILY 90 tablet 0     beclomethasone HFA (QVAR REDIHALER) 80 MCG/ACT inhaler Inhale 1 puff into the lungs 2 times daily       famotidine (PEPCID) 40 MG tablet Take 1 tablet (40 mg) by mouth daily 90 tablet 3     FLUoxetine (PROZAC) 40 MG capsule TAKE 1 CAPSULE(40 MG) BY MOUTH DAILY 90 capsule 1     order for DME Adult nebulizer machine with mask and tubing. 1 each 0     order for DME Equipment being ordered: Nebulizer and supplies (mask and tubing) 1 each 0     order for DME Equipment being ordered: Rollator with seat and brakes. 1 Units 0     order for DME Equipment being ordered: automatic blood pressure monitor 1 Units 0     polyethylene glycol (MIRALAX) powder Take 1 capful every other day 510 g 1       Allergies   Allergen Reactions     Penicillins Swelling        Social History     Tobacco Use     Smoking status: Current Every Day Smoker     Types: Pipe     Smokeless tobacco: Never Used     Tobacco comment: has been cut down a lot, is on the nicotine patch   Substance Use Topics     Alcohol use: Yes     Alcohol/week: 0.0 standard drinks     Family History   Problem Relation Age of Onset     Breast Cancer Mother      Heart Failure Father      Diabetes Other         daughters      Cancer Other         wife      Heart Disease No family hx of      Coronary Artery Disease No family hx of      Hypertension No family hx of      Hyperlipidemia No family hx of      Cerebrovascular Disease No family hx of      Colon Cancer No family hx of      Prostate Cancer No family hx of      Other Cancer No family hx of      Depression No family hx of      Anxiety Disorder No family hx of      Mental  Illness No family hx of      Substance Abuse No family hx of      Anesthesia Reaction No family hx of      Asthma No family hx of      Osteoporosis No family hx of      Genetic Disorder No family hx of      Thyroid Disease No family hx of      Obesity No family hx of      Unknown/Adopted No family hx of      History   Drug Use No         Objective     BP (!) 147/74   Pulse 101   Temp 97.8  F (36.6  C) (Oral)   Resp 16   Wt 77.2 kg (170 lb 3.2 oz)   SpO2 98%   BMI 28.32 kg/m      Physical Exam    GENERAL APPEARANCE: patient has lost significant weight and appears somewhat frail on exam.     EYES: EOMI,  PERRL     HENT: ear canals and TM's normal and nose and mouth without ulcers or lesions     NECK: no adenopathy, no asymmetry, masses, or scars and thyroid thyroid without fullness or nodules.     RESP: lungs clear to auscultation - no rales, rhonchi or wheezes, good airflow     CV: regular rates and rhythm, normal S1 S2, no S3 or S4 and no murmur, click or rub     ABDOMEN:  soft, nontender, no HSM or masses and bowel sounds normal     MS: extremities normal- Limited range of motion in right ankle secondary to previous fracture and surgical repair.     SKIN: no suspicious lesions or rashes     NEURO: Normal strength and tone, sensory exam grossly normal, mentation intact and speech normal, Poor balance, tremor present with movement.     PSYCH: mentation appears normal. and affect normal/bright       Recent Labs   Lab Test 07/25/22  0725 05/09/22  0025 01/28/21  1052 09/30/20  1145   HGB 11.4* 9.6*   < > 12.1*    240   < > 248    137   < > 139   POTASSIUM 4.0 3.8   < > 4.2   CR 1.44* 1.53*   < > 1.51*   A1C  --   --   --  5.7*    < > = values in this interval not displayed.        Diagnostics:  Labs pending at this time.  Results will be reviewed when available.     EKG: Normal Sinus Rhythm, left axis deviation, left anterior fasicular block, no ST changes, unchanged from previous tracings     ECHO:   6/3/2022 through [facility]:      1. Normal left ventricular chamber size.  Calculated left ventricular ejection fraction   (modified Welch technique) is 59 %.    2. Normal left ventricular wall thickness.    3. No regional wall motion abnormalities.    4. Valve changes consistent with age.      ACT results:  A few episodes of NSVT, with structurally normal heart.  Nothing alarming, no further work up needed. Per [facility]    Revised Cardiac Risk Index (RCRI):  The patient has the following serious cardiovascular risks for perioperative complications:   - High risk surgery (>5% cardiac complication risk) = 1 point   - Coronary Artery Disease (MI, positive stress test, angina, Qs on EKG) = 1 point     RCRI Interpretation: 2 points: Class III (moderate risk - 6.6% complication rate)     Estimated Functional Capacity: CANNOT perform 4 METS.  Denies chest pain with exertion.  Seen for pre-operative evaluation by Cardiology and approved to proceed.      Signed Electronically by: Jacqueline Nowak MD  Copy of this evaluation report is provided to requesting physician.

## 2022-08-02 NOTE — PATIENT INSTRUCTIONS
Stop in lab for blood tests before leaving the clinic    Your EKG looks stable, this is good news.    After reviewing the test we will determine if it is okay to move forward with surgery right now, but I think this will be okay.    Please stop taking your aspirin on , in preparation for surgery.    Preparing for Your Surgery  Getting started  A nurse will call you to review your health history and instructions. They will give you an arrival time based on your scheduled surgery time. Please be ready to share:  Your doctor's clinic name and phone number  Your medical, surgical and anesthesia history  A list of allergies and sensitivities  A list of medicines, including herbal treatments and over-the-counter drugs  Whether the patient has a legal guardian (ask how to send us the papers in advance)  Please tell us if you're pregnant--or if there's any chance you might be pregnant. Some surgeries may injure a fetus (unborn baby), so they require a pregnancy test. Surgeries that are safe for a fetus don't always need a test, and you can choose whether to have one.   If you have a child who's having surgery, please ask for a copy of Preparing for Your Child's Surgery.    Preparing for surgery  Within 30 days of surgery: Have a pre-op exam (sometimes called an H&P, or History and Physical). This can be done at a clinic or pre-operative center.  If you're having a , you may not need this exam. Talk to your care team.  At your pre-op exam, talk to your care team about all medicines you take. If you need to stop any medicines before surgery, ask when to start taking them again.  We do this for your safety. Many medicines can make you bleed too much during surgery. Some change how well surgery (anesthesia) drugs work.  Call your insurance company to let them know you're having surgery. (If you don't have insurance, call [phone].)  Call your clinic if there's any change in your health. This includes signs  of a cold or flu (sore throat, runny nose, cough, rash, fever). It also includes a scrape or scratch near the surgery site.  If you have questions on the day of surgery, call your hospital or surgery center.  COVID testing  You may need to be tested for COVID-19 before having surgery. If so, we will give you instructions.  Eating and drinking guidelines  For your safety: Unless your surgeon tells you otherwise, follow the guidelines below.  Eat and drink as usual until 8 hours before surgery. After that, no food or milk.  Drink clear liquids until 2 hours before surgery. These are liquids you can see through, like water, Gatorade and Propel Water. You may also have black coffee and tea (no cream or milk).  Nothing by mouth within 2 hours of surgery. This includes gum, candy and breath mints.  If you drink alcohol: Stop drinking it the night before surgery.  If your care team tells you to take medicine on the morning of surgery, it's okay to take it with a sip of water.  Preventing infection  Shower or bathe the night before and morning of your surgery. Follow the instructions your clinic gave you. (If no instructions, use regular soap.)  Don't shave or clip hair near your surgery site. We'll remove the hair if needed.  Don't smoke or vape the morning of surgery. You may chew nicotine gum up to 2 hours before surgery. A nicotine patch is okay.  Note: Some surgeries require you to completely quit smoking and nicotine. Check with your surgeon.  Your care team will make every effort to keep you safe from infection. We will:  Clean our hands often with soap and water (or an alcohol-based hand rub).  Clean the skin at your surgery site with a special soap that kills germs.  Give you a special gown to keep you warm. (Cold raises the risk of infection.)  Wear special hair covers, masks, gowns and gloves during surgery.  Give antibiotic medicine, if prescribed. Not all surgeries need antibiotics.  What to bring on the day of  surgery  Photo ID and insurance card  Copy of your health care directive, if you have one  Glasses and hearing aides (bring cases)  You can't wear contacts during surgery  Inhaler and eye drops, if you use them (tell us about these when you arrive)  CPAP machine or breathing device, if you use them  A few personal items, if spending the night  If you have . . .  A pacemaker, ICD (cardiac defibrillator) or other implant: Bring the ID card.  An implanted stimulator: Bring the remote control.  A legal guardian: Bring a copy of the certified (court-stamped) guardianship papers.  Please remove any jewelry, including body piercings. Leave jewelry and other valuables at home.  If you're going home the day of surgery  You must have a responsible adult drive you home. They should stay with you overnight as well.  If you don't have someone to stay with you, and you aren't safe to go home alone, we may keep you overnight. Insurance often won't pay for this.  After surgery  If it's hard to control your pain or you need more pain medicine, please call your surgeon's office.  Questions?   If you have any questions for your care team, list them here: _________________________________________________________________________________________________________________________________________________________________________ ____________________________________ ____________________________________ ____________________________________  For informational purposes only. Not to replace the advice of your health care provider. Copyright   2003, 2019 [facility]. All rights reserved. Clinically reviewed by Simona Aguilar MD. [employer] [id number] - REV 07/21.

## 2022-08-03 LAB
FERRITIN SERPL-MCNC: 167 NG/ML (ref 31–409)
IRON BINDING CAPACITY (ROCHE): 269 UG/DL (ref 240–430)
IRON SATN MFR SERPL: 25 % (ref 15–46)
IRON SERPL-MCNC: 66 UG/DL (ref 61–157)

## 2022-08-03 RX ORDER — LEVOTHYROXINE SODIUM 200 UG/1
200 TABLET ORAL DAILY
Qty: 90 TABLET | Refills: 1 | Status: SHIPPED | OUTPATIENT
Start: 2022-08-03 | End: 2022-10-18

## 2022-08-16 ENCOUNTER — TRANSFERRED RECORDS (OUTPATIENT)
Dept: HEALTH INFORMATION MANAGEMENT | Facility: CLINIC | Age: 77
End: 2022-08-16

## 2022-08-18 ENCOUNTER — MEDICAL CORRESPONDENCE (OUTPATIENT)
Dept: HEALTH INFORMATION MANAGEMENT | Facility: CLINIC | Age: 77
End: 2022-08-18

## 2022-08-18 ENCOUNTER — TRANSFERRED RECORDS (OUTPATIENT)
Dept: HEALTH INFORMATION MANAGEMENT | Facility: CLINIC | Age: 77
End: 2022-08-18

## 2022-08-24 ENCOUNTER — MEDICAL CORRESPONDENCE (OUTPATIENT)
Dept: HEALTH INFORMATION MANAGEMENT | Facility: CLINIC | Age: 77
End: 2022-08-24

## 2022-08-25 ENCOUNTER — MEDICAL CORRESPONDENCE (OUTPATIENT)
Dept: HEALTH INFORMATION MANAGEMENT | Facility: CLINIC | Age: 77
End: 2022-08-25

## 2022-08-26 ENCOUNTER — TELEPHONE (OUTPATIENT)
Dept: FAMILY MEDICINE | Facility: CLINIC | Age: 77
End: 2022-08-26

## 2022-08-26 NOTE — TELEPHONE ENCOUNTER
[facility] phone call message- general phone call:    Reason for call: Orders for skilled nursing 3x a week for 2 weeks then 2x a week for 5 week then 1x a week for 1 week    Return call needed: Yes    OK to leave a message on voice mail? Yes    Primary language: English      needed? No    Call taken on August 26, 2022 at 2:50 PM by Grisel Flores-Cardona

## 2022-08-29 NOTE — TELEPHONE ENCOUNTER
Approved//LVM for [facility].    Nataliia Tenorio, BSN, RN, PHN, CHFN, HNB-BC   8/29/2022 at 11:12 AM

## 2022-08-30 ENCOUNTER — OFFICE VISIT (OUTPATIENT)
Dept: FAMILY MEDICINE | Facility: CLINIC | Age: 77
End: 2022-08-30
Payer: COMMERCIAL

## 2022-08-30 ENCOUNTER — TELEPHONE (OUTPATIENT)
Dept: FAMILY MEDICINE | Facility: CLINIC | Age: 77
End: 2022-08-30

## 2022-08-30 VITALS
OXYGEN SATURATION: 99 % | WEIGHT: 163.6 LBS | RESPIRATION RATE: 16 BRPM | SYSTOLIC BLOOD PRESSURE: 135 MMHG | DIASTOLIC BLOOD PRESSURE: 61 MMHG | TEMPERATURE: 98.3 F | BODY MASS INDEX: 27.22 KG/M2 | HEART RATE: 91 BPM

## 2022-08-30 DIAGNOSIS — I50.33 DIASTOLIC CHF, ACUTE ON CHRONIC (H): ICD-10-CM

## 2022-08-30 DIAGNOSIS — J45.40 MODERATE PERSISTENT ASTHMA, UNSPECIFIED WHETHER COMPLICATED: Primary | ICD-10-CM

## 2022-08-30 DIAGNOSIS — I26.99 PULMONARY EMBOLISM AND INFARCTION (H): ICD-10-CM

## 2022-08-30 DIAGNOSIS — J44.9 CHRONIC OBSTRUCTIVE PULMONARY DISEASE, UNSPECIFIED COPD TYPE (H): ICD-10-CM

## 2022-08-30 DIAGNOSIS — Z13.220 SCREENING FOR HYPERLIPIDEMIA: ICD-10-CM

## 2022-08-30 DIAGNOSIS — Z11.59 NEED FOR HEPATITIS C SCREENING TEST: ICD-10-CM

## 2022-08-30 DIAGNOSIS — Z43.2 ILEOSTOMY CARE (H): ICD-10-CM

## 2022-08-30 DIAGNOSIS — R25.2 LEG CRAMPS: ICD-10-CM

## 2022-08-30 DIAGNOSIS — C20 ADENOCARCINOMA OF RECTUM (H): ICD-10-CM

## 2022-08-30 DIAGNOSIS — Z00.00 HEALTH CARE MAINTENANCE: ICD-10-CM

## 2022-08-30 DIAGNOSIS — F33.1 MODERATE RECURRENT MAJOR DEPRESSION (H): ICD-10-CM

## 2022-08-30 DIAGNOSIS — N18.31 STAGE 3A CHRONIC KIDNEY DISEASE (H): ICD-10-CM

## 2022-08-30 PROCEDURE — 99214 OFFICE O/P EST MOD 30 MIN: CPT | Performed by: STUDENT IN AN ORGANIZED HEALTH CARE EDUCATION/TRAINING PROGRAM

## 2022-08-30 RX ORDER — BUDESONIDE AND FORMOTEROL FUMARATE DIHYDRATE 80; 4.5 UG/1; UG/1
AEROSOL RESPIRATORY (INHALATION)
Qty: 20.4 G | Refills: 3 | Status: SHIPPED | OUTPATIENT
Start: 2022-08-30 | End: 2023-09-22

## 2022-08-30 RX ORDER — ALBUTEROL SULFATE 0.83 MG/ML
2.5 SOLUTION RESPIRATORY (INHALATION) EVERY 6 HOURS PRN
Qty: 90 ML | Refills: 1 | Status: SHIPPED | OUTPATIENT
Start: 2022-08-30

## 2022-08-30 RX ORDER — ASPIRIN 81 MG/1
81 TABLET, CHEWABLE ORAL DAILY
Qty: 90 TABLET | Refills: 3 | Status: SHIPPED | OUTPATIENT
Start: 2022-08-30 | End: 2023-09-22

## 2022-08-30 RX ORDER — BACLOFEN 10 MG/1
10 TABLET ORAL 2 TIMES DAILY PRN
Qty: 30 TABLET | Refills: 1 | Status: SHIPPED | OUTPATIENT
Start: 2022-08-30 | End: 2023-02-14

## 2022-08-30 NOTE — TELEPHONE ENCOUNTER
Attempted Mona at listed # x2. Both times, phone rang once and call disconnected.    Nataliia Tenorio, BSN, RN, PHN, CHFN, HNB-BC   8/30/2022 at 1:43 PM

## 2022-08-30 NOTE — TELEPHONE ENCOUNTER
[facility] phone call message - order or referral request for patient:     Order or referral being requested:home health  physical therapy       Additional Comments: 2 time a week for 1 week and then 1 time a week for 4 weeks starting on 8/29/2022    OK to leave a message on voice mail? Yes    Primary language: English      needed? No    Call taken on August 30, 2022 at 2:44 PM by Sascha Casarez

## 2022-08-30 NOTE — TELEPHONE ENCOUNTER
[facility] phone call message- general phone call:    Reason for call: Requesting verbal orders for OT evaluation to be completed next week, instead of this week, due to patient not being home.    Return call needed: Yes    OK to leave a message on voice mail? Yes    Primary language: English      needed? No    Call taken on August 30, 2022 at 1:13 PM by Morgan Ca

## 2022-08-30 NOTE — PROGRESS NOTES
There are no exam notes on file for this visit.    ASSESSMENT AND PLAN:      Isiah was seen today for follow up.  Seen today for ER follow-up visit.    Diagnoses and all orders for this visit:    ER follow-up.  Hiccups, resolved.  Moderate persistent asthma, unspecified whether complicated  Chronic obstructive pulmonary disease, unspecified COPD type (H)  Chronic smoker.  -     budesonide-formoterol (SYMBICORT) 80-4.5 MCG/ACT Inhaler; Take 2 puff 2x daily and 1-2 puffs as needed with shortness of breath.  -     albuterol (PROVENTIL) (2.5 MG/3ML) 0.083% neb solution; Take 1 vial (2.5 mg) by nebulization every 6 hours as needed for shortness of breath / dyspnea or wheezing    History of pulmonary embolism.  Need for anticoagulation.  He needs a new prescription for his aspirin which he was told by his surgical and oncology team to restart.  Also be recommended for him for cardiovascular protection.  -     aspirin (ASA) 81 MG chewable tablet; Take 1 tablet (81 mg) by mouth daily Chew and swallow 1 tablet daily    Leg cramps.  Last potassium during hospitalization was stable with mildly low magnesium.  Encourage continued fluid intake, will try some baclofen for his leg spasms.  -     baclofen (LIORESAL) 10 MG tablet; Take 1 tablet (10 mg) by mouth 2 times daily as needed for muscle spasms (at night for leg cramps)    Known, stable chronic kidney disease.  This was stable at time of hospital discharge.  We will plan to recheck at follow-up visit in September.  Would check magnesium, as well as his preventative recommendations including microalbumin, hepatitis C, lipid panel at that time.    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  Now with home nursing support through [facility].  Ileostomy in place.  He will make sure that his care team sends copies of visits notes to our clinic so we can help support him along the way.  Continues to lose weight.  Is using Ensure, and IV happy  to help try and get this covered through insurance if needed.    Patient Instructions   Medications at the [facility]:    NEW inhaler:  Symbicort.  Take 2 puffs in AM and 2 puffs in PM and 1-2 puffs if having worsening breath.  Up to 12 puffs per day.     Refill albuterol nebs    Refill aspirin.    NEW medication for leg cramps:  Baclofen.  1 pill if needed at night for cramps.      Make sure you are drinking lots of water.          Jacqueline Nowak MD    SUBJECTIVE  Isiah CARROLL McArdle is a 77 year old male with past medical history significant for    Patient Active Problem List   Diagnosis     Chronic Reflux esophagitis     Moderate recurrent major depression (H)     Diastolic Dysfunction      Fainting (Syncope)     smoking      Prediabetes     Asthma     Hyperlipidemia     Obese     Adenocarcinoma of rectum (H)     Pulmonary embolism and infarction (H)     Esophageal reflux     Hypothyroidism due to acquired atrophy of thyroid     ACP (advance care planning)     CKD (chronic kidney disease) stage 3, GFR 30-59 ml/min (H)     Decreased vision in both eyes     Decreased hearing of both ears     Ventral hernia     Unsteady gait     Others present at the visit:  None    Presents for   Chief Complaint   Patient presents with     Follow Up     Pt was having issues after surgery and that is what took him to the ER.      Patient presents for ER follow-up visit.  He was recently hospitalized at [facility] for a colorectal surgery August 18 through 24.  They found a larger mass than expected in the rectal area, so they did a diverting ileostomy, and he is set to start chemo next week.  Recovery from the surgery was a little slower than he expected, and he continues to feel fatigued, have poor appetite, and feels generally weak.  He is doing well with his ileostomy bag.  He has had 1 of these before on the opposite side and feels comfortable with the dressing changes.  He also has services that are coming out through [facility]  including PT, OT, and wound care.  Wendie will be his regular nurse through them.  He has noted that the bag fills up very quickly.  Feels like anything he eats runs through him.  He has been trying to stay on top of fluids, and is drinking 2 ensures advanced per day.  He is trying to eat small frequent meals, but does get some occasional nausea.  He is using Pepto-Bismol to help with this.  Has been urinating normally.    He was seen in the ER for complaints of hiccups.  These happened after hospital discharge, and when they were happening he felt like his belly moved up into his chest and that his ear was cut off.  Had difficulty breathing and felt very short of breath, which is why he called the ambulance.  He sure that at the ER they had him take small sips of warm water and this seemed to help.  He has not had hiccups since discharge.  His breathing has been overall stable, but he does feel more short of breath with activity.  He has a history of both asthma and COPD and is a long-term smoker.  He would like a refill on his Qvar and albuterol inhalers today.  He has been on these for a long time.  Would also like a refill on his aspirin as he was told to restart this after surgery and has not been able to do so yet.    He is noticing increased difficulty with leg cramps.  These are most bothersome at night.  This started in his feet and all the way up to his legs.  Treats them with massage.  Is working hard on drinking lots of fluids, but they have been very bothersome.      OBJECTIVE:  Vitals: /61 (BP Location: Left arm, Patient Position: Sitting, Cuff Size: Adult Regular)   Pulse 91   Temp 98.3  F (36.8  C) (Oral)   Resp 16   Wt 74.2 kg (163 lb 9.6 oz)   SpO2 99%   BMI 27.22 kg/m    BMI= Body mass index is 27.22 kg/m .  Objective:    Vitals:  Vitals are reviewed and are within the normal range.  No respiratory distress.  O2 sats are good.  Gen:  Alert, pleasant, no acute distress  Cardiac:  Regular  rate and rhythm, soft systolic murmur noted on exam, less prominent in right upper sternal border  Respiratory: Lungs with scattered wheezes, more notable on the patient's left upper lung fields.  Decreased airflow bilaterally in lower lung fields.  Abdomen: His belly is soft, it is nontender.  Surgical site is clean dry and intact.  Ileostomy bag is in place on the left side without evidence of irritation, and appropriate stool consistency.  Bowel sounds are present.  Belly is nontender with palpation.  Extremities:  Warm, well-perfused, pulses 2+/4, no lower extremity edema    Reviewed recent hospitalization notes and ER visit.  Labs in particular were reviewed and showed stable renal function, normal potassium, and borderline normal magnesium at 1.7.      Patient Instructions   Medications at the [facility]:    NEW inhaler:  Symbicort.  Take 2 puffs in AM and 2 puffs in PM and 1-2 puffs if having worsening breath.  Up to 12 puffs per day.     Refill albuterol nebs    Refill aspirin.    NEW medication for leg cramps:  Baclofen.  1 pill if needed at night for cramps.      Make sure you are drinking lots of water.          Jacqueline Nowak MD

## 2022-08-30 NOTE — PATIENT INSTRUCTIONS
Medications at the [facility]:    NEW inhaler:  Symbicort.  Take 2 puffs in AM and 2 puffs in PM and 1-2 puffs if having worsening breath.  Up to 12 puffs per day.     Refill albuterol nebs    Refill aspirin.    NEW medication for leg cramps:  Baclofen.  1 pill if needed at night for cramps.      Make sure you are drinking lots of water.

## 2022-08-31 NOTE — TELEPHONE ENCOUNTER
Approved verbal orders.     Nataliia Tenorio BSN, RN, PHN, CHFN, HNB-BC   8/31/2022 at 9:03 AM

## 2022-09-07 ENCOUNTER — TELEPHONE (OUTPATIENT)
Dept: FAMILY MEDICINE | Facility: CLINIC | Age: 77
End: 2022-09-07

## 2022-09-07 NOTE — TELEPHONE ENCOUNTER
[facility] phone call message- general phone call:    Reason for call: Requesting verbal orders for OT for 1 x a wk for 4 wks for address ADL's, activity tolerance, upper body strengthening,and safety awareness.    Return call needed: Yes    OK to leave a message on voice mail? Yes    Primary language: English      needed? No    Call taken on September 7, 2022 at 9:55 AM by Morgan Ca

## 2022-09-15 ENCOUNTER — TELEPHONE (OUTPATIENT)
Dept: FAMILY MEDICINE | Facility: CLINIC | Age: 77
End: 2022-09-15

## 2022-09-15 NOTE — TELEPHONE ENCOUNTER
Return call attempted no answer at given number unable to leave a message will attempt call again    Diet as tolerated including ensure    BTRN

## 2022-09-15 NOTE — TELEPHONE ENCOUNTER
----- Message from Evelina Lilly sent at 9/13/2022 11:00 AM CDT -----  Regarding: Confirm Prescribed Deit Orders  Parisa Hernández RN and  from [facility] called as she's looking to confirm prescribed diet orders. Her return number is [phone] she also states that it's a confidential voice message if she isn't able to  the call.  Thank you.    Evelina Simons

## 2022-09-16 NOTE — TELEPHONE ENCOUNTER
Return call attempted.no answer at given number unable to leave a message writer will remain available if alternate contact information is given or return call made     BTRN

## 2022-10-12 ENCOUNTER — DOCUMENTATION ONLY (OUTPATIENT)
Dept: FAMILY MEDICINE | Facility: CLINIC | Age: 77
End: 2022-10-12

## 2022-10-12 NOTE — PROGRESS NOTES
Detail Level: Detailed To be completed in Nursing note:    Please reference list for forms that require a visit for completion.  Please remind patients that providers are given 3-5 business days to complete and return forms.      Form type:  "[employer] [place] [employer]     Date form received:  10/11/2022    Date form completed by Physician:  10/11/2022    How was form returned to patient (mailed, faxed, or at  for patient to ):  Faxed to [phone]    Date form mailed/faxed/left at  for patient and sent to HIM for scanning:  10/12/2022      Once form is left for patient, faxed, or mailed PCS will then close the documentation only encounter.      Quality 130: Documentation Of Current Medications In The Medical Record: Current Medications Documented Quality 402: Tobacco Use And Help With Quitting Among Adolescents: Patient screened for tobacco and never smoked Quality 110: Preventive Care And Screening: Influenza Immunization: Influenza immunization was not ordered or administered, reason not given

## 2022-10-17 DIAGNOSIS — Z11.59 NEED FOR HEPATITIS C SCREENING TEST: Primary | ICD-10-CM

## 2022-10-17 DIAGNOSIS — Z13.220 SCREENING FOR HYPERLIPIDEMIA: ICD-10-CM

## 2022-10-17 DIAGNOSIS — C20 ADENOCARCINOMA OF RECTUM (H): ICD-10-CM

## 2022-10-17 DIAGNOSIS — R25.2 LEG CRAMPS: ICD-10-CM

## 2022-10-17 DIAGNOSIS — N18.31 STAGE 3A CHRONIC KIDNEY DISEASE (H): ICD-10-CM

## 2022-10-18 ENCOUNTER — OFFICE VISIT (OUTPATIENT)
Dept: FAMILY MEDICINE | Facility: CLINIC | Age: 77
End: 2022-10-18
Payer: COMMERCIAL

## 2022-10-18 VITALS
RESPIRATION RATE: 20 BRPM | WEIGHT: 168.2 LBS | SYSTOLIC BLOOD PRESSURE: 153 MMHG | HEART RATE: 89 BPM | BODY MASS INDEX: 27.99 KG/M2 | TEMPERATURE: 97.9 F | OXYGEN SATURATION: 99 % | DIASTOLIC BLOOD PRESSURE: 79 MMHG

## 2022-10-18 DIAGNOSIS — Z23 HIGH PRIORITY FOR 2019-NCOV VACCINE: ICD-10-CM

## 2022-10-18 DIAGNOSIS — Z13.220 SCREENING FOR HYPERLIPIDEMIA: ICD-10-CM

## 2022-10-18 DIAGNOSIS — C20 ADENOCARCINOMA OF RECTUM (H): Primary | ICD-10-CM

## 2022-10-18 DIAGNOSIS — K21.9 GASTROESOPHAGEAL REFLUX DISEASE WITHOUT ESOPHAGITIS: ICD-10-CM

## 2022-10-18 DIAGNOSIS — Z11.59 NEED FOR HEPATITIS C SCREENING TEST: ICD-10-CM

## 2022-10-18 DIAGNOSIS — F33.1 MODERATE RECURRENT MAJOR DEPRESSION (H): ICD-10-CM

## 2022-10-18 DIAGNOSIS — N18.31 STAGE 3A CHRONIC KIDNEY DISEASE (H): ICD-10-CM

## 2022-10-18 DIAGNOSIS — I10 ESSENTIAL HYPERTENSION: ICD-10-CM

## 2022-10-18 DIAGNOSIS — E03.4 HYPOTHYROIDISM DUE TO ACQUIRED ATROPHY OF THYROID: ICD-10-CM

## 2022-10-18 DIAGNOSIS — Z43.3 COLOSTOMY CARE (H): ICD-10-CM

## 2022-10-18 LAB
ANION GAP SERPL CALCULATED.3IONS-SCNC: 11 MMOL/L (ref 7–15)
BUN SERPL-MCNC: 19.5 MG/DL (ref 8–23)
CALCIUM SERPL-MCNC: 9.3 MG/DL (ref 8.8–10.2)
CHLORIDE SERPL-SCNC: 104 MMOL/L (ref 98–107)
CHOLEST SERPL-MCNC: 190 MG/DL
CREAT SERPL-MCNC: 1.33 MG/DL (ref 0.67–1.17)
DEPRECATED HCO3 PLAS-SCNC: 24 MMOL/L (ref 22–29)
ERYTHROCYTE [DISTWIDTH] IN BLOOD BY AUTOMATED COUNT: 13.2 % (ref 10–15)
GFR SERPL CREATININE-BSD FRML MDRD: 55 ML/MIN/1.73M2
GLUCOSE SERPL-MCNC: 105 MG/DL (ref 70–99)
HCT VFR BLD AUTO: 34.6 % (ref 40–53)
HCV AB SERPL QL IA: NONREACTIVE
HDLC SERPL-MCNC: 39 MG/DL
HGB BLD-MCNC: 11.3 G/DL (ref 13.3–17.7)
LDLC SERPL CALC-MCNC: 135 MG/DL
MCH RBC QN AUTO: 30.1 PG (ref 26.5–33)
MCHC RBC AUTO-ENTMCNC: 32.7 G/DL (ref 31.5–36.5)
MCV RBC AUTO: 92 FL (ref 78–100)
NONHDLC SERPL-MCNC: 151 MG/DL
PLATELET # BLD AUTO: 242 10E3/UL (ref 150–450)
POTASSIUM SERPL-SCNC: 4.2 MMOL/L (ref 3.4–5.3)
RBC # BLD AUTO: 3.75 10E6/UL (ref 4.4–5.9)
SODIUM SERPL-SCNC: 139 MMOL/L (ref 136–145)
TRIGL SERPL-MCNC: 79 MG/DL
WBC # BLD AUTO: 4.9 10E3/UL (ref 4–11)

## 2022-10-18 PROCEDURE — 85027 COMPLETE CBC AUTOMATED: CPT | Performed by: STUDENT IN AN ORGANIZED HEALTH CARE EDUCATION/TRAINING PROGRAM

## 2022-10-18 PROCEDURE — 86803 HEPATITIS C AB TEST: CPT | Performed by: STUDENT IN AN ORGANIZED HEALTH CARE EDUCATION/TRAINING PROGRAM

## 2022-10-18 PROCEDURE — 90715 TDAP VACCINE 7 YRS/> IM: CPT | Performed by: STUDENT IN AN ORGANIZED HEALTH CARE EDUCATION/TRAINING PROGRAM

## 2022-10-18 PROCEDURE — 36415 COLL VENOUS BLD VENIPUNCTURE: CPT | Performed by: STUDENT IN AN ORGANIZED HEALTH CARE EDUCATION/TRAINING PROGRAM

## 2022-10-18 PROCEDURE — 80048 BASIC METABOLIC PNL TOTAL CA: CPT | Performed by: STUDENT IN AN ORGANIZED HEALTH CARE EDUCATION/TRAINING PROGRAM

## 2022-10-18 PROCEDURE — 90471 IMMUNIZATION ADMIN: CPT | Performed by: STUDENT IN AN ORGANIZED HEALTH CARE EDUCATION/TRAINING PROGRAM

## 2022-10-18 PROCEDURE — 99214 OFFICE O/P EST MOD 30 MIN: CPT | Mod: 25 | Performed by: STUDENT IN AN ORGANIZED HEALTH CARE EDUCATION/TRAINING PROGRAM

## 2022-10-18 PROCEDURE — 80061 LIPID PANEL: CPT | Performed by: STUDENT IN AN ORGANIZED HEALTH CARE EDUCATION/TRAINING PROGRAM

## 2022-10-18 RX ORDER — FAMOTIDINE 40 MG/1
40 TABLET, FILM COATED ORAL DAILY
Qty: 90 TABLET | Refills: 3 | Status: SHIPPED | OUTPATIENT
Start: 2022-10-18 | End: 2023-02-14

## 2022-10-18 RX ORDER — LEVOTHYROXINE SODIUM 200 UG/1
200 TABLET ORAL DAILY
Qty: 90 TABLET | Refills: 1 | Status: SHIPPED | OUTPATIENT
Start: 2022-10-18 | End: 2023-05-11

## 2022-10-18 RX ORDER — AMLODIPINE BESYLATE 5 MG/1
5 TABLET ORAL DAILY
Qty: 90 TABLET | Refills: 3 | Status: SHIPPED | OUTPATIENT
Start: 2022-10-18 | End: 2024-01-19

## 2022-10-18 ASSESSMENT — ACTIVITIES OF DAILY LIVING (ADL)
TOILETING_ISSUES: NO
HEARING_DIFFICULTY_OR_DEAF: NO
DIFFICULTY_EATING/SWALLOWING: NO
TRANSFERRING: 1-->ASSISTANCE (EQUIPMENT/PERSON) NEEDED
WEAR_GLASSES_OR_BLIND: YES
EQUIPMENT_CURRENTLY_USED_AT_HOME: WALKER, ROLLING
FALL_HISTORY_WITHIN_LAST_SIX_MONTHS: NO
WALKING_OR_CLIMBING_STAIRS_DIFFICULTY: YES
CHANGE_IN_FUNCTIONAL_STATUS_SINCE_ONSET_OF_CURRENT_ILLNESS/INJURY: NO
TRANSFERRING: 1-->ASSISTANCE (EQUIPMENT/PERSON) NEEDED (NOT DEVELOPMENTALLY APPROPRIATE)
CONCENTRATING,_REMEMBERING_OR_MAKING_DECISIONS_DIFFICULTY: NO
WALKING_OR_CLIMBING_STAIRS: TRANSFERRING DIFFICULTY, REQUIRES EQUIPMENT
DIFFICULTY_COMMUNICATING: NO
DRESSING/BATHING_DIFFICULTY: NO

## 2022-10-18 ASSESSMENT — PATIENT HEALTH QUESTIONNAIRE - PHQ9: SUM OF ALL RESPONSES TO PHQ QUESTIONS 1-9: 5

## 2022-10-18 NOTE — LETTER
October 19, 2022      Isiah D McArdle  [address]   [place] [address]        Dear Mr.McArdle,    We are writing to inform you of your test results.    It was so nice to see you in clinic.  Here is a copy of your lab results.  Everything came back looking good.  Your kidney tests are stable.  Blood counts are slightly low, but also stable.  Your hepatitis C testing is normal and cholesterol is good.  Please call the clinic at [phone] if you have any questions.         Resulted Orders   Lipid Profile   Result Value Ref Range    Cholesterol 190 <200 mg/dL    Triglycerides 79 <150 mg/dL    Direct Measure HDL 39 (L) >=40 mg/dL    LDL Cholesterol Calculated 135 (H) <=100 mg/dL    Non HDL Cholesterol 151 (H) <130 mg/dL    Narrative    Cholesterol  Desirable:  <200 mg/dL    Triglycerides  Normal:  Less than 150 mg/dL  Borderline High:  150-199 mg/dL  High:  200-499 mg/dL  Very High:  Greater than or equal to 500 mg/dL    Direct Measure HDL  Female:  Greater than or equal to 50 mg/dL   Male:  Greater than or equal to 40 mg/dL    LDL Cholesterol  Desirable:  <100mg/dL  Above Desirable:  100-129 mg/dL   Borderline High:  130-159 mg/dL   High:  160-189 mg/dL   Very High:  >= 190 mg/dL    Non HDL Cholesterol  Desirable:  130 mg/dL  Above Desirable:  130-159 mg/dL  Borderline High:  160-189 mg/dL  High:  190-219 mg/dL  Very High:  Greater than or equal to 220 mg/dL   Hepatitis C antibody   Result Value Ref Range    Hepatitis C Antibody Nonreactive Nonreactive    Narrative    Assay performance characteristics have not been established for newborns, infants, and children.   Basic metabolic panel   Result Value Ref Range    Sodium 139 136 - 145 mmol/L    Potassium 4.2 3.4 - 5.3 mmol/L    Chloride 104 98 - 107 mmol/L    Carbon Dioxide (CO2) 24 22 - 29 mmol/L    Anion Gap 11 7 - 15 mmol/L    Urea Nitrogen 19.5 8.0 - 23.0 mg/dL    Creatinine 1.33 (H) 0.67 - 1.17 mg/dL    Calcium 9.3 8.8 - 10.2 mg/dL    Glucose 105  (H) 70 - 99 mg/dL    GFR Estimate 55 (L) >60 mL/min/1.73m2      Comment:      Effective December 21, 2021 eGFRcr in adults is calculated using the 2021 CKD-EPI creatinine equation which includes age and gender (Khadar et al., NEJ, DOI: 10.1056/BTZRuc1515023)   CBC with platelets   Result Value Ref Range    WBC Count 4.9 4.0 - 11.0 10e3/uL    RBC Count 3.75 (L) 4.40 - 5.90 10e6/uL    Hemoglobin 11.3 (L) 13.3 - 17.7 g/dL    Hematocrit 34.6 (L) 40.0 - 53.0 %    MCV 92 78 - 100 fL    MCH 30.1 26.5 - 33.0 pg    MCHC 32.7 31.5 - 36.5 g/dL    RDW 13.2 10.0 - 15.0 %    Platelet Count 242 150 - 450 10e3/uL       If you have any questions or concerns, please call the clinic at the number listed above.       Sincerely,      Jacqueline Nowak MD

## 2022-10-18 NOTE — PROGRESS NOTES
There are no exam notes on file for this visit.    ASSESSMENT AND PLAN:      Isiah was seen today for other and imm/inj.    Diagnoses and all orders for this visit:    Adenocarcinoma of rectum (H)  Colostomy care (H)  He endorses having all of the colostomy products that he needs.  Her new prescriptions for pull-ups and pads.  He signed a release of information for Lolis his  at [facility].  Weight is improving.  He is using nutritional supplements.  Overall seems to be healing well from surgery.  -     Miscellaneous Order for DME - ONLY FOR DME  -     Miscellaneous Order for DME - ONLY FOR DME  -     Basic metabolic panel  -     CBC with platelets    Screening for hyperlipidemia  Need for hepatitis C screening test  High priority for 2019-nCoV vaccine  Preventative recommendations including hep C screening, lipid profile, and tetanus shot done today.  -     TDAP VACCINE (Adacel, Boostrix)  [[id number]]  -     Lipid Profile  -     Hepatitis C antibody    CKD (chronic kidney disease) stage 3, GFR 30-59 ml/min (H)  Renal function is stable.    Gastroesophageal reflux disease without esophagitis  Pepcid refilled.  GERD has been well controlled.  -     famotidine (PEPCID) 40 MG tablet; Take 1 tablet (40 mg) by mouth daily    Essential hypertension  Blood pressure somewhat elevated today.  He is out of his amlodipine.  This was refilled.  -     amLODIPine (NORVASC) 5 MG tablet; Take 1 tablet (5 mg) by mouth daily    Hypothyroidism due to acquired atrophy of thyroid  Refills given on the 200 mcg levothyroxine.  We should recheck this at next visit.  -     levothyroxine (SYNTHROID/LEVOTHROID) 200 MCG tablet; Take 1 tablet (200 mcg) by mouth daily    Moderate recurrent major depression (H)  Mood is stable.    Follow up in 1 month.  Recheck thyroid at that time.     Patient Instructions   Keep watching blood pressure at home.     Keep it up with the good diet!  I'm impressed that you've put the weight back  on, and its in a good place now.     Orders for pads and pull up.    Refills on amlodipine, thyroid medication and pepcid at the pharmacy.     Keep looking out for each other.        Jacqueline Nowak MD    SUBJECTIVE  Isiah CARLY McArdle is a 77 year old male with past medical history significant for    Patient Active Problem List   Diagnosis     Chronic Reflux esophagitis     Moderate recurrent major depression (H)     Diastolic Dysfunction      Fainting (Syncope)     smoking      Prediabetes     Asthma     Hyperlipidemia     Obese     Adenocarcinoma of rectum (H)     Pulmonary embolism and infarction (H)     Esophageal reflux     Hypothyroidism due to acquired atrophy of thyroid     ACP (advance care planning)     CKD (chronic kidney disease) stage 3, GFR 30-59 ml/min (H)     Decreased vision in both eyes     Decreased hearing of both ears     Ventral hernia     Unsteady gait     Ileostomy care (H)     Others present at the visit:  None    Presents for   Chief Complaint   Patient presents with     Other     Post op stomach surgery     Imm/Inj     COVID-19 VACCINE     Patient seen today for hospitalization follow-up visit after being admitted to [facility] for a colon resection surgery secondary to adenocarcinoma of the colon and rectum.  He shares that the surgery was complicated.  They were unable to remove the entire malignancy, and that his surgeon has recommended he have chemotherapy to shrink the cancer prior to further surgery.  He has been back to see the oncologist and it sounds like they do not think he benefit from chemotherapy.  He is working on coordinating these 2 appointments and teams to determine next steps.  Expresses some frustration that there is been a delay in moving forward with the surgery.    Overall, his abdomen feels good.  He denies any pain.  No drainage, erythema or difficulties at the incision site.  Has his colostomy bag in place.  Shares that the bag fills up very quickly and  he has to change it 3-4 times per day.  He is got a lot of gas and air that passes through.  He does not like having to change it so often.  He lost a lot of weight after surgery, but is gaining this back.  Is doing boost, Ensure, and other supplements, and this is going well.  Is nearly back up to his presurgical weight.    He also shares that he is building up strength.  Did have to walk 3 blocks because of bus changes to get to the clinic.  He reports his breathing has been okay.  Using his walker to get around.  No recent falls.  He does have to stop a couple times and walks in order to catch his breath, but this has not changed.  He does use his partner's scooter sometimes to get around.    Feels like he is getting good support at home.  He is now moved in with his partner, and they are looking out for each other.  He is doing a lot of the cooking, and trying to encourage her to eat more as well.    He is requesting DME prescriptions for pull-ups.  He also needs additional pads.  Would like these to go to [facility].  He is having difficulty with mucus leaking out of his rectum, and difficulties with urinary incontinence.    He is getting help with case management through [facility].  Feels like he has the services he needs at home.  Mood has been good.  He just wants to move forward with neck steps for treating his cancer.    We reviewed her lab work.  These show stable renal function.  Hemoglobin was improving at time of discharge from the TCU.    He is open to getting his COVID shot done today.    OBJECTIVE:  Vitals: BP (!) 153/79   Pulse 89   Temp 97.9  F (36.6  C) (Oral)   Resp 20   Wt 76.3 kg (168 lb 3.2 oz)   SpO2 99%   BMI 27.99 kg/m    BMI= Body mass index is 27.99 kg/m .  Objective:    Vitals:  Vitals are reviewed and are within the normal range  Gen:  Alert, pleasant, no acute distress  Cardiac:  Regular rate and rhythm, no murmurs, rubs or gallops  Respiratory:  Lungs clear to  auscultation bilaterally  Abdomen:  Soft, non-tender, non-distended, bowel sounds positive  Extremities:  Warm, well-perfused, pulses 2+/4, no lower extremity edema    Results for orders placed or performed in visit on 10/18/22   Lipid Profile     Status: Abnormal   Result Value Ref Range    Cholesterol 190 <200 mg/dL    Triglycerides 79 <150 mg/dL    Direct Measure HDL 39 (L) >=40 mg/dL    LDL Cholesterol Calculated 135 (H) <=100 mg/dL    Non HDL Cholesterol 151 (H) <130 mg/dL    Narrative    Cholesterol  Desirable:  <200 mg/dL    Triglycerides  Normal:  Less than 150 mg/dL  Borderline High:  150-199 mg/dL  High:  200-499 mg/dL  Very High:  Greater than or equal to 500 mg/dL    Direct Measure HDL  Female:  Greater than or equal to 50 mg/dL   Male:  Greater than or equal to 40 mg/dL    LDL Cholesterol  Desirable:  <100mg/dL  Above Desirable:  100-129 mg/dL   Borderline High:  130-159 mg/dL   High:  160-189 mg/dL   Very High:  >= 190 mg/dL    Non HDL Cholesterol  Desirable:  130 mg/dL  Above Desirable:  130-159 mg/dL  Borderline High:  160-189 mg/dL  High:  190-219 mg/dL  Very High:  Greater than or equal to 220 mg/dL   Hepatitis C antibody     Status: Normal   Result Value Ref Range    Hepatitis C Antibody Nonreactive Nonreactive    Narrative    Assay performance characteristics have not been established for newborns, infants, and children.   Basic metabolic panel     Status: Abnormal   Result Value Ref Range    Sodium 139 136 - 145 mmol/L    Potassium 4.2 3.4 - 5.3 mmol/L    Chloride 104 98 - 107 mmol/L    Carbon Dioxide (CO2) 24 22 - 29 mmol/L    Anion Gap 11 7 - 15 mmol/L    Urea Nitrogen 19.5 8.0 - 23.0 mg/dL    Creatinine 1.33 (H) 0.67 - 1.17 mg/dL    Calcium 9.3 8.8 - 10.2 mg/dL    Glucose 105 (H) 70 - 99 mg/dL    GFR Estimate 55 (L) >60 mL/min/1.73m2   CBC with platelets     Status: Abnormal   Result Value Ref Range    WBC Count 4.9 4.0 - 11.0 10e3/uL    RBC Count 3.75 (L) 4.40 - 5.90 10e6/uL    Hemoglobin  11.3 (L) 13.3 - 17.7 g/dL    Hematocrit 34.6 (L) 40.0 - 53.0 %    MCV 92 78 - 100 fL    MCH 30.1 26.5 - 33.0 pg    MCHC 32.7 31.5 - 36.5 g/dL    RDW 13.2 10.0 - 15.0 %    Platelet Count 242 150 - 450 10e3/uL           Patient Instructions   Keep watching blood pressure at home.     Keep it up with the good diet!  I'm impressed that you've put the weight back on, and its in a good place now.     Orders for pads and pull up.    Refills on amlodipine, thyroid medication and pepcid at the pharmacy.     Keep looking out for each other.        Jacqueline Nowak MD

## 2022-10-18 NOTE — PATIENT INSTRUCTIONS
Keep watching blood pressure at home.     Keep it up with the good diet!  I'm impressed that you've put the weight back on, and its in a good place now.     Orders for pads and pull up.    Refills on amlodipine, thyroid medication and pepcid at the pharmacy.     Keep looking out for each other.

## 2022-10-19 NOTE — RESULT ENCOUNTER NOTE
Michael D McArdle-    It was so nice to see you in clinic.  Here is a copy of your lab results.  Everything came back looking good.  Your kidney tests are stable.  Blood counts are slightly low, but also stable.  Your hepatitis C testing is normal and cholesterol is good.  Please call the clinic at [phone] if you have any questions.      Jacqueline Nowak MD    Please send results to patient.

## 2022-10-23 ENCOUNTER — HEALTH MAINTENANCE LETTER (OUTPATIENT)
Age: 77
End: 2022-10-23

## 2022-11-10 ENCOUNTER — TELEPHONE (OUTPATIENT)
Dept: FAMILY MEDICINE | Facility: CLINIC | Age: 77
End: 2022-11-10
Payer: COMMERCIAL

## 2022-11-10 DIAGNOSIS — Z93.2 ILEOSTOMY STATUS (H): ICD-10-CM

## 2022-11-10 DIAGNOSIS — C20 ADENOCARCINOMA OF RECTUM (H): Primary | ICD-10-CM

## 2022-11-11 NOTE — TELEPHONE ENCOUNTER
DME prescription written for the supplies outlined below and will place on GARTH Hamilton desk to fax of [facility].     Jacqueline Nowak MD    Routed to GARTH Hamilton

## 2022-11-11 NOTE — TELEPHONE ENCOUNTER
Pt states that they cannot get the ostomy supplies from [facility].  Wondering if Dr Nowak could send to [facility]?  He needs the following supplies:  1. Standard size ileostomy bags qty 15 per month  2. Ostomy Rings qty 15 per month  3. Ostomy powder  4: Surgical scissors (to cut opening to place bag around stoma).    Called [facility] [phone] and they are actually a  and not a distributor but can provide samples.  Pt should get ostomy supplies from a local "[employer] company.      Called and spoke with pt and he would like the rx sent to [facility].   They will call [facility] next week to ensure that they go the rx as he only has 3 bags left which should last 6-9 days.  Routed note to Dr Nowak./SAMPSON

## 2022-11-11 NOTE — TELEPHONE ENCOUNTER
I have not refilled his ostomy bags in the past.  Can we figure out the size, type, and what supply company he uses?    Thank you!    I have placed a general DME order, but I'm not sure if the information is correct.   The orders were sent to [facility], but again, not sure if that is the correct location.     Dr. Nowak    Routed to GARTH Hamilton

## 2022-12-08 ENCOUNTER — TELEPHONE (OUTPATIENT)
Dept: FAMILY MEDICINE | Facility: CLINIC | Age: 77
End: 2022-12-08

## 2022-12-08 DIAGNOSIS — Z93.2 ILEOSTOMY STATUS (H): ICD-10-CM

## 2022-12-08 DIAGNOSIS — C20 ADENOCARCINOMA OF RECTUM (H): Primary | ICD-10-CM

## 2022-12-08 NOTE — TELEPHONE ENCOUNTER
[facility] phone call message- patient requesting a refill:    Full Medication Name: He needs the following supplies:  1. Standard size ileostomy bags qty 15 per month  2. Ostomy Rings qty 15 per month  3. Ostomy powder  4: Surgical scissors (to cut opening to place bag around stoma).    Pharmacy confirmed as    [facility]  : Yes    Additional Comments: pt is requesting a larger size Ileostomy bag.  Pt is getting very low on supplies.    OK to leave a message on voice mail? Yes    Primary language: English      needed? No    Call taken on December 8, 2022 at 10:13 AM by Morgan Ca

## 2022-12-09 ENCOUNTER — MEDICAL CORRESPONDENCE (OUTPATIENT)
Dept: HEALTH INFORMATION MANAGEMENT | Facility: CLINIC | Age: 77
End: 2022-12-09

## 2022-12-09 NOTE — TELEPHONE ENCOUNTER
DME order placed for supplies for patient and printed out.      Given to Jacob to fax to [employer].      Jacqueline Nowak MD    Routed to SHANEKA James, and GARTH Espinal for FYI.

## 2022-12-14 ENCOUNTER — MEDICAL CORRESPONDENCE (OUTPATIENT)
Dept: HEALTH INFORMATION MANAGEMENT | Facility: CLINIC | Age: 77
End: 2022-12-14

## 2022-12-14 ENCOUNTER — DOCUMENTATION ONLY (OUTPATIENT)
Dept: FAMILY MEDICINE | Facility: CLINIC | Age: 77
End: 2022-12-14

## 2022-12-14 NOTE — PROGRESS NOTES
To be completed in Nursing note:    Please reference list for forms that require a visit for completion.  Please remind patients that providers are given 3-5 business days to complete and return forms.      Form type:  [employer]    Date form received:  2022    Date form completed by Physician:  2022    How was form returned to patient (mailed, faxed, or at  for patient to ):  Faxed to [phone]    Date form mailed/faxed/left at  for patient and sent to HIM for scannin2022      Once form is left for patient, faxed, or mailed PCS will then close the documentation only encounter.

## 2023-01-04 ENCOUNTER — MEDICAL CORRESPONDENCE (OUTPATIENT)
Dept: HEALTH INFORMATION MANAGEMENT | Facility: CLINIC | Age: 78
End: 2023-01-04

## 2023-02-14 DIAGNOSIS — N39.46 MIXED INCONTINENCE: Primary | ICD-10-CM

## 2023-02-14 DIAGNOSIS — R25.2 LEG CRAMPS: ICD-10-CM

## 2023-02-14 DIAGNOSIS — J45.40 MODERATE PERSISTENT ASTHMA, UNCOMPLICATED: ICD-10-CM

## 2023-02-14 DIAGNOSIS — M79.641 PAIN OF RIGHT HAND: ICD-10-CM

## 2023-02-14 DIAGNOSIS — K21.9 GASTROESOPHAGEAL REFLUX DISEASE WITHOUT ESOPHAGITIS: ICD-10-CM

## 2023-02-14 RX ORDER — FAMOTIDINE 40 MG/1
40 TABLET, FILM COATED ORAL DAILY
Qty: 90 TABLET | Refills: 3 | Status: SHIPPED | OUTPATIENT
Start: 2023-02-14 | End: 2024-03-11

## 2023-02-14 RX ORDER — BACLOFEN 20 MG/1
20 TABLET ORAL 2 TIMES DAILY PRN
Qty: 45 TABLET | Refills: 1 | Status: SHIPPED | OUTPATIENT
Start: 2023-02-14 | End: 2023-05-31

## 2023-02-14 RX ORDER — CAPSAICIN 0.025 %
1 CREAM (GRAM) TOPICAL 3 TIMES DAILY PRN
Qty: 50 G | Refills: 1 | Status: SHIPPED | OUTPATIENT
Start: 2023-02-14 | End: 2024-03-11

## 2023-02-14 RX ORDER — ALBUTEROL SULFATE 90 UG/1
2 AEROSOL, METERED RESPIRATORY (INHALATION) EVERY 6 HOURS
Qty: 18 G | Refills: 1 | Status: SHIPPED | OUTPATIENT
Start: 2023-02-14

## 2023-02-15 ENCOUNTER — DOCUMENTATION ONLY (OUTPATIENT)
Dept: FAMILY MEDICINE | Facility: CLINIC | Age: 78
End: 2023-02-15
Payer: COMMERCIAL

## 2023-02-15 NOTE — PROGRESS NOTES
To be completed in Nursing note:    Please reference list for forms that require a visit for completion.  Please remind patients that providers are given 3-5 business days to complete and return forms.      Form type:Tej hensley extra large    Date form received: 02/15/203    Date form completed by Physician:02/15/2023    How was form returned to patient (mailed, faxed, or at  for patient to ):  Faxed to     Date form mailed/faxed/left at  for patient and sent to HIM for scannin/15/2023      Once form is left for patient, faxed, or mailed PCS will then close the documentation only encounter.

## 2023-04-02 ENCOUNTER — HEALTH MAINTENANCE LETTER (OUTPATIENT)
Age: 78
End: 2023-04-02

## 2023-05-08 NOTE — PROGRESS NOTES
"SUBJECTIVE:   Isiah is a 78 year old who presents for Preventive Visit.      10/18/2022    12:14 PM   Additional Questions   Roomed by zack   Accompanied by self   Patient has been advised of split billing requirements and indicates understanding: Yes  Are you in the first 12 months of your Medicare coverage?  No    Healthy Habits:     In general, how would you rate your overall health?  Good    Frequency of exercise:  6-7 days/week    Duration of exercise:  15-30 minutes    Do you usually eat at least 4 servings of fruit and vegetables a day, include whole grains    & fiber and avoid regularly eating high fat or \"junk\" foods?  Yes    Taking medications regularly:  Yes    Medication side effects:  Muscle aches    Ability to successfully perform activities of daily living:  No assistance needed    Home Safety:  No safety concerns identified    Hearing Impairment:  No hearing concerns    In the past 6 months, have you been bothered by leaking of urine?  No    In general, how would you rate your overall mental or emotional health?  Fair      PHQ-2 Total Score: 1    Additional concerns today:  No      Have you ever done Advance Care Planning? (For example, a Health Directive, POLST, or a discussion with a medical provider or your loved ones about your wishes): No, advance care planning information given to patient to review.  Patient plans to discuss their wishes with loved ones or provider.  He has not considered an advanced directive before this time.  His daughter Chantal would likely be his decision maker.    Hearing Acuity: diminished, recommended follow up.   Has hx of hearing aids, needs follow up for this.  Going to do it after his abdominal surgery.    Hearing Assessment: not done--followed by audiology     Fall risk  Has fallen multiple times in the past year.  Has symptoms of syncopy, and slide down off of the chair as well.    Uses walker/cane.  Difficulty getting up and down out of the chair.  "   Continues to have difficulty with seizures/syncopy.  [facility], and this looked okay.      Cognitive Screening   1) Repeat 3 items (Leader, Season, Table)   Able to repeat after second reminder  2) Clock draw: NORMAL  3) 3 item recall: Recalls 3 objects  Results: 3 items recalled: COGNITIVE IMPAIRMENT LESS LIKELY    Mini-CogTM Copyright LOGAN Geiger. Licensed by the author for use in [facility]; reprinted with permission (negro@[facility]). All rights reserved.      Do you have sleep apnea, excessive snoring or daytime drowsiness?: yes   Partner thinks that he has sleep apnea.  He snores at night.    Reviewed and updated as needed this visit by clinical staff    Reviewed and updated as needed this visit by Provider                 Social History     Tobacco Use     Smoking status: Every Day     Types: Pipe     Smokeless tobacco: Never     Tobacco comments:     has been cut down a lot, is on the nicotine patch   Vaping Use     Vaping status: Not on file   Substance Use Topics     Alcohol use: Yes     Alcohol/week: 0.0 standard drinks of alcohol           5/10/2023     9:14 AM   Alcohol Use   Prescreen: >3 drinks/day or >7 drinks/week? Not Applicable   No alcohol use.    Occasional CBD for relaxing.    Do you have a current opioid prescription? No  Do you use any other controlled substances or medications that are not prescribed by a provider? None  Taking tylenol for stomach pain.      He has known colon cancer.  He has an appointment coming up with a new surgeon to talk about his ileostomy, which has been bothering him.  He notices that the stoma comes in and out.  Can have pain at times when it is moving in and out.  He struggles with the bags and has difficulty with them sticking.  Does not have enough bags the last month.  He is working with his worker on this.    His appetite overall has been poor.  He has been doing his own cooking and sharing this with his  partner.    He also describes symptoms of peripheral neuropathy.  He gets numbness in his hand.  Has no feeling there and sometimes has difficulty with .  He is try diclofenac gel which helps Biofreeze does not help.  This is his right hand and he is right-handed.    We reviewed and updated his mental health.  He is seeing a counselor regularly who he is seen for many years.  This is very helpful.  He enjoys spending time with his granddaughter and daughter.  She was very sick this past winter and that was very stressful.    He is wondering if he would qualify for diabetic shoes.  Notes some swelling and discomfort in his feet.  Has had diabetes in the past.    He also has difficulty with urination.  Feels like fluids run right through him.  He gets up 3-4 times during the night to urinate.  Sometimes he has trickles and has difficulty emptying.  He has to wear pull-ups.  These also help with some leaking and mucus from his bottom.    Current providers sharing in care for this patient include:   Patient Care Team:  Jacqueline Nowak MD as PCP - General (Family Medicine)  Therese Spencer [facility] as Pharmacist (Pharmacist)  Makayla Resendiz [facility] as Pharmacist (Pharmacist)  Jacqueline Nowak MD as Assigned PCP  [facility] - , [facility]    The following health maintenance items are reviewed in Epic and correct as of today:  Health Maintenance   Topic Date Due     HF ACTION PLAN  Never done     COPD ACTION PLAN  Never done     DEPRESSION ACTION PLAN  Never done     ZOSTER IMMUNIZATION (1 of 2) Never done     MEDICARE ANNUAL WELLNESS VISIT  04/09/2020     LUNG CANCER SCREENING  03/07/2021     INFLUENZA VACCINE (1) 09/01/2022     COVID-19 Vaccine (5 - Moderna series) 01/22/2023     PHQ-9  04/18/2023     ALT  07/25/2023     FALL RISK ASSESSMENT  08/30/2023     LIPID  10/18/2023     CBC  10/18/2023     HEMOGLOBIN  10/18/2023     BMP  11/10/2023     NICOTINE/TOBACCO CESSATION COUNSELING Q 1 YR   "05/10/2024     MICROALBUMIN  05/10/2024     TSH W/FREE T4 REFLEX  05/10/2024     ADVANCE CARE PLANNING  05/16/2028     DTAP/TDAP/TD IMMUNIZATION (3 - Td or Tdap) 10/18/2032     SPIROMETRY  Completed     HEPATITIS C SCREENING  Completed     Pneumococcal Vaccine: 65+ Years  Completed     URINALYSIS  Completed     IPV IMMUNIZATION  Aged Out     MENINGITIS IMMUNIZATION  Aged Out     COLORECTAL CANCER SCREENING  Discontinued       Review of Systems   Constitutional: Negative for chills and fever.   HENT: Negative for congestion, ear pain, hearing loss and sore throat.    Eyes: Negative for pain and visual disturbance.   Respiratory: Positive for cough and shortness of breath.    Cardiovascular: Positive for peripheral edema. Negative for chest pain and palpitations.   Gastrointestinal: Positive for abdominal pain, constipation, diarrhea, heartburn and nausea. Negative for hematochezia.   Genitourinary: Positive for urgency. Negative for dysuria, genital sores, hematuria, impotence and penile discharge.   Musculoskeletal: Positive for arthralgias, joint swelling and myalgias.   Skin: Negative for rash.   Neurological: Positive for weakness and headaches. Negative for dizziness and paresthesias.   Psychiatric/Behavioral: The patient is not nervous/anxious.        OBJECTIVE:   BP (!) 143/73   Pulse 94   Temp 97.5  F (36.4  C) (Oral)   Resp 20   Ht 1.628 m (5' 4.1\")   Wt 78 kg (172 lb)   SpO2 96%   BMI 29.43 kg/m   Estimated body mass index is 29.43 kg/m  as calculated from the following:    Height as of this encounter: 1.628 m (5' 4.1\").    Weight as of this encounter: 78 kg (172 lb).  Physical Exam  Constitutional:       General: He is not in acute distress.     Appearance: Normal appearance. He is normal weight.   HENT:      Head: Normocephalic.      Right Ear: There is impacted cerumen.      Left Ear: There is impacted cerumen.      Nose: Nose normal.      Mouth/Throat:      Mouth: Mucous membranes are moist.      " Pharynx: Oropharynx is clear.   Eyes:      Extraocular Movements: Extraocular movements intact.      Pupils: Pupils are equal, round, and reactive to light.   Cardiovascular:      Rate and Rhythm: Normal rate and regular rhythm.      Pulses: Normal pulses.      Heart sounds: Normal heart sounds. No murmur heard.  Pulmonary:      Effort: Pulmonary effort is normal. No respiratory distress.      Breath sounds: No wheezing.   Abdominal:      General: Bowel sounds are normal. There is distension.      Palpations: Abdomen is soft.      Tenderness: There is no abdominal tenderness. There is no guarding.      Comments: Ileostomy present, bag in place, loose stool in bag, no extending erythema.    Musculoskeletal:         General: No swelling.      Cervical back: Normal range of motion and neck supple.      Right lower leg: No edema.      Left lower leg: No edema.   Skin:     General: Skin is warm.   Neurological:      Mental Status: He is alert and oriented to person, place, and time.   Psychiatric:         Mood and Affect: Mood normal.         Behavior: Behavior normal.       Results for orders placed or performed in visit on 05/10/23   Albumin Random Urine Quantitative with Creat Ratio     Status: None   Result Value Ref Range    Creatinine Urine mg/dL 101.0 mg/dL    Albumin Urine mg/L <12.0 mg/L    Albumin Urine mg/g Cr     BASIC METABOLIC PANEL     Status: Abnormal   Result Value Ref Range    Sodium 141 136 - 145 mmol/L    Potassium 4.3 3.4 - 5.3 mmol/L    Chloride 106 98 - 107 mmol/L    Carbon Dioxide (CO2) 21 (L) 22 - 29 mmol/L    Anion Gap 14 7 - 15 mmol/L    Urea Nitrogen 22.2 8.0 - 23.0 mg/dL    Creatinine 1.36 (H) 0.67 - 1.17 mg/dL    Calcium 9.0 8.8 - 10.2 mg/dL    Glucose 96 70 - 99 mg/dL    GFR Estimate 53 (L) >60 mL/min/1.73m2   Hemoglobin A1c     Status: Normal   Result Value Ref Range    Hemoglobin A1C 5.6 0.0 - 5.6 %   HIV Antigen Antibody Combo     Status: Normal   Result Value Ref Range    HIV Antigen  Antibody Combo Nonreactive Nonreactive   TSH     Status: Abnormal   Result Value Ref Range    TSH <0.01 (L) 0.30 - 4.20 uIU/mL       ASSESSMENT / PLAN:       ICD-10-CM    1. Preventative health care  Z00.00 Hemoglobin A1c     HIV Antigen Antibody Combo     Hemoglobin A1c     HIV Antigen Antibody Combo      2. Need for shingles vaccine  Z23       3. Stage 3a chronic kidney disease (H)  N18.31       4. Hypothyroidism due to acquired atrophy of thyroid  E03.4 TSH     TSH      5. Elevated fasting glucose  R73.01 Hemoglobin A1c     Hemoglobin A1c      6. Carpal tunnel syndrome of right wrist  G56.01 diclofenac (VOLTAREN) 1 % topical gel     Orthopedic  Referral      7. Ileostomy status (H)  Z93.2       8. Pain of right hand  M79.641       9. Adenocarcinoma of rectum (H)  C20       10. Unsteady gait  R26.81       11. Prediabetes  R73.03       12. Cataract of both eyes, unspecified cataract type  H26.9       13. Moderate recurrent major depression (H)  F33.1       14. Bilateral hearing loss, unspecified hearing loss type  H91.93         We reviewed and updated his problem list, making a list of acute topics to work on going forward.    He is at high risk for falls and this needs to be addressed.    For step however is to meet his new surgeon and discussed neck steps for his colon cancer and current ileostomy.    Patient has been advised of split billing requirements and indicates understanding: Yes    COUNSELING:  Reviewed preventive health counseling, as reflected in patient instructions       Regular exercise       Healthy diet/nutrition       Dental care       Bladder control       Fall risk           He reports that he has been smoking pipe. He has never used smokeless tobacco.  Nicotine/Tobacco Cessation Plan:   Self help information given to patient    Reviewed patients plan of care and provided an AVS. The Complex Care Plan (for patients with higher acuity and needing more deliberate coordination of  services) for Isiah meets the Care Plan requirement. This Care Plan has been established and reviewed with the Patient.    Jacqueline Nowak MD  [facility]    Identified Health Risks:    I have reviewed Opioid Use Disorder and Substance Use Disorder risk factors and made any needed referrals.        MEDICARE PERSONAL PREVENTIVE SERVICES PLAN - SERVICES     Review these tests with your doctor then decide which ones you want and take this page home for your reference                                                    IMMUNIZATIONS Description Recommend today?     Hepatitis B  If you have any of the following risk factors you should be immunized for hepatitis B: severe kidney disease, people who live in the same house as a carrier of Hepatitis B virus, people who live in  institutions (e.g. nursing homes or group homes), homosexual men, patients with hemophilia who received Factor VIII or IX concentrates, abusers of illicit injectable drugs No; is up to date.     SCREENING TESTS     Description   Year of Last Screening   Recommended Today?   Heart disease screening blood tests    Cholesterol level Reducing cholesterol can reduce your risk of heart attacks by 25%.  Screening is recommended yearly if you are at risk of heart disease otherwise every 4-5 years 10/2022  On Atorvastatin 80mg No; is up to date.   Diabetes screening tests    Glucose    Hemoglobin A1c blood test   Finding and treating diabetes early can reduce complications.  Screening recommended/covered yearly if you have high blood pressure, high cholesterol, obesity (BMI >30), or a history of high blood glucose tests; or 2 of the following: family history of diabetes, overweight (BMI >25 but <30), age 65 years or older, and a history of diabetes of pregnancy or gave birth to baby weighing more than 9 lbs. 9/2020  5.7 Yes; Recommended and ordered.   Hepatitis B screening Finding hepatitis B early can reduce complications.  Screening  is recommended for persons with selected risk factors. NA Series completed   Hepatitis C screening Finding hepatitis C early can reduce complications.  Screening is recommended for all persons born from 1945 through 1965 and for those with selected other risk factors.  10/2022 No; is up to date.   HIV screening Finding HIV early can reduce complications.  Screening is recommended for persons with risk factors for HIV infection. NA Yes; Recommended and ordered.   Glaucoma screening Early detection of glaucoma can prevent blindness.   Please talk to your eye doctor about this.       SCREENING TESTS     Description   Year of Last Screening   Recommended Today?   Colorectal cancer screening    Fecal occult blood test     Screening colonoscopy Screening for colon cancer has been shown to reduce death from colon cancer by 25-30%. Screening recommended to start at 50 years and continuing until age 75 years.   NA  Patient has colon cancer No: is not indicated today.   Breast Cancer Screening (women)    Mammogram Mammogram screening for breast cancer has been shown to reduce the risk of dying from breast cancer and prolong life. Screening is recommended every 1-2 years for women aged 50 to 74 years.  NA No: is not indicated today.   Cervical Cancer screening (women)    Pap Cervical pap smears can reduce cervical cancer. Screening is recommended annually if high risk (history of abnormal pap smears) otherwise every 2-3 years, stop screening at 65 years of age if history of normal paps. NA No: is not indicated today.   Screening for Osteoporosis:  Bone mass measurements (women)    Dexa Scan Screening and treating Osteoporosis can reduce the risk of hip and spine fractures. Screening is recommended in women 65 years or older and in women and men at risk of osteoporosis. NA No: is not indicated today.   Screening for Lung Cancer     Low-dose CT scanning Screening can reduce mortality in persons aged 55-80 who have smoked at  least 30 pack-years and who are either still smoking or have quit in the past 15 years. None Yes; Recommended and ordered.   Abdominal Aortic Aneurysm (AAA) screening    Ultrasound (US)   An aneurysm treated before rupture is very safe -a ruptured aneurysm can be fatal.  Screening  by US for AAA is limited to patients who meet one of the following criteria:    Men who are 65-75 years old and have smoked more than 100 cigarettes in their lifetime    Anyone with a family history of abdominal aortic aneurysm 4/2017 No: is not indicated today.             MEDICARE WELLNESS EXAM PATIENT INSTRUCTIONS    Yearly exam:     See your health care provider every year in order to review changes in your health, review medicines that you take, and discuss preventive care needs such as immunizations and cancer screening.    Get a flu shot each year.     Advance Directives:    If you have not done so, you are encouraged to complete advance directives and/or a living will.   More information about advance directives can be found at: http://www.[place]med.org/advocacy/Key-Issues/Advance-Directives    Nutrition:     Eat at least 5 servings of fruits and vegetables each day.     Eat whole-grain bread, whole-wheat pasta and brown rice instead of white grains and rice.     Talk to your doctor about Calcium and Vitamin D.     Lifestyle:    Exercise for at least 150 minutes a week (30 minutes a day, 5 days a week). This will help you control your weight and prevent disease.     Limit alcohol to one drink per day.     If you smoke, try to quit - your doctor will be happy to help.     Wear sunscreen to prevent skin cancer.     See your dentist every six months for an exam and cleaning.     See your eye doctor every 1 to 2 years to screen for conditions such as glaucoma, macular degeneration and cataracts.            The patient was provided with suggestions to help him develop a healthy emotional lifestyle.

## 2023-05-10 ENCOUNTER — OFFICE VISIT (OUTPATIENT)
Dept: FAMILY MEDICINE | Facility: CLINIC | Age: 78
End: 2023-05-10
Payer: COMMERCIAL

## 2023-05-10 VITALS
SYSTOLIC BLOOD PRESSURE: 143 MMHG | BODY MASS INDEX: 29.37 KG/M2 | TEMPERATURE: 97.5 F | HEIGHT: 64 IN | HEART RATE: 94 BPM | DIASTOLIC BLOOD PRESSURE: 73 MMHG | RESPIRATION RATE: 20 BRPM | WEIGHT: 172 LBS | OXYGEN SATURATION: 96 %

## 2023-05-10 DIAGNOSIS — C20 ADENOCARCINOMA OF RECTUM (H): ICD-10-CM

## 2023-05-10 DIAGNOSIS — R73.03 PREDIABETES: ICD-10-CM

## 2023-05-10 DIAGNOSIS — H91.93 BILATERAL HEARING LOSS, UNSPECIFIED HEARING LOSS TYPE: ICD-10-CM

## 2023-05-10 DIAGNOSIS — Z00.00 ENCOUNTER FOR MEDICARE ANNUAL WELLNESS EXAM: ICD-10-CM

## 2023-05-10 DIAGNOSIS — R26.81 UNSTEADY GAIT: ICD-10-CM

## 2023-05-10 DIAGNOSIS — M79.641 PAIN OF RIGHT HAND: ICD-10-CM

## 2023-05-10 DIAGNOSIS — H26.9 CATARACT OF BOTH EYES, UNSPECIFIED CATARACT TYPE: ICD-10-CM

## 2023-05-10 DIAGNOSIS — N18.31 STAGE 3A CHRONIC KIDNEY DISEASE (H): ICD-10-CM

## 2023-05-10 DIAGNOSIS — E03.4 HYPOTHYROIDISM DUE TO ACQUIRED ATROPHY OF THYROID: ICD-10-CM

## 2023-05-10 DIAGNOSIS — G56.01 CARPAL TUNNEL SYNDROME OF RIGHT WRIST: ICD-10-CM

## 2023-05-10 DIAGNOSIS — Z23 NEED FOR SHINGLES VACCINE: ICD-10-CM

## 2023-05-10 DIAGNOSIS — R73.01 ELEVATED FASTING GLUCOSE: ICD-10-CM

## 2023-05-10 DIAGNOSIS — Z93.2 ILEOSTOMY STATUS (H): ICD-10-CM

## 2023-05-10 DIAGNOSIS — Z00.00 PREVENTATIVE HEALTH CARE: Primary | ICD-10-CM

## 2023-05-10 DIAGNOSIS — F33.1 MODERATE RECURRENT MAJOR DEPRESSION (H): ICD-10-CM

## 2023-05-10 LAB
ANION GAP SERPL CALCULATED.3IONS-SCNC: 14 MMOL/L (ref 7–15)
BUN SERPL-MCNC: 22.2 MG/DL (ref 8–23)
CALCIUM SERPL-MCNC: 9 MG/DL (ref 8.8–10.2)
CHLORIDE SERPL-SCNC: 106 MMOL/L (ref 98–107)
CREAT SERPL-MCNC: 1.36 MG/DL (ref 0.67–1.17)
CREAT UR-MCNC: 101 MG/DL
DEPRECATED HCO3 PLAS-SCNC: 21 MMOL/L (ref 22–29)
GFR SERPL CREATININE-BSD FRML MDRD: 53 ML/MIN/1.73M2
GLUCOSE SERPL-MCNC: 96 MG/DL (ref 70–99)
HBA1C MFR BLD: 5.6 % (ref 0–5.6)
HIV 1+2 AB+HIV1 P24 AG SERPL QL IA: NONREACTIVE
MICROALBUMIN UR-MCNC: <12 MG/L
MICROALBUMIN/CREAT UR: NORMAL MG/G{CREAT}
POTASSIUM SERPL-SCNC: 4.3 MMOL/L (ref 3.4–5.3)
SODIUM SERPL-SCNC: 141 MMOL/L (ref 136–145)
TSH SERPL DL<=0.005 MIU/L-ACNC: <0.01 UIU/ML (ref 0.3–4.2)

## 2023-05-10 PROCEDURE — 36415 COLL VENOUS BLD VENIPUNCTURE: CPT | Performed by: STUDENT IN AN ORGANIZED HEALTH CARE EDUCATION/TRAINING PROGRAM

## 2023-05-10 PROCEDURE — 83036 HEMOGLOBIN GLYCOSYLATED A1C: CPT | Performed by: STUDENT IN AN ORGANIZED HEALTH CARE EDUCATION/TRAINING PROGRAM

## 2023-05-10 PROCEDURE — 99213 OFFICE O/P EST LOW 20 MIN: CPT | Mod: 25 | Performed by: STUDENT IN AN ORGANIZED HEALTH CARE EDUCATION/TRAINING PROGRAM

## 2023-05-10 PROCEDURE — 87389 HIV-1 AG W/HIV-1&-2 AB AG IA: CPT | Performed by: STUDENT IN AN ORGANIZED HEALTH CARE EDUCATION/TRAINING PROGRAM

## 2023-05-10 PROCEDURE — 80048 BASIC METABOLIC PNL TOTAL CA: CPT | Performed by: STUDENT IN AN ORGANIZED HEALTH CARE EDUCATION/TRAINING PROGRAM

## 2023-05-10 PROCEDURE — 82043 UR ALBUMIN QUANTITATIVE: CPT | Performed by: STUDENT IN AN ORGANIZED HEALTH CARE EDUCATION/TRAINING PROGRAM

## 2023-05-10 PROCEDURE — 99397 PER PM REEVAL EST PAT 65+ YR: CPT | Performed by: STUDENT IN AN ORGANIZED HEALTH CARE EDUCATION/TRAINING PROGRAM

## 2023-05-10 PROCEDURE — 84443 ASSAY THYROID STIM HORMONE: CPT | Performed by: STUDENT IN AN ORGANIZED HEALTH CARE EDUCATION/TRAINING PROGRAM

## 2023-05-10 PROCEDURE — 82570 ASSAY OF URINE CREATININE: CPT | Performed by: STUDENT IN AN ORGANIZED HEALTH CARE EDUCATION/TRAINING PROGRAM

## 2023-05-10 ASSESSMENT — ENCOUNTER SYMPTOMS
JOINT SWELLING: 1
NAUSEA: 1
COUGH: 1
DIZZINESS: 0
HEADACHES: 1
CONSTIPATION: 1
PARESTHESIAS: 0
HEMATOCHEZIA: 0
MYALGIAS: 1
CHILLS: 0
EYE PAIN: 0
WEAKNESS: 1
SORE THROAT: 0
DYSURIA: 0
SHORTNESS OF BREATH: 1
ABDOMINAL PAIN: 1
DIARRHEA: 1
HEMATURIA: 0
PALPITATIONS: 0
HEARTBURN: 1
NERVOUS/ANXIOUS: 0
FEVER: 0
ARTHRALGIAS: 1

## 2023-05-10 ASSESSMENT — ACTIVITIES OF DAILY LIVING (ADL)
DRESSING/BATHING_DIFFICULTY: NO
HEARING_DIFFICULTY_OR_DEAF: YES
TOILETING_ISSUES: NO
WEAR_GLASSES_OR_BLIND: YES
TRANSFERRING: 0-->INDEPENDENT
DIFFICULTY_EATING/SWALLOWING: NO
TRANSFERRING: 0-->INDEPENDENT
FALL_HISTORY_WITHIN_LAST_SIX_MONTHS: NO
DIFFICULTY_COMMUNICATING: NO
DOING_ERRANDS_INDEPENDENTLY_DIFFICULTY: NO
CONCENTRATING,_REMEMBERING_OR_MAKING_DECISIONS_DIFFICULTY: NO
DESCRIBE_HEARING_LOSS: HEARING LOSS ON RIGHT SIDE;HEARING LOSS ON LEFT SIDE
WALKING_OR_CLIMBING_STAIRS_DIFFICULTY: YES
CHANGE_IN_FUNCTIONAL_STATUS_SINCE_ONSET_OF_CURRENT_ILLNESS/INJURY: NO
CURRENT_FUNCTION: NO ASSISTANCE NEEDED

## 2023-05-10 NOTE — PATIENT INSTRUCTIONS
For the hand:  --Wear your brace!  Especially at night  --Referral for ortho hand to talk about options.     For the stomach:  --Glad you are following up with oncology and have a new surgeon.    --Let me know if there are other things I can help with.     THings to do in the future:  --Hearing evaluation  --Eye doctor  --Dentist for fitting for new dentures  --Talk about urinary frequency  --Review syncope and falls - make sure you are safe!    Stop in lab for blood draw.    Follow up in 6-8 weeks after your stomach surgery      MEDICARE PERSONAL PREVENTIVE SERVICES PLAN - SERVICES     Review these tests with your doctor then decide which ones you want and take this page home for your reference                                                    IMMUNIZATIONS Description Recommend today?     Hepatitis B  If you have any of the following risk factors you should be immunized for hepatitis B: severe kidney disease, people who live in the same house as a carrier of Hepatitis B virus, people who live in  institutions (e.g. nursing homes or group homes), homosexual men, patients with hemophilia who received Factor VIII or IX concentrates, abusers of illicit injectable drugs No; is up to date.     SCREENING TESTS     Description   Year of Last Screening   Recommended Today?   Heart disease screening blood tests    Cholesterol level Reducing cholesterol can reduce your risk of heart attacks by 25%.  Screening is recommended yearly if you are at risk of heart disease otherwise every 4-5 years 10/2022  On Atorvastatin 80mg No; is up to date.   Diabetes screening tests    Glucose    Hemoglobin A1c blood test   Finding and treating diabetes early can reduce complications.  Screening recommended/covered yearly if you have high blood pressure, high cholesterol, obesity (BMI >30), or a history of high blood glucose tests; or 2 of the following: family history of diabetes, overweight (BMI >25 but <30), age 65 years or older, and a  history of diabetes of pregnancy or gave birth to baby weighing more than 9 lbs. 9/2020  5.7 Yes; Recommended and ordered.   Hepatitis B screening Finding hepatitis B early can reduce complications.  Screening is recommended for persons with selected risk factors. NA Series completed   Hepatitis C screening Finding hepatitis C early can reduce complications.  Screening is recommended for all persons born from 1945 through 1965 and for those with selected other risk factors.  10/2022 No; is up to date.   HIV screening Finding HIV early can reduce complications.  Screening is recommended for persons with risk factors for HIV infection. NA Yes; Recommended and ordered.   Glaucoma screening Early detection of glaucoma can prevent blindness.   Please talk to your eye doctor about this.       SCREENING TESTS     Description   Year of Last Screening   Recommended Today?   Colorectal cancer screening    Fecal occult blood test     Screening colonoscopy Screening for colon cancer has been shown to reduce death from colon cancer by 25-30%. Screening recommended to start at 50 years and continuing until age 75 years.   NA  Patient has colon cancer No: is not indicated today.   Breast Cancer Screening (women)    Mammogram Mammogram screening for breast cancer has been shown to reduce the risk of dying from breast cancer and prolong life. Screening is recommended every 1-2 years for women aged 50 to 74 years.  NA No: is not indicated today.   Cervical Cancer screening (women)    Pap Cervical pap smears can reduce cervical cancer. Screening is recommended annually if high risk (history of abnormal pap smears) otherwise every 2-3 years, stop screening at 65 years of age if history of normal paps. NA No: is not indicated today.   Screening for Osteoporosis:  Bone mass measurements (women)    Dexa Scan Screening and treating Osteoporosis can reduce the risk of hip and spine fractures. Screening is recommended in women 65 years or  older and in women and men at risk of osteoporosis. NA No: is not indicated today.   Screening for Lung Cancer     Low-dose CT scanning Screening can reduce mortality in persons aged 55-80 who have smoked at least 30 pack-years and who are either still smoking or have quit in the past 15 years. None Yes; Recommended and ordered.   Abdominal Aortic Aneurysm (AAA) screening    Ultrasound (US)   An aneurysm treated before rupture is very safe -a ruptured aneurysm can be fatal.  Screening  by US for AAA is limited to patients who meet one of the following criteria:    Men who are 65-75 years old and have smoked more than 100 cigarettes in their lifetime    Anyone with a family history of abdominal aortic aneurysm 4/2017 No: is not indicated today.             MEDICARE WELLNESS EXAM PATIENT INSTRUCTIONS    Yearly exam:     See your health care provider every year in order to review changes in your health, review medicines that you take, and discuss preventive care needs such as immunizations and cancer screening.    Get a flu shot each year.     Advance Directives:    If you have not done so, you are encouraged to complete advance directives and/or a living will.   More information about advance directives can be found at: http://www.[place]med.org/advocacy/Key-Issues/Advance-Directives    Nutrition:     Eat at least 5 servings of fruits and vegetables each day.     Eat whole-grain bread, whole-wheat pasta and brown rice instead of white grains and rice.     Talk to your doctor about Calcium and Vitamin D.     Lifestyle:    Exercise for at least 150 minutes a week (30 minutes a day, 5 days a week). This will help you control your weight and prevent disease.     Limit alcohol to one drink per day.     If you smoke, try to quit - your doctor will be happy to help.     Wear sunscreen to prevent skin cancer.     See your dentist every six months for an exam and cleaning.     See your eye doctor every 1 to 2 years to screen for  conditions such as glaucoma, macular degeneration and cataracts.                  05/11/23   ORTHOPEDICS ADULT REFERRAL  [facility]  Phone: [phone]  Fax: [phone]    Demographics, referral, office note(s) and radiology reports faxed to [phone], they will contact pt to schedule an appt.    Christina Hatfield    Patient Education   Personalized Prevention Plan  You are due for the preventive services outlined below.  Your care team is available to assist you in scheduling these services.  If you have already completed any of these items, please share that information with your care team to update in your medical record.  Health Maintenance Due   Topic Date Due     Heart Failure Action Plan  Never done     COPD Action Plan  Never done     Depression Action Plan  Never done     Zoster (Shingles) Vaccine (1 of 2) Never done     LUNG CANCER SCREENING  03/07/2021     Flu Vaccine (1) 09/01/2022     COVID-19 Vaccine (5 - Moderna series) 01/22/2023     Depression Assessment  04/18/2023     Your Health Risk Assessment indicates you feel you are not in good emotional health.    Recreation   Recreation is not limited to sports and team events. It includes any activity that provides relaxation, interest, enjoyment, and exercise. Recreation provides an outlet for physical, mental, and social energy. It can give a sense of worth and achievement. It can help you stay healthy.    Mental Exercise and Social Involvement  Mental and emotional health is as important as physical health. Keep in touch with friends and family. Stay as active as possible. Continue to learn and challenge yourself.   Things you can do to stay mentally active are:    Learn something new, like a foreign language or musical instrument.     Play SCRABBLE or do crossword puzzles. If you cannot find people to play these games with you at home, you can play them with others on your computer through the Internet.     Join a games club--anything from  card games to chess or checkers or lawn bowling.     Start a new hobby.     Go back to school.     Volunteer.     Read.   Keep up with world events.

## 2023-05-10 NOTE — LETTER
May 11, 2023      Michael D McArdle  [address]   [facility] [address]        Dear Mr.McArdle,    We are writing to inform you of your test results.     New prescription was sent in to the pharmacy for him. Labs are otherwise normal and stable.  Please call the clinic at [phone] if you have any questions.      Resulted Orders   Albumin Random Urine Quantitative with Creat Ratio   Result Value Ref Range    Creatinine Urine mg/dL 101.0 mg/dL      Comment:      The reference ranges have not been established in urine creatinine. The results should be integrated into the clinical context for interpretation.    Albumin Urine mg/L <12.0 mg/L      Comment:      The reference ranges have not been established in urine albumin. The results should be integrated into the clinical context for interpretation.    Albumin Urine mg/g Cr        Comment:      Unable to calculate, urine albumin and/or urine creatinine is outside detectable limits.  Microalbuminuria is defined as an albumin:creatinine ratio of 17 to 299 for males and 25 to 299 for females. A ratio of albumin:creatinine of 300 or higher is indicative of overt proteinuria.  Due to biologic variability, positive results should be confirmed by a second, first-morning random or 24-hour timed urine specimen. If there is discrepancy, a third specimen is recommended. When 2 out of 3 results are in the microalbuminuria range, this is evidence for incipient nephropathy and warrants increased efforts at glucose control, blood pressure control, and institution of therapy with an angiotensin-converting-enzyme (ACE) inhibitor (if the patient can tolerate it).     BASIC METABOLIC PANEL   Result Value Ref Range    Sodium 141 136 - 145 mmol/L    Potassium 4.3 3.4 - 5.3 mmol/L    Chloride 106 98 - 107 mmol/L    Carbon Dioxide (CO2) 21 (L) 22 - 29 mmol/L    Anion Gap 14 7 - 15 mmol/L    Urea Nitrogen 22.2 8.0 - 23.0 mg/dL    Creatinine 1.36 (H) 0.67 - 1.17 mg/dL    Calcium  9.0 8.8 - 10.2 mg/dL    Glucose 96 70 - 99 mg/dL    GFR Estimate 53 (L) >60 mL/min/1.73m2      Comment:      eGFR calculated using 2021 CKD-EPI equation.   Hemoglobin A1c   Result Value Ref Range    Hemoglobin A1C 5.6 0.0 - 5.6 %      Comment:      Normal <5.7%   Prediabetes 5.7-6.4%    Diabetes 6.5% or higher     Note: Adopted from ADA consensus guidelines.   HIV Antigen Antibody Combo   Result Value Ref Range    HIV Antigen Antibody Combo Nonreactive Nonreactive      Comment:      HIV-1 p24 Ag & HIV-1/HIV-2 Ab Not Detected   TSH   Result Value Ref Range    TSH <0.01 (L) 0.30 - 4.20 uIU/mL       If you have any questions or concerns, please call the clinic at the number listed above.       Sincerely,      Jacqueline Nowak MD

## 2023-05-11 DIAGNOSIS — E03.4 HYPOTHYROIDISM DUE TO ACQUIRED ATROPHY OF THYROID: ICD-10-CM

## 2023-05-11 RX ORDER — LEVOTHYROXINE SODIUM 150 UG/1
150 TABLET ORAL DAILY
Qty: 90 TABLET | Refills: 0 | Status: SHIPPED | OUTPATIENT
Start: 2023-05-11 | End: 2023-08-27

## 2023-05-11 NOTE — RESULT ENCOUNTER NOTE
Called patient and left voicemail about abnormal thyroid test results and need to decrease his thyroid medication.  New prescription was sent in to the pharmacy for him. Labs are otherwise normal and stable.  Please call the clinic at [phone] if you have any questions.      Jacqueline Nowak MD    Please send results to patient.

## 2023-05-16 PROBLEM — Z93.2 ILEOSTOMY STATUS (H): Status: ACTIVE | Noted: 2023-05-16

## 2023-05-16 PROBLEM — H26.9 CATARACT OF BOTH EYES, UNSPECIFIED CATARACT TYPE: Status: ACTIVE | Noted: 2023-05-16

## 2023-05-16 PROBLEM — G56.01 CARPAL TUNNEL SYNDROME OF RIGHT WRIST: Status: ACTIVE | Noted: 2023-05-16

## 2023-05-19 ENCOUNTER — MEDICAL CORRESPONDENCE (OUTPATIENT)
Dept: HEALTH INFORMATION MANAGEMENT | Facility: CLINIC | Age: 78
End: 2023-05-19
Payer: COMMERCIAL

## 2023-05-24 ENCOUNTER — DOCUMENTATION ONLY (OUTPATIENT)
Dept: FAMILY MEDICINE | Facility: CLINIC | Age: 78
End: 2023-05-24
Payer: COMMERCIAL

## 2023-05-24 NOTE — PROGRESS NOTES
To be completed in Nursing note:    Please reference list for forms that require a visit for completion.  Please remind patients that providers are given 3-5 business days to complete and return forms.      Form type:  [employer]    Date form received: 23    Date form completed by Physician:  23    How was form returned to patient (mailed, faxed, or at  for patient to ):    Faxed     Date form mailed/faxed/left at  for patient and sent to HIM for scannin23      Once form is left for patient, faxed, or mailed PCS will then close the documentation only encounter.

## 2023-06-13 ENCOUNTER — TRANSFERRED RECORDS (OUTPATIENT)
Dept: HEALTH INFORMATION MANAGEMENT | Facility: CLINIC | Age: 78
End: 2023-06-13

## 2023-06-23 ENCOUNTER — MEDICAL CORRESPONDENCE (OUTPATIENT)
Dept: HEALTH INFORMATION MANAGEMENT | Facility: CLINIC | Age: 78
End: 2023-06-23
Payer: COMMERCIAL

## 2023-06-27 ENCOUNTER — DOCUMENTATION ONLY (OUTPATIENT)
Dept: FAMILY MEDICINE | Facility: CLINIC | Age: 78
End: 2023-06-27
Payer: COMMERCIAL

## 2023-06-27 NOTE — PROGRESS NOTES
To be completed in Nursing note:    Please reference list for forms that require a visit for completion.  Please remind patients that providers are given 3-5 business days to complete and return forms.      Form type:  [employer]    Date form received: 23    Date form completed by Physician:23    How was form returned to patient (mailed, faxed, or at  for patient to ):  Faxed    Date form mailed/faxed/left at  for patient and sent to HIM for scannin23      Once form is left for patient, faxed, or mailed PCS will then close the documentation only encounter.

## 2023-07-06 ENCOUNTER — MEDICAL CORRESPONDENCE (OUTPATIENT)
Dept: HEALTH INFORMATION MANAGEMENT | Facility: CLINIC | Age: 78
End: 2023-07-06
Payer: COMMERCIAL

## 2023-07-07 ENCOUNTER — DOCUMENTATION ONLY (OUTPATIENT)
Dept: FAMILY MEDICINE | Facility: CLINIC | Age: 78
End: 2023-07-07
Payer: COMMERCIAL

## 2023-07-07 NOTE — PROGRESS NOTES
To be completed in Nursing note:    Please reference list for forms that require a visit for completion.  Please remind patients that providers are given 3-5 business days to complete and return forms.      Form type:[employer]    Date form received: 23    Date form completed by Physician:23    How was form returned to patient (mailed, faxed, or at  for patient to ):faxed to     Date form mailed/faxed/left at  for patient and sent to HIM for scannin23    Once form is left for patient, faxed, or mailed PCS will then close the documentation only encounter.

## 2023-07-12 ENCOUNTER — TELEPHONE (OUTPATIENT)
Dept: FAMILY MEDICINE | Facility: CLINIC | Age: 78
End: 2023-07-12
Payer: COMMERCIAL

## 2023-07-12 NOTE — TELEPHONE ENCOUNTER
[facility] phone call message- general phone call:    Reason for call: pt was requesting a letter for a  pet. He thought the Dr had discussed this at the last visit.    Action desired: please call if possible    Return call needed: Yes    OK to leave a message on voice mail? Yes    Advised patient to response may take up to 2 business days: No    Primary language: English      needed? No    Call taken on July 12, 2023 at 9:03 AM by [facility] phone call message-

## 2023-07-12 NOTE — LETTER
To Whom this may concern:    Michael D McArdle is a patient of mine here at [facility].  He has a diagnosis of mental health issues, including moderate recurrent major depression, for which we have developed a multi-dimensional care plan that includes anti-depressant medications, psychotherapy, and home supports.  Part of this plan includes the use of home supports, including support for a  animal.  Please allow Mr. McArdle to keep a  animal with him at home.  He will be responsible for safety, clean up, and ensure appropriate behavioral for this animal.  The animal will support him in relaxation, mood management and anxiety.  Please call the clinic at [phone] if you have any questions.        Sincerely,           Jacqueline Nowak MD  [facility]

## 2023-07-20 NOTE — TELEPHONE ENCOUNTER
Letter drafted, printed and signed and will give to Jacob to send to the patient.     As a note, typically these letters require a visit (which is what I shared with the patient previously), but given my lack of availability, letter was written today.     Jacqueline Nowak MD    Routed to Nupur.

## 2023-07-25 ENCOUNTER — TRANSFERRED RECORDS (OUTPATIENT)
Dept: HEALTH INFORMATION MANAGEMENT | Facility: CLINIC | Age: 78
End: 2023-07-25

## 2023-07-25 LAB — PHQ9 SCORE: 6

## 2023-08-03 ENCOUNTER — MEDICAL CORRESPONDENCE (OUTPATIENT)
Dept: HEALTH INFORMATION MANAGEMENT | Facility: CLINIC | Age: 78
End: 2023-08-03

## 2023-08-25 ENCOUNTER — OFFICE VISIT (OUTPATIENT)
Dept: FAMILY MEDICINE | Facility: CLINIC | Age: 78
End: 2023-08-25
Payer: COMMERCIAL

## 2023-08-25 ENCOUNTER — DOCUMENTATION ONLY (OUTPATIENT)
Dept: FAMILY MEDICINE | Facility: CLINIC | Age: 78
End: 2023-08-25

## 2023-08-25 VITALS
TEMPERATURE: 97.9 F | BODY MASS INDEX: 28.47 KG/M2 | HEART RATE: 89 BPM | WEIGHT: 166.4 LBS | OXYGEN SATURATION: 97 % | DIASTOLIC BLOOD PRESSURE: 71 MMHG | RESPIRATION RATE: 20 BRPM | SYSTOLIC BLOOD PRESSURE: 126 MMHG

## 2023-08-25 DIAGNOSIS — E03.4 HYPOTHYROIDISM DUE TO ACQUIRED ATROPHY OF THYROID: ICD-10-CM

## 2023-08-25 DIAGNOSIS — C20 ADENOCARCINOMA OF RECTUM (H): ICD-10-CM

## 2023-08-25 DIAGNOSIS — C20 ADENOCARCINOMA OF RECTUM (H): Primary | ICD-10-CM

## 2023-08-25 DIAGNOSIS — G62.9 PERIPHERAL POLYNEUROPATHY: ICD-10-CM

## 2023-08-25 DIAGNOSIS — J44.9 CHRONIC OBSTRUCTIVE PULMONARY DISEASE, UNSPECIFIED COPD TYPE (H): ICD-10-CM

## 2023-08-25 DIAGNOSIS — K43.9 VENTRAL HERNIA WITHOUT OBSTRUCTION OR GANGRENE: ICD-10-CM

## 2023-08-25 DIAGNOSIS — Z93.2 ILEOSTOMY STATUS (H): ICD-10-CM

## 2023-08-25 DIAGNOSIS — E03.4 HYPOTHYROIDISM DUE TO ACQUIRED ATROPHY OF THYROID: Primary | ICD-10-CM

## 2023-08-25 LAB
ALT SERPL W P-5'-P-CCNC: 8 U/L (ref 0–70)
ERYTHROCYTE [DISTWIDTH] IN BLOOD BY AUTOMATED COUNT: 14 % (ref 10–15)
HCT VFR BLD AUTO: 33.4 % (ref 40–53)
HGB BLD-MCNC: 11.2 G/DL (ref 13.3–17.7)
MCH RBC QN AUTO: 30.1 PG (ref 26.5–33)
MCHC RBC AUTO-ENTMCNC: 33.5 G/DL (ref 31.5–36.5)
MCV RBC AUTO: 90 FL (ref 78–100)
PLATELET # BLD AUTO: 282 10E3/UL (ref 150–450)
RBC # BLD AUTO: 3.72 10E6/UL (ref 4.4–5.9)
T4 FREE SERPL-MCNC: 1.58 NG/DL (ref 0.9–1.7)
TSH SERPL DL<=0.005 MIU/L-ACNC: 0.25 UIU/ML (ref 0.3–4.2)
WBC # BLD AUTO: 5.2 10E3/UL (ref 4–11)

## 2023-08-25 PROCEDURE — 36415 COLL VENOUS BLD VENIPUNCTURE: CPT | Performed by: STUDENT IN AN ORGANIZED HEALTH CARE EDUCATION/TRAINING PROGRAM

## 2023-08-25 PROCEDURE — 84439 ASSAY OF FREE THYROXINE: CPT | Performed by: STUDENT IN AN ORGANIZED HEALTH CARE EDUCATION/TRAINING PROGRAM

## 2023-08-25 PROCEDURE — 85027 COMPLETE CBC AUTOMATED: CPT | Performed by: STUDENT IN AN ORGANIZED HEALTH CARE EDUCATION/TRAINING PROGRAM

## 2023-08-25 PROCEDURE — 99214 OFFICE O/P EST MOD 30 MIN: CPT | Performed by: STUDENT IN AN ORGANIZED HEALTH CARE EDUCATION/TRAINING PROGRAM

## 2023-08-25 PROCEDURE — 84443 ASSAY THYROID STIM HORMONE: CPT | Performed by: STUDENT IN AN ORGANIZED HEALTH CARE EDUCATION/TRAINING PROGRAM

## 2023-08-25 PROCEDURE — 84460 ALANINE AMINO (ALT) (SGPT): CPT | Performed by: STUDENT IN AN ORGANIZED HEALTH CARE EDUCATION/TRAINING PROGRAM

## 2023-08-25 RX ORDER — GABAPENTIN 100 MG/1
100 CAPSULE ORAL AT BEDTIME
Qty: 30 CAPSULE | Refills: 1 | Status: SHIPPED | OUTPATIENT
Start: 2023-08-25 | End: 2024-03-11

## 2023-08-25 RX ORDER — GABAPENTIN 100 MG/1
100 CAPSULE ORAL 3 TIMES DAILY
Qty: 30 CAPSULE | Refills: 1 | Status: SHIPPED | OUTPATIENT
Start: 2023-08-25 | End: 2023-08-25

## 2023-08-25 ASSESSMENT — ANXIETY QUESTIONNAIRES
5. BEING SO RESTLESS THAT IT IS HARD TO SIT STILL: NOT AT ALL
2. NOT BEING ABLE TO STOP OR CONTROL WORRYING: NOT AT ALL
GAD7 TOTAL SCORE: 1
IF YOU CHECKED OFF ANY PROBLEMS ON THIS QUESTIONNAIRE, HOW DIFFICULT HAVE THESE PROBLEMS MADE IT FOR YOU TO DO YOUR WORK, TAKE CARE OF THINGS AT HOME, OR GET ALONG WITH OTHER PEOPLE: NOT DIFFICULT AT ALL
7. FEELING AFRAID AS IF SOMETHING AWFUL MIGHT HAPPEN: NOT AT ALL
6. BECOMING EASILY ANNOYED OR IRRITABLE: NOT AT ALL
1. FEELING NERVOUS, ANXIOUS, OR ON EDGE: SEVERAL DAYS
3. WORRYING TOO MUCH ABOUT DIFFERENT THINGS: NOT AT ALL
GAD7 TOTAL SCORE: 1

## 2023-08-25 ASSESSMENT — PATIENT HEALTH QUESTIONNAIRE - PHQ9
5. POOR APPETITE OR OVEREATING: NOT AT ALL
SUM OF ALL RESPONSES TO PHQ QUESTIONS 1-9: 2

## 2023-08-25 NOTE — PROGRESS NOTES
There are no exam notes on file for this visit.    ASSESSMENT AND PLAN:      Isiah was seen today for abdominal pain.    Diagnoses and all orders for this visit:    Ileostomy status (H)  Ventral hernia without obstruction or gangrene  Adenocarcinoma of rectum (H)  Increased difficulty with pain, and expanding area around his stoma.  I recommended follow-up with his surgeon.  We will also place a wound care referral, and I think some targeted stoma education and adjustment in bag size may be helpful as well.  -     CBC with platelets  -     Wound Care Referral; Future  -     Miscellaneous Order for DME - ONLY FOR DME    Hypothyroidism due to acquired atrophy of thyroid  He is due for recheck on his thyroid labs.  These were checked today.  Will adjust dose as needed.  -     T4 free  -     TSH    CKD (chronic kidney disease) stage 3, GFR 30-59 ml/min (H)  Peripheral polyneuropathy  Increased peripheral neuropathy symptoms at nighttime.  We will start gabapentin 100 mg once daily at night.  Adjust dose as needed.  -     Discontinue: gabapentin (NEURONTIN) 100 MG capsule; Take 1 capsule (100 mg) by mouth 3 times daily  -     gabapentin (NEURONTIN) 100 MG capsule; Take 1 capsule (100 mg) by mouth At Bedtime    Chronic medical conditions.  Blood pressures well controlled.  Not due for lipid check today.  Breathing has been good without concerns.  Mood has been stable.  He continues to see his therapist and is benefiting from his support animal.    Patient Instructions   Call and schedule a follow up with the surgeon.    Referral for wound care  Keep working with your  and waiver about increasing the number of bags you have available.     Start gabapentin for peripheral neuropathy.   Take 1 pill daily    Jacqueline Nowak MD    SUBJECTIVE  Michael D McArdle is a 78 year old male with past medical history significant for    Patient Active Problem List   Diagnosis    Chronic Reflux esophagitis    Moderate  recurrent major depression (H)    Diastolic Dysfunction     Fainting (Syncope)    smoking     Prediabetes    Asthma    Hyperlipidemia    Obese    Adenocarcinoma of rectum (H)    Pulmonary embolism and infarction (H)    Esophageal reflux    Hypothyroidism due to acquired atrophy of thyroid    ACP (advance care planning)    CKD (chronic kidney disease) stage 3, GFR 30-59 ml/min (H)    Decreased vision in both eyes    Bilateral hearing loss, unspecified hearing loss type    Ventral hernia    Unsteady gait    Ileostomy care (H)    Ileostomy status (H)    Carpal tunnel syndrome of right wrist    Cataract of both eyes, unspecified cataract type     Others present at the visit:  Patient's significant other, Sabina    Presents for   Chief Complaint   Patient presents with    Abdominal Pain     Follow-up on stomach     Patient presents for follow-up on a couple of issues today.    He has a history of colon carcinoma, status post resection, chemotherapy, and now he has a ileostomy with stoma.  He is having more difficulty with this area.  Has developed a new and worsening hernia where the bag is located.  This pulls, and causes cramping in the area.  The area of the stoma itself continues to expand, and the bags barely fit over the area.  They do not stay on.  He has had numerous embarrassing episodes where the bags break or slip off.  He has some areas where he gets rubbing around the edges well.  He has been working with his  to get an increased number of bags per month, but has not been able to do this.  He is worried he is going to run out of colostomy bags by the end of the month.    He is also feeling frustrated about neck steps for his care.  Has been told he is not eligible for chemotherapy anymore.  Has been told that the surgeons will go in and do further work and that they were unable to remove all the cancer.  He does have follow-up scheduled with his oncology team in October.  Does not have follow-up  "scheduled with the surgeon.    He is also had more difficulty with pain.  He describes numbness and tingling bilaterally in his feet.  He describes them as feeling like \"his ankles are on fire \"during the nighttime.  He is also having difficulty with the second and third finger of his right hand.  Has a brace that he wears in the hands which is helpful.  When he has trouble at night he will walk around and take 2 Tylenol and this sometimes helps.  He is having increased leg cramping and baclofen is not working well for this.  He is trying to drink lots of water.  Uses some CBD ointment which helps some.    He has been taking the new thyroid medication.  Denies difficulties with this.  No racing heart, tremors, or other symptoms.    His mood has been okay.  He and his partner recently received 2 kittens siblings, which have brought them a lot of dionicio.  He finds motivation to get up and take care of the animals.      OBJECTIVE:  Vitals: /71   Pulse 89   Temp 97.9  F (36.6  C) (Oral)   Resp 20   Wt 75.5 kg (166 lb 6.4 oz)   SpO2 97%   BMI 28.47 kg/m    BMI= Body mass index is 28.47 kg/m .  Objective:    Vitals:  Vitals are reviewed and are within the normal range  Gen:  Alert, pleasant, no acute distress  Cardiac:  Regular rate and rhythm, no murmurs, rubs or gallops  Respiratory:  Lungs clear to auscultation bilaterally  Abdomen: Abdomen is soft he has 2 large ventral hernias across the main portion of the abdomen.  The stoma is on the right.  It is extending in size from the original stoma.  The bag barely fits over the exposed area.  He has stool draining around the edges.  Difficulty keeping the bag in place.  He is got some tenderness with palpation although no evidence of hernial incarceration.  Extremities: He endorses numbness and tingling with palpation most notably on the second and third fingers of his right hand.  Positive Tinel and Phalen sign.  Consistent with carpal tunnel.    Results for " orders placed or performed in visit on 08/25/23   CBC with platelets     Status: Abnormal   Result Value Ref Range    WBC Count 5.2 4.0 - 11.0 10e3/uL    RBC Count 3.72 (L) 4.40 - 5.90 10e6/uL    Hemoglobin 11.2 (L) 13.3 - 17.7 g/dL    Hematocrit 33.4 (L) 40.0 - 53.0 %    MCV 90 78 - 100 fL    MCH 30.1 26.5 - 33.0 pg    MCHC 33.5 31.5 - 36.5 g/dL    RDW 14.0 10.0 - 15.0 %    Platelet Count 282 150 - 450 10e3/uL           Patient Instructions   Call and schedule a follow up with the surgeon.    Referral for wound care  Keep working with your  and waiver about increasing the number of bags you have available.     Start gabapentin for peripheral neuropathy.   Take 1 pill daily    Jacqueline Nowak MD

## 2023-08-25 NOTE — LETTER
August 28, 2023      Michael D McArdle  [address]   [facility] [address]        Dear Mr.McArdle,    We are writing to inform you of your test results.    Here is a copy of your lab results.  Your TSH thyroid test shows that your thyroid medication is still a little too strong.  This could be causing your symptoms of peripheral neuropathy and burning in your feet.  I have sent in a new prescription at a lower dose.  Your blood counts are stable.  Your liver tests are normal.  Please call the clinic at [phone] if you have any questions.     Resulted Orders   CBC with platelets   Result Value Ref Range    WBC Count 5.2 4.0 - 11.0 10e3/uL    RBC Count 3.72 (L) 4.40 - 5.90 10e6/uL    Hemoglobin 11.2 (L) 13.3 - 17.7 g/dL    Hematocrit 33.4 (L) 40.0 - 53.0 %    MCV 90 78 - 100 fL    MCH 30.1 26.5 - 33.0 pg    MCHC 33.5 31.5 - 36.5 g/dL    RDW 14.0 10.0 - 15.0 %    Platelet Count 282 150 - 450 10e3/uL   T4 free   Result Value Ref Range    Free T4 1.58 0.90 - 1.70 ng/dL   TSH   Result Value Ref Range    TSH 0.25 (L) 0.30 - 4.20 uIU/mL   ALT (Today)   Result Value Ref Range    ALT 8 0 - 70 U/L      Comment:      Reference intervals for this test were updated on 6/12/2023 to more accurately reflect our healthy population. There may be differences in the flagging of prior results with similar values performed with this method. Interpretation of those prior results can be made in the context of the updated reference intervals.         If you have any questions or concerns, please call the clinic at the number listed above.       Sincerely,      Jacqueline Nowak MD

## 2023-08-25 NOTE — PROGRESS NOTES
To be completed in Nursing note:    Please reference list for forms that require a visit for completion.  Please remind patients that providers are given 3-5 business days to complete and return forms.      Form type:  [employer]     Date form received: 23    Date form completed by Physician:  23     How was form returned to patient (mailed, faxed, or at  for patient to ):  Faxed to   Date form mailed/faxed/left at  for patient and sent to HIM for scannin23    Once form is left for patient, faxed, or mailed PCS will then close the documentation only encounter.

## 2023-08-25 NOTE — PATIENT INSTRUCTIONS
Call and schedule a follow up with the surgeon.    Referral for wound care  Keep working with your  and waiver about increasing the number of bags you have available.     Start gabapentin for peripheral neuropathy.   Take 1 pill daily

## 2023-08-27 RX ORDER — LEVOTHYROXINE SODIUM 137 UG/1
137 TABLET ORAL DAILY
Qty: 90 TABLET | Refills: 0 | Status: SHIPPED | OUTPATIENT
Start: 2023-08-27 | End: 2023-12-14

## 2023-08-27 NOTE — RESULT ENCOUNTER NOTE
Michael D McArdle-    Here is a copy of your lab results.  Your TSH thyroid test shows that your thyroid medication is still a little too strong.  This could be causing your symptoms of peripheral neuropathy and burning in your feet.  I have sent in a new prescription at a lower dose.  Your blood counts are stable.  Your liver tests are normal.  Please call the clinic at [phone] if you have any questions.      Jacqueline Nowak MD    Please send results to patient.

## 2023-08-28 ENCOUNTER — TELEPHONE (OUTPATIENT)
Dept: WOUND CARE | Facility: HOSPITAL | Age: 78
End: 2023-08-28

## 2023-09-22 DIAGNOSIS — J45.40 MODERATE PERSISTENT ASTHMA, UNSPECIFIED WHETHER COMPLICATED: ICD-10-CM

## 2023-09-22 DIAGNOSIS — J44.9 CHRONIC OBSTRUCTIVE PULMONARY DISEASE, UNSPECIFIED COPD TYPE (H): ICD-10-CM

## 2023-09-22 DIAGNOSIS — Z00.00 HEALTH CARE MAINTENANCE: ICD-10-CM

## 2023-09-22 RX ORDER — BUDESONIDE AND FORMOTEROL FUMARATE DIHYDRATE 80; 4.5 UG/1; UG/1
AEROSOL RESPIRATORY (INHALATION)
Qty: 20.4 G | Refills: 3 | Status: SHIPPED | OUTPATIENT
Start: 2023-09-22

## 2023-09-22 RX ORDER — ASPIRIN 81 MG
TABLET,CHEWABLE ORAL
Qty: 90 TABLET | Refills: 3 | Status: SHIPPED | OUTPATIENT
Start: 2023-09-22 | End: 2023-12-12

## 2023-09-27 DIAGNOSIS — E78.5 HYPERLIPIDEMIA LDL GOAL <130: ICD-10-CM

## 2023-09-28 RX ORDER — ATORVASTATIN CALCIUM 80 MG/1
80 TABLET, FILM COATED ORAL DAILY
Qty: 90 TABLET | Refills: 3 | Status: SHIPPED | OUTPATIENT
Start: 2023-09-28 | End: 2024-03-11

## 2023-10-11 ENCOUNTER — MEDICAL CORRESPONDENCE (OUTPATIENT)
Dept: HEALTH INFORMATION MANAGEMENT | Facility: CLINIC | Age: 78
End: 2023-10-11
Payer: COMMERCIAL

## 2023-10-11 ENCOUNTER — DOCUMENTATION ONLY (OUTPATIENT)
Dept: FAMILY MEDICINE | Facility: CLINIC | Age: 78
End: 2023-10-11
Payer: COMMERCIAL

## 2023-10-11 NOTE — PROGRESS NOTES
To be completed in Nursing note:    Please reference list for forms that require a visit for completion.  Please remind patients that providers are given 3-5 business days to complete and return forms.      Form type:  The [facility]    Date form received: 10/05/23    Date form completed by Physician:  10/11/23    How was form returned to patient (mailed, faxed, or at  for patient to ):  Faxed   Date form mailed/faxed/left at  for patient and sent to HIM for scanning:  10/11/23, sent to MR for scanning    Once form is left for patient, faxed, or mailed PCS will then close the documentation only encounter.

## 2023-11-07 DIAGNOSIS — N18.31 STAGE 3A CHRONIC KIDNEY DISEASE (H): ICD-10-CM

## 2023-11-07 DIAGNOSIS — E78.5 HYPERLIPIDEMIA LDL GOAL <130: Primary | ICD-10-CM

## 2023-11-07 DIAGNOSIS — E03.4 HYPOTHYROIDISM DUE TO ACQUIRED ATROPHY OF THYROID: ICD-10-CM

## 2023-12-11 DIAGNOSIS — E78.5 HYPERLIPIDEMIA LDL GOAL <130: Primary | ICD-10-CM

## 2023-12-11 DIAGNOSIS — E03.4 HYPOTHYROIDISM DUE TO ACQUIRED ATROPHY OF THYROID: ICD-10-CM

## 2023-12-11 DIAGNOSIS — N18.31 STAGE 3A CHRONIC KIDNEY DISEASE (H): ICD-10-CM

## 2023-12-12 ENCOUNTER — OFFICE VISIT (OUTPATIENT)
Dept: FAMILY MEDICINE | Facility: CLINIC | Age: 78
End: 2023-12-12
Payer: COMMERCIAL

## 2023-12-12 VITALS
RESPIRATION RATE: 20 BRPM | BODY MASS INDEX: 29.57 KG/M2 | DIASTOLIC BLOOD PRESSURE: 74 MMHG | TEMPERATURE: 97.9 F | SYSTOLIC BLOOD PRESSURE: 137 MMHG | WEIGHT: 172.8 LBS | HEART RATE: 99 BPM | OXYGEN SATURATION: 99 %

## 2023-12-12 DIAGNOSIS — C20 ADENOCARCINOMA OF RECTUM (H): Primary | ICD-10-CM

## 2023-12-12 DIAGNOSIS — Z29.11 NEED FOR VACCINATION AGAINST RESPIRATORY SYNCYTIAL VIRUS: ICD-10-CM

## 2023-12-12 DIAGNOSIS — E78.5 HYPERLIPIDEMIA LDL GOAL <130: ICD-10-CM

## 2023-12-12 DIAGNOSIS — E03.4 HYPOTHYROIDISM DUE TO ACQUIRED ATROPHY OF THYROID: ICD-10-CM

## 2023-12-12 DIAGNOSIS — G56.01 CARPAL TUNNEL SYNDROME OF RIGHT WRIST: ICD-10-CM

## 2023-12-12 DIAGNOSIS — T14.8XXA OPEN WOUND: ICD-10-CM

## 2023-12-12 DIAGNOSIS — R25.2 LEG CRAMPS: ICD-10-CM

## 2023-12-12 DIAGNOSIS — N18.31 STAGE 3A CHRONIC KIDNEY DISEASE (H): ICD-10-CM

## 2023-12-12 DIAGNOSIS — Z00.00 HEALTH CARE MAINTENANCE: ICD-10-CM

## 2023-12-12 LAB
ANION GAP SERPL CALCULATED.3IONS-SCNC: <1 MMOL/L (ref 3–14)
BUN SERPL-MCNC: 19 MG/DL (ref 7–30)
CALCIUM SERPL-MCNC: 9 MG/DL (ref 8.5–10.1)
CHLORIDE BLD-SCNC: 111 MMOL/L (ref 94–109)
CO2 SERPL-SCNC: 29 MMOL/L (ref 20–32)
CREAT SERPL-MCNC: 1.7 MG/DL (ref 0.66–1.25)
EGFRCR SERPLBLD CKD-EPI 2021: 41 ML/MIN/1.73M2
GLUCOSE BLD-MCNC: 101 MG/DL (ref 70–99)
POTASSIUM BLD-SCNC: 5.2 MMOL/L (ref 3.4–5.3)
SODIUM SERPL-SCNC: 139 MMOL/L (ref 133–144)

## 2023-12-12 PROCEDURE — 80061 LIPID PANEL: CPT | Performed by: STUDENT IN AN ORGANIZED HEALTH CARE EDUCATION/TRAINING PROGRAM

## 2023-12-12 PROCEDURE — 99214 OFFICE O/P EST MOD 30 MIN: CPT | Performed by: STUDENT IN AN ORGANIZED HEALTH CARE EDUCATION/TRAINING PROGRAM

## 2023-12-12 PROCEDURE — 80048 BASIC METABOLIC PNL TOTAL CA: CPT | Performed by: STUDENT IN AN ORGANIZED HEALTH CARE EDUCATION/TRAINING PROGRAM

## 2023-12-12 PROCEDURE — 36415 COLL VENOUS BLD VENIPUNCTURE: CPT | Performed by: STUDENT IN AN ORGANIZED HEALTH CARE EDUCATION/TRAINING PROGRAM

## 2023-12-12 PROCEDURE — 84443 ASSAY THYROID STIM HORMONE: CPT | Performed by: STUDENT IN AN ORGANIZED HEALTH CARE EDUCATION/TRAINING PROGRAM

## 2023-12-12 RX ORDER — ZINC OXIDE
OINTMENT (GRAM) TOPICAL PRN
Qty: 113 G | Refills: 1 | Status: SHIPPED | OUTPATIENT
Start: 2023-12-12 | End: 2024-03-11

## 2023-12-12 RX ORDER — ASPIRIN 81 MG/1
81 TABLET, CHEWABLE ORAL DAILY
Qty: 90 TABLET | Refills: 3 | Status: SHIPPED | OUTPATIENT
Start: 2023-12-12 | End: 2024-03-11

## 2023-12-12 RX ORDER — BACLOFEN 20 MG/1
TABLET ORAL
Qty: 45 TABLET | Refills: 1 | Status: SHIPPED | OUTPATIENT
Start: 2023-12-12 | End: 2024-05-15

## 2023-12-12 RX ORDER — SULFAMETHOXAZOLE/TRIMETHOPRIM 800-160 MG
1 TABLET ORAL 2 TIMES DAILY
Qty: 14 TABLET | Refills: 0 | Status: SHIPPED | OUTPATIENT
Start: 2023-12-12 | End: 2024-03-11

## 2023-12-12 RX ORDER — RESPIRATORY SYNCYTIAL VIRUS VACCINE 120MCG/0.5
0.5 KIT INTRAMUSCULAR ONCE
Qty: 1 EACH | Refills: 0 | Status: CANCELLED | OUTPATIENT
Start: 2023-12-12 | End: 2023-12-12

## 2023-12-12 ASSESSMENT — ANXIETY QUESTIONNAIRES
7. FEELING AFRAID AS IF SOMETHING AWFUL MIGHT HAPPEN: NOT AT ALL
GAD7 TOTAL SCORE: 1
1. FEELING NERVOUS, ANXIOUS, OR ON EDGE: NOT AT ALL
GAD7 TOTAL SCORE: 1
3. WORRYING TOO MUCH ABOUT DIFFERENT THINGS: NOT AT ALL
6. BECOMING EASILY ANNOYED OR IRRITABLE: NOT AT ALL
IF YOU CHECKED OFF ANY PROBLEMS ON THIS QUESTIONNAIRE, HOW DIFFICULT HAVE THESE PROBLEMS MADE IT FOR YOU TO DO YOUR WORK, TAKE CARE OF THINGS AT HOME, OR GET ALONG WITH OTHER PEOPLE: NOT DIFFICULT AT ALL
2. NOT BEING ABLE TO STOP OR CONTROL WORRYING: NOT AT ALL
5. BEING SO RESTLESS THAT IT IS HARD TO SIT STILL: NOT AT ALL

## 2023-12-12 ASSESSMENT — PATIENT HEALTH QUESTIONNAIRE - PHQ9
5. POOR APPETITE OR OVEREATING: SEVERAL DAYS
SUM OF ALL RESPONSES TO PHQ QUESTIONS 1-9: 7

## 2023-12-12 NOTE — PATIENT INSTRUCTIONS
Referral for the oncologist and colorectal surgeon at [facility] ([facility])  --Keep wearing your belt  --Other bag options     Wound care - home referral  Butt paste to the area  Antibiotics - Bactrim 1 pill 2x per day for 1 week.      Refill the spasm medications and the pain cream.

## 2023-12-12 NOTE — PROGRESS NOTES
There are no exam notes on file for this visit.    ASSESSMENT AND PLAN:      Isiah was seen today for follow-up of multiple issues.    Diagnoses and all orders for this visit:    Adenocarcinoma of rectum (H)  Would like a second opinion on options for surgery and oncology treatments.  Referrals were placed for her at the main [facility] campus.  -     Adult Colorectal Surgery  Referral; Future  -     Adult Oncology/Hematology  Referral; Future    Hypothyroidism due to acquired atrophy of thyroid  Due for thyroid recheck.  I will notify him of these results.  -     TSH    Hyperlipidemia LDL goal <130  Stage 3a chronic kidney disease (H)  His renal panel shows mild elevation.  Having some difficulty with urinary emptying.  We did not explore this more fully today.  Encouraged good fluid intake both with losses from the ostomy, and to help support his kidneys.  -     Basic metabolic panel  -     Lipid Profil    Rectal lesion/open wound  Is having significant purulent drainage and some irritating granulation tissue with bleeding just anterior to his rectum.  Some of this is chronic, however the moisture he is having more skin breakdown to the area.  Will treat with Bactrim, prescribed Butt paste, and recommended home wound care referral.  I am happy to send what ever products the wound team recommends.  -     Home Care Referral  -     sulfamethoxazole-trimethoprim (BACTRIM DS) 800-160 MG tablet; Take 1 tablet by mouth 2 times daily  -     zinc oxide (DESITIN) 40 % external ointment; Apply topically as needed for dry skin or irritation (apply to rectum.)    Medication refill  Refilled a number of medications from today.  -     aspirin (ASPIRIN LOW DOSE) 81 MG chewable tablet; Take 1 tablet (81 mg) by mouth daily  -     baclofen (LIORESAL) 20 MG tablet; TAKE ONE TABLET BY MOUTH TWICE A DAY AS NEEDED FOR MUSCLE SPASMS IN THE EVENING  -     diclofenac (VOLTAREN) 1 % topical gel; Apply 2-4 g topically 4 times  daily    Follow-up in 1 month    Patient Instructions   Referral for the oncologist and colorectal surgeon at [facility] ([facility])  --Keep wearing your belt  --Other bag options     Wound care - home referral  Butt paste to the area  Antibiotics - Bactrim 1 pill 2x per day for 1 week.      Refill the spasm medications and the pain cream.          Jacqueline Nowak MD    SUBJECTIVE  Isiah CARLY McArdle is a 78 year old male with past medical history significant for    Patient Active Problem List   Diagnosis    Chronic Reflux esophagitis    Moderate recurrent major depression (H)    Diastolic Dysfunction     Fainting (Syncope)    smoking     Prediabetes    Asthma    Hyperlipidemia    Obese    Adenocarcinoma of rectum (H)    Pulmonary embolism and infarction (H)    Esophageal reflux    Hypothyroidism due to acquired atrophy of thyroid    ACP (advance care planning)    CKD (chronic kidney disease) stage 3, GFR 30-59 ml/min (H)    Decreased vision in both eyes    Bilateral hearing loss, unspecified hearing loss type    Ventral hernia    Unsteady gait    Ileostomy care (H)    Ileostomy status (H)    Carpal tunnel syndrome of right wrist    Cataract of both eyes, unspecified cataract type    Chronic obstructive pulmonary disease, unspecified COPD type (H)     Others present at the visit:  Patient's significant other, Sabina    Presents for   Chief Complaint   Patient presents with    Other     Check up last visit, hernia problem and cancer     Patient presents today for follow-up of multiple chronic issues.    He continues to have lots of difficulty with his hernia and stoma bags.  Has challenges keeping the bags on and in place.  When he walks his hernia bulges and the bag comes off.  This leads to spillage.  1 night he had to replace the bag 4 times.  He receives only 3 boxes of 10 bags for the month, and has to buy extra is because otherwise he has no bag to use.      He found the visit to the ostomy clinic not  helpful.  They recommended dietary changes including eating baby food and that there were no other solutions for him.    He has been following with an oncologist and surgeon group through [facility].  Has been told he can have chemo, but also been told he has to control his cancer before he can have surgery to repair his hernia.  Was told by the surgeon that he needs to have his mesh repaired, his hernia repaired, and his rectal area closed all the same time.  This would be in a dollar work surgery and they do not think he can tolerate this.  He is worried about this as well.    Currently his rectum is also bothering him.  He has pain with sitting down.  Is noticing some discharge.  There is very sensitive and bleeds frequently.    He is also worried about his urine.  Feels like his hernia is blocking his bladder and has some difficulty with emptying.  No blood in his urination, no dysuria just cannot get it out.    Mood has been okay.  Gets good support from his cat.  He is seeing his psychologist regularly and this is helpful.  She often comes out to the house slowly been doing some virtual lately.    He is due for a follow-up thyroid test today.    He is requesting refills on a number of medications.  The baclofen is helping with his leg cramps as is the pain cream.  He is having more pain in his rectum.  It was previously given but paced for this and it was helpful.    OBJECTIVE:  Vitals: /74   Pulse 99   Temp 97.9  F (36.6  C) (Oral)   Resp 20   Wt 78.4 kg (172 lb 12.8 oz)   SpO2 99%   BMI 29.57 kg/m    BMI= Body mass index is 29.57 kg/m .  Objective:    Vitals:  Vitals are reviewed and are within the normal range  Gen:  Alert, pleasant, no acute distress  Cardiac:  Regular rate and rhythm, no murmurs, rubs or gallops  Respiratory:  Lungs clear to auscultation bilaterally  Abdomen: Belly is soft, bowel sounds are present.  He has a notable left lower quadrant ventral hernia and the ostomy stoma is  located in that.  Mild protrusion of the stoma today.  Appropriate soft stool in the bag.  Rectum: He has visible hemorrhoids.  He also has what looks like mildly irritated granulomatous tissue protruding just anterior to his rectum.  There is a significant amount of pus and drainage.  Some tenderness with palpation.  No significant extending erythema.  Some increased evidence of irritation and wound formation around this moist area.    Results for orders placed or performed in visit on 12/12/23   Basic metabolic panel     Status: Abnormal   Result Value Ref Range    Sodium 139 133 - 144 mmol/L    Potassium 5.2 3.4 - 5.3 mmol/L    Chloride 111 (H) 94 - 109 mmol/L    Carbon Dioxide (CO2) 29 20 - 32 mmol/L    Anion Gap <1 (L) 3 - 14 mmol/L    Urea Nitrogen 19 7 - 30 mg/dL    Creatinine 1.70 (H) 0.66 - 1.25 mg/dL    GFR Estimate 41 (L) >60 mL/min/1.73m2    Calcium 9.0 8.5 - 10.1 mg/dL    Glucose 101 (H) 70 - 99 mg/dL           Patient Instructions   Referral for the oncologist and colorectal surgeon at [facility] ([facility])  --Keep wearing your belt  --Other bag options     Wound care - home referral  Butt paste to the area  Antibiotics - Bactrim 1 pill 2x per day for 1 week.      Refill the spasm medications and the pain cream.          Jacqueline Nowak MD

## 2023-12-13 ENCOUNTER — PRE VISIT (OUTPATIENT)
Dept: ONCOLOGY | Facility: CLINIC | Age: 78
End: 2023-12-13
Payer: COMMERCIAL

## 2023-12-13 ENCOUNTER — TELEPHONE (OUTPATIENT)
Dept: SURGERY | Facility: CLINIC | Age: 78
End: 2023-12-13
Payer: COMMERCIAL

## 2023-12-13 LAB
CHOLEST SERPL-MCNC: 197 MG/DL
FASTING STATUS PATIENT QL REPORTED: ABNORMAL
HDLC SERPL-MCNC: 34 MG/DL
LDLC SERPL CALC-MCNC: 147 MG/DL
NONHDLC SERPL-MCNC: 163 MG/DL
TRIGL SERPL-MCNC: 82 MG/DL
TSH SERPL DL<=0.005 MIU/L-ACNC: 9.41 UIU/ML (ref 0.3–4.2)

## 2023-12-13 NOTE — TELEPHONE ENCOUNTER
Records Requested    Facility  [facility]  Fax: [phone]  [facility]  Fax: [phone]    Outcome * 12/13/23 11:10 AM Faxed urgent request to [facility] and [facility] for records to be faxed to the clinic. - [place]    * 12/13/23 3:01 PM records received from [facility] and sent to HIM to be scanned into the chart.  Need DOMENIC signed to be able to get the [facility] records. - [place]    [facility]:  6/13/23 - CR OV with Dr. Moss  5/17/22 - CR OV with Dr. Bauer  5/4/22 - CR OV with Keyana Gotti    [facility]:  8/18/22 - Flexible Sigmoidoscopy  8/16/22 - Rectum Biopsy (Case: [id number])    [facility]:  5/6/22 - CT Chest/Abd/Pelvis  5/5/22 - MRI Pelvis

## 2023-12-13 NOTE — TELEPHONE ENCOUNTER
Action Taken  Date/Description  GEOVANI     N Navigator December 13, 2023  2:15 PM   Internal referral from Jacqueline Nowak MD for a Dx of C20 (ICD-10-CM) - Adenocarcinoma of rectum (H)  Records from [facility] and 's are in CE  Bx's at [facility]:  8.16.22 - Case: [id number]  Rectal  6.12.15 - Case: [id number]   Colon  Images from HE loaded in PACS  Other recent imaging at [facility]    LV for Pt to CB and verify records Hx  December 15, 2023  Call to Pt and MB is full, unable to leave a message     Records Requested  TJ   Clinic name Comments/Action Taken   [facility] December 14, 2023 9:57 AM    Called and requested images be pushed to PACS     Imaging Received  December 15, 2023  9:59 AM  TJ   Action: Images from [facility] received and resolved to PACS.     Records Requested  TJ   Clinic name Comments/Action Taken   [facility] January 8, 2024 2:47 PM    Faxed request for all pathology slides Tracking #: [id number]

## 2023-12-14 ENCOUNTER — TELEPHONE (OUTPATIENT)
Dept: SURGERY | Facility: CLINIC | Age: 78
End: 2023-12-14
Payer: COMMERCIAL

## 2023-12-14 DIAGNOSIS — T14.8XXA OPEN WOUND: Primary | ICD-10-CM

## 2023-12-14 DIAGNOSIS — E03.4 HYPOTHYROIDISM DUE TO ACQUIRED ATROPHY OF THYROID: ICD-10-CM

## 2023-12-14 RX ORDER — ALOE VERA/COLLAGEN
1 FOAM (ML) TOPICAL 4 TIMES DAILY PRN
Qty: 30 G | Refills: 0 | Status: SHIPPED | OUTPATIENT
Start: 2023-12-14 | End: 2024-03-11

## 2023-12-14 RX ORDER — LEVOTHYROXINE SODIUM 150 UG/1
150 TABLET ORAL DAILY
Qty: 90 TABLET | Refills: 1 | Status: SHIPPED | OUTPATIENT
Start: 2023-12-14 | End: 2024-03-11

## 2023-12-14 NOTE — PROGRESS NOTES
Called patient to discuss low thyroid levels.  I have sent in a new prescription for 150mcg.  We should recheck his TSH in 6-8 weeks.     ALso was unable to  the butt paste, so an alternative was sent in.     Jacqueline Nowak MD

## 2023-12-14 NOTE — RESULT ENCOUNTER NOTE
Results discussed with patient by phone.  Will send in new prescription for levothyroxine 150mcg     Jacqueline Nowak MD

## 2023-12-18 ENCOUNTER — TELEPHONE (OUTPATIENT)
Dept: SURGERY | Facility: CLINIC | Age: 78
End: 2023-12-18
Payer: COMMERCIAL

## 2023-12-18 ENCOUNTER — PATIENT OUTREACH (OUTPATIENT)
Dept: ONCOLOGY | Facility: CLINIC | Age: 78
End: 2023-12-18
Payer: COMMERCIAL

## 2023-12-18 NOTE — PROGRESS NOTES
Oncology referral received from PCP Dr Nowak for this patient with recurrent rectal malignancy.      (See my bookmarks for pertinent records in Epic)      HX rectal mod diff adenocarcinoma ([facility] colo 12/31/2014), staged as T3 N1 M0, s/p chemo, RT, LAR 6/12/2015. [facility] Dr Tirado last saw pt 9/2018 then pt lost to follow up. 8/2022 flex sig bx + rectal adenocarcinoma recurrence, APR aborted per [facility] at [facility].     Pt has not followed up w/ [facility]. This referral entered by PCP, pt seeking 2nd opinion with [facility]. [facility] has not been able to reach pt. We will call pt to offer next available GI Med Onc per pt preference.    Jose Gavin RN  Oncology Nurse Navigator  [facility]  [phone]

## 2023-12-18 NOTE — TELEPHONE ENCOUNTER
Action    Action Taken 12/18/23  Spoke w/ Anthony @ [facility] - advised pt last seen 8/8/2018. These records are in Epic.  2:48 PM

## 2023-12-21 ENCOUNTER — TELEPHONE (OUTPATIENT)
Dept: FAMILY MEDICINE | Facility: CLINIC | Age: 78
End: 2023-12-21
Payer: COMMERCIAL

## 2023-12-21 NOTE — TELEPHONE ENCOUNTER
,[facility] phone call message - order or referral request for patient:     Order or referral being requested: ORDERS       Additional Comments: Orders to delay care for skilled nursing to start 12/22    OK to leave a message on voice mail? Yes    Primary language: English      needed? No    Call taken on December 21, 2023 at 4:31 PM by Ana Coburn

## 2023-12-21 NOTE — TELEPHONE ENCOUNTER
LVM with verbal orders on behalf of Dr Nowak to delay skilled nursing care until 12/22/23.  Riaz, RN, BSN

## 2023-12-22 ENCOUNTER — MEDICAL CORRESPONDENCE (OUTPATIENT)
Dept: HEALTH INFORMATION MANAGEMENT | Facility: CLINIC | Age: 78
End: 2023-12-22
Payer: COMMERCIAL

## 2023-12-29 ENCOUNTER — DOCUMENTATION ONLY (OUTPATIENT)
Dept: FAMILY MEDICINE | Facility: CLINIC | Age: 78
End: 2023-12-29
Payer: COMMERCIAL

## 2023-12-29 NOTE — PROGRESS NOTES
To be completed in Nursing note:    Please reference list for forms that require a visit for completion.  Please remind patients that providers are given 3-5 business days to complete and return forms.      Form type:[facility]    Date form received: 23    Date form completed by Physician:23    How was form returned to patient (mailed, faxed, or at  for patient to ):  Faxed to   Date form mailed/faxed/left at  for patient and sent to HIM for scannin23, sent to  for scanning    Once form is left for patient, faxed, or mailed PCS will then close the documentation only encounter.

## 2024-01-03 ENCOUNTER — DOCUMENTATION ONLY (OUTPATIENT)
Dept: FAMILY MEDICINE | Facility: CLINIC | Age: 79
End: 2024-01-03
Payer: COMMERCIAL

## 2024-01-03 NOTE — PROGRESS NOTES
To be completed in Nursing note:    Please reference list for forms that require a visit for completion.  Please remind patients that providers are given 3-5 business days to complete and return forms.      Form type:[facility] to evaluate and treat    Date form received: 23    Date form completed by Physician:24    How was form returned to patient (mailed, faxed, or at  for patient to ):  Faxed to   Date form mailed/faxed/left at  for patient and sent to HIM for scannin24, sent to MR for scanning    Once form is left for patient, faxed, or mailed PCS will then close the documentation only encounter.

## 2024-01-11 ENCOUNTER — TRANSFERRED RECORDS (OUTPATIENT)
Dept: HEALTH INFORMATION MANAGEMENT | Facility: CLINIC | Age: 79
End: 2024-01-11
Payer: COMMERCIAL

## 2024-01-11 NOTE — TELEPHONE ENCOUNTER
Action January 11, 2024 10:24 AM SY   Action Taken Slides from [facility] received and taken to 5th floor path lab for review.  8.16.22 - Case: [id number]  (1 slide)  6.12.15 - Case: [id number] (15 slides)

## 2024-01-12 ENCOUNTER — LAB REQUISITION (OUTPATIENT)
Dept: LAB | Facility: CLINIC | Age: 79
End: 2024-01-12
Payer: COMMERCIAL

## 2024-01-12 PROCEDURE — 88321 CONSLTJ&REPRT SLD PREP ELSWR: CPT | Performed by: PATHOLOGY

## 2024-01-12 PROCEDURE — 88341 IMHCHEM/IMCYTCHM EA ADD ANTB: CPT | Mod: TC | Performed by: STUDENT IN AN ORGANIZED HEALTH CARE EDUCATION/TRAINING PROGRAM

## 2024-01-12 PROCEDURE — 88321 CONSLTJ&REPRT SLD PREP ELSWR: CPT | Mod: XU | Performed by: PATHOLOGY

## 2024-01-17 ENCOUNTER — DOCUMENTATION ONLY (OUTPATIENT)
Dept: FAMILY MEDICINE | Facility: CLINIC | Age: 79
End: 2024-01-17

## 2024-01-17 ENCOUNTER — MEDICAL CORRESPONDENCE (OUTPATIENT)
Dept: HEALTH INFORMATION MANAGEMENT | Facility: CLINIC | Age: 79
End: 2024-01-17

## 2024-01-17 ENCOUNTER — OFFICE VISIT (OUTPATIENT)
Dept: FAMILY MEDICINE | Facility: CLINIC | Age: 79
End: 2024-01-17
Payer: COMMERCIAL

## 2024-01-17 VITALS
OXYGEN SATURATION: 97 % | WEIGHT: 174.6 LBS | HEART RATE: 86 BPM | HEIGHT: 64 IN | DIASTOLIC BLOOD PRESSURE: 63 MMHG | BODY MASS INDEX: 29.81 KG/M2 | RESPIRATION RATE: 20 BRPM | TEMPERATURE: 97.5 F | SYSTOLIC BLOOD PRESSURE: 148 MMHG

## 2024-01-17 DIAGNOSIS — R52 PAIN: Primary | ICD-10-CM

## 2024-01-17 DIAGNOSIS — C20 ADENOCARCINOMA OF RECTUM (H): ICD-10-CM

## 2024-01-17 DIAGNOSIS — N18.31 STAGE 3A CHRONIC KIDNEY DISEASE (H): ICD-10-CM

## 2024-01-17 DIAGNOSIS — E03.4 HYPOTHYROIDISM DUE TO ACQUIRED ATROPHY OF THYROID: ICD-10-CM

## 2024-01-17 PROCEDURE — 90480 ADMN SARSCOV2 VAC 1/ONLY CMP: CPT | Performed by: STUDENT IN AN ORGANIZED HEALTH CARE EDUCATION/TRAINING PROGRAM

## 2024-01-17 PROCEDURE — 90662 IIV NO PRSV INCREASED AG IM: CPT | Performed by: STUDENT IN AN ORGANIZED HEALTH CARE EDUCATION/TRAINING PROGRAM

## 2024-01-17 PROCEDURE — 99214 OFFICE O/P EST MOD 30 MIN: CPT | Mod: 25 | Performed by: STUDENT IN AN ORGANIZED HEALTH CARE EDUCATION/TRAINING PROGRAM

## 2024-01-17 PROCEDURE — 91320 SARSCV2 VAC 30MCG TRS-SUC IM: CPT | Performed by: STUDENT IN AN ORGANIZED HEALTH CARE EDUCATION/TRAINING PROGRAM

## 2024-01-17 PROCEDURE — 96372 THER/PROPH/DIAG INJ SC/IM: CPT | Performed by: STUDENT IN AN ORGANIZED HEALTH CARE EDUCATION/TRAINING PROGRAM

## 2024-01-17 PROCEDURE — G0008 ADMIN INFLUENZA VIRUS VAC: HCPCS | Performed by: STUDENT IN AN ORGANIZED HEALTH CARE EDUCATION/TRAINING PROGRAM

## 2024-01-17 RX ORDER — OXYCODONE AND ACETAMINOPHEN 5; 325 MG/1; MG/1
1 TABLET ORAL EVERY 6 HOURS PRN
Qty: 25 TABLET | Refills: 0 | Status: SHIPPED | OUTPATIENT
Start: 2024-01-17 | End: 2024-03-11

## 2024-01-17 RX ORDER — KETOROLAC TROMETHAMINE 30 MG/ML
15 INJECTION, SOLUTION INTRAMUSCULAR; INTRAVENOUS ONCE
Status: COMPLETED | OUTPATIENT
Start: 2024-01-17 | End: 2024-01-17

## 2024-01-17 RX ADMIN — KETOROLAC TROMETHAMINE 15 MG: 30 INJECTION, SOLUTION INTRAMUSCULAR; INTRAVENOUS at 15:25

## 2024-01-17 NOTE — PROGRESS NOTES
To be completed in Nursing note:    Please reference list for forms that require a visit for completion.  Please remind patients that providers are given 3-5 business days to complete and return forms.      Form type: Recommended Plan of Treatment    Date form received: 1/11/24    Date form completed by Physician: 1/17/24    How was form returned to patient (mailed, faxed, or at  for patient to ): Fax to [facility] at [phone]    Date form mailed/faxed/left at  for patient and sent to HIM for scanning: Faxed 1/17/24      Once form is left for patient, faxed, or mailed PCS will then close the documentation only encounter.

## 2024-01-17 NOTE — PROGRESS NOTES
To be completed in Nursing note:    Please reference list for forms that require a visit for completion.  Please remind patients that providers are given 3-5 business days to complete and return forms.      Form type: Verbal Order (Tracking no. [id number])    Date form received: 1/15/24    Date form completed by Physician: 1/17/24    How was form returned to patient (mailed, faxed, or at  for patient to ): Fax to [facility] at [phone]    Date form mailed/faxed/left at  for patient and sent to HIM for scanning: Faxed 1/17/24      Once form is left for patient, faxed, or mailed PCS will then close the documentation only encounter.

## 2024-01-17 NOTE — PROGRESS NOTES
There are no exam notes on file for this visit.    ASSESSMENT AND PLAN:      Isiah was seen today for other, imm/inj and imm/inj.    Diagnoses and all orders for this visit:    Pain  Adenocarcinoma of rectum (H)  Ventral hernia  Stoma, with colostomy.    I cleaned and redressed his rectal area.  Much more comfortable afterwards.  We gave 1 dose of Toradol for severe pain, and discussed the pain regimen.  We will add some as needed Percocet for dressing changes and with severe pain at home.  Discussed that he may need to meet with palliative if he is needing further pain control measures.  Strongly recommended he call for an earlier appointment with the surgeon and the oncologist to discuss potential treatments given the effect this is having on his quality of life.  Appreciate the excellent work at home.  Is doing with both of his ostomy and his bottom wounds.  -     ketorolac (TORADOL) injection 15 mg  -     oxyCODONE-acetaminophen (PERCOCET) 5-325 MG tablet; Take 1 tablet by mouth every 6 hours as needed for pain    Hypothyroidism due to acquired atrophy of thyroid  Symptomatically he is improved.  We will recheck levels in 1 month.    Stage 3a chronic kidney disease (H)  I am concerned about the level of ibuprofen he is getting.  We will switch to a small dose of narcotics given his significant cancer, and clear evidence for pain today.  Recheck labs at next visit    Other orders  Flu and COVID shot given today.  -     INFLUENZA VACCINE 65+ (FLUZONE HD)  -     COVID-19 12+ (2023-24) (PFIZER)        Patient Instructions   Get in to see the oncologist and the surgeon to talk about options.      Use the pain medications as needed.  Use the percocet as needed for severe pain.    Keep working with wound care.    Follow up in 1 month - check thyroid and kidneys at that time.          Jacqueline Nowak MD    SUBJECTIVE  Michael D McArdle is a 78 year old male with past medical history significant for    Patient  Active Problem List   Diagnosis    Chronic Reflux esophagitis    Moderate recurrent major depression (H)    Diastolic Dysfunction     Fainting (Syncope)    smoking     Prediabetes    Asthma    Hyperlipidemia    Obese    Adenocarcinoma of rectum (H)    Pulmonary embolism and infarction (H)    Esophageal reflux    Hypothyroidism due to acquired atrophy of thyroid    ACP (advance care planning)    CKD (chronic kidney disease) stage 3, GFR 30-59 ml/min (H)    Decreased vision in both eyes    Bilateral hearing loss, unspecified hearing loss type    Ventral hernia    Unsteady gait    Ileostomy care (H)    Ileostomy status (H)    Carpal tunnel syndrome of right wrist    Cataract of both eyes, unspecified cataract type    Chronic obstructive pulmonary disease, unspecified COPD type (H)     Others present at the visit:  Patient's significant other, Sabina    Presents for   Chief Complaint   Patient presents with    Other     Follow-up and referral for hospital, and refill amlodipine, baclofen, atorvastain    Imm/Inj     Flu Shot    Imm/Inj     COVID-19 VACCINE     Patient presents today for follow-up visit.  He continues to have difficulty with pain in his abdomen.  Has significant pain in the area of his stoma, which she attributes to the large hernia associated with the stoma.  Now has an ostomy nurse and wound nurse coming to his house and this has been quite helpful.  They are ordering a new type of bag, doing wound care and dressing care around the outside areas of the stoma, and continue to brainstorm ways to support that hernia so it is not so uncomfortable.    Additionally he is getting wound care on his bottom.  Has some protruding tissue, with discharge and skin breakdown around his rectum.  Nurses are coming every other day.  He has significant pain with sitting.  Has to sleep on his right side where he has significant pain.  Dressing changes are uncomfortable.  He is noticed a significant deterioration in his  "quality of life.    Has upcoming appointments with oncology and surgery at the  to talk about potential options for treatment.    He does note improvement in his energy and hot and cold sensation since adjusting his thyroid medication.  He is taking the new dose without difficulty.    Blood pressure is elevated today.  He endorses significant pain.  Has been taking \"a lot \"of ibuprofen at home.  This helps for couple hours but the pain returns.  Tylenol has not been helpful.  He is interested in stronger pain medications.  Does have underlying chronic kidney disease.    OBJECTIVE:  Vitals: BP (!) 148/63   Pulse 86   Temp 97.5  F (36.4  C) (Oral)   Resp 20   Ht 1.615 m (5' 3.6\")   Wt 79.2 kg (174 lb 9.6 oz)   SpO2 97%   BMI 30.35 kg/m    BMI= Body mass index is 30.35 kg/m .  Objective:    Vitals:  Vitals are reviewed and are within the normal range  Gen:  Alert, pleasant, no acute distress  Cardiac:  Regular rate and rhythm, no murmurs, rubs or gallops  Respiratory:  Lungs clear to auscultation bilaterally  Abdomen: He has bilateral large ventral hernias, smaller on the right than left.  The stoma protrudes from the hernia on the left, with bag attached.  There is some skin breakdown around it.  His rectum shows protruding tissue, yellowish discharge, some blood, with some areas of irritation in the skin around it.  No extending erythema or evidence of cellulitis.        Patient Instructions   Get in to see the oncologist and the surgeon to talk about options.      Use the pain medications as needed.  Use the percocet as needed for severe pain.    Keep working with wound care.    Follow up in 1 month - check thyroid and kidneys at that time.          Jacqueline Nowak MD    "

## 2024-01-17 NOTE — PROGRESS NOTES
To be completed in Nursing note:    Please reference list for forms that require a visit for completion.  Please remind patients that providers are given 3-5 business days to complete and return forms.      Form type: Verbal Order (Tracking no. [id number])    Date form received: 1/12/24    Date form completed by Physician: 1/17/24    How was form returned to patient (mailed, faxed, or at  for patient to ): Fax to [facility] at [phone]    Date form mailed/faxed/left at  for patient and sent to HIM for scanning: Faxed 1/17/24      Once form is left for patient, faxed, or mailed PCS will then close the documentation only encounter.

## 2024-01-17 NOTE — PROGRESS NOTES
To be completed in Nursing note:    Please reference list for forms that require a visit for completion.  Please remind patients that providers are given 3-5 business days to complete and return forms.      Form type: Verbal Order (Tracking no. [id number])    Date form received: 1/12/24    Date form completed by Physician: 1/17/24    How was form returned to patient (mailed, faxed, or at  for patient to ): Fax to [facility] at [phone]    Date form mailed/faxed/left at  for patient and sent to HIM for scanning: Faxed 1/17/24      Once form is left for patient, faxed, or mailed PCS will then close the documentation only encounter.

## 2024-01-17 NOTE — PROGRESS NOTES
To be completed in Nursing note:    Please reference list for forms that require a visit for completion.  Please remind patients that providers are given 3-5 business days to complete and return forms.      Form type: Verbal Order (Tracking no. [id number])    Date form received: 1/10/24    Date form completed by Physician:1/17/24    How was form returned to patient (mailed, faxed, or at  for patient to ): Fax to [facility] at [phone]    Date form mailed/faxed/left at  for patient and sent to HIM for scanning: Faxed 1/17/24      Once form is left for patient, faxed, or mailed PCS will then close the documentation only encounter.

## 2024-01-17 NOTE — PROGRESS NOTES
Clinic Administered Medication Documentation        Patient was given Ketorolac (Toradol)15 ml. Prior to medication administration, verified patient's identity using patient s name and date of birth. Please see MAR and medication order for additional information. Patient instructed to remain in clinic for 15 minutes and report any adverse reaction to staff immediately.    Vial/Syringe: Single dose vial. Was entire vial of medication used? No, The remainder 15 ML of 30 ML was discarded as unavoidable waste.    Name of provider who requested the medication administration: Dr. Nowak  Name of provider on site (faculty or community preceptor) at the time of performing the medication administration: Dr. Nowak    Date of next administration: N/A  Date of next office visit with provider to renew medication plan (must be seen annually): N\A    Jacob Hatfield MA

## 2024-01-17 NOTE — PATIENT INSTRUCTIONS
Get in to see the oncologist and the surgeon to talk about options.      Use the pain medications as needed.  Use the percocet as needed for severe pain.    Keep working with wound care.    Follow up in 1 month - check thyroid and kidneys at that time.

## 2024-01-19 ENCOUNTER — DOCUMENTATION ONLY (OUTPATIENT)
Dept: FAMILY MEDICINE | Facility: CLINIC | Age: 79
End: 2024-01-19

## 2024-01-19 ENCOUNTER — MEDICAL CORRESPONDENCE (OUTPATIENT)
Dept: HEALTH INFORMATION MANAGEMENT | Facility: CLINIC | Age: 79
End: 2024-01-19
Payer: COMMERCIAL

## 2024-01-19 DIAGNOSIS — I10 ESSENTIAL HYPERTENSION: ICD-10-CM

## 2024-01-19 RX ORDER — AMLODIPINE BESYLATE 5 MG/1
5 TABLET ORAL DAILY
Qty: 90 TABLET | Refills: 3 | Status: SHIPPED | OUTPATIENT
Start: 2024-01-19 | End: 2024-03-11

## 2024-01-19 NOTE — TELEPHONE ENCOUNTER
Medication requested: AMLODIPINE BESYLATE 5MG TABS   Last office visit: 1/17/23  Future [facility] appointments: none  Medication last refilled: 7/20/23  Last qualifying labs: BP: 148/63 Abnormal   as of 1/17/2024     Routing refill request to provider for review/approval because:    Calcium Channel Blockers Protocol  [id number]/19/2024 10:08 AM   Protocol Details Blood pressure under 140/90 in past 12 months    Normal serum creatinine on file in past 12 months        GARTH Mello, BSN

## 2024-01-19 NOTE — PROGRESS NOTES
To be completed in Nursing note:    Please reference list for forms that require a visit for completion.  Please remind patients that providers are given 3-5 business days to complete and return forms.      Form type:[employer]    Date form received: 24    Date form completed by Physician:24    How was form returned to patient (mailed, faxed, or at  for patient to ):  Faxed to   Date form mailed/faxed/left at  for patient and sent to HIM for scannin24, sent to MR for scanning    Once form is left for patient, faxed, or mailed PCS will then close the documentation only encounter.       Jacob Hatfield MA

## 2024-01-26 DIAGNOSIS — Z53.9 DIAGNOSIS NOT YET DEFINED: Primary | ICD-10-CM

## 2024-01-26 PROCEDURE — G0180 MD CERTIFICATION HHA PATIENT: HCPCS | Performed by: STUDENT IN AN ORGANIZED HEALTH CARE EDUCATION/TRAINING PROGRAM

## 2024-01-29 ENCOUNTER — DOCUMENTATION ONLY (OUTPATIENT)
Dept: FAMILY MEDICINE | Facility: CLINIC | Age: 79
End: 2024-01-29
Payer: COMMERCIAL

## 2024-01-29 NOTE — PROGRESS NOTES
To be completed in Nursing note:    Please reference list for forms that require a visit for completion.  Please remind patients that providers are given 3-5 business days to complete and return forms.      Form type:[employer] [facility] [facility]    Date form received: 24    Date form completed by Physician:24    How was form returned to patient (mailed, faxed, or at  for patient to ):  Faxed to   Date form mailed/faxed/left at  for patient and sent to HIM for scannin24, sent to MR for scanning    Once form is left for patient, faxed, or mailed PCS will then close the documentation only encounter.       Jacob Hatfield MA

## 2024-02-06 DIAGNOSIS — C20 ADENOCARCINOMA OF RECTUM (H): ICD-10-CM

## 2024-02-06 DIAGNOSIS — R52 PAIN: ICD-10-CM

## 2024-02-06 RX ORDER — OXYCODONE AND ACETAMINOPHEN 5; 325 MG/1; MG/1
1 TABLET ORAL EVERY 6 HOURS PRN
Qty: 25 TABLET | Refills: 0 | OUTPATIENT
Start: 2024-02-06

## 2024-02-06 NOTE — TELEPHONE ENCOUNTER
[facility] phone call message- patient requesting a refill:    Full Medication Name: oxyCODONE-acetaminophen (PERCOCET) 5-325 MG tablet     Dose: Take 1 tablet by mouth every 6 hours as needed for pain - Oral     Pharmacy confirmed as   [facility] [place] - [address]  [address] [address]  Phone: [phone] Fax: [phone]  : Yes    Additional Comments: Pt is out of medication     OK to leave a message on voice mail? Yes    Primary language: English      needed? No    Call taken on February 6, 2024 at 11:20 AM by Morgan Ca

## 2024-02-16 ENCOUNTER — PATIENT OUTREACH (OUTPATIENT)
Dept: ONCOLOGY | Facility: CLINIC | Age: 79
End: 2024-02-16

## 2024-02-16 ENCOUNTER — LAB (OUTPATIENT)
Dept: LAB | Facility: CLINIC | Age: 79
End: 2024-02-16
Payer: COMMERCIAL

## 2024-02-16 ENCOUNTER — ONCOLOGY VISIT (OUTPATIENT)
Dept: ONCOLOGY | Facility: CLINIC | Age: 79
End: 2024-02-16
Attending: STUDENT IN AN ORGANIZED HEALTH CARE EDUCATION/TRAINING PROGRAM
Payer: COMMERCIAL

## 2024-02-16 ENCOUNTER — ANCILLARY PROCEDURE (OUTPATIENT)
Dept: CT IMAGING | Facility: CLINIC | Age: 79
End: 2024-02-16
Attending: STUDENT IN AN ORGANIZED HEALTH CARE EDUCATION/TRAINING PROGRAM
Payer: COMMERCIAL

## 2024-02-16 VITALS
BODY MASS INDEX: 30.65 KG/M2 | WEIGHT: 173 LBS | HEIGHT: 63 IN | SYSTOLIC BLOOD PRESSURE: 170 MMHG | OXYGEN SATURATION: 96 % | RESPIRATION RATE: 24 BRPM | HEART RATE: 99 BPM | TEMPERATURE: 97.4 F | DIASTOLIC BLOOD PRESSURE: 84 MMHG

## 2024-02-16 DIAGNOSIS — E03.4 HYPOTHYROIDISM DUE TO ACQUIRED ATROPHY OF THYROID: ICD-10-CM

## 2024-02-16 DIAGNOSIS — C20 ADENOCARCINOMA OF RECTUM (H): ICD-10-CM

## 2024-02-16 DIAGNOSIS — C20 ADENOCARCINOMA OF RECTUM (H): Primary | ICD-10-CM

## 2024-02-16 LAB
ALBUMIN SERPL BCG-MCNC: 4 G/DL (ref 3.5–5.2)
ALP SERPL-CCNC: 83 U/L (ref 40–150)
ALT SERPL W P-5'-P-CCNC: 13 U/L (ref 0–70)
ANION GAP SERPL CALCULATED.3IONS-SCNC: 11 MMOL/L (ref 7–15)
AST SERPL W P-5'-P-CCNC: 21 U/L (ref 0–45)
BASOPHILS # BLD AUTO: 0 10E3/UL (ref 0–0.2)
BASOPHILS NFR BLD AUTO: 1 %
BILIRUB SERPL-MCNC: 0.2 MG/DL
BUN SERPL-MCNC: 23.8 MG/DL (ref 8–23)
CALCIUM SERPL-MCNC: 8.9 MG/DL (ref 8.8–10.2)
CEA SERPL-MCNC: 5.1 NG/ML
CHLORIDE SERPL-SCNC: 107 MMOL/L (ref 98–107)
CREAT BLD-MCNC: 1.5 MG/DL (ref 0.7–1.3)
CREAT SERPL-MCNC: 1.47 MG/DL (ref 0.67–1.17)
DEPRECATED HCO3 PLAS-SCNC: 22 MMOL/L (ref 22–29)
EGFRCR SERPLBLD CKD-EPI 2021: 47 ML/MIN/1.73M2
EGFRCR SERPLBLD CKD-EPI 2021: 48 ML/MIN/1.73M2
EOSINOPHIL # BLD AUTO: 0.1 10E3/UL (ref 0–0.7)
EOSINOPHIL NFR BLD AUTO: 1 %
ERYTHROCYTE [DISTWIDTH] IN BLOOD BY AUTOMATED COUNT: 14 % (ref 10–15)
GLUCOSE SERPL-MCNC: 111 MG/DL (ref 70–99)
HCT VFR BLD AUTO: 32.4 % (ref 40–53)
HGB BLD-MCNC: 10.6 G/DL (ref 13.3–17.7)
IMM GRANULOCYTES # BLD: 0 10E3/UL
IMM GRANULOCYTES NFR BLD: 0 %
LYMPHOCYTES # BLD AUTO: 0.9 10E3/UL (ref 0.8–5.3)
LYMPHOCYTES NFR BLD AUTO: 15 %
MCH RBC QN AUTO: 29.5 PG (ref 26.5–33)
MCHC RBC AUTO-ENTMCNC: 32.7 G/DL (ref 31.5–36.5)
MCV RBC AUTO: 90 FL (ref 78–100)
MONOCYTES # BLD AUTO: 0.5 10E3/UL (ref 0–1.3)
MONOCYTES NFR BLD AUTO: 9 %
NEUTROPHILS # BLD AUTO: 4.1 10E3/UL (ref 1.6–8.3)
NEUTROPHILS NFR BLD AUTO: 74 %
NRBC # BLD AUTO: 0 10E3/UL
NRBC BLD AUTO-RTO: 0 /100
PLATELET # BLD AUTO: 312 10E3/UL (ref 150–450)
POTASSIUM SERPL-SCNC: 4.4 MMOL/L (ref 3.4–5.3)
PROT SERPL-MCNC: 7.5 G/DL (ref 6.4–8.3)
RBC # BLD AUTO: 3.59 10E6/UL (ref 4.4–5.9)
SODIUM SERPL-SCNC: 140 MMOL/L (ref 135–145)
WBC # BLD AUTO: 5.5 10E3/UL (ref 4–11)

## 2024-02-16 PROCEDURE — 99205 OFFICE O/P NEW HI 60 MIN: CPT | Performed by: STUDENT IN AN ORGANIZED HEALTH CARE EDUCATION/TRAINING PROGRAM

## 2024-02-16 PROCEDURE — 71260 CT THORAX DX C+: CPT | Mod: GC | Performed by: STUDENT IN AN ORGANIZED HEALTH CARE EDUCATION/TRAINING PROGRAM

## 2024-02-16 PROCEDURE — 85025 COMPLETE CBC W/AUTO DIFF WBC: CPT | Performed by: PATHOLOGY

## 2024-02-16 PROCEDURE — 74177 CT ABD & PELVIS W/CONTRAST: CPT | Mod: GC | Performed by: STUDENT IN AN ORGANIZED HEALTH CARE EDUCATION/TRAINING PROGRAM

## 2024-02-16 PROCEDURE — 99417 PROLNG OP E/M EACH 15 MIN: CPT | Performed by: STUDENT IN AN ORGANIZED HEALTH CARE EDUCATION/TRAINING PROGRAM

## 2024-02-16 PROCEDURE — 82378 CARCINOEMBRYONIC ANTIGEN: CPT | Performed by: STUDENT IN AN ORGANIZED HEALTH CARE EDUCATION/TRAINING PROGRAM

## 2024-02-16 PROCEDURE — G0463 HOSPITAL OUTPT CLINIC VISIT: HCPCS | Performed by: STUDENT IN AN ORGANIZED HEALTH CARE EDUCATION/TRAINING PROGRAM

## 2024-02-16 PROCEDURE — 36415 COLL VENOUS BLD VENIPUNCTURE: CPT | Performed by: PATHOLOGY

## 2024-02-16 PROCEDURE — 80053 COMPREHEN METABOLIC PANEL: CPT | Performed by: PATHOLOGY

## 2024-02-16 PROCEDURE — 82565 ASSAY OF CREATININE: CPT | Performed by: PATHOLOGY

## 2024-02-16 PROCEDURE — 84443 ASSAY THYROID STIM HORMONE: CPT | Performed by: PATHOLOGY

## 2024-02-16 PROCEDURE — 99000 SPECIMEN HANDLING OFFICE-LAB: CPT | Performed by: PATHOLOGY

## 2024-02-16 RX ORDER — IOPAMIDOL 755 MG/ML
88 INJECTION, SOLUTION INTRAVASCULAR ONCE
Status: COMPLETED | OUTPATIENT
Start: 2024-02-16 | End: 2024-02-16

## 2024-02-16 RX ADMIN — IOPAMIDOL 88 ML: 755 INJECTION, SOLUTION INTRAVASCULAR at 11:29

## 2024-02-16 ASSESSMENT — PAIN SCALES - GENERAL: PAINLEVEL: MODERATE PAIN (5)

## 2024-02-16 NOTE — LETTER
2024         RE: Michael D McArdle  [address]  [place] [address]        Dear Colleague,    Thank you for referring your patient, Michael D McArdle, to the [facility]. Please see a copy of my visit note below.    [facility] - Medical Oncology New Outpatient Consult Note  2024    Patient Identifiers     Name: Michael D McArdle  Preferred Address: [place]  Preferred Language: English  : 1945  Gender: male    Assessment and Plan     Mr. Michael D McArdle is a 78 year old male pipe smoker (2 bowls daily) with prior history of localized rectal cancer s/p JOEY () c/b localized recurrence (2022) who presents for second opinion and new consultation.    NGS: pending  Immuno: pending  Clinical Trial: pending    Mr. McArdle presents to clinic for second opinion and initial consultation for recurrent rectal cancer.  Unfortunately, we have no current staging, and an unclear image of the current state of his disease and his ongoing surgical complications.  It has been over a year since he has seen any of his prior providers for oncologic management.  We will plan for STAT CT today along with labs to better characterize the state of his disease.  Given the significant hernia near his ostomy site, along with his perirectal complaints, we will request a colorectal surgery evaluation next week.  Following these visits, patient will then return to Med Onc to discuss what would likely be palliative systemic treatment.    Patient expressed understanding of these recommendations, but continued to express hesitation regarding potential systemic treatment.  We informed him that the only other option would be palliative care or hospice.  He also expressed an understanding of this recommendation.  Given the potential palliative nature of his treatment, we have also provided referrals to palliative care and nutrition counseling.    Plan for MD follow-up in 3 weeks  after the workup outlined above.    90 minutes spent on the date of the encounter doing chart review, review of outside records, review of test results, interpretation of tests, patient visit, documentation, and discussion with other provider(s)     Chester Palacio MD, PhD   of Medicine  Division of Hematology, Oncology and Transplantation  [facility]    -----------------------------------    Oncology Summary and HPI      Cancer Staging   Adenocarcinoma of rectum (H)  Staging form: Colon and Rectum, AJCC 8th Edition  - Clinical stage from 8/18/2022: Stage Unknown (rcT4b, cNX, cM0) - Signed by Chester Palacio MD on 2/16/2024      Oncology History Overview Note   Patient is unfortunately unable to provide an updated history of his ongoing malignancy.  Based on chart review his T3 N1 rectal cancer was initially diagnosed in December 2014.  Patient underwent chemoradiation followed by low anterior resection in July 2015.  Patient also experienced brief diuretic ileostomy during that time, which was eventually taken down.    Patient was subsequently lost to follow-up and did not undergo surveillance until 2022 when he presented to [facility] with rectal bleeding, pain and pressure.  Workup at that time found local recurrence of his rectal cancer with no evidence of lymph node or distant metastases.  He was scheduled for reresection, which was aborted due to fixation of the primary tumor to the posterior sacrum with concern for ulceration at the level of prior anastomosis.  He was recommended to undergo systemic treatment with potential consideration of curative intent resection following this.    Patient was again unfortunately lost to follow-up. He expresses during interview today that he was frustrated by his prior providers.  It is unclear why he did not seek second opinion or further consultation at that time.  He was recommended recently by his primary care provider to seek further counseling  "for his cancer, which leads to his presentation to the [facility]     Adenocarcinoma of rectum (H)   12/31/2014 Initial Diagnosis    Adenocarcinoma of rectum (H)         Subjective/Interval Events     - Unfortunately patient's medical history is extremely complicated and recent notes from Dr. Tirado's office are unavailable, as patient last saw him about 1 year prior. Patient himself is unable to provide the detail necessary for oncologic decision making. He has no additional records with him.     - Patient is frustrated by tyler-ostomy hernia, which occurred about 1 month after diverting loop colostomy.     Physical Exam     Vital signs: BP (!) 170/84   Pulse 99   Temp 97.4  F (36.3  C)   Resp 24   Ht 1.6 m (5' 3\")   Wt 78.5 kg (173 lb)   SpO2 96%   BMI 30.65 kg/m      ECOG performance status:  2  Vascular access:  none    Physical Exam:  Exam performed, notable for:  - 5cm hernia beneath his ostomy site; no pain on palpation of abdomen  - CTAB in both lungs    Objective Data     Lab data:  I have personally reviewed the lab data, notable for:    - pending    Radiology data:  I have personally reviewed the radiology data, notable for:  - pending    Pathology and other data:  I have personally reviewed and interpreted the pathology data, notable for:    06/12/2015 Surg Path  Final Diagnosis   I. CASE FROM [place] ([id number], OBTAINED 06/12/2015):  Rectosigmoid colon, low anterior resection:  -Invasive moderately differentiated adenocarcinoma  -3 cm mass involving the mid rectum, all surgical margins are negative for dysplasia and malignancy  -Tumor invades muscularis propria, acellular mucin is noted in the pericolic fat   -Fourteen benign lymph nodes, negative for metastatic carcinoma (0/14)  -Status post chemotherapy with minimal tumor response (abundant viable tumor, grade 2)  -Mismatch repair enzymes intact by report  -ypT2N0     II. CASE FROM [place], [place] " ([id number], OBTAINED 08/16/2022):  Rectal mass, biopsy:  -Adenocarcinoma, moderately differentiated       Medical/Surgical History     Past medical history:  Active Ambulatory Problems     Diagnosis Date Noted    Chronic Reflux esophagitis 03/18/2013    Moderate recurrent major depression (H) 03/18/2013    Diastolic Dysfunction  03/18/2013    Fainting (Syncope) 03/18/2013    smoking  03/18/2013    Prediabetes 03/18/2013    Asthma 03/18/2013    Hyperlipidemia 03/18/2013    Obese 04/22/2014    Adenocarcinoma of rectum (H) 12/31/2014    Pulmonary embolism and infarction (H) 06/21/2015    Esophageal reflux 06/12/2015    Hypothyroidism due to acquired atrophy of thyroid 11/19/2015    ACP (advance care planning) 12/22/2016    CKD (chronic kidney disease) stage 3, GFR 30-59 ml/min (H) 03/22/2017    Decreased vision in both eyes 04/09/2019    Bilateral hearing loss, unspecified hearing loss type 04/09/2019    Ventral hernia 04/09/2019    Unsteady gait 04/09/2019    Ileostomy care (H) 08/30/2022    Ileostomy status (H) 05/16/2023    Carpal tunnel syndrome of right wrist 05/16/2023    Cataract of both eyes, unspecified cataract type 05/16/2023    Chronic obstructive pulmonary disease, unspecified COPD type (H) 08/25/2023     Resolved Ambulatory Problems     Diagnosis Date Noted    Hypothyroidism 04/09/2014    Malignant neoplasm of rectum (H) 01/08/2015     Past Medical History:   Diagnosis Date    Cancer (H)     Depressive disorder     Kidney disease     Status post rotator cuff repair     Thyroid disease     Uncomplicated asthma        Past surgical history:  Past Surgical History:   Procedure Laterality Date    COLECTOMY  July 2015    radiation and chemo, 3 colon surgeries    COLOSTOMY  2015    and reversal     OPEN REDUCTION INTERNAL FIXATION ANKLE Right 2/22/2016    Procedure: ANKLE FRACTURE OPEN REDUCTION INTERNAL FIXATION, RIGHT;  Surgeon: Yolanad Dean MD;  Location: [facility];  Service:     OTHER  SURGICAL HISTORY      takedown of colostomy    SHOULDER ARTHROSCOPY W/ ROTATOR CUFF REPAIR Left     SHOULDER OPEN ROTATOR CUFF REPAIR Right         Social history:   Social History     Tobacco Use    Smoking status: Every Day     Types: Pipe    Smokeless tobacco: Never    Tobacco comments:     has been cut down a lot, is on the nicotine patch   Substance Use Topics    Alcohol use: Yes     Alcohol/week: 0.0 standard drinks of alcohol    Drug use: No       Family history:  Family History   Problem Relation Age of Onset    Breast Cancer Mother     Heart Failure Father     Diabetes Other         daughters     Cancer Other         wife     Heart Disease No family hx of     Coronary Artery Disease No family hx of     Hypertension No family hx of     Hyperlipidemia No family hx of     Cerebrovascular Disease No family hx of     Colon Cancer No family hx of     Prostate Cancer No family hx of     Other Cancer No family hx of     Depression No family hx of     Anxiety Disorder No family hx of     Mental Illness No family hx of     Substance Abuse No family hx of     Anesthesia Reaction No family hx of     Asthma No family hx of     Osteoporosis No family hx of     Genetic Disorder No family hx of     Thyroid Disease No family hx of     Obesity No family hx of     Unknown/Adopted No family hx of        Allergies:   Allergies   Allergen Reactions    Penicillins Swelling       Outpatient medications:     Current Outpatient Medications:     ACETAMINOPHEN PO, Take 500 mg by mouth, Disp: , Rfl:     albuterol (PROAIR HFA/PROVENTIL HFA/VENTOLIN HFA) 108 (90 Base) MCG/ACT inhaler, Inhale 2 puffs into the lungs every 6 hours, Disp: 18 g, Rfl: 1    albuterol (PROVENTIL) (2.5 MG/3ML) 0.083% neb solution, Take 1 vial (2.5 mg) by nebulization every 6 hours as needed for shortness of breath / dyspnea or wheezing, Disp: 90 mL, Rfl: 1    amLODIPine (NORVASC) 5 MG tablet, TAKE ONE TABLET BY MOUTH EVERY DAY, Disp: 90 tablet, Rfl: 3    aspirin  (ASPIRIN LOW DOSE) 81 MG chewable tablet, Take 1 tablet (81 mg) by mouth daily, Disp: 90 tablet, Rfl: 3    atorvastatin (LIPITOR) 80 MG tablet, Take 1 tablet (80 mg) by mouth daily, Disp: 90 tablet, Rfl: 3    baclofen (LIORESAL) 20 MG tablet, TAKE ONE TABLET BY MOUTH TWICE A DAY AS NEEDED FOR MUSCLE SPASMS IN THE EVENING, Disp: 45 tablet, Rfl: 1    beclomethasone HFA (QVAR REDIHALER) 80 MCG/ACT inhaler, Inhale 1 puff into the lungs 2 times daily, Disp: , Rfl:     budesonide-formoterol (SYMBICORT) 80-4.5 MCG/ACT Inhaler, Take 2 puff 2x daily and 1-2 puffs as needed with shortness of breath., Disp: 20.4 g, Rfl: 3    capsaicin (ZOSTRIX) 0.025 % external cream, Apply 1 g topically 3 times daily as needed (hand pain), Disp: 50 g, Rfl: 1    diclofenac (VOLTAREN) 1 % topical gel, Apply 2-4 g topically 4 times daily, Disp: 100 g, Rfl: 3    famotidine (PEPCID) 40 MG tablet, Take 1 tablet (40 mg) by mouth daily, Disp: 90 tablet, Rfl: 3    FLUoxetine (PROZAC) 40 MG capsule, TAKE 1 CAPSULE(40 MG) BY MOUTH DAILY, Disp: 90 capsule, Rfl: 1    gabapentin (NEURONTIN) 100 MG capsule, Take 1 capsule (100 mg) by mouth At Bedtime, Disp: 30 capsule, Rfl: 1    levothyroxine (SYNTHROID/LEVOTHROID) 150 MCG tablet, Take 1 tablet (150 mcg) by mouth daily, Disp: 90 tablet, Rfl: 1    order for DME, Adult nebulizer machine with mask and tubing., Disp: 1 each, Rfl: 0    order for DME, Equipment being ordered: Nebulizer and supplies (mask and tubing), Disp: 1 each, Rfl: 0    order for DME, Equipment being ordered: Rollator with seat and brakes., Disp: 1 Units, Rfl: 0    order for DME, Equipment being ordered: automatic blood pressure monitor, Disp: 1 Units, Rfl: 0    ostomy (STOMAHESIVE) POWD external powder, Apply 1 Dose topically 4 times daily as needed (skin breakdown), Disp: 30 g, Rfl: 0    oxyCODONE-acetaminophen (PERCOCET) 5-325 MG tablet, Take 1 tablet by mouth every 6 hours as needed for pain, Disp: 25 tablet, Rfl: 0    polyethylene glycol  (MIRALAX) powder, Take 1 capful every other day, Disp: 510 g, Rfl: 1    sulfamethoxazole-trimethoprim (BACTRIM DS) 800-160 MG tablet, Take 1 tablet by mouth 2 times daily, Disp: 14 tablet, Rfl: 0    zinc oxide (DESITIN) 40 % external ointment, Apply topically as needed for dry skin or irritation (apply to rectum.), Disp: 113 g, Rfl: 1      Chester Palacio MD

## 2024-02-16 NOTE — TELEPHONE ENCOUNTER
[facility]: Cancer Care Short Note                                                                                          Completed chart audit to assign Oncology Care Coordination enrollment status.    MMR and CRS NGS testing on [id number] requested. Will await results before sending Caris if needed.    I. CASE FROM [place], [place] ([id number], OBTAINED 06/12/2015):  Rectosigmoid colon, low anterior resection:  -Invasive moderately differentiated adenocarcinoma  -3 cm mass involving the mid rectum, all surgical margins are negative for dysplasia and malignancy  -Tumor invades muscularis propria, acellular mucin is noted in the pericolic fat   -Fourteen benign lymph nodes, negative for metastatic carcinoma (0/14)  -Status post chemotherapy with minimal tumor response (abundant viable tumor, grade 2)  -Mismatch repair enzymes intact by report  -ypT2N0     II. CASE FROM [place], [place] ([id number], OBTAINED 08/16/2022):  Rectal mass, biopsy:  -Adenocarcinoma, moderately differentiated

## 2024-02-16 NOTE — DISCHARGE INSTRUCTIONS

## 2024-02-16 NOTE — LETTER
February 22, 2024      Isiah CARROLL McArdle  [address]   [place] [address]        Dear Mr.McArdle,    We are writing to inform you of your test results.    I'm sorry I missed you at our visit today.  I hope things are going okay.  I'm glad you are meeting with the oncologist.  Your thyroid results came back just at the edge of the normal range.  We should stay steady at this dose and recheck the level again in another 6-8 weeks.  Please call the clinic at [phone] if you have any questions.     Resulted Orders   TSH   Result Value Ref Range    TSH 4.26 (H) 0.30 - 4.20 uIU/mL       If you have any questions or concerns, please call the clinic at the number listed above.       Sincerely,      Jacqueline Nowak MD

## 2024-02-16 NOTE — PROGRESS NOTES
[facility] - Medical Oncology New Outpatient Consult Note  2024    Patient Identifiers     Name: Michael D McArdle  Preferred Address: [place]  Preferred Language: English  : 1945  Gender: male    Assessment and Plan     Mr. Michael D McArdle is a 78 year old male pipe smoker (2 bowls daily) with prior history of localized rectal cancer s/p JOEY () c/b localized recurrence s/p attempted resection (2022) subsequently lost to oncologic follow-up who presents for second opinion and new consultation.    NGS: pending  Immuno: pending  Clinical Trial: pending    Mr. McArdle presents to clinic for second opinion and initial consultation for recurrent rectal cancer.  Unfortunately, we have no current staging, and an unclear image of the current state of his disease and his ongoing surgical complications.  It has been over a year since he has seen any of his prior providers for oncologic management.  We will plan for STAT CT today along with labs to better characterize the state of his disease.  Given the significant hernia near his ostomy site, along with his perirectal complaints, we will request a colorectal surgery evaluation next week.  Following these visits, patient will then return to Med Onc to discuss what would likely be palliative systemic treatment.    Patient expressed understanding of these recommendations, but continued to express hesitation regarding potential systemic treatment.  We informed him that the only other option would be palliative care or hospice.  He also expressed an understanding of this recommendation.  Given the potential palliative nature of his treatment, we have also provided referrals to palliative care and nutrition counseling.    Plan for MD follow-up in 3 weeks after the workup outlined above.    90 minutes spent on the date of the encounter doing chart review, review of outside records, review of test results, interpretation of tests, patient visit,  documentation, and discussion with other provider(s)     Chester Palacio MD, PhD   of Medicine  Division of Hematology, Oncology and Transplantation  [facility]    -----------------------------------    Oncology Summary and HPI      Cancer Staging   Adenocarcinoma of rectum (H)  Staging form: Colon and Rectum, AJCC 8th Edition  - Clinical stage from 8/18/2022: Stage Unknown (rcT4b, cNX, cM0) - Signed by Chester Palacio MD on 2/16/2024      Oncology History Overview Note   Patient is unfortunately unable to provide an updated history of his ongoing malignancy.  Based on chart review his T3 N1 rectal cancer was initially diagnosed in December 2014.  Patient underwent chemoradiation followed by low anterior resection in July 2015.  Patient also experienced brief diuretic ileostomy during that time, which was eventually taken down.    Patient was subsequently lost to follow-up and did not undergo surveillance until 2022 when he presented to [facility] with rectal bleeding, pain and pressure.  Workup at that time found local recurrence of his rectal cancer with no evidence of lymph node or distant metastases.  He was scheduled for reresection, which was aborted due to fixation of the primary tumor to the posterior sacrum with concern for ulceration at the level of prior anastomosis.  He was recommended to undergo systemic treatment with potential consideration of curative intent resection following this.    Patient was again unfortunately lost to follow-up. He expresses during interview today that he was frustrated by his prior providers.  It is unclear why he did not seek second opinion or further consultation at that time.  He was recommended recently by his primary care provider to seek further counseling for his cancer, which leads to his presentation to the [facility]     Adenocarcinoma of rectum (H)   12/31/2014 Initial Diagnosis    Adenocarcinoma of rectum (H)    "      Subjective/Interval Events     - Unfortunately patient's medical history is extremely complicated and recent notes from Dr. Tirado's office are unavailable, as patient last saw him about 1 year prior. Patient himself is unable to provide the detail necessary for oncologic decision making. He has no additional records with him.     - Patient is frustrated by tyler-ostomy hernia, which occurred about 1 month after diverting loop colostomy.     Physical Exam     Vital signs: BP (!) 170/84   Pulse 99   Temp 97.4  F (36.3  C)   Resp 24   Ht 1.6 m (5' 3\")   Wt 78.5 kg (173 lb)   SpO2 96%   BMI 30.65 kg/m      ECOG performance status:  2  Vascular access:  none    Physical Exam:  Exam performed, notable for:  - 5cm hernia beneath his ostomy site; no pain on palpation of abdomen  - CTAB in both lungs    Objective Data     Lab data:  I have personally reviewed the lab data, notable for:    - pending    Radiology data:  I have personally reviewed the radiology data, notable for:  - pending    Pathology and other data:  I have personally reviewed and interpreted the pathology data, notable for:    06/12/2015 Surg Path  Final Diagnosis   I. CASE FROM [place], [place] ([id number], OBTAINED 06/12/2015):  Rectosigmoid colon, low anterior resection:  -Invasive moderately differentiated adenocarcinoma  -3 cm mass involving the mid rectum, all surgical margins are negative for dysplasia and malignancy  -Tumor invades muscularis propria, acellular mucin is noted in the pericolic fat   -Fourteen benign lymph nodes, negative for metastatic carcinoma (0/14)  -Status post chemotherapy with minimal tumor response (abundant viable tumor, grade 2)  -Mismatch repair enzymes intact by report  -ypT2N0     II. CASE FROM [place], [place] ([id number], OBTAINED 08/16/2022):  Rectal mass, biopsy:  -Adenocarcinoma, moderately differentiated       Medical/Surgical History     Past medical history:  Active " Ambulatory Problems     Diagnosis Date Noted    Chronic Reflux esophagitis 03/18/2013    Moderate recurrent major depression (H) 03/18/2013    Diastolic Dysfunction  03/18/2013    Fainting (Syncope) 03/18/2013    smoking  03/18/2013    Prediabetes 03/18/2013    Asthma 03/18/2013    Hyperlipidemia 03/18/2013    Obese 04/22/2014    Adenocarcinoma of rectum (H) 12/31/2014    Pulmonary embolism and infarction (H) 06/21/2015    Esophageal reflux 06/12/2015    Hypothyroidism due to acquired atrophy of thyroid 11/19/2015    ACP (advance care planning) 12/22/2016    CKD (chronic kidney disease) stage 3, GFR 30-59 ml/min (H) 03/22/2017    Decreased vision in both eyes 04/09/2019    Bilateral hearing loss, unspecified hearing loss type 04/09/2019    Ventral hernia 04/09/2019    Unsteady gait 04/09/2019    Ileostomy care (H) 08/30/2022    Ileostomy status (H) 05/16/2023    Carpal tunnel syndrome of right wrist 05/16/2023    Cataract of both eyes, unspecified cataract type 05/16/2023    Chronic obstructive pulmonary disease, unspecified COPD type (H) 08/25/2023     Resolved Ambulatory Problems     Diagnosis Date Noted    Hypothyroidism 04/09/2014    Malignant neoplasm of rectum (H) 01/08/2015     Past Medical History:   Diagnosis Date    Cancer (H)     Depressive disorder     Kidney disease     Status post rotator cuff repair     Thyroid disease     Uncomplicated asthma        Past surgical history:  Past Surgical History:   Procedure Laterality Date    COLECTOMY  July 2015    radiation and chemo, 3 colon surgeries    COLOSTOMY  2015    and reversal     OPEN REDUCTION INTERNAL FIXATION ANKLE Right 2/22/2016    Procedure: ANKLE FRACTURE OPEN REDUCTION INTERNAL FIXATION, RIGHT;  Surgeon: Yolanda Dean MD;  Location: [facility];  Service:     OTHER SURGICAL HISTORY      takedown of colostomy    SHOULDER ARTHROSCOPY W/ ROTATOR CUFF REPAIR Left     SHOULDER OPEN ROTATOR CUFF REPAIR Right         Social history:    Social History     Tobacco Use    Smoking status: Every Day     Types: Pipe    Smokeless tobacco: Never    Tobacco comments:     has been cut down a lot, is on the nicotine patch   Substance Use Topics    Alcohol use: Yes     Alcohol/week: 0.0 standard drinks of alcohol    Drug use: No       Family history:  Family History   Problem Relation Age of Onset    Breast Cancer Mother     Heart Failure Father     Diabetes Other         daughters     Cancer Other         wife     Heart Disease No family hx of     Coronary Artery Disease No family hx of     Hypertension No family hx of     Hyperlipidemia No family hx of     Cerebrovascular Disease No family hx of     Colon Cancer No family hx of     Prostate Cancer No family hx of     Other Cancer No family hx of     Depression No family hx of     Anxiety Disorder No family hx of     Mental Illness No family hx of     Substance Abuse No family hx of     Anesthesia Reaction No family hx of     Asthma No family hx of     Osteoporosis No family hx of     Genetic Disorder No family hx of     Thyroid Disease No family hx of     Obesity No family hx of     Unknown/Adopted No family hx of        Allergies:   Allergies   Allergen Reactions    Penicillins Swelling       Outpatient medications:     Current Outpatient Medications:     ACETAMINOPHEN PO, Take 500 mg by mouth, Disp: , Rfl:     albuterol (PROAIR HFA/PROVENTIL HFA/VENTOLIN HFA) 108 (90 Base) MCG/ACT inhaler, Inhale 2 puffs into the lungs every 6 hours, Disp: 18 g, Rfl: 1    albuterol (PROVENTIL) (2.5 MG/3ML) 0.083% neb solution, Take 1 vial (2.5 mg) by nebulization every 6 hours as needed for shortness of breath / dyspnea or wheezing, Disp: 90 mL, Rfl: 1    amLODIPine (NORVASC) 5 MG tablet, TAKE ONE TABLET BY MOUTH EVERY DAY, Disp: 90 tablet, Rfl: 3    aspirin (ASPIRIN LOW DOSE) 81 MG chewable tablet, Take 1 tablet (81 mg) by mouth daily, Disp: 90 tablet, Rfl: 3    atorvastatin (LIPITOR) 80 MG tablet, Take 1 tablet (80  mg) by mouth daily, Disp: 90 tablet, Rfl: 3    baclofen (LIORESAL) 20 MG tablet, TAKE ONE TABLET BY MOUTH TWICE A DAY AS NEEDED FOR MUSCLE SPASMS IN THE EVENING, Disp: 45 tablet, Rfl: 1    beclomethasone HFA (QVAR REDIHALER) 80 MCG/ACT inhaler, Inhale 1 puff into the lungs 2 times daily, Disp: , Rfl:     budesonide-formoterol (SYMBICORT) 80-4.5 MCG/ACT Inhaler, Take 2 puff 2x daily and 1-2 puffs as needed with shortness of breath., Disp: 20.4 g, Rfl: 3    capsaicin (ZOSTRIX) 0.025 % external cream, Apply 1 g topically 3 times daily as needed (hand pain), Disp: 50 g, Rfl: 1    diclofenac (VOLTAREN) 1 % topical gel, Apply 2-4 g topically 4 times daily, Disp: 100 g, Rfl: 3    famotidine (PEPCID) 40 MG tablet, Take 1 tablet (40 mg) by mouth daily, Disp: 90 tablet, Rfl: 3    FLUoxetine (PROZAC) 40 MG capsule, TAKE 1 CAPSULE(40 MG) BY MOUTH DAILY, Disp: 90 capsule, Rfl: 1    gabapentin (NEURONTIN) 100 MG capsule, Take 1 capsule (100 mg) by mouth At Bedtime, Disp: 30 capsule, Rfl: 1    levothyroxine (SYNTHROID/LEVOTHROID) 150 MCG tablet, Take 1 tablet (150 mcg) by mouth daily, Disp: 90 tablet, Rfl: 1    order for DME, Adult nebulizer machine with mask and tubing., Disp: 1 each, Rfl: 0    order for DME, Equipment being ordered: Nebulizer and supplies (mask and tubing), Disp: 1 each, Rfl: 0    order for DME, Equipment being ordered: Rollator with seat and brakes., Disp: 1 Units, Rfl: 0    order for DME, Equipment being ordered: automatic blood pressure monitor, Disp: 1 Units, Rfl: 0    ostomy (STOMAHESIVE) POWD external powder, Apply 1 Dose topically 4 times daily as needed (skin breakdown), Disp: 30 g, Rfl: 0    oxyCODONE-acetaminophen (PERCOCET) 5-325 MG tablet, Take 1 tablet by mouth every 6 hours as needed for pain, Disp: 25 tablet, Rfl: 0    polyethylene glycol (MIRALAX) powder, Take 1 capful every other day, Disp: 510 g, Rfl: 1    sulfamethoxazole-trimethoprim (BACTRIM DS) 800-160 MG tablet, Take 1 tablet by mouth 2  times daily, Disp: 14 tablet, Rfl: 0    zinc oxide (DESITIN) 40 % external ointment, Apply topically as needed for dry skin or irritation (apply to rectum.), Disp: 113 g, Rfl: 1

## 2024-02-16 NOTE — NURSING NOTE
"Oncology Rooming Note    February 16, 2024 10:11 AM   Michael D McArdle is a 78 year old male who presents for:    Chief Complaint   Patient presents with    Oncology Clinic Visit     Adenocarcinoma of rectum      Initial Vitals: BP (!) 170/84   Pulse 99   Temp 97.4  F (36.3  C)   Resp 24   Ht 1.6 m (5' 3\")   Wt 78.5 kg (173 lb)   SpO2 96%   BMI 30.65 kg/m   Estimated body mass index is 30.65 kg/m  as calculated from the following:    Height as of this encounter: 1.6 m (5' 3\").    Weight as of this encounter: 78.5 kg (173 lb). Body surface area is 1.87 meters squared.  Moderate Pain (5) Comment: Data Unavailable   No LMP for male patient.  Allergies reviewed: Yes  Medications reviewed: Yes    Medications: Medication refills not needed today.  [facility]    Frailty Screening:   Is the patient here for a new oncology consult visit in cancer care? 1. Yes. Over the past month, have you experienced difficulty or required a caregiver to assist with:   1. Balance, walking or general mobility (including any falls)? YES  2. Completion of self-care tasks such as bathing, dressing, toileting, grooming/hygiene?  NO  3. Concentration or memory that affects your daily life?  NO       Clinical concerns: no other complaints      Maurilio Del Angel"

## 2024-02-19 ENCOUNTER — TELEPHONE (OUTPATIENT)
Dept: FAMILY MEDICINE | Facility: CLINIC | Age: 79
End: 2024-02-19

## 2024-02-19 DIAGNOSIS — E03.4 HYPOTHYROIDISM DUE TO ACQUIRED ATROPHY OF THYROID: Primary | ICD-10-CM

## 2024-02-19 NOTE — TELEPHONE ENCOUNTER
Home Care is calling regarding an established patient with [facility].       Requesting orders from: Jacqueline Nowak  Provider is following patient: Yes  Is this a 60-day recertification request?  No    Orders Requested    Skilled Nursing  Request for continuation of care with increase in frequency  Frequency:  3x/wk for 9 wks for wound care         Information was gathered and will be sent to provider for review.  RN will contact Home Care with information after provider review.  Confirmed ok to leave a detailed message with call back.  Contact information confirmed and updated as needed.    Sascha Casarez                When does home care need verbal order by?       **PROVIDERS: Please route task to Nurses, not .**

## 2024-02-19 NOTE — TELEPHONE ENCOUNTER
Agree with increase in visit frequency for patient.     MD Kelly Valenzuela, can you give them verbal approval from me?    Routed to GARTH Mendoza

## 2024-02-19 NOTE — TELEPHONE ENCOUNTER
LVM for [facility] RN with verbal orders    Orders provided on behalf of Dr Nowak:    Skilled Nursing  Request for continuation of care with increase in frequency  Frequency:  3x/wk for 9 wks for wound care        GARTH Mello, BSN

## 2024-02-20 LAB — TSH SERPL DL<=0.005 MIU/L-ACNC: 4.26 UIU/ML (ref 0.3–4.2)

## 2024-02-21 ENCOUNTER — MEDICAL CORRESPONDENCE (OUTPATIENT)
Dept: HEALTH INFORMATION MANAGEMENT | Facility: CLINIC | Age: 79
End: 2024-02-21

## 2024-02-21 DIAGNOSIS — C20 RECTAL CANCER (H): Primary | ICD-10-CM

## 2024-02-21 NOTE — TELEPHONE ENCOUNTER
Reached pt to introduce self as nurse care coordinator for Dr. Palacio. Reviewed referrals sent from visit 2/16/24 and offered to connect patient with Colorectal Surgery. Per [facility] notes, multiple attempts made in January after referral from pcp was sent, unable to reach pt and no further attempts were made. Pt stated he will take their number and call tomorrow. Informed him Dr. Palacio will present case at [facility] 2/26 and hopefully CRS has met with him by then. Follow-up visit will be scheduled to discuss whether pt wants to proceed with systemic treatment.     Also asked pt if he uses [employer], as he had not read writer's message from 2/16. He stated no and to deactivate [employer] account, this was done. He confirmed both numbers on file as his and ok to leave messages if he does not answer. Writer will continue to follow-up via phone weekly.

## 2024-02-22 ENCOUNTER — DOCUMENTATION ONLY (OUTPATIENT)
Dept: FAMILY MEDICINE | Facility: CLINIC | Age: 79
End: 2024-02-22
Payer: COMMERCIAL

## 2024-02-22 ENCOUNTER — TELEPHONE (OUTPATIENT)
Dept: SURGERY | Facility: CLINIC | Age: 79
End: 2024-02-22
Payer: COMMERCIAL

## 2024-02-22 ENCOUNTER — PATIENT OUTREACH (OUTPATIENT)
Dept: CARE COORDINATION | Facility: CLINIC | Age: 79
End: 2024-02-22
Payer: COMMERCIAL

## 2024-02-22 NOTE — TELEPHONE ENCOUNTER
I attempted both phone numbers again to go over CRS appointments with patient    Voicemail box is full and unable to leave message.

## 2024-02-22 NOTE — RESULT ENCOUNTER NOTE
Michael D McArdle-    I'm sorry I missed you at our visit today.  I hope things are going okay.  I'm glad you are meeting with the oncologist.  Your thyroid results came back just at the edge of the normal range.  We should stay steady at this dose and recheck the level again in another 6-8 weeks.  Please call the clinic at [phone] if you have any questions.      Jacqueline Nowak MD    Please send results to patient.

## 2024-02-22 NOTE — TELEPHONE ENCOUNTER
Diagnosis, Referred by & from: Rectal Cancer Recurrence   Appt date: 3/5/2024   NOTES STATUS DETAILS   [facility]  [facility] - ONC OV with Dr. Palacio   OFFICE NOTE from other specialist Received / Care Everywhere / Internal [facility] [place]:  1/17/24 - PCC OV with Dr. Nowak    [facility]:  6/13/23 - CR OV with Dr. Moss  5/17/22 - CR OV with Dr. Bauer  5/4/22 - CR OV with JANET Cerna    [facility]:  8/15/22 - WOUND OV with Shana Gonzalez RN  6/29/22 - PLASTIC OV with Dr. Timothy [facility]:  8/8/18 - ONC OV with Dr. Tirado   DISCHARGE SUMMARY from hospital Care Everywhere [facility]:  8/18/22 - Admission with Dr. Bauer  9/8/15 - Admission with Dr. Bauer  6/25/15 - Admission with Dr. Hogan   DISCHARGE REPORT from the ER Care Everywhere [facility]:  8/26/22 - ED OV with Dr. Alatorre   OPERATIVE REPORT Care Everywhere / Internal [facility]:  8/18/22 - OP Note for OPEN TRANSVERSE LOOP COLOSTOMY with Dr. Bauer  9/8/15 - OP Note for ILEOSTOMY LOOP CLOSURE with Dr. Bauer  6/25/15 - OP Note for RECLOSURE OF ABDOMEN DEHISCENCE with Dr. Bauer  6/12/15 - OP Note for ROBOTIC ASSISTED LOW ANTERIOR RESECTION WITH ILEO-LOOP ILEOSTOMY with Dr. Bauer    [facility]:  5/10/19 - OP Note for INCISIONAL HERNIA REPAIR with Dr. Calderon   MEDICATION LIST Internal    LABS     ANAL PAP/CEA Internal [facility]:  2/16/24 - CEA   BIOPSIES/PATHOLOGY RELATED TO DIAGNOSIS Received [facility]:  8/16/22 - Rectum (Case: [id number]) - Consulted by us 1/12/24  9/8/15 - Ileostomy (Case: [id number])  6/12/15 - Colon Biopsy (Case: [id number]) - consulted by us 1/12/24    [facility]:  12/31/14 - Colon Biopsy (Case: [id number])   DIAGNOSTIC PROCEDURES     COLONOSCOPY (most recent all time after 5 years) Received [facility]:  12/31/14 - Colonoscopy   FLEX SIGMOIDOSCOPY Care Everywhere [facility]:  8/16/22 - Flexible Sigmoidoscopy   IMAGING (DISC & REPORT)      CT Received / Internal [facility]:  2/16/24 - CT Chest/Abd/Pelvis    [facility]:  5/6/22 - CT Chest/Abd/Pelvis   MRI Received / Internal [facility]:  ([facility]) 3/1/24 - MRI Rectum    [facility]:  5/5/22 - MRI Pelvis   XRAY Received [facility]:  8/20/22 - XR Abdomen     Records Requested  02/22/24    Facility  [facility]  Fax: [phone]  [facility]  Fax: [phone]  [facility]  Fax: [phone]  [facility]  Fax: [phone]   Outcome * 2/22/24 7:55 AM Faxed urgent request to [facility], [facility], [facility], and [facility] for records and images to be sent to the clinic. - [place]    * 2/22/24 11:04 AM [facility], and [facility] and sent to HIM to be scanned into the chart. Images received from [facility] and attached to the patient in PACs. - [place]

## 2024-02-22 NOTE — PROGRESS NOTES
Social Work - Intervention  [facility]  Data/Intervention:    Patient Name: Michael D McArdle Goes By: Isiah SIMS/Age: 1945 (78 year old)     Visit Type: telephone  Referral Source: care team  Reason for Referral: health care directive    Collaborated With:    -Patient  -     Psychosocial Information/Concerns:  Care team reports patient is a poor historian, has missed outreach attempts to schedule appointments. Clinic wondering if patient has additional support to assist patient in managing care as well as assisit with decision making when appropriate.     Reviewing patient's chart patient appears to have questionable capacity. At this time, Dr. Palacio feels patient's consent capacity would have to be assessed on a case-by-case basis. Dr. Palacio feels patient has the capacity to consent to having his daughter be a decision maker.     Patient expressed feeling overwhelmed with the amount of calls and follow up needed to manage their care.     Patient reports they have talked with their daughter Chantal this morning and daughter has agreed to be patient's health care agent.     Patient states their daughter is an LPN and daughter's work is near patient's home. Patient reports daughter stops after work to check in on patient and helps as needed.     Patient states daughter has been helpful in managing their care. Patient reports daughter has been trying to find a PCA for them. They have been calling different agencies, but have not been able to put services in place yet. Patient expressed appreciation for daughter's help.      Intervention/Education/Resources Provided:  SW provided education about POLST and health care directive form, what each are for and when they would be used.     Patient expressed understanding and stated their daughter is willing to be a health care agent.     SW discussed a consent to communicate form. Patient stated they would like clinic to communicate with their  daughter with scheduling. Patient believes their daughter would be most helpful in managing their care as they expressed feeling overwhelmed in managing appointments and follow up need for their care.        Assessment/Plan:  Patient asked all three forms be mailed to them and they agreed to complete them and bring them back during next appointment.      Provided patient/family with contact information and availability.    Charity THOMASON, [facility]  - Oncology  Phone : [phone]  Pager: [phone]

## 2024-02-22 NOTE — PROGRESS NOTES
To be completed in Nursing note:    Please reference list for forms that require a visit for completion.  Please remind patients that providers are given 3-5 business days to complete and return forms.      Form type:[employer] [facility]    Date form received: 24    Date form completed by Physician:24    How was form returned to patient (mailed, faxed, or at  for patient to ):  Faxed to   Date form mailed/faxed/left at  for patient and sent to HIM for scannin24, sent to HIM for scanning    Once form is left for patient, faxed, or mailed PCS will then close the documentation only encounter.       Jacob Hatfield MA

## 2024-02-23 ENCOUNTER — PATIENT OUTREACH (OUTPATIENT)
Dept: SURGERY | Facility: CLINIC | Age: 79
End: 2024-02-23
Payer: COMMERCIAL

## 2024-02-27 ENCOUNTER — TELEPHONE (OUTPATIENT)
Dept: FAMILY MEDICINE | Facility: CLINIC | Age: 79
End: 2024-02-27

## 2024-02-27 DIAGNOSIS — C20 ADENOCARCINOMA OF RECTUM (H): Primary | ICD-10-CM

## 2024-02-27 NOTE — TELEPHONE ENCOUNTER
I am happy to write the DME order when we do the face to face visit. He had an appointment with me last week and missed it.      But more important that he is getting in for his oncology and surgery visits!    Jacqueline Nowak MD    Routed to [facility] - Can you call patient and help get him scheduled?      GARTH Mendoza for FYI only.

## 2024-02-27 NOTE — TELEPHONE ENCOUNTER
Spoke with Jonna regarding DME order request. Informed her that patient will need a face to face visit for DME order, as it has been greater than 30 days since last visit. Jonna will contact pt's  to help him get scheduled.  GARTH Mello, BSN

## 2024-02-27 NOTE — TELEPHONE ENCOUNTER
[facility] phone call message - order or referral request for patient:     Order or referral being requested: DME  for a walker. He needs 4 wheel walker with basket ane back rest       Additional Comments: his walker broke     OK to leave a message on voice mail? Yes    Primary language: English      needed? No    Call taken on February 27, 2024 at 10:17 AM by Sascha Casarez

## 2024-02-29 ENCOUNTER — DOCUMENTATION ONLY (OUTPATIENT)
Dept: FAMILY MEDICINE | Facility: CLINIC | Age: 79
End: 2024-02-29
Payer: COMMERCIAL

## 2024-02-29 NOTE — PROGRESS NOTES
To be completed in Nursing note:    Please reference list for forms that require a visit for completion.  Please remind patients that providers are given 3-5 business days to complete and return forms.      Form type: Home Health Care Certification and Plan of Care    Date form received: 2/27/24    Date form completed by Physician:    How was form returned to patient (mailed, faxed, or at  for patient to ): Fax to [facility] at [phone]    Date form mailed/faxed/left at  for patient and sent to HIM for scanning:      Once form is left for patient, faxed, or mailed PCS will then close the documentation only encounter.

## 2024-02-29 NOTE — TELEPHONE ENCOUNTER
Called both number listed, no answer.  Unable to leave message,  Mailbox is full.  Will try again later.    Jacob Hatfield MA

## 2024-03-01 ENCOUNTER — ANCILLARY PROCEDURE (OUTPATIENT)
Dept: MRI IMAGING | Facility: CLINIC | Age: 79
End: 2024-03-01
Attending: SURGERY
Payer: COMMERCIAL

## 2024-03-01 ENCOUNTER — DOCUMENTATION ONLY (OUTPATIENT)
Dept: FAMILY MEDICINE | Facility: CLINIC | Age: 79
End: 2024-03-01

## 2024-03-01 DIAGNOSIS — Z53.9 DIAGNOSIS NOT YET DEFINED: Primary | ICD-10-CM

## 2024-03-01 DIAGNOSIS — C20 RECTAL CANCER (H): ICD-10-CM

## 2024-03-01 PROCEDURE — 81455 SO/HL 51/>GSAP DNA/DNA&RNA: CPT | Performed by: STUDENT IN AN ORGANIZED HEALTH CARE EDUCATION/TRAINING PROGRAM

## 2024-03-01 PROCEDURE — 74183 MRI ABD W/O CNTR FLWD CNTR: CPT | Performed by: RADIOLOGY

## 2024-03-01 PROCEDURE — 81301 MICROSATELLITE INSTABILITY: CPT | Performed by: STUDENT IN AN ORGANIZED HEALTH CARE EDUCATION/TRAINING PROGRAM

## 2024-03-01 PROCEDURE — A9585 GADOBUTROL INJECTION: HCPCS | Performed by: RADIOLOGY

## 2024-03-01 RX ORDER — GADOBUTROL 604.72 MG/ML
7.5 INJECTION INTRAVENOUS ONCE
Status: COMPLETED | OUTPATIENT
Start: 2024-03-01 | End: 2024-03-01

## 2024-03-01 RX ADMIN — GADOBUTROL 7.5 ML: 604.72 INJECTION INTRAVENOUS at 11:00

## 2024-03-01 NOTE — PROGRESS NOTES
To be completed in Nursing note:    Please reference list for forms that require a visit for completion.  Please remind patients that providers are given 3-5 business days to complete and return forms.      Form type:[facility]    Date form received: 24    Date form completed by Physician:24    How was form returned to patient (mailed, faxed, or at  for patient to ):  Faxed to   Date form mailed/faxed/left at  for patient and sent to HIM for scannin24, sent to HIM for scanning    Once form is left for patient, faxed, or mailed PCS will then close the documentation only encounter.       Jacob Hatfield MA

## 2024-03-04 PROCEDURE — 88342 IMHCHEM/IMCYTCHM 1ST ANTB: CPT | Mod: 26 | Performed by: PATHOLOGY

## 2024-03-04 PROCEDURE — 88341 IMHCHEM/IMCYTCHM EA ADD ANTB: CPT | Mod: 26 | Performed by: PATHOLOGY

## 2024-03-05 ENCOUNTER — PATIENT OUTREACH (OUTPATIENT)
Dept: ONCOLOGY | Facility: CLINIC | Age: 79
End: 2024-03-05

## 2024-03-05 ENCOUNTER — PRE VISIT (OUTPATIENT)
Dept: SURGERY | Facility: CLINIC | Age: 79
End: 2024-03-05

## 2024-03-05 ENCOUNTER — OFFICE VISIT (OUTPATIENT)
Dept: SURGERY | Facility: CLINIC | Age: 79
End: 2024-03-05
Payer: COMMERCIAL

## 2024-03-05 VITALS — SYSTOLIC BLOOD PRESSURE: 156 MMHG | OXYGEN SATURATION: 99 % | DIASTOLIC BLOOD PRESSURE: 74 MMHG | HEART RATE: 54 BPM

## 2024-03-05 DIAGNOSIS — Z86.711 HISTORY OF PULMONARY EMBOLISM: ICD-10-CM

## 2024-03-05 DIAGNOSIS — N18.9 CHRONIC KIDNEY DISEASE, UNSPECIFIED CKD STAGE: ICD-10-CM

## 2024-03-05 DIAGNOSIS — J44.9 CHRONIC OBSTRUCTIVE PULMONARY DISEASE, UNSPECIFIED COPD TYPE (H): ICD-10-CM

## 2024-03-05 DIAGNOSIS — C20 LOCAL RECURRENCE OF RECTAL CANCER (H): Primary | ICD-10-CM

## 2024-03-05 DIAGNOSIS — C20 LOCAL RECURRENCE OF RECTAL CANCER (H): ICD-10-CM

## 2024-03-05 LAB
PATH REPORT.ADDENDUM SPEC: NORMAL
PATH REPORT.COMMENTS IMP SPEC: NORMAL
PATH REPORT.FINAL DX SPEC: NORMAL
PATH REPORT.GROSS SPEC: NORMAL
PATH REPORT.MICROSCOPIC SPEC OTHER STN: NORMAL
PATH REPORT.RELEVANT HX SPEC: NORMAL
PATH REPORT.RELEVANT HX SPEC: NORMAL
PATH REPORT.SITE OF ORIGIN SPEC: NORMAL

## 2024-03-05 PROCEDURE — 99205 OFFICE O/P NEW HI 60 MIN: CPT | Performed by: SURGERY

## 2024-03-05 ASSESSMENT — PAIN SCALES - GENERAL: PAINLEVEL: SEVERE PAIN (7)

## 2024-03-05 NOTE — NURSING NOTE
Chief Complaint   Patient presents with    Cancer       Vitals:    03/05/24 0902   BP: (!) 156/74   BP Location: Left arm   Patient Position: Sitting   Cuff Size: Adult Regular   Pulse: 54   SpO2: 99%       There is no height or weight on file to calculate BMI.    Evangelist Falcon EMT-P

## 2024-03-05 NOTE — LETTER
3/5/2024       RE: Michael D McArdle  [address]  [facility] [address]     Dear Colleague,    Thank you for referring your patient, Michael D McArdle, to the [facility] COLON AND RECTAL SURGERY CLINIC [place] [facility]. Please see a copy of my visit note below.    Colon and Rectal Surgery Clinic Note    RE: Michael D McArdle.  : 1945.  JANI: 3/5/2024.    Reason for visit: rectal cancer recurrence.    HPI: Michael McArdle is a 78-year-old male who presents today for rectal cancer recurrence. He has a past medical history of COPD, PE (was on warfarin), GERD, CKD, hypothyroidism, depression, and asthma. He was initially diagnosed with T3N1 rectal cancer in 2014. He underwent JOEY (5040 cGy of radiation 2015-2015, unclear chemotherapy regimen) followed by a LAR with DLI in 2015 with Dr. Bauer. Surgical Pathology demonstrated moderately differentiated adenocarcinoma, MMR intact, ypT2N0. On 2015 he was admitted for wound dehiscence and was taken to the OR for wound exploration and reclosure. On 2015 he underwent a DLI takedown. He developed an incisional hernia and underwent a robotic hernia repair with soft macro porous polypropylene mesh on 5/10/2019.  He was lost to follow up and did not undergo surveillance till  when he presented to [facility] with rectal bleeding, pain, and pressure. He was found to have a local recurrence of his rectal cancer which on MRI was confined to the rectal wall and sphincter. An APR with flap reconstruction was planned however the mass was fixated to the sacrum posteriorly with a segment concerning for ulceration at level of prior anastomosis. It was decided to do a transverse loop colostomy and abort the APR. Of note, flex sig two days prior confirmed moderately-differentiated adenocarcinoma. It was recommended that he undergo chemotherapy. He never followed through with this.  Notes indicate [facility] reached out to patient. He saw Dr. Moss in June 2023 for a parastomal hernia who recommended (if pt wanted to undergo curative intent for his cancer) to undergo a MRI and a visit at the [facility] for sacrectomy. He never followed through with this. Dr. Moss did discuss case with Dr. Tirado who confirmed pt hadn't been seen since 2019. He recently saw his PCP who recommended he follow up with his cancer care.     CEA on 2/16/2024, 5.1. CT Chest/Abdomen/Pelvis on 2/16/2024 demonstrated a new masslike lesion involving the remaining rectum and anus measuring up to 8.9 cm. There was also a complex density collection containing air located in the presacral space measuring up to 7.4 cm with possible fistulous connection to the remaining rectum.  There was a single enlarged left external iliac chain lymph node.  Lastly there was an increase size of peripheral enhancing adrenal lesions bilaterally, indeterminant, which may represent metastasis or adrenal adenomas. MRI Rectum on 3/1/2024 demonstrated a large obstructive mass containing mucin and viable tumor, involving the mid and lower rectum, internal and external sphincters and protruding outside the anus. There is invasion of bilateral levator ani muscles, presacral fascia abutting the sacrum and coccyx. No involvement of the prostate or seminal vesicles. Stage T4b. Heterogeneous left inguinal lymph nodes are suspicious for metastasis. Presacral gas-containing fluid collection measuring 2.9 x 1.0 cm, to which the rectum is tethered with suspicion of fistulous communication. He hasn't had a colonoscopy since before he was diagnosed with rectal cancer.     He follows with Dr Palacio in medical/oncology. His previous oncologist was Dr. Tirado at [facility].    Today Isiah feels OK. He has a walker and smokes the pipe daily. SOB on RA. He is on 81mg of aspirin.     Surgical Pathology (6/12/2015):  Final Diagnosis   I. CASE FROM [facility],  [place], [place] ([id number], OBTAINED 06/12/2015):  Rectosigmoid colon, low anterior resection:  -Invasive moderately differentiated adenocarcinoma  -3 cm mass involving the mid rectum, all surgical margins are negative for dysplasia and malignancy  -Tumor invades muscularis propria, acellular mucin is noted in the pericolic fat   -Fourteen benign lymph nodes, negative for metastatic carcinoma (0/14)  -Status post chemotherapy with minimal tumor response (abundant viable tumor, grade 2)  -Mismatch repair enzymes intact by report  -ypT2N0     Surgical Pathology (8/16/2022):  II. CASE FROM [place], [place] ([id number], OBTAINED 08/16/2022):  Rectal mass, biopsy:  -Adenocarcinoma, moderately differentiated    CEA (2/16/2024):  5.1     CT Chest/Abdomen/Pelvis (2/16/2024):  IMPRESSION:     1. New masslike lesion involving the remaining rectum and anus  measuring up to 8.9 cm, suspicious for locally recurrent disease.  2. Complex density collection containing air located in the presacral  space measuring up to 7.4 cm with possible fistulous connection to the  remaining rectum. Infection and metastasis remains in the  differential.  3. Single enlarged left external iliac chain lymph node.  4. Interval increased size of peripherally enhancing adrenal lesions  bilaterally, indeterminate. May represent metastases or adrenal  adenomas.  5. No new or enlarging pulmonary nodules. No hepatic metastasis.  6. Moderate left lower quadrant parastomal hernia containing small  bowel. No evidence of ischemia or obstruction.     MRI Rectum (3/1/2024):  IMPRESSION:   1.  Large obstructive mass containing mucin and viable tumor,  involving the mid and lower rectum, internal and external sphincters  and protruding outside the anus. There is invasion of bilateral  levator ani muscles, presacral fascia abutting the sacrum and coccyx.  No involvement of the prostate or seminal vesicles. Stage T4b.  2.  Heterogeneous left inguinal  lymph nodes are suspicious for  metastasis.  3.  Presacral gas-containing fluid collection measuring 2.9 x 1.0 cm,  to which the rectum is tethered with suspicion of fistulous  communication.    Medical history:  COPD  Depression  Asthma   4. GERD  5. Hypothyroidism   6.CKD  7. PE    Surgical history:  LAR with DLI  DLI reversal   Robotic incisional hernia repair on 5/10/2019  Loop colostomy     Family history:  Family History   Problem Relation Age of Onset     Breast Cancer Mother      Heart Failure Father      Diabetes Other         daughters      Cancer Other         wife      Heart Disease No family hx of      Coronary Artery Disease No family hx of      Hypertension No family hx of      Hyperlipidemia No family hx of      Cerebrovascular Disease No family hx of      Colon Cancer No family hx of      Prostate Cancer No family hx of      Other Cancer No family hx of      Depression No family hx of      Anxiety Disorder No family hx of      Mental Illness No family hx of      Substance Abuse No family hx of      Anesthesia Reaction No family hx of      Asthma No family hx of      Osteoporosis No family hx of      Genetic Disorder No family hx of      Thyroid Disease No family hx of      Obesity No family hx of      Unknown/Adopted No family hx of        Medications:  Current Outpatient Medications   Medication Sig Dispense Refill     ACETAMINOPHEN PO Take 500 mg by mouth       albuterol (PROAIR HFA/PROVENTIL HFA/VENTOLIN HFA) 108 (90 Base) MCG/ACT inhaler Inhale 2 puffs into the lungs every 6 hours 18 g 1     albuterol (PROVENTIL) (2.5 MG/3ML) 0.083% neb solution Take 1 vial (2.5 mg) by nebulization every 6 hours as needed for shortness of breath / dyspnea or wheezing 90 mL 1     amLODIPine (NORVASC) 5 MG tablet TAKE ONE TABLET BY MOUTH EVERY DAY 90 tablet 3     aspirin (ASPIRIN LOW DOSE) 81 MG chewable tablet Take 1 tablet (81 mg) by mouth daily 90 tablet 3     atorvastatin (LIPITOR) 80 MG tablet Take 1 tablet  (80 mg) by mouth daily 90 tablet 3     baclofen (LIORESAL) 20 MG tablet TAKE ONE TABLET BY MOUTH TWICE A DAY AS NEEDED FOR MUSCLE SPASMS IN THE EVENING 45 tablet 1     beclomethasone HFA (QVAR REDIHALER) 80 MCG/ACT inhaler Inhale 1 puff into the lungs 2 times daily       budesonide-formoterol (SYMBICORT) 80-4.5 MCG/ACT Inhaler Take 2 puff 2x daily and 1-2 puffs as needed with shortness of breath. 20.4 g 3     capsaicin (ZOSTRIX) 0.025 % external cream Apply 1 g topically 3 times daily as needed (hand pain) 50 g 1     diclofenac (VOLTAREN) 1 % topical gel Apply 2-4 g topically 4 times daily 100 g 3     famotidine (PEPCID) 40 MG tablet Take 1 tablet (40 mg) by mouth daily 90 tablet 3     FLUoxetine (PROZAC) 40 MG capsule TAKE 1 CAPSULE(40 MG) BY MOUTH DAILY 90 capsule 1     gabapentin (NEURONTIN) 100 MG capsule Take 1 capsule (100 mg) by mouth At Bedtime 30 capsule 1     levothyroxine (SYNTHROID/LEVOTHROID) 150 MCG tablet Take 1 tablet (150 mcg) by mouth daily 90 tablet 1     order for DME Adult nebulizer machine with mask and tubing. 1 each 0     order for DME Equipment being ordered: Nebulizer and supplies (mask and tubing) 1 each 0     order for DME Equipment being ordered: Rollator with seat and brakes. 1 Units 0     order for DME Equipment being ordered: automatic blood pressure monitor 1 Units 0     ostomy (STOMAHESIVE) POWD external powder Apply 1 Dose topically 4 times daily as needed (skin breakdown) 30 g 0     oxyCODONE-acetaminophen (PERCOCET) 5-325 MG tablet Take 1 tablet by mouth every 6 hours as needed for pain 25 tablet 0     polyethylene glycol (MIRALAX) powder Take 1 capful every other day 510 g 1     sulfamethoxazole-trimethoprim (BACTRIM DS) 800-160 MG tablet Take 1 tablet by mouth 2 times daily 14 tablet 0     zinc oxide (DESITIN) 40 % external ointment Apply topically as needed for dry skin or irritation (apply to rectum.) 113 g 1       Allergies:  Allergies   Allergen Reactions     Penicillins  Swelling       Social history:   Social History     Tobacco Use     Smoking status: Every Day     Types: Pipe     Smokeless tobacco: Never     Tobacco comments:     has been cut down a lot, is on the nicotine patch   Substance Use Topics     Alcohol use: Yes     Alcohol/week: 0.0 standard drinks of alcohol     Marital status: legally .    ROS:  A complete review of systems was performed with the patient and all systems negative except as per HPI.    Physical Examination:  Exam was chaperoned by Evangelist Falcon, EMT-P  BP (!) 156/74 (BP Location: Left arm, Patient Position: Sitting, Cuff Size: Adult Regular)   Pulse 54   SpO2 99%   General: Well hydrated. No acute distress.  CV: RRR  Lung: Non-labored breathing on RA  Abdomen: Soft, NT, colostomy in place with parastomal hernia. No inguinal adenopathy palpated.  Perianal external examination:  Large exophytic lesion protruding outside of the anal verge with mucous discharge.  Digital rectal examination: Aborted due to significant pain        ASSESSMENT    This is a 78 year old M with multiple comorbidities as listed above, who underwent a LAR complicated by wound dehiscence following JOEY for a mod diff mid-upper rectal cancer in 2015. He subsequently developed a recurrence in 2022 and was taken to the OR for an aborted APR due to concerns for posterior compartment invasion. He appeared to have been then lost to follow up. He would need further work-up at this time, including a colonoscopy and an adrenal protocol CT, prior to plan his treatment strategy. On my MRI read, the plane between the rectum and the prostate is preserved, while there might be focal bone involvement of S4-5 and coccyx - thus he would need an APR with distal sacrectomy to achieve local control. The morbidity of the procedure was discussed at length with Isiah. I am worried that he is too deconditioned to sustained the postop recovery at this time. I discussed the case with his medical  oncologist as well, and plan to present him to tumor board. Risks, benefits, and alternatives of operative treatment were thoroughly discussed with the patient, he understands these well and agrees to proceed.    All pertinent labs and imaging were personally reviewed by me.      PLAN  - Protein shakes daily  - Referral for colonoscopy to r/o synchronous disease  - Consider CT with adrenal protocol to r/o metastatic disease  - DMT presentation to discuss role of RT  - US bx on inguinal LN  - Agree with 3 months of chemotherapy  - RTC after then with repeat MRI and CT to discuss possible exenteration  - Referral for preop rehabilitation      75 minutes spent on the date of the encounter doing chart review, history and exam, imaging review, documentation and further activities as noted above.      Antonino Valencia MD    Division of Colon and Rectal Surgery  [facility]    Referring Provider:  No referring provider defined for this encounter.     Primary Care Provider:  Jacqueline Nowak      Again, thank you for allowing me to participate in the care of your patient.      Sincerely,    Antonino Valencia MD

## 2024-03-05 NOTE — PROGRESS NOTES
received referral for Dr Lenz in the PM&R clinic.     Referred for:   possible surgery for rectal cancer after chemotherapy     Scheduling instructions updated and sent to New Patient Scheduling for completion.

## 2024-03-05 NOTE — CONSULTS
Outpatient IR Biopsy Referral    Patient is a 77 y/o male with a PMH of COPD, PE, GERD, CKD, hypothyroidism, depression, asthma, rectal cancer 2014 s/p radiation, chemotherapy, LAR w DLI 2015, local recurrence 2022 lost to follow up, presenting to [facility] for recurrence care. IR has been asked to biopsy a inguinal LN for forward care.    MRI 3/1/24 LYMPH NODES:  There are no suspicious adjacent perirectal lymph nodes, but prominent  left inguinal lymph nodes measuring up to 8 mm) series 23, image 50)  suspicious for metastasis.   STAGE: N1=1-3 positive nodes    IMPRESSION:   1.  Large obstructive mass containing mucin and viable tumor,  involving the mid and lower rectum, internal and external sphincters  and protruding outside the anus. There is invasion of bilateral  levator ani muscles, presacral fascia abutting the sacrum and coccyx.  No involvement of the prostate or seminal vesicles. Stage T4b.  2.  Heterogeneous left inguinal lymph nodes are suspicious for  metastasis.  3.  Presacral gas-containing fluid collection measuring 2.9 x 1.0 cm,  to which the rectum is tethered with suspicion of fistulous  communication.        CT 2/16/24 IMPRESSION:    1. New masslike lesion involving the remaining rectum and anus  measuring up to 8.9 cm, suspicious for locally recurrent disease.  2. Complex density collection containing air located in the presacral  space measuring up to 7.4 cm with possible fistulous connection to the  remaining rectum. Infection and metastasis remains in the  differential.  3. Single enlarged left external iliac chain lymph node.  4. Interval increased size of peripherally enhancing adrenal lesions  bilaterally, indeterminate. May represent metastases or adrenal  adenomas.  5. No new or enlarging pulmonary nodules. No hepatic metastasis.  6. Moderate left lower quadrant parastomal hernia containing small  bowel. No evidence of ischemia or obstruction.    Lymph nodes: Single enlarged left  external iliac chain lymph node  measuring 1.1 cm in short axis (series 3 image 500).    Case and imaging CT 2/16/24 was reviewed with Dr. Cadena from IR and CT guided biopsy of the Left enlarged inguinal LN is approved. Series 3 Image 500.       Procedure order, surgical pathology order placed.      ASA should be held for 5 days prior to scheduled procedure.     If requesting team would like samples sent for anything else please enter them prior to scheduled procedure.    Primary team Dr. Valencia made aware of IR recommendations via epic messaging.    Lisa Giraldo DNP, APRN  Interventional Radiology   IR on-call pager: [phone]

## 2024-03-05 NOTE — PATIENT INSTRUCTIONS
Follow up:  Colonoscopy  - someone will call to schedule otherwise, you may call to schedule at [phone] opt 2  IR referral for biopsy - Someone will call you to schedule this  Agree with 3 months of chemotherapy  Return to clinic after chemotherapy with MRI and CT to discuss possible surgery      Please call with any questions or concerns regarding your clinic visit today.    It is a pleasure being involved in your health care.    Contacts post-consultation depending on your need:    Radiology Appointments [phone]    Schedule Clinic Appointments [phone] # 1   M-F 7:30 - 5 pm    [facility] [phone]    Clinic Fax Number [phone]    Surgery Scheduling [phone]    My Chart is available 24 hours a day and is a secure way to access your records and communicate with your care team.  I strongly recommend signing up if you haven't already done so, if you are comfortable with computers.  If you would like to inquire about this or are having problems with My Chart access, you may call [phone] or go online at [email or web address].  Please allow at least 24 hours for a response and extra time on weekends and Holidays.

## 2024-03-11 ENCOUNTER — APPOINTMENT (OUTPATIENT)
Dept: CT IMAGING | Facility: HOSPITAL | Age: 79
DRG: 394 | End: 2024-03-11
Attending: EMERGENCY MEDICINE
Payer: COMMERCIAL

## 2024-03-11 ENCOUNTER — APPOINTMENT (OUTPATIENT)
Dept: RADIOLOGY | Facility: HOSPITAL | Age: 79
DRG: 394 | End: 2024-03-11
Attending: STUDENT IN AN ORGANIZED HEALTH CARE EDUCATION/TRAINING PROGRAM
Payer: COMMERCIAL

## 2024-03-11 ENCOUNTER — TUMOR CONFERENCE (OUTPATIENT)
Dept: ONCOLOGY | Facility: CLINIC | Age: 79
End: 2024-03-11
Payer: COMMERCIAL

## 2024-03-11 ENCOUNTER — HOSPITAL ENCOUNTER (INPATIENT)
Facility: HOSPITAL | Age: 79
LOS: 1 days | Discharge: HOME | DRG: 394 | End: 2024-03-12
Attending: EMERGENCY MEDICINE | Admitting: FAMILY MEDICINE
Payer: COMMERCIAL

## 2024-03-11 ENCOUNTER — APPOINTMENT (OUTPATIENT)
Dept: RADIOLOGY | Facility: HOSPITAL | Age: 79
DRG: 394 | End: 2024-03-11
Attending: EMERGENCY MEDICINE
Payer: COMMERCIAL

## 2024-03-11 DIAGNOSIS — K43.3 PARASTOMAL HERNIA WITH OBSTRUCTION AND WITHOUT GANGRENE: Primary | ICD-10-CM

## 2024-03-11 DIAGNOSIS — N17.9 ACUTE KIDNEY INJURY (H): ICD-10-CM

## 2024-03-11 DIAGNOSIS — Z43.2 ILEOSTOMY CARE (H): ICD-10-CM

## 2024-03-11 DIAGNOSIS — Z93.2 ILEOSTOMY STATUS (H): ICD-10-CM

## 2024-03-11 DIAGNOSIS — R19.7 VOMITING AND DIARRHEA: ICD-10-CM

## 2024-03-11 DIAGNOSIS — R53.1 GENERALIZED WEAKNESS: ICD-10-CM

## 2024-03-11 DIAGNOSIS — R11.10 VOMITING AND DIARRHEA: ICD-10-CM

## 2024-03-11 LAB
ALBUMIN SERPL BCG-MCNC: 3.9 G/DL (ref 3.5–5.2)
ALP SERPL-CCNC: 83 U/L (ref 40–150)
ALT SERPL W P-5'-P-CCNC: 10 U/L (ref 0–70)
ANION GAP SERPL CALCULATED.3IONS-SCNC: 9 MMOL/L (ref 7–15)
AST SERPL W P-5'-P-CCNC: 22 U/L (ref 0–45)
BASOPHILS # BLD AUTO: 0 10E3/UL (ref 0–0.2)
BASOPHILS NFR BLD AUTO: 0 %
BILIRUB DIRECT SERPL-MCNC: <0.2 MG/DL (ref 0–0.3)
BILIRUB SERPL-MCNC: 0.5 MG/DL
BUN SERPL-MCNC: 25.9 MG/DL (ref 8–23)
CALCIUM SERPL-MCNC: 8.9 MG/DL (ref 8.8–10.2)
CHLORIDE SERPL-SCNC: 106 MMOL/L (ref 98–107)
CREAT SERPL-MCNC: 1.79 MG/DL (ref 0.67–1.17)
DEPRECATED HCO3 PLAS-SCNC: 26 MMOL/L (ref 22–29)
EGFRCR SERPLBLD CKD-EPI 2021: 38 ML/MIN/1.73M2
EOSINOPHIL # BLD AUTO: 0.1 10E3/UL (ref 0–0.7)
EOSINOPHIL NFR BLD AUTO: 1 %
ERYTHROCYTE [DISTWIDTH] IN BLOOD BY AUTOMATED COUNT: 15.1 % (ref 10–15)
FLUAV RNA SPEC QL NAA+PROBE: NEGATIVE
FLUBV RNA RESP QL NAA+PROBE: NEGATIVE
GLUCOSE SERPL-MCNC: 117 MG/DL (ref 70–99)
HCT VFR BLD AUTO: 35.9 % (ref 40–53)
HGB BLD-MCNC: 11.6 G/DL (ref 13.3–17.7)
IMM GRANULOCYTES # BLD: 0 10E3/UL
IMM GRANULOCYTES NFR BLD: 0 %
LACTATE SERPL-SCNC: 0.9 MMOL/L (ref 0.7–2)
LYMPHOCYTES # BLD AUTO: 0.2 10E3/UL (ref 0.8–5.3)
LYMPHOCYTES NFR BLD AUTO: 2 %
MAGNESIUM SERPL-MCNC: 2.2 MG/DL (ref 1.7–2.3)
MCH RBC QN AUTO: 29.9 PG (ref 26.5–33)
MCHC RBC AUTO-ENTMCNC: 32.3 G/DL (ref 31.5–36.5)
MCV RBC AUTO: 93 FL (ref 78–100)
MONOCYTES # BLD AUTO: 0.3 10E3/UL (ref 0–1.3)
MONOCYTES NFR BLD AUTO: 3 %
NEUTROPHILS # BLD AUTO: 8.9 10E3/UL (ref 1.6–8.3)
NEUTROPHILS NFR BLD AUTO: 94 %
NRBC # BLD AUTO: 0 10E3/UL
NRBC BLD AUTO-RTO: 0 /100
NT-PROBNP SERPL-MCNC: 351 PG/ML (ref 0–1800)
PLATELET # BLD AUTO: 281 10E3/UL (ref 150–450)
POTASSIUM SERPL-SCNC: 5.5 MMOL/L (ref 3.4–5.3)
PROT SERPL-MCNC: 7.5 G/DL (ref 6.4–8.3)
RBC # BLD AUTO: 3.88 10E6/UL (ref 4.4–5.9)
RSV RNA SPEC NAA+PROBE: NEGATIVE
SARS-COV-2 RNA RESP QL NAA+PROBE: NEGATIVE
SODIUM SERPL-SCNC: 141 MMOL/L (ref 135–145)
WBC # BLD AUTO: 9.6 10E3/UL (ref 4–11)

## 2024-03-11 PROCEDURE — 99222 1ST HOSP IP/OBS MODERATE 55: CPT | Mod: AI

## 2024-03-11 PROCEDURE — 999N000065 XR ABDOMEN PORT 1 VIEW

## 2024-03-11 PROCEDURE — 96374 THER/PROPH/DIAG INJ IV PUSH: CPT | Mod: 59

## 2024-03-11 PROCEDURE — 99222 1ST HOSP IP/OBS MODERATE 55: CPT | Performed by: SURGERY

## 2024-03-11 PROCEDURE — 87637 SARSCOV2&INF A&B&RSV AMP PRB: CPT | Performed by: EMERGENCY MEDICINE

## 2024-03-11 PROCEDURE — 250N000011 HC RX IP 250 OP 636

## 2024-03-11 PROCEDURE — 120N000001 HC R&B MED SURG/OB

## 2024-03-11 PROCEDURE — 250N000013 HC RX MED GY IP 250 OP 250 PS 637

## 2024-03-11 PROCEDURE — 250N000011 HC RX IP 250 OP 636: Performed by: EMERGENCY MEDICINE

## 2024-03-11 PROCEDURE — 36415 COLL VENOUS BLD VENIPUNCTURE: CPT | Performed by: EMERGENCY MEDICINE

## 2024-03-11 PROCEDURE — 82310 ASSAY OF CALCIUM: CPT | Performed by: EMERGENCY MEDICINE

## 2024-03-11 PROCEDURE — 96376 TX/PRO/DX INJ SAME DRUG ADON: CPT

## 2024-03-11 PROCEDURE — 99285 EMERGENCY DEPT VISIT HI MDM: CPT | Mod: 25

## 2024-03-11 PROCEDURE — 258N000003 HC RX IP 258 OP 636

## 2024-03-11 PROCEDURE — 83880 ASSAY OF NATRIURETIC PEPTIDE: CPT | Performed by: EMERGENCY MEDICINE

## 2024-03-11 PROCEDURE — 85025 COMPLETE CBC W/AUTO DIFF WBC: CPT | Performed by: EMERGENCY MEDICINE

## 2024-03-11 PROCEDURE — 74176 CT ABD & PELVIS W/O CONTRAST: CPT

## 2024-03-11 PROCEDURE — 83605 ASSAY OF LACTIC ACID: CPT | Performed by: EMERGENCY MEDICINE

## 2024-03-11 PROCEDURE — 83735 ASSAY OF MAGNESIUM: CPT | Performed by: EMERGENCY MEDICINE

## 2024-03-11 PROCEDURE — 96361 HYDRATE IV INFUSION ADD-ON: CPT

## 2024-03-11 PROCEDURE — 71045 X-RAY EXAM CHEST 1 VIEW: CPT

## 2024-03-11 PROCEDURE — 258N000003 HC RX IP 258 OP 636: Performed by: EMERGENCY MEDICINE

## 2024-03-11 PROCEDURE — 250N000009 HC RX 250

## 2024-03-11 PROCEDURE — 80053 COMPREHEN METABOLIC PANEL: CPT | Performed by: EMERGENCY MEDICINE

## 2024-03-11 RX ORDER — LIDOCAINE HYDROCHLORIDE 20 MG/ML
10 JELLY TOPICAL ONCE
Status: COMPLETED | OUTPATIENT
Start: 2024-03-11 | End: 2024-03-11

## 2024-03-11 RX ORDER — LEVOTHYROXINE SODIUM 150 UG/1
150 TABLET ORAL EVERY EVENING
COMMUNITY
End: 2024-03-21

## 2024-03-11 RX ORDER — ENOXAPARIN SODIUM 100 MG/ML
40 INJECTION SUBCUTANEOUS EVERY 24 HOURS
Status: DISCONTINUED | OUTPATIENT
Start: 2024-03-11 | End: 2024-03-12 | Stop reason: HOSPADM

## 2024-03-11 RX ORDER — ATORVASTATIN CALCIUM 80 MG/1
80 TABLET, FILM COATED ORAL EVERY EVENING
COMMUNITY

## 2024-03-11 RX ORDER — ALBUTEROL SULFATE 0.83 MG/ML
2.5 SOLUTION RESPIRATORY (INHALATION) EVERY 6 HOURS PRN
Status: DISCONTINUED | OUTPATIENT
Start: 2024-03-11 | End: 2024-03-12 | Stop reason: HOSPADM

## 2024-03-11 RX ORDER — AMOXICILLIN 250 MG
1 CAPSULE ORAL 2 TIMES DAILY PRN
Status: DISCONTINUED | OUTPATIENT
Start: 2024-03-11 | End: 2024-03-12 | Stop reason: HOSPADM

## 2024-03-11 RX ORDER — ALBUTEROL SULFATE 90 UG/1
2 AEROSOL, METERED RESPIRATORY (INHALATION) EVERY 6 HOURS
Status: DISCONTINUED | OUTPATIENT
Start: 2024-03-11 | End: 2024-03-12 | Stop reason: HOSPADM

## 2024-03-11 RX ORDER — SODIUM CHLORIDE 9 MG/ML
INJECTION, SOLUTION INTRAVENOUS ONCE
Status: COMPLETED | OUTPATIENT
Start: 2024-03-11 | End: 2024-03-11

## 2024-03-11 RX ORDER — ATORVASTATIN CALCIUM 40 MG/1
80 TABLET, FILM COATED ORAL EVERY EVENING
Status: DISCONTINUED | OUTPATIENT
Start: 2024-03-11 | End: 2024-03-12 | Stop reason: HOSPADM

## 2024-03-11 RX ORDER — AMOXICILLIN 250 MG
2 CAPSULE ORAL 2 TIMES DAILY PRN
Status: DISCONTINUED | OUTPATIENT
Start: 2024-03-11 | End: 2024-03-12 | Stop reason: HOSPADM

## 2024-03-11 RX ORDER — ASPIRIN 81 MG/1
81 TABLET, CHEWABLE ORAL EVERY EVENING
Status: DISCONTINUED | OUTPATIENT
Start: 2024-03-11 | End: 2024-03-12 | Stop reason: HOSPADM

## 2024-03-11 RX ORDER — ONDANSETRON 2 MG/ML
4 INJECTION INTRAMUSCULAR; INTRAVENOUS ONCE
Status: COMPLETED | OUTPATIENT
Start: 2024-03-11 | End: 2024-03-11

## 2024-03-11 RX ORDER — AMLODIPINE BESYLATE 5 MG/1
5 TABLET ORAL EVERY EVENING
Status: DISCONTINUED | OUTPATIENT
Start: 2024-03-11 | End: 2024-03-12 | Stop reason: HOSPADM

## 2024-03-11 RX ORDER — ASPIRIN 81 MG/1
81 TABLET, CHEWABLE ORAL EVERY EVENING
COMMUNITY
End: 2024-05-15

## 2024-03-11 RX ORDER — AMLODIPINE BESYLATE 5 MG/1
5 TABLET ORAL EVERY EVENING
COMMUNITY

## 2024-03-11 RX ORDER — UREA 10 %
500 LOTION (ML) TOPICAL 2 TIMES DAILY
Status: DISCONTINUED | OUTPATIENT
Start: 2024-03-12 | End: 2024-03-12 | Stop reason: HOSPADM

## 2024-03-11 RX ORDER — FLUTICASONE FUROATE AND VILANTEROL 100; 25 UG/1; UG/1
1 POWDER RESPIRATORY (INHALATION) DAILY
Status: DISCONTINUED | OUTPATIENT
Start: 2024-03-11 | End: 2024-03-12 | Stop reason: HOSPADM

## 2024-03-11 RX ORDER — LIDOCAINE 40 MG/G
CREAM TOPICAL
Status: DISCONTINUED | OUTPATIENT
Start: 2024-03-11 | End: 2024-03-12 | Stop reason: HOSPADM

## 2024-03-11 RX ADMIN — ONDANSETRON 4 MG: 2 INJECTION INTRAMUSCULAR; INTRAVENOUS at 11:13

## 2024-03-11 RX ADMIN — SODIUM CHLORIDE: 9 INJECTION, SOLUTION INTRAVENOUS at 13:47

## 2024-03-11 RX ADMIN — LIDOCAINE HYDROCHLORIDE 10 ML: 20 JELLY TOPICAL at 17:09

## 2024-03-11 RX ADMIN — DIATRIZOATE MEGLUMINE AND DIATRIZOATE SODIUM 120 ML: 660; 100 SOLUTION ORAL; RECTAL at 21:23

## 2024-03-11 RX ADMIN — SODIUM CHLORIDE: 9 INJECTION, SOLUTION INTRAVENOUS at 17:48

## 2024-03-11 RX ADMIN — ENOXAPARIN SODIUM 40 MG: 40 INJECTION SUBCUTANEOUS at 21:23

## 2024-03-11 RX ADMIN — SODIUM CHLORIDE 1000 ML: 9 INJECTION, SOLUTION INTRAVENOUS at 11:13

## 2024-03-11 RX ADMIN — FLUTICASONE FUROATE AND VILANTEROL TRIFENATATE 1 PUFF: 100; 25 POWDER RESPIRATORY (INHALATION) at 17:46

## 2024-03-11 RX ADMIN — ONDANSETRON 4 MG: 2 INJECTION INTRAMUSCULAR; INTRAVENOUS at 13:28

## 2024-03-11 ASSESSMENT — ACTIVITIES OF DAILY LIVING (ADL)
ADLS_ACUITY_SCORE: 35
ADLS_ACUITY_SCORE: 35
ADLS_ACUITY_SCORE: 28
ADLS_ACUITY_SCORE: 35
ADLS_ACUITY_SCORE: 35
ADLS_ACUITY_SCORE: 26
ADLS_ACUITY_SCORE: 35
DEPENDENT_IADLS:: INDEPENDENT
ADLS_ACUITY_SCORE: 35
ADLS_ACUITY_SCORE: 38
ADLS_ACUITY_SCORE: 35

## 2024-03-11 ASSESSMENT — COLUMBIA-SUICIDE SEVERITY RATING SCALE - C-SSRS
6. HAVE YOU EVER DONE ANYTHING, STARTED TO DO ANYTHING, OR PREPARED TO DO ANYTHING TO END YOUR LIFE?: NO
2. HAVE YOU ACTUALLY HAD ANY THOUGHTS OF KILLING YOURSELF IN THE PAST MONTH?: NO
1. IN THE PAST MONTH, HAVE YOU WISHED YOU WERE DEAD OR WISHED YOU COULD GO TO SLEEP AND NOT WAKE UP?: NO

## 2024-03-11 NOTE — ED PROVIDER NOTES
Emergency Department Encounter     Evaluation Date & Time:   3/11/2024 10:28 AM    CHIEF COMPLAINT:  Flu Symptoms      Triage Note:       ED COURSE & MEDICAL DECISION MAKING:     Pt here by EMS from home after he slipped on his own feces when his colostomy bag tore open.  Pt reports a day or so of diarrhea and n/v.  Pt reports he had been couging for the previous 3 days as well with some lightheadedness. Pt denies syncope, head injury, LOC.  Pt has no known fevers, but feeling warm at home. Pt arrives covered in feces and nursing/tech actively cleaning him.  His colostomy bag was replaced.  No bloody stool really noted, but has a chronic wound to rectum he states will be biopsied, but he was diagnosed with colorectal cancer, has not yet started treatment.  Will get labs, CT abd/pelvis given his vomiting/history, hydrate, reassess.      ED Course as of 03/11/24 1554   Mon Mar 11, 2024   1043 Met with the patient and performed my initial exam.   1118 Performed full exam on patient   1257 K 5.5, Cr slightly up. Will continue IVF.  Rest of evaluation overall reassuring.     1258 CXR (independent interpretation): no acute cardiopulmonary process    1332 Pt refusing to do CT with contrast - CT control called. Will change to w/o contrast.    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   Gen surg paged and lactate ordered. WBC wnl.  Pt updated on results.     1452 I spoke with general surgeon, Dr. Long, who recommends NG placement for now.     1455 Lactate reassuring at 0.9.   1501 Rechecked and updated patient on lab and imaging results.  Discussed need for NG placement/hospitalization and pt agreeable.   1508 Spoke with Dr. Porter from [place].         Medical Decision Making    History:  Supplemental history from: Documented in chart  External Record(s) reviewed: Documented in chart    Work Up:  Chart documentation includes differential considered and any EKGs or imaging independently interpreted  by provider, where specified.  In additional to work up documented, I considered the following work up: Documented in chart, if applicable.    External consultation:  Discussion of management with another provider: Documented in chart, if applicable    Complicating factors:  Care impacted by chronic illness: Cancer/Chemotherapy and Chronic Lung Disease  Care affected by social determinants of health: N/A    Disposition considerations: Admit.      At the conclusion of the encounter I discussed the results of all the tests and the disposition. The questions were answered. The patient or family acknowledged understanding and was agreeable with the care plan.      MEDICATIONS GIVEN IN THE EMERGENCY DEPARTMENT:  Medications   lidocaine (XYLOCAINE) 2 % external gel 10 mL (has no administration in time range)   sodium chloride 0.9% BOLUS 1,000 mL (0 mLs Intravenous Stopped 3/11/24 1324)   ondansetron (ZOFRAN) injection 4 mg (4 mg Intravenous $Given 3/11/24 1113)   sodium chloride 0.9 % infusion (0 mLs Intravenous Stopped 3/11/24 1437)   ondansetron (ZOFRAN) injection 4 mg (4 mg Intravenous $Given 3/11/24 1328)       NEW PRESCRIPTIONS STARTED AT TODAY'S ED VISIT:  New Prescriptions    No medications on file       HPI   The history is provided by the patient and the EMS personnel. No  was used.        Michael D McArdle is a 78 year old male with a pertinent history of SBO, stage IV colon and rectal cancer, COPD, asthma, PE, and CKD 3, who presents to this ED via ambulance for evaluation of flu symptoms.    Per EMS, patient has been sick the past 3 days with vomiting, diarrhea, and and chills. Per patient, he has been having loose stools the past 2 days. He was walking to the bathroom to empty his full colostomy bag this AM and slipped and fell on his feces on the floor and now has feces all over his arms and legs. Mentions he is starting chemotherapy next week for new diagnosis of stage IV colon and  rectal cancer. He has a pre-existing hernia. Per RN, noticed some blood with stool when wiping. Reports he has an upcoming biopsy.    Reports ongoing productive cough for some time with worsening symptoms the past 3 days with associated shortness of breath, feeling feverish and chills, and had 2 episodes of vomiting this morning. Denies sick contacts. No other reported complaints or concerns at this time.      REVIEW OF SYSTEMS:  See HPI      Medical History     Past Medical History:   Diagnosis Date    Cancer (H)     Chronic obstructive pulmonary disease, unspecified COPD type (H) 8/25/2023    Depressive disorder     Kidney disease     Status post rotator cuff repair     Thyroid disease     Uncomplicated asthma        Past Surgical History:   Procedure Laterality Date    COLECTOMY  July 2015    radiation and chemo, 3 colon surgeries    COLOSTOMY  2015    and reversal     OPEN REDUCTION INTERNAL FIXATION ANKLE Right 2/22/2016    Procedure: ANKLE FRACTURE OPEN REDUCTION INTERNAL FIXATION, RIGHT;  Surgeon: Yolanda Dean MD;  Location: [facility];  Service:     OTHER SURGICAL HISTORY      takedown of colostomy    SHOULDER ARTHROSCOPY W/ ROTATOR CUFF REPAIR Left     SHOULDER OPEN ROTATOR CUFF REPAIR Right        Family History   Problem Relation Age of Onset    Breast Cancer Mother     Heart Failure Father     Diabetes Other         daughters     Cancer Other         wife     Heart Disease No family hx of     Coronary Artery Disease No family hx of     Hypertension No family hx of     Hyperlipidemia No family hx of     Cerebrovascular Disease No family hx of     Colon Cancer No family hx of     Prostate Cancer No family hx of     Other Cancer No family hx of     Depression No family hx of     Anxiety Disorder No family hx of     Mental Illness No family hx of     Substance Abuse No family hx of     Anesthesia Reaction No family hx of     Asthma No family hx of     Osteoporosis No family hx of     Genetic  "Disorder No family hx of     Thyroid Disease No family hx of     Obesity No family hx of     Unknown/Adopted No family hx of        Social History     Tobacco Use    Smoking status: Every Day     Types: Pipe    Smokeless tobacco: Never    Tobacco comments:     has been cut down a lot, is on the nicotine patch   Substance Use Topics    Alcohol use: Yes     Alcohol/week: 0.0 standard drinks of alcohol    Drug use: No       albuterol (PROAIR HFA/PROVENTIL HFA/VENTOLIN HFA) 108 (90 Base) MCG/ACT inhaler  albuterol (PROVENTIL) (2.5 MG/3ML) 0.083% neb solution  amLODIPine (NORVASC) 5 MG tablet  aspirin (ASA) 81 MG chewable tablet  atorvastatin (LIPITOR) 80 MG tablet  baclofen (LIORESAL) 20 MG tablet  budesonide-formoterol (SYMBICORT) 80-4.5 MCG/ACT Inhaler  levothyroxine (SYNTHROID/LEVOTHROID) 150 MCG tablet  Magnesium Cl-Calcium Carbonate 71.5-119 MG TBEC        Physical Exam     Vitals:  /62   Pulse 99   Temp 98.3  F (36.8  C) (Oral)   Resp 20   Ht 1.626 m (5' 4\")   Wt 78.5 kg (173 lb)   SpO2 95%   BMI 29.70 kg/m      PHYSICAL EXAM:   Physical Exam  Vitals and nursing note reviewed.   Constitutional:       General: He is not in acute distress.     Appearance: Normal appearance. He is ill-appearing (chronically).      Comments: Disheveled with dry feces on his legs and hands   HENT:      Head: Normocephalic and atraumatic.      Nose: Nose normal.      Mouth/Throat:      Mouth: Mucous membranes are moist.   Eyes:      Pupils: Pupils are equal, round, and reactive to light.   Cardiovascular:      Rate and Rhythm: Regular rhythm. Tachycardia present.      Pulses: Normal pulses.           Radial pulses are 2+ on the right side and 2+ on the left side.        Dorsalis pedis pulses are 2+ on the right side and 2+ on the left side.   Pulmonary:      Effort: Pulmonary effort is normal. No respiratory distress.      Breath sounds: Normal breath sounds.   Abdominal:      Palpations: Abdomen is soft.      Tenderness: " There is abdominal tenderness (mild diffusely).      Comments: He has a colostomy bag on his left lower abdomen with parastomal hernia.  Pt with light brown stool present.  No melena/bleeding   Musculoskeletal:      Cervical back: Full passive range of motion without pain and neck supple.      Comments: No calf tenderness or swelling b/l   Skin:     General: Skin is warm.      Findings: No rash.   Neurological:      General: No focal deficit present.      Mental Status: He is alert. Mental status is at baseline.      Comments: Fluent speech, no acute lateralizing deficits   Psychiatric:         Mood and Affect: Mood normal.         Behavior: Behavior normal.         Results     LAB:  All pertinent labs reviewed and interpreted  Labs Ordered and Resulted from Time of ED Arrival to Time of ED Departure   BASIC METABOLIC PANEL - Abnormal       Result Value    Sodium 141      Potassium 5.5 (*)     Chloride 106      Carbon Dioxide (CO2) 26      Anion Gap 9      Urea Nitrogen 25.9 (*)     Creatinine 1.79 (*)     GFR Estimate 38 (*)     Calcium 8.9      Glucose 117 (*)    CBC WITH PLATELETS AND DIFFERENTIAL - Abnormal    WBC Count 9.6      RBC Count 3.88 (*)     Hemoglobin 11.6 (*)     Hematocrit 35.9 (*)     MCV 93      MCH 29.9      MCHC 32.3      RDW 15.1 (*)     Platelet Count 281      % Neutrophils 94      % Lymphocytes 2      % Monocytes 3      % Eosinophils 1      % Basophils 0      % Immature Granulocytes 0      NRBCs per 100 WBC 0      Absolute Neutrophils 8.9 (*)     Absolute Lymphocytes 0.2 (*)     Absolute Monocytes 0.3      Absolute Eosinophils 0.1      Absolute Basophils 0.0      Absolute Immature Granulocytes 0.0      Absolute NRBCs 0.0     HEPATIC FUNCTION PANEL - Normal    Protein Total 7.5      Albumin 3.9      Bilirubin Total 0.5      Alkaline Phosphatase 83      AST 22      ALT 10      Bilirubin Direct <0.20     MAGNESIUM - Normal    Magnesium 2.2     INFLUENZA A/B, RSV, & SARS-COV2 PCR - Normal     Influenza A PCR Negative      Influenza B PCR Negative      RSV PCR Negative      SARS CoV2 PCR Negative     N TERMINAL PRO BNP OUTPATIENT - Normal    N Terminal Pro BNP Outpatient 351     LACTIC ACID WHOLE BLOOD - Normal    Lactic Acid 0.9         RADIOLOGY:  CT Abdomen Pelvis w/o Contrast   Final Result   IMPRESSION:    1.  Parastomal hernia containing proximal jejunum with resultant high-grade small bowel obstruction and upstream dilatation of the small bowel and stomach. No pneumatosis or evidence of perforation.   2.  The recurrent rectal/anal mass is unchanged from and better evaluated on recent pelvic MRI.   3.  Unchanged small presacral gas containing fluid collection, which as before is suspicious for fistulous communication with bowel.   4.  Additional details in the findings.         XR Chest Port 1 View   Final Result   IMPRESSION: Negative chest.                   ECG:  none    PROCEDURES:  Procedures:  none      FINAL IMPRESSION:    ICD-10-CM    1. 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  K43.3       2. Vomiting and diarrhea  R11.10     R19.7       3. Generalized weakness  R53.1       4. Acute kidney injury (H24)  N17.9           0 minutes of critical care time      I, Nessa Jeffries, am serving as a scribe to document services personally performed by Dr. Leobardo Herring, based on my observations and the provider's statements to me. I, Leobardo Herring, DO attest that Nessa Jeffries is acting in a scribe capacity, has observed my performance of the services and has documented them in accordance with my direction.      Leobardo Herring DO  Emergency Medicine  [facility] EMERGENCY DEPARTMENT  3/11/2024  10:39 AM         Leobardo Herring MD  03/11/24 1554

## 2024-03-11 NOTE — MEDICATION SCRIBE - ADMISSION MEDICATION HISTORY
Medication Scribe Admission Medication History    Admission medication history is complete. The information provided in this note is only as accurate as the sources available at the time of the update.    Information Source(s): Patient via in-person    Pertinent Information: patient reports self management of medications.     Patient reports no longer taking: Tylenol, Qvar, Zostrix, Voltaren, Pepcid, Prozac, Gabapentin, Stomahesive, Percocet, Miralax, Bactrim, Desitin    Changes made to PTA medication list:  Added: magnesium  Deleted: Tylenol, Qvar, Zostrix, Voltaren, Pepcid, Prozac, Gabapentin, Stomahesive, Percocet, Miralax, Bactrim, Desitin  Changed: None    Allergies reviewed with patient and updates made in EHR: yes    Medication History Completed By: Eddy Grey 3/11/2024 1:56 PM    PTA Med List   Medication Sig Last Dose    albuterol (PROAIR HFA/PROVENTIL HFA/VENTOLIN HFA) 108 (90 Base) MCG/ACT inhaler Inhale 2 puffs into the lungs every 6 hours More than a month at prn    albuterol (PROVENTIL) (2.5 MG/3ML) 0.083% neb solution Take 1 vial (2.5 mg) by nebulization every 6 hours as needed for shortness of breath / dyspnea or wheezing More than a month at prn    amLODIPine (NORVASC) 5 MG tablet Take 5 mg by mouth every evening 3/10/2024 at pm    aspirin (ASA) 81 MG chewable tablet Take 81 mg by mouth every evening 3/10/2024 at pm    atorvastatin (LIPITOR) 80 MG tablet Take 80 mg by mouth every evening 3/10/2024 at pm    baclofen (LIORESAL) 20 MG tablet TAKE ONE TABLET BY MOUTH TWICE A DAY AS NEEDED FOR MUSCLE SPASMS IN THE EVENING 3/11/2024 at am    budesonide-formoterol (SYMBICORT) 80-4.5 MCG/ACT Inhaler Take 2 puff 2x daily and 1-2 puffs as needed with shortness of breath. More than a month at prn    levothyroxine (SYNTHROID/LEVOTHROID) 150 MCG tablet Take 150 mcg by mouth every evening 3/10/2024 at pm    Magnesium Cl-Calcium Carbonate 71.5-119 MG TBEC Take 71.5 mg by mouth 2 times daily 3/11/2024 at am

## 2024-03-11 NOTE — ED NOTES
Patient is declining NG, hospital resident in the room at this time while this writer was trying to explain the process and reason behind it and patient continued to refuse the NG placement states he has had his nose broken four times and he does not want a tube down it. He states he wants his hernia repaired that is blocking his colon. Patient continues to refuse the NG. Tano paged and notified.

## 2024-03-11 NOTE — CONSULTS
General surgery consult         ASSESSMENT     1. 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 Vomiting and diarrhea    3. Generalized weakness    4. Acute kidney injury (H24)       78-year-old man with a history of colon cancer has a left-sided ileostomy with a peristomal hernia.  He presents with symptoms of a small bowel obstruction.      PLAN      Will start with conservative measures of NG tube decompression followed by a Gastrografin challenge..    [facility] consult to address Rectal Cancer.           CHIEF COMPLAINT     Chief Complaint   Patient presents with    Flu Symptoms         HPI     Michael D McArdle is a 78 year old male who presents with complaints of nausea vomiting that started earlier today.  He came in for evaluation today at [facility] and CT scan shows evidence of a partial small bowel obstruction likely associated with his parastomal hernia.  He is s/p a colectomy for colon cancer  in 2015 by  Dr. Bauer.  He also had a Robotic Ventral  [place] Repair by  Dr. Calderon in 2021.    He currently has an ulcerated wound at the anus that he tells me is a rectal cancer.  He says he is waiting to  have this treated.           PAST MEDICAL HISTORY SURGICAL HISTORY     Past Medical History:   Diagnosis Date    Cancer (H)     Chronic obstructive pulmonary disease, unspecified COPD type (H) 8/25/2023    Depressive disorder     Kidney disease     Status post rotator cuff repair     Thyroid disease     Uncomplicated asthma     Past Surgical History:   Procedure Laterality Date    COLECTOMY  July 2015    radiation and chemo, 3 colon surgeries    COLOSTOMY  2015    and reversal     OPEN REDUCTION INTERNAL FIXATION ANKLE Right 2/22/2016    Procedure: ANKLE FRACTURE OPEN REDUCTION INTERNAL FIXATION, RIGHT;  Surgeon: Yolanda Dean MD;  Location: [facility];  Service:     OTHER SURGICAL HISTORY      takedown of colostomy    SHOULDER ARTHROSCOPY W/ ROTATOR CUFF REPAIR Left      SHOULDER OPEN ROTATOR CUFF REPAIR Right           CURRENT MEDICATIONS ALLERGIES     Current Outpatient Rx   Medication Sig Dispense Refill    albuterol (PROAIR HFA/PROVENTIL HFA/VENTOLIN HFA) 108 (90 Base) MCG/ACT inhaler Inhale 2 puffs into the lungs every 6 hours 18 g 1    albuterol (PROVENTIL) (2.5 MG/3ML) 0.083% neb solution Take 1 vial (2.5 mg) by nebulization every 6 hours as needed for shortness of breath / dyspnea or wheezing 90 mL 1    amLODIPine (NORVASC) 5 MG tablet Take 5 mg by mouth every evening      aspirin (ASA) 81 MG chewable tablet Take 81 mg by mouth every evening      atorvastatin (LIPITOR) 80 MG tablet Take 80 mg by mouth every evening      baclofen (LIORESAL) 20 MG tablet TAKE ONE TABLET BY MOUTH TWICE A DAY AS NEEDED FOR MUSCLE SPASMS IN THE EVENING 45 tablet 1    budesonide-formoterol (SYMBICORT) 80-4.5 MCG/ACT Inhaler Take 2 puff 2x daily and 1-2 puffs as needed with shortness of breath. 20.4 g 3    levothyroxine (SYNTHROID/LEVOTHROID) 150 MCG tablet Take 150 mcg by mouth every evening      Magnesium Cl-Calcium Carbonate 71.5-119 MG TBEC Take 71.5 mg by mouth 2 times daily      Allergies   Allergen Reactions    Penicillins Swelling          FAMILY HISTORY     Family History   Problem Relation Age of Onset    Breast Cancer Mother     Heart Failure Father     Diabetes Other         daughters     Cancer Other         wife     Heart Disease No family hx of     Coronary Artery Disease No family hx of     Hypertension No family hx of     Hyperlipidemia No family hx of     Cerebrovascular Disease No family hx of     Colon Cancer No family hx of     Prostate Cancer No family hx of     Other Cancer No family hx of     Depression No family hx of     Anxiety Disorder No family hx of     Mental Illness No family hx of     Substance Abuse No family hx of     Anesthesia Reaction No family hx of     Asthma No family hx of     Osteoporosis No family hx of     Genetic Disorder No family hx of     Thyroid  Disease No family hx of     Obesity No family hx of     Unknown/Adopted No family hx of          SOCIAL HISTORY     Social History     Socioeconomic History    Marital status: Legally    Tobacco Use    Smoking status: Every Day     Types: Pipe    Smokeless tobacco: Never    Tobacco comments:     has been cut down a lot, is on the nicotine patch   Substance and Sexual Activity    Alcohol use: Yes     Alcohol/week: 0.0 standard drinks of alcohol    Drug use: No    Sexual activity: Yes     Partners: Female     Social Determinants of Health     Financial Resource Strain: Low Risk  (12/12/2023)    Financial Resource Strain     Within the past 12 months, have you or your family members you live with been unable to get utilities (heat, electricity) when it was really needed?: No   Food Insecurity: Low Risk  (12/12/2023)    Food Insecurity     Within the past 12 months, did you worry that your food would run out before you got money to buy more?: No     Within the past 12 months, did the food you bought just not last and you didn t have money to get more?: No   Transportation Needs: Low Risk  (12/12/2023)    Transportation Needs     Within the past 12 months, has lack of transportation kept you from medical appointments, getting your medicines, non-medical meetings or appointments, work, or from getting things that you need?: No   Interpersonal Safety: Low Risk  (1/17/2024)    Interpersonal Safety     Do you feel physically and emotionally safe where you currently live?: Yes     Within the past 12 months, have you been hit, slapped, kicked or otherwise physically hurt by someone?: No     Within the past 12 months, have you been humiliated or emotionally abused in other ways by your partner or ex-partner?: No   Housing Stability: Low Risk  (12/12/2023)    Housing Stability     Do you have housing? : Yes     Are you worried about losing your housing?: No         REVIEW OF SYSTEMS       12 point review of systems are  "unremarkable except for the symptoms described in the HPI    PHYSICAL EXAM     VITAL SIGNS: /60   Pulse 106   Temp 98.3  F (36.8  C) (Oral)   Resp 20   Ht 1.626 m (5' 4\")   Wt 78.5 kg (173 lb)   SpO2 95%   BMI 29.70 kg/m     General : Alert,  appears stated age   Skin: Skin color, texture, turgor normal, no rashes or lesions   Head: Normocephalic, without obvious abnormality, atraumatic   HEENT:  conjunctiva/corneas clear, EOM's intact, no scleral icterus   Throat: Lips, mucosa, and tongue normal; teeth and gums normal    Neck: Supple, thyroid not enlarged   Back: No CVA tenderness   Lungs: Clear to auscultation bilaterally, respirations unlabored, no rales, rhonchi or wheezes   Chest Wall:No tenderness or deformity   Heart: Regular rate and rhythm, S1, S2 normal, no murmur, rub or gallop   Abdomen: Soft, non-tender, no guarding, left abdominal ostomy with surrounding swelling consistent with parastomal hernia.  Rectum:  Ulcerated wound that looks consistent with a tumor bed.    Extremities : No obvious swelling,  Neurologic: Cranial Nerves II-XII normal, moves all extremities equally, no focal findings          RADIOLOGY      CT Abdomen Pelvis w/o Contrast   Final Result   IMPRESSION:    1.  Parastomal hernia containing proximal jejunum with resultant high-grade small bowel obstruction and upstream dilatation of the small bowel and stomach. No pneumatosis or evidence of perforation.   2.  The recurrent rectal/anal mass is unchanged from and better evaluated on recent pelvic MRI.   3.  Unchanged small presacral gas containing fluid collection, which as before is suspicious for fistulous communication with bowel.   4.  Additional details in the findings.         XR Chest Port 1 View   Final Result   IMPRESSION: Negative chest.          EKG     Reviewed        LABS     Results for orders placed or performed during the hospital encounter of 03/11/24   XR Chest Port 1 View    Impression    IMPRESSION: " Negative chest.   CT Abdomen Pelvis w/o Contrast    Impression    IMPRESSION:   1.  Parastomal hernia containing proximal jejunum with resultant high-grade small bowel obstruction and upstream dilatation of the small bowel and stomach. No pneumatosis or evidence of perforation.  2.  The recurrent rectal/anal mass is unchanged from and better evaluated on recent pelvic MRI.  3.  Unchanged small presacral gas containing fluid collection, which as before is suspicious for fistulous communication with bowel.  4.  Additional details in the findings.     Basic metabolic panel   Result Value Ref Range    Sodium 141 135 - 145 mmol/L    Potassium 5.5 (H) 3.4 - 5.3 mmol/L    Chloride 106 98 - 107 mmol/L    Carbon Dioxide (CO2) 26 22 - 29 mmol/L    Anion Gap 9 7 - 15 mmol/L    Urea Nitrogen 25.9 (H) 8.0 - 23.0 mg/dL    Creatinine 1.79 (H) 0.67 - 1.17 mg/dL    GFR Estimate 38 (L) >60 mL/min/1.73m2    Calcium 8.9 8.8 - 10.2 mg/dL    Glucose 117 (H) 70 - 99 mg/dL   Hepatic function panel   Result Value Ref Range    Protein Total 7.5 6.4 - 8.3 g/dL    Albumin 3.9 3.5 - 5.2 g/dL    Bilirubin Total 0.5 <=1.2 mg/dL    Alkaline Phosphatase 83 40 - 150 U/L    AST 22 0 - 45 U/L    ALT 10 0 - 70 U/L    Bilirubin Direct <0.20 0.00 - 0.30 mg/dL   Result Value Ref Range    Magnesium 2.2 1.7 - 2.3 mg/dL   Symptomatic Influenza A/B, RSV, & SARS-CoV2 PCR (COVID-19) Nasopharyngeal    Specimen: Nasopharyngeal; Swab   Result Value Ref Range    Influenza A PCR Negative Negative    Influenza B PCR Negative Negative    RSV PCR Negative Negative    SARS CoV2 PCR Negative Negative   CBC with platelets and differential   Result Value Ref Range    WBC Count 9.6 4.0 - 11.0 10e3/uL    RBC Count 3.88 (L) 4.40 - 5.90 10e6/uL    Hemoglobin 11.6 (L) 13.3 - 17.7 g/dL    Hematocrit 35.9 (L) 40.0 - 53.0 %    MCV 93 78 - 100 fL    MCH 29.9 26.5 - 33.0 pg    MCHC 32.3 31.5 - 36.5 g/dL    RDW 15.1 (H) 10.0 - 15.0 %    Platelet Count 281 150 - 450 10e3/uL    %  Neutrophils 94 %    % Lymphocytes 2 %    % Monocytes 3 %    % Eosinophils 1 %    % Basophils 0 %    % Immature Granulocytes 0 %    NRBCs per 100 WBC 0 <1 /100    Absolute Neutrophils 8.9 (H) 1.6 - 8.3 10e3/uL    Absolute Lymphocytes 0.2 (L) 0.8 - 5.3 10e3/uL    Absolute Monocytes 0.3 0.0 - 1.3 10e3/uL    Absolute Eosinophils 0.1 0.0 - 0.7 10e3/uL    Absolute Basophils 0.0 0.0 - 0.2 10e3/uL    Absolute Immature Granulocytes 0.0 <=0.4 10e3/uL    Absolute NRBCs 0.0 10e3/uL   N terminal pro BNP outpatient   Result Value Ref Range    N Terminal Pro BNP Outpatient 351 0 - 1,800 pg/mL   Lactic acid whole blood   Result Value Ref Range    Lactic Acid 0.9 0.7 - 2.0 mmol/L           [facility]  [phone]

## 2024-03-11 NOTE — H&P
[facility]    History and Physical - Hospitalist Service       Date of Admission:  3/11/2024    Assessment & Plan      Michael D McArdle is a 78 year old male with medical Hx of colon cancer (s/p colectomy with colostomy), COPD (PRN albuterol and symbicort inhalers), Hypothyroidism, CKD, HTN, Hx PE,  and recent recurrence of recto-anal cancer, presenting with 3 days of nausea and vomiting and abdominal pain, admitted on 3/11/2024 for management of small bowel obstruction secondary to stomal hernia.       Parastomal hernia with evidence of small bowel obstruction  General surgery consulted   - Plan for NG decompression followed by gastrografin challenge.  - NPO  - mIVF    Nausea/vomiting/Diarrhea  3 days of cold like symptoms and N/V/D.  Suspect originating from viral illness.  Likely contributing to SBO since his stomal hernia has been bulging out into the colostomy bag more often and he is constantly needing to push it back in.  - s/p 1L bolus IVF  - continue mIVF    Adenocarcinoma of rectum   Stoma, with colostomy   Recurrence of rectal cancer involving the rectum and anus  See oncology note on 3/5/24.  Plans to start chemo for 3 months and then do surgery.    JOELLE on CKD  Baseline Cr more 1.3-1.5.  Cr 1.79 on admission. Potentially dehydrated.  - will recheck BMP    Hypothyroidism due to acquired atrophy of thyroid   - Continued PTA levothyroxine    Chronic Meds  - continued atorvastatin  - continued inhalers  - held aspirin  - held amlodipine  - continued MgGluconate      Diet:  NPO  DVT Prophylaxis: Enoxaparin (Lovenox) SQ  Estimated Creatinine Clearance: 31.7 mL/min (A) (based on SCr of 1.79 mg/dL (H)).  Chakraborty Catheter: Not present  Fluids: mIVF  Lines: None     Cardiac Monitoring: None  Code Status:  Full Code    Clinically Significant Risk Factors Present on Admission        # Hyperkalemia: Highest K = 5.5 mmol/L in last 2 days, will monitor as appropriate         # Drug Induced  "Platelet Defect: home medication list includes an antiplatelet medication        # Overweight: Estimated body mass index is 29.7 kg/m  as calculated from the following:    Height as of this encounter: 1.626 m (5' 4\").    Weight as of this encounter: 78.5 kg (173 lb).       # Asthma: noted on problem list        Disposition Plan      Expected Discharge Date: 03/13/2024                The patient's care was discussed with the Attending Physician, Dr. Berrios .      Ryan Porter MD  Hospitalist Service  [facility]  Securely message with [employer] (more info)  Text page via [facility] Paging/Directory   ______________________________________________________________________    Chief Complaint   Nausea/vomiting/diarrhea   Abdominal pain    History is obtained from the patient    History of Present Illness   Michael D McArdle is a 78 year old male with medical Hx of colon cancer (s/p colectomy with colostomy), COPD (PRN albuterol and symbicort inhalers), Hypothyroidism, CKD, HTN, Hx PE,  and recent recurrence of recto-anal cancer, presenting with 3 days of nausea and vomiting and abdominal pain, admitted on 3/11/2024 for management of small bowel obstruction secondary to stomal hernia.     The 3 days of nausea/vomiting/diarrhea and cold symptoms has been causing a lot of problems with his ostomy bag.  The hernia has been bulging into the bag when he coughs and presumably gas makes it bulge off and it actually popped open and went all over the floor today.  He slipped on the mess and fell onto his side, did not strike head.    Otherwise his BM have been fine.  Takes mirilax every other day and eats 1-2 meals per day.  He has had less appetite the past month.    ROS:  Cough, stuffy nose  Abdominal pain  Normal urination    Smokes a pipe  No other tobacco, alcohol, or drug use    Lives alone in an apartment but has PCA come 5 hours a day and his daughter who is a LPN come and help with cares.  He " "gets around with a walker or cane, and tries to be active, goes on walks to [place], gets coffee, or goes to the gas station.  He does have a girlfriend that lives in the same building.        From Oncology note 3/5/24  \"He was initially diagnosed with T3N1 rectal cancer in December 2014. He underwent Chemo in June 2015 with Dr. Bauer.  On 6/25/2015 he was admitted for wound dehiscence and was taken to the OR for wound exploration and reclosure. On 9/8/2015 he underwent a DLI takedown. He developed an incisional hernia and underwent a robotic hernia repair on 5/10/2019.      He was lost to follow up and did not undergo surveillance till 2022 when he presented to [facility] with rectal bleeding, pain, and pressure. He was found to have a local recurrence of his rectal cancer which on MRI was confined to the rectal wall and sphincter.     It was decided to do a transverse loop colostomy and abort the APR. Of note, flex sig two days prior confirmed moderately-differentiated adenocarcinoma. It was recommended that he undergo chemotherapy. He never followed through with this.     He saw Dr. Moss in June 2023 for a parastomal hernia who recommended (if pt wanted to undergo curative intent for his cancer) to undergo a MRI and a visit at the [facility] for sacrectomy. He never followed through with this.  He recently saw his PCP who recommended he follow up with his cancer care.      CEA on 2/16/2024, 5.1. CT Chest/Abdomen/Pelvis on 2/16/2024 demonstrated a new masslike lesion involving the remaining rectum and anus measuring up to 8.9 cm. There was also a complex density collection containing air located in the presacral space measuring up to 7.4 cm with possible fistulous connection to the remaining rectum.  There was a single enlarged left external iliac chain lymph node.  Lastly there was an increase size of peripheral enhancing adrenal lesions bilaterally, indeterminant, which may represent metastasis or adrenal adenomas. MRI " "Rectum on 3/1/2024 demonstrated a large obstructive mass containing mucin and viable tumor, involving the mid and lower rectum, internal and external sphincters and protruding outside the anus. There is invasion of bilateral levator ani muscles, presacral fascia abutting the sacrum and coccyx. No involvement of the prostate or seminal vesicles. Stage T4b. \"    Past Medical History    Past Medical History:   Diagnosis Date    Cancer (H)     Chronic obstructive pulmonary disease, unspecified COPD type (H) 8/25/2023    Depressive disorder     Kidney disease     Status post rotator cuff repair     Thyroid disease     Uncomplicated asthma        Past Surgical History   Past Surgical History:   Procedure Laterality Date    COLECTOMY  July 2015    radiation and chemo, 3 colon surgeries    COLOSTOMY  2015    and reversal     OPEN REDUCTION INTERNAL FIXATION ANKLE Right 2/22/2016    Procedure: ANKLE FRACTURE OPEN REDUCTION INTERNAL FIXATION, RIGHT;  Surgeon: Yolanda Dean MD;  Location: [facility];  Service:     OTHER SURGICAL HISTORY      takedown of colostomy    SHOULDER ARTHROSCOPY W/ ROTATOR CUFF REPAIR Left     SHOULDER OPEN ROTATOR CUFF REPAIR Right        Prior to Admission Medications   Prior to Admission Medications   Prescriptions Last Dose Informant Patient Reported? Taking?   Magnesium Cl-Calcium Carbonate 71.5-119 MG TBEC 3/11/2024 at am  Yes Yes   Sig: Take 71.5 mg by mouth 2 times daily   albuterol (PROAIR HFA/PROVENTIL HFA/VENTOLIN HFA) 108 (90 Base) MCG/ACT inhaler More than a month at prn  No Yes   Sig: Inhale 2 puffs into the lungs every 6 hours   albuterol (PROVENTIL) (2.5 MG/3ML) 0.083% neb solution More than a month at prn  No Yes   Sig: Take 1 vial (2.5 mg) by nebulization every 6 hours as needed for shortness of breath / dyspnea or wheezing   amLODIPine (NORVASC) 5 MG tablet 3/10/2024 at pm  Yes Yes   Sig: Take 5 mg by mouth every evening   aspirin (ASA) 81 MG chewable tablet 3/10/2024 " at pm  Yes Yes   Sig: Take 81 mg by mouth every evening   atorvastatin (LIPITOR) 80 MG tablet 3/10/2024 at pm  Yes Yes   Sig: Take 80 mg by mouth every evening   baclofen (LIORESAL) 20 MG tablet 3/11/2024 at am  No Yes   Sig: TAKE ONE TABLET BY MOUTH TWICE A DAY AS NEEDED FOR MUSCLE SPASMS IN THE EVENING   budesonide-formoterol (SYMBICORT) 80-4.5 MCG/ACT Inhaler More than a month at prn  No Yes   Sig: Take 2 puff 2x daily and 1-2 puffs as needed with shortness of breath.   levothyroxine (SYNTHROID/LEVOTHROID) 150 MCG tablet 3/10/2024 at pm  Yes Yes   Sig: Take 150 mcg by mouth every evening      Facility-Administered Medications: None           Physical Exam   Vital Signs: Temp: 98.3  F (36.8  C) Temp src: Oral BP: 115/62 Pulse: 99   Resp: 20 SpO2: 95 % O2 Device: None (Room air)    Weight: 173 lbs 0 oz    Constitutional: Gruff and upset, lying in bed, productive cough, awake, alert, cooperative, no apparent distress, and appears stated age  Eyes: Lids and lashes normal, pupils equal, round, extra ocular muscles intact, sclera clear, conjunctiva normal  ENT: without obvious abnormality, oral pharynx with moist mucous membranes  Hematologic / Lymphatic: no cervical lymphadenopathy  Respiratory: No increased work of breathing, good air exchange, clear to auscultation   Cardiovascular: regular rate and rhythm  GI: normal to hyperactive bowel sounds, soft, non-distended, tender around area of colostomy, bowel visible herniating into colostomy bag   Skin: normal skin color, texture, turgor  Musculoskeletal: no lower extremity pitting edema present  Neurologic: Awake, alert, oriented        Data     I have personally reviewed the following data over the past 24 hrs:    9.6  \   11.6 (L)   / 281     141 106 25.9 (H) /  117 (H)   5.5 (H) 26 1.79 (H) \     ALT: 10 AST: 22 AP: 83 TBILI: 0.5   ALB: 3.9 TOT PROTEIN: 7.5 LIPASE: N/A     Trop: N/A BNP: 351     Procal: N/A CRP: N/A Lactic Acid: 0.9         Imaging results reviewed  over the past 24 hrs:   Recent Results (from the past 24 hour(s))   XR Chest Port 1 View    Narrative    EXAM: XR CHEST PORT 1 VIEW  LOCATION: [facility]  DATE: 3/11/2024    INDICATION: cough  COMPARISON: 05/09/2022      Impression    IMPRESSION: Negative chest.   CT Abdomen Pelvis w/o Contrast    Narrative    EXAM: CT ABDOMEN PELVIS W/O CONTRAST  LOCATION: [facility]  DATE: 3/11/2024    INDICATION: abdominal pain, vomiting  COMPARISON: CT chest, abdomen and pelvis 02/16/2024  TECHNIQUE: CT scan of the abdomen and pelvis was performed without IV contrast. Multiplanar reformats were obtained. Dose reduction techniques were used.  CONTRAST: None.    FINDINGS:   LOWER CHEST: Dependent atelectasis.    HEPATOBILIARY: Unchanged unenhanced liver contours. Gallbladder is contracted. No biliary ductal dilation.    PANCREAS: Normal.    SPLEEN: Normal.    ADRENAL GLANDS: Stable adenomatoid changes bilaterally.    KIDNEYS/BLADDER: Right renal sinus cysts. No urolithiasis or hydronephrosis. Bladder is minimally distended.    BOWEL: Postoperative findings from low anterior resection and left lower quadrant ostomy. There is a large parastomal hernia containing proximal jejunum which is resulting in high-grade small bowel obstruction with dilation of the upstream small bowel   and stomach. No pneumatosis or pneumoperitoneum.    The large recurrent rectal/anal mass is not significantly changed from and better evaluated on recent pelvic MRI. Additionally, the gas containing presacral collection is unchanged, again suspicious for fistulous communication.    LYMPH NODES: Unchanged prominent left inguinal lymph nodes.    VASCULATURE: Abdominal aorta is nonaneurysmal with moderate atheromatous disease.    PELVIC ORGANS: Unremarkable.    MUSCULOSKELETAL: No destructive osseous lesions. Multiple small fat-containing ventral wall hernias as well as fat-containing right inguinal hernia.       Impression    IMPRESSION:   1.  Parastomal hernia containing proximal jejunum with resultant high-grade small bowel obstruction and upstream dilatation of the small bowel and stomach. No pneumatosis or evidence of perforation.  2.  The recurrent rectal/anal mass is unchanged from and better evaluated on recent pelvic MRI.  3.  Unchanged small presacral gas containing fluid collection, which as before is suspicious for fistulous communication with bowel.  4.  Additional details in the findings.

## 2024-03-11 NOTE — CONSULTS
Care Management Initial Consult    General Information  Assessment completed with: Patient, pt  Type of CM/SW Visit: Initial Assessment    Primary Care Provider verified and updated as needed: Yes   Readmission within the last 30 days: no previous admission in last 30 days      Reason for Consult: discharge planning  Advance Care Planning: Advance Care Planning Reviewed: no concerns identified, verified with patient, present on chart     General Information Comments: lives alone and no svcs, independent at [facility] and no assisted devices at apt.    Communication Assessment  Patient's communication style: spoken language (English or Bilingual)             Cognitive  Cognitive/Neuro/Behavioral: WDL                      Living Environment:   People in home: alone     Current living Arrangements: apartment      Able to return to prior arrangements: yes       Family/Social Support:  Care provided by: self  Provides care for: no one  Marital Status: Single  Children          Description of Support System: Supportive, Involved    Support Assessment: Adequate family and caregiver support, Adequate social supports    Current Resources:   Patient receiving home care services: No     Community Resources: None  Equipment currently used at home:    Supplies currently used at home: None    Employment/Financial:  Employment Status:          Financial Concerns: none   Referral to Financial Worker: No       Does the patient's insurance plan have a 3 day qualifying hospital stay waiver?  No    Lifestyle & Psychosocial Needs:  Social Determinants of Health     Food Insecurity: Low Risk  (12/12/2023)    Food Insecurity     Within the past 12 months, did you worry that your food would run out before you got money to buy more?: No     Within the past 12 months, did the food you bought just not last and you didn t have money to get more?: No   Depression: Not at risk (1/17/2024)    PHQ-2     PHQ-2 Score: 0   Housing Stability: Low Risk   (12/12/2023)    Housing Stability     Do you have housing? : Yes     Are you worried about losing your housing?: No   Tobacco Use: High Risk (3/5/2024)    Patient History     Smoking Tobacco Use: Every Day     Smokeless Tobacco Use: Never     Passive Exposure: Not on file   Financial Resource Strain: Low Risk  (12/12/2023)    Financial Resource Strain     Within the past 12 months, have you or your family members you live with been unable to get utilities (heat, electricity) when it was really needed?: No   Alcohol Use: Not on file   Transportation Needs: Low Risk  (12/12/2023)    Transportation Needs     Within the past 12 months, has lack of transportation kept you from medical appointments, getting your medicines, non-medical meetings or appointments, work, or from getting things that you need?: No   Physical Activity: Not on file   Interpersonal Safety: Low Risk  (1/17/2024)    Interpersonal Safety     Do you feel physically and emotionally safe where you currently live?: Yes     Within the past 12 months, have you been hit, slapped, kicked or otherwise physically hurt by someone?: No     Within the past 12 months, have you been humiliated or emotionally abused in other ways by your partner or ex-partner?: No   Stress: Not on file   Social Connections: Not on file       Functional Status:  Prior to admission patient needed assistance:   Dependent ADLs:: Independent  Dependent IADLs:: Independent  Assesssment of Functional Status: Not at baseline with ADL Functioning    Mental Health Status:  Mental Health Status: No Current Concerns       Chemical Dependency Status:                Values/Beliefs:  Spiritual, Cultural Beliefs, Christian Practices, Values that affect care:                 Additional Information:  Assessed, lives alone and no svcs, independent at [facility] and no assisted devices at [facility]. CM to follow for discharge needs.    Bennie Hatfield RN

## 2024-03-11 NOTE — ED TRIAGE NOTES
Patient presents via EMS for flu like symptoms. Last 3 days patient has been vomiting, diarrhea and chills. This morning patient was emptying colostomy bag and slipped on feces and fell to floor. Patient only takes Aspirin.     Triage Assessment (Adult)       Row Name 03/11/24 1038          Triage Assessment    Airway WDL WDL        Respiratory WDL    Respiratory WDL WDL        Skin Circulation/Temperature WDL    Skin Circulation/Temperature WDL WDL        Cardiac WDL    Cardiac WDL WDL        Peripheral/Neurovascular WDL    Peripheral Neurovascular WDL WDL        Cognitive/Neuro/Behavioral WDL    Cognitive/Neuro/Behavioral WDL WDL

## 2024-03-11 NOTE — ED NOTES
Bed: [place] [address]  Expected date:   Expected time:   Means of arrival: Ambulance  Comments:  MPLW: Flu symptoms, vomiting, incontinent   36.9

## 2024-03-12 ENCOUNTER — APPOINTMENT (OUTPATIENT)
Dept: RADIOLOGY | Facility: HOSPITAL | Age: 79
DRG: 394 | End: 2024-03-12
Attending: SURGERY
Payer: COMMERCIAL

## 2024-03-12 VITALS
HEART RATE: 88 BPM | WEIGHT: 162.7 LBS | TEMPERATURE: 98.6 F | HEIGHT: 64 IN | DIASTOLIC BLOOD PRESSURE: 68 MMHG | RESPIRATION RATE: 18 BRPM | OXYGEN SATURATION: 96 % | SYSTOLIC BLOOD PRESSURE: 145 MMHG | BODY MASS INDEX: 27.78 KG/M2

## 2024-03-12 LAB
ANION GAP SERPL CALCULATED.3IONS-SCNC: 9 MMOL/L (ref 7–15)
BUN SERPL-MCNC: 34.3 MG/DL (ref 8–23)
CALCIUM SERPL-MCNC: 8.4 MG/DL (ref 8.8–10.2)
CHLORIDE SERPL-SCNC: 110 MMOL/L (ref 98–107)
CREAT SERPL-MCNC: 1.84 MG/DL (ref 0.67–1.17)
DEPRECATED HCO3 PLAS-SCNC: 22 MMOL/L (ref 22–29)
EGFRCR SERPLBLD CKD-EPI 2021: 37 ML/MIN/1.73M2
ERYTHROCYTE [DISTWIDTH] IN BLOOD BY AUTOMATED COUNT: 15.4 % (ref 10–15)
GLUCOSE SERPL-MCNC: 103 MG/DL (ref 70–99)
HCT VFR BLD AUTO: 31.1 % (ref 40–53)
HGB BLD-MCNC: 10.1 G/DL (ref 13.3–17.7)
MAGNESIUM SERPL-MCNC: 2.2 MG/DL (ref 1.7–2.3)
MCH RBC QN AUTO: 30 PG (ref 26.5–33)
MCHC RBC AUTO-ENTMCNC: 32.5 G/DL (ref 31.5–36.5)
MCV RBC AUTO: 92 FL (ref 78–100)
PLATELET # BLD AUTO: 210 10E3/UL (ref 150–450)
POTASSIUM SERPL-SCNC: 4.3 MMOL/L (ref 3.4–5.3)
RBC # BLD AUTO: 3.37 10E6/UL (ref 4.4–5.9)
SODIUM SERPL-SCNC: 141 MMOL/L (ref 135–145)
WBC # BLD AUTO: 3.5 10E3/UL (ref 4–11)

## 2024-03-12 PROCEDURE — 258N000003 HC RX IP 258 OP 636

## 2024-03-12 PROCEDURE — 99238 HOSP IP/OBS DSCHRG MGMT 30/<: CPT | Mod: GC

## 2024-03-12 PROCEDURE — 80048 BASIC METABOLIC PNL TOTAL CA: CPT

## 2024-03-12 PROCEDURE — 74018 RADEX ABDOMEN 1 VIEW: CPT

## 2024-03-12 PROCEDURE — 99231 SBSQ HOSP IP/OBS SF/LOW 25: CPT

## 2024-03-12 PROCEDURE — 85027 COMPLETE CBC AUTOMATED: CPT

## 2024-03-12 PROCEDURE — 250N000013 HC RX MED GY IP 250 OP 250 PS 637

## 2024-03-12 PROCEDURE — 83735 ASSAY OF MAGNESIUM: CPT | Performed by: FAMILY MEDICINE

## 2024-03-12 PROCEDURE — 36415 COLL VENOUS BLD VENIPUNCTURE: CPT

## 2024-03-12 PROCEDURE — 250N000013 HC RX MED GY IP 250 OP 250 PS 637: Performed by: FAMILY MEDICINE

## 2024-03-12 PROCEDURE — G0463 HOSPITAL OUTPT CLINIC VISIT: HCPCS

## 2024-03-12 RX ORDER — SODIUM CHLORIDE 9 MG/ML
INJECTION, SOLUTION INTRAVENOUS CONTINUOUS
Status: DISCONTINUED | OUTPATIENT
Start: 2024-03-12 | End: 2024-03-12 | Stop reason: HOSPADM

## 2024-03-12 RX ADMIN — SODIUM CHLORIDE 500 ML: 9 INJECTION, SOLUTION INTRAVENOUS at 12:46

## 2024-03-12 RX ADMIN — Medication 500 MG: at 09:27

## 2024-03-12 RX ADMIN — FLUTICASONE FUROATE AND VILANTEROL TRIFENATATE 1 PUFF: 100; 25 POWDER RESPIRATORY (INHALATION) at 12:38

## 2024-03-12 ASSESSMENT — ACTIVITIES OF DAILY LIVING (ADL)
ADLS_ACUITY_SCORE: 40
ADLS_ACUITY_SCORE: 35
ADLS_ACUITY_SCORE: 40
ADLS_ACUITY_SCORE: 35
ADLS_ACUITY_SCORE: 35
ADLS_ACUITY_SCORE: 40
ADLS_ACUITY_SCORE: 40
ADLS_ACUITY_SCORE: 38
ADLS_ACUITY_SCORE: 40
ADLS_ACUITY_SCORE: 38
ADLS_ACUITY_SCORE: 40
ADLS_ACUITY_SCORE: 35
ADLS_ACUITY_SCORE: 35

## 2024-03-12 NOTE — DISCHARGE INSTRUCTIONS
"WOC DISCHARGE INSTRUCTIONS: Patient has home care already set  LLQ Colostomy pouching plan:   Pouching system: ostomy supplies pouches: Jay 2 pc 75250 ostomy supplies barrier: esther CTF 29055 not available in stores  Accessories used: WOC ostomy accessories: 2\" Cera Barrier Ring (031794) and Adapt Paste (822107) per patient preference  Frequency of pouch changes: Twice weekly and PRN leakage  WOC follow up plan: Notify WOC if leakage occurs  Bedside RN interventions: Change pouch PRN if leaking using the supplies above, Empty pouch when 1/3 to 1/2 full, ensure to clean pouch outlet after emptying to prevent odor, Notify WOC for ongoing pouch leakage, and Document stoma appearance and output volume, color, and consistency every shift   "

## 2024-03-12 NOTE — PROGRESS NOTES
General Surgery Progress Note:    Hospital Day # 1    ASSESSMENT:   1. 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 Vomiting and diarrhea    3. Generalized weakness    4. Acute kidney injury (H24)      Michael D McArdle is a 78 year old male with a history of colon cancer s/p colectomy who presented with a partial SBO likely associated with parastomal hernia. NG tube was placed. Passed GGC. Denies nausea, vomiting. Passing flatus and liquid output in ostomy.    PLAN:   - Remove NG tube  - Advance to clear liquid diet   - WOC consulted for ostomy bag care  - Recommend follow-up with colorectal outpatient for rectal cancer per patient  - Medical management per primary team  - General surgery will continue to follow    SUBJECTIVE:   Michael D McArdle is feeling better today. Complains of tenderness surrounding ostomy. Passing flatus and liquid output from ostomy. No formed stools. States he is hungry and wants to eat. Denies nausea, vomiting.    Patient Vitals for the past 24 hrs:   BP Temp Temp src Pulse Resp SpO2 Height Weight   03/12/24 0838 (!) 164/84 -- -- 94 20 94 % -- --   03/12/24 0820 125/63 98.8  F (37.1  C) Oral 96 16 94 % -- --   03/12/24 0038 117/66 98  F (36.7  C) Oral 92 14 96 % -- --   03/11/24 1956 130/80 97.9  F (36.6  C) Oral 100 14 94 % -- --   03/11/24 1905 -- 98.5  F (36.9  C) Oral -- -- -- -- --   03/11/24 1901 135/63 -- -- 96 -- 95 % -- --   03/11/24 1846 133/61 -- -- 104 -- 98 % -- --   03/11/24 1831 133/60 -- -- 95 -- 95 % -- --   03/11/24 1816 121/56 -- -- 98 -- 93 % -- --   03/11/24 1801 128/58 -- -- 97 -- 95 % -- --   03/11/24 1746 124/61 -- -- 100 -- 96 % -- --   03/11/24 1731 127/61 -- -- 98 -- 95 % -- --   03/11/24 1716 (!) 140/63 -- -- 99 -- 96 % -- --   03/11/24 1500 115/62 -- -- 99 -- 95 % -- --   03/11/24 1445 121/60 -- -- 106 -- 95 % -- --   03/11/24 1434 128/60 -- -- 100 -- 94 % -- --   03/11/24 1419 132/62 -- -- 100 -- 95 % -- --   03/11/24 1400 133/60 -- -- 101  "-- 94 % -- --   03/11/24 1345 (!) 135/90 -- -- -- -- -- -- --   03/11/24 1053 128/60 98.3  F (36.8  C) Oral 100 20 96 % 1.626 m (5' 4\") 78.5 kg (173 lb)     Physical Exam:  General: patient seen resting in bed, no acute distress,   Resp: no respiratory distress, breathing comfortably on room air.  Abdomen: soft, non-distended, tenderness surrounding left abdominal ostomy site, ostomy intact with parastomal hernia  Extremities: warm and well perfused    Admission on 03/11/2024   Component Date Value    Sodium 03/11/2024 141     Potassium 03/11/2024 5.5 (H)     Chloride 03/11/2024 106     Carbon Dioxide (CO2) 03/11/2024 26     Anion Gap 03/11/2024 9     Urea Nitrogen 03/11/2024 25.9 (H)     Creatinine 03/11/2024 1.79 (H)     GFR Estimate 03/11/2024 38 (L)     Calcium 03/11/2024 8.9     Glucose 03/11/2024 117 (H)     Protein Total 03/11/2024 7.5     Albumin 03/11/2024 3.9     Bilirubin Total 03/11/2024 0.5     Alkaline Phosphatase 03/11/2024 83     AST 03/11/2024 22     ALT 03/11/2024 10     Bilirubin Direct 03/11/2024 <0.20     Magnesium 03/11/2024 2.2     Influenza A PCR 03/11/2024 Negative     Influenza B PCR 03/11/2024 Negative     RSV PCR 03/11/2024 Negative     SARS CoV2 PCR 03/11/2024 Negative     WBC Count 03/11/2024 9.6     RBC Count 03/11/2024 3.88 (L)     Hemoglobin 03/11/2024 11.6 (L)     Hematocrit 03/11/2024 35.9 (L)     MCV 03/11/2024 93     MCH 03/11/2024 29.9     MCHC 03/11/2024 32.3     RDW 03/11/2024 15.1 (H)     Platelet Count 03/11/2024 281     % Neutrophils 03/11/2024 94     % Lymphocytes 03/11/2024 2     % Monocytes 03/11/2024 3     % Eosinophils 03/11/2024 1     % Basophils 03/11/2024 0     % Immature Granulocytes 03/11/2024 0     NRBCs per 100 WBC 03/11/2024 0     Absolute Neutrophils 03/11/2024 8.9 (H)     Absolute Lymphocytes 03/11/2024 0.2 (L)     Absolute Monocytes 03/11/2024 0.3     Absolute Eosinophils 03/11/2024 0.1     Absolute Basophils 03/11/2024 0.0     Absolute Immature Granul* " 03/11/2024 0.0     Absolute NRBCs 03/11/2024 0.0     N Terminal Pro BNP Outpa* 03/11/2024 351     Lactic Acid 03/11/2024 0.9     Sodium 03/12/2024 141     Potassium 03/12/2024 4.3     Chloride 03/12/2024 110 (H)     Carbon Dioxide (CO2) 03/12/2024 22     Anion Gap 03/12/2024 9     Urea Nitrogen 03/12/2024 34.3 (H)     Creatinine 03/12/2024 1.84 (H)     GFR Estimate 03/12/2024 37 (L)     Calcium 03/12/2024 8.4 (L)     Glucose 03/12/2024 103 (H)     WBC Count 03/12/2024 3.5 (L)     RBC Count 03/12/2024 3.37 (L)     Hemoglobin 03/12/2024 10.1 (L)     Hematocrit 03/12/2024 31.1 (L)     MCV 03/12/2024 92     MCH 03/12/2024 30.0     MCHC 03/12/2024 32.5     RDW 03/12/2024 15.4 (H)     Platelet Count 03/12/2024 210     Magnesium 03/12/2024 2.2       Vivian Cisneros PA-C  [facility] General Surgery  [address] [address]

## 2024-03-12 NOTE — DISCHARGE SUMMARY
"[facility]  Discharge Summary - Medicine & Pediatrics       Date of Admission:  3/11/2024  Date of Discharge:  3/12/2024  Discharging Provider: Dr Charlotte Mg  Discharge Service: Hospitalist Service    Discharge Diagnoses   Small bowel obstruction  JOELLE    Clinically Significant Risk Factors     # Overweight: Estimated body mass index is 27.93 kg/m  as calculated from the following:    Height as of this encounter: 1.626 m (5' 4\").    Weight as of this encounter: 73.8 kg (162 lb 11.2 oz).       Follow-ups Needed After Discharge   Follow-up Appointments     Follow-up and recommended labs and tests       Follow-up with primary care provider as scheduled  Follow-up with oncology as scheduled          Primary care provider to follow-up JOELLE with repeat BMP    Discharge Disposition   Discharged to home  Condition at discharge: Stable    Hospital Course   Michael D McArdle is a 78 year old male with medical Hx of colon cancer (s/p colectomy with colostomy), COPD (PRN albuterol and symbicort inhalers), Hypothyroidism, CKD, HTN, Hx PE,  and recent recurrence of recto-anal cancer, presenting with 3 days of nausea and vomiting and abdominal pain, admitted on 3/11/2024 for management of small bowel obstruction secondary to stomal hernia.   The following problems were addressed during his hospitalization:    Parastomal hernia with evidence of small bowel obstruction  General surgery consulted.  NG decompression followed by gastrografin challenge done on 3/11.  Signs of obstruction resolved on subsequent imaging.  Abdomen exam improved on 3/12.  Management of parastomal hernia to be addressed as an outpatient by colorectal referral as deemed appropriate by oncology and primary care team.  - NG removed on 3/12  - Tolerating diet on 3/12     Nausea/vomiting/Diarrhea  3 days of cold like symptoms and N/V/D.  Suspect originating from viral illness.  Likely contributing to SBO since his stomal hernia " has been bulging out into the colostomy bag more often and he is constantly needing to push it back in.     Adenocarcinoma of rectum   Stoma, with colostomy   Recurrence of rectal cancer involving the rectum and anus  See oncology note on 3/5/24.  Plans to start chemo for 3 months and then do surgery.     JOELLE on CKD  Baseline Cr 1.3-1.5.  Cr 1.79  on admission. Potentially dehydrated. Recheck on 3/12 shows worsening JOELLE.  This should be followed up as an outpatient.     Hypothyroidism due to acquired atrophy of thyroid   - Continued PTA levothyroxine     Chronic Meds  - continued atorvastatin  - continued inhalers  - held aspirin  - held amlodipine  - continued MgGluconate    Consultations This Hospital Stay   SURGERY GENERAL IP CONSULT  CARE MANAGEMENT / SOCIAL WORK IP CONSULT  WOUND OSTOMY CONTINENCE NURSE  IP CONSULT    Code Status   Full Code       The patient was discussed with Dr. Yash Porter MD    ______________________________________________________________________    Physical Exam   Vital Signs: Temp: 98.6  F (37  C) Temp src: Oral BP: (!) 145/68 Pulse: 88   Resp: 18 SpO2: 96 % O2 Device: None (Room air)    Weight: 162 lbs 11.19 oz    Constitutional: awake, alert, cooperative, no apparent distress, and appears stated age  Eyes: Lids and lashes normal, pupils equal, round, extra ocular muscles intact, sclera clear, conjunctiva normal  ENT: without obvious abnormality  Respiratory: No increased work of breathing  Cardiovascular: regular rate and rhythm  GI: colostomy bag in place, unchanged from yesterday, small amounts of loose brown stool in bag, normal bowel sounds, soft, non-distended, non-tender  Skin: normal skin color, texture, turgor  Musculoskeletal: no lower extremity pitting edema present  Neurologic: Awake, alert, oriented      Primary Care Physician   Jacqueline Nowak    Discharge Orders      Reason for your hospital stay    You were admitted for small bowel obstruction  secondary to a hernia that is occurring at your colostomy site.     Follow-up and recommended labs and tests     Follow-up with primary care provider as scheduled  Follow-up with oncology as scheduled     Activity    Your activity upon discharge: activity as tolerated     Diet    Follow this diet upon discharge: Orders Placed This Encounter      Regular Diet Adult       Significant Results and Procedures   Results for orders placed or performed during the hospital encounter of 03/11/24   XR Chest Port 1 View    Narrative    EXAM: XR CHEST PORT 1 VIEW  LOCATION: [facility]  DATE: 3/11/2024    INDICATION: cough  COMPARISON: 05/09/2022      Impression    IMPRESSION: Negative chest.   CT Abdomen Pelvis w/o Contrast    Narrative    EXAM: CT ABDOMEN PELVIS W/O CONTRAST  LOCATION: [facility]  DATE: 3/11/2024    INDICATION: abdominal pain, vomiting  COMPARISON: CT chest, abdomen and pelvis 02/16/2024  TECHNIQUE: CT scan of the abdomen and pelvis was performed without IV contrast. Multiplanar reformats were obtained. Dose reduction techniques were used.  CONTRAST: None.    FINDINGS:   LOWER CHEST: Dependent atelectasis.    HEPATOBILIARY: Unchanged unenhanced liver contours. Gallbladder is contracted. No biliary ductal dilation.    PANCREAS: Normal.    SPLEEN: Normal.    ADRENAL GLANDS: Stable adenomatoid changes bilaterally.    KIDNEYS/BLADDER: Right renal sinus cysts. No urolithiasis or hydronephrosis. Bladder is minimally distended.    BOWEL: Postoperative findings from low anterior resection and left lower quadrant ostomy. There is a large parastomal hernia containing proximal jejunum which is resulting in high-grade small bowel obstruction with dilation of the upstream small bowel   and stomach. No pneumatosis or pneumoperitoneum.    The large recurrent rectal/anal mass is not significantly changed from and better evaluated on recent pelvic MRI. Additionally, the gas  containing presacral collection is unchanged, again suspicious for fistulous communication.    LYMPH NODES: Unchanged prominent left inguinal lymph nodes.    VASCULATURE: Abdominal aorta is nonaneurysmal with moderate atheromatous disease.    PELVIC ORGANS: Unremarkable.    MUSCULOSKELETAL: No destructive osseous lesions. Multiple small fat-containing ventral wall hernias as well as fat-containing right inguinal hernia.      Impression    IMPRESSION:   1.  Parastomal hernia containing proximal jejunum with resultant high-grade small bowel obstruction and upstream dilatation of the small bowel and stomach. No pneumatosis or evidence of perforation.  2.  The recurrent rectal/anal mass is unchanged from and better evaluated on recent pelvic MRI.  3.  Unchanged small presacral gas containing fluid collection, which as before is suspicious for fistulous communication with bowel.  4.  Additional details in the findings.     XR Gastrografin Challenge    Narrative    EXAM: XR GASTROGRAFIN CHALLENGE  LOCATION: [facility]  DATE: 3/12/2024    INDICATION: s p colon resection for colon cancer.  COMPARISON: CT abdomen 03/11/2024.  TECHNIQUE: Routine water soluble contrast follow-through challenge.      Impression    IMPRESSION: Nasogastric tube terminates in the stomach. Previously administered enteric contrast is distributed throughout normal caliber colon, evidence against bowel obstruction. No pneumatosis or pneumoperitoneum visualized.   XR Abdomen Port 1 View    Narrative    EXAM: XR ABDOMEN PORT 1 VIEW  LOCATION: [facility]  DATE: 3/11/2024    INDICATION: NG placement verification  COMPARISON: 08/22/2022      Impression    IMPRESSION: The enteric tube is coiled within the stomach with the tip pointing near the gastroesophageal junction. Nonobstructive bowel gas pattern.       Discharge Medications   Current Discharge Medication List        CONTINUE these medications which  have NOT CHANGED    Details   albuterol (PROAIR HFA/PROVENTIL HFA/VENTOLIN HFA) 108 (90 Base) MCG/ACT inhaler Inhale 2 puffs into the lungs every 6 hours  Qty: 18 g, Refills: 1    Comments: Pharmacy may dispense brand covered by insurance (Proair, or proventil or ventolin or generic albuterol inhaler)  Associated Diagnoses: Moderate persistent asthma, uncomplicated      albuterol (PROVENTIL) (2.5 MG/3ML) 0.083% neb solution Take 1 vial (2.5 mg) by nebulization every 6 hours as needed for shortness of breath / dyspnea or wheezing  Qty: 90 mL, Refills: 1    Associated Diagnoses: Chronic obstructive pulmonary disease, unspecified COPD type (H)      amLODIPine (NORVASC) 5 MG tablet Take 5 mg by mouth every evening      aspirin (ASA) 81 MG chewable tablet Take 81 mg by mouth every evening      atorvastatin (LIPITOR) 80 MG tablet Take 80 mg by mouth every evening      baclofen (LIORESAL) 20 MG tablet TAKE ONE TABLET BY MOUTH TWICE A DAY AS NEEDED FOR MUSCLE SPASMS IN THE EVENING  Qty: 45 tablet, Refills: 1    Associated Diagnoses: Leg cramps      budesonide-formoterol (SYMBICORT) 80-4.5 MCG/ACT Inhaler Take 2 puff 2x daily and 1-2 puffs as needed with shortness of breath.  Qty: 20.4 g, Refills: 3    Associated Diagnoses: Chronic obstructive pulmonary disease, unspecified COPD type (H); Moderate persistent asthma, unspecified whether complicated      levothyroxine (SYNTHROID/LEVOTHROID) 150 MCG tablet Take 150 mcg by mouth every evening      Magnesium Cl-Calcium Carbonate 71.5-119 MG TBEC Take 71.5 mg by mouth 2 times daily           Allergies   Allergies   Allergen Reactions    Penicillins Swelling

## 2024-03-12 NOTE — PLAN OF CARE
Problem: Adult Inpatient Plan of Care  Goal: Optimal Comfort and Wellbeing  Outcome: Progressing     Problem: Risk for Delirium  Goal: Improved Sleep  Outcome: Progressing    Patient alert and oriented. VSS. Denies pain. Mag+K protocols. Patient is NPO. NG tube-clamped. Patient had leaking at ileostomy site-used Tegaderm to secure which is holding nicely. Per patient he uses 70mm colostomy bags. Patient expresses no further concerns at this time. Call light within reach.    Mona Rodriguez RN  [phone]

## 2024-03-12 NOTE — PROGRESS NOTES
General Surgery Progress Note  Hospital Day # 1    Subjective:   Pt is feeling better and passing stool out through his ostomy bag.    Vitals:    03/12/24 0820 03/12/24 0838 03/12/24 1243 03/12/24 1543   BP: 125/63 (!) 164/84  (!) 145/68   BP Location: Left arm Left arm  Left arm   Patient Position:  Semi-Baker's     Pulse: 96 94  88   Resp: 16 20  18   Temp: 98.8  F (37.1  C)   98.6  F (37  C)   TempSrc: Oral   Oral   SpO2: 94% 94%  96%   Weight:   73.8 kg (162 lb 11.2 oz)    Height:           Physical Exam:  Lungs:  CTA  CV:       RRR  Ab:       Soft, + BS, prominent parastomal hernia, stoma on the left side of the abdomen from his colostomy.    Narrative & Impression   EXAM: XR GASTROGRAFIN CHALLENGE  LOCATION: [facility]  DATE: 3/12/2024     INDICATION: s p colon resection for colon cancer.  COMPARISON: CT abdomen 03/11/2024.  TECHNIQUE: Routine water soluble contrast follow-through challenge.                                                                      IMPRESSION: Nasogastric tube terminates in the stomach. Previously administered enteric contrast is distributed throughout normal caliber colon, evidence against bowel obstruction. No pneumatosis or pneumoperitoneum visualized.       Recent Labs   Lab 03/12/24  0713   WBC 3.5*   HGB 10.1*   HCT 31.1*          Recent Labs   Lab 03/12/24  0713 03/11/24  1110    141   CO2 22 26   BUN 34.3* 25.9*   ALBUMIN  --  3.9   ALKPHOS  --  83   ALT  --  10   AST  --  22       Assessment:  Pt is progressing well from the standpoint of the is small bowel obstruction concerns.  The patient still has a rectal cancer with ulceration around the anus that should be evaluated.  He also still has a parastomal hernia that poses potential problems for future bowel obstructions.  Unfortunately this particular hernia would present a high risk of recurrence if a repair was attempted.    Plan: Surgery will sign off as the patient small bowel  obstruction issues have gone away.  We would be happy to follow-up with this patient and discuss hernia repair issues.  I would recommend a follow-up with his surgeon Dr. Tomi Calderon to discuss hernia repair possibilities if that interest him.    Ervin Freedman MD  [facility]  [phone]
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
## 2024-03-12 NOTE — ED NOTES
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  (primary encounter diagnosis)  Comment:   Plan:     (R11.10,  R19.7) Vomiting and diarrhea  Comment:   Plan:     (R53.1) Generalized weakness  Comment:   Plan:     (N17.9) Acute kidney injury (H24)  Comment:   Plan:        PMH:    Past Medical History:   Diagnosis Date    Cancer (H)     Chronic obstructive pulmonary disease, unspecified COPD type (H) 8/25/2023    Depressive disorder     Kidney disease     Status post rotator cuff repair     Thyroid disease     Uncomplicated asthma         Code Status:  Full Code     Falls Risk: Yes Band: Applied    Current Living Situation/Residence: lives in an assisted living facility     Elimination Status: Continent: Yes     Activity Level: Total assist/lift    Patients Preferred Language:  English     Needed: No    Vital Signs:  /63   Pulse 96   Temp 98.3  F (36.8  C) (Oral)   Resp 20   Ht 1.626 m (5' 4\")   Wt 78.5 kg (173 lb)   SpO2 95%   BMI 29.70 kg/m         Pain Score: 2/10    Is the Patient Confused:  No    Last Food or Drink: 03/11/24     Focused Assessment:  some abdominal distention noted much improved after NG decompression has had 800ml since placement of NG patient is currently on CONTINUOUS suction until 2035 he is to have gastrografin at this time and instructions and gastrografin will be sent with him.     Tests Performed: Done: Labs and Imaging    Treatments Provided:  NG to suction, IV fluids    Family Dynamics/Concerns: No    Family Updated On Visitor Policy: Yes    Plan of Care Communicated to Family: Yes    Who Was Updated about Plan of Care: daughter and girlfriend    Belongings Checklist Done and Signed by Patient: Yes    Belongings Sent with Patient: yes    Medications sent with patient: yes        Additional Information: please see gastrografin instructions    RN: Tonya Armendariz RN   " 3/11/2024 7:05 PM

## 2024-03-12 NOTE — UTILIZATION REVIEW
Admission Status; Secondary Review Determination       Under the authority of the Utilization Management Committee, the utilization review process indicated a secondary review on the above patient. The review outcome is based on review of the medical records, discussions with staff, and applying clinical experience noted on the date of the review.     (x) Inpatient Status Appropriate - This patient's medical care is consistent with medical management for inpatient care and reasonable inpatient medical practice.     RATIONALE FOR DETERMINATION     Mr. McArdle is a 77 yo male pt with a PMH of recto-colon cancer who presents to the ED with abd pain , N/V and noted to have acute SBO secondary to a stomal hernia.  NG tube placed.  Gen surgery consulted; underwent gastrograffin challenge with improvement of symptoms.  NG tube removed today if able to tolerate clears.  Noted to have ARF on admission; creat worse today and is being re-bolused with IVF today with close clinical monitoring of abd symptoms and renal function.     He is requiring ongoing inpatient medical evaluation, treatment and further clinical monitoring.       At the time of admission with the information available to the attending physician more than 2 nights Hospital complex care was anticipated, based on patient risk of adverse outcome if treated as outpatient and complex care required. Inpatient admission is appropriate based on the Medicare guidelines.     The information on this document is developed by the utilization review team in order for the business office to ensure compliance. This only denotes the appropriateness of proper admission status and does not reflect the quality of care rendered.   The definitions of Inpatient Status and Observation Status used in making the determination above are those provided in the CMS Coverage Manual, Chapter 1 and Chapter 6, section 70.4.         Sincerely,     Caitlyn Bagley, DO  Utilization  Review  Physician Advisor  [facility].

## 2024-03-12 NOTE — PROGRESS NOTES
"CLINICAL NUTRITION SERVICES - ASSESSMENT NOTE     Nutrition Prescription    RECOMMENDATIONS FOR MDs/PROVIDERS TO ORDER:    Malnutrition Status:    TBD    Recommendations already ordered by Registered Dietitian (RD):  None at this time    Future/Additional Recommendations:  Monitor intake, weight, labs  Obtain nutrition hx and NFPE     REASON FOR ASSESSMENT  Michael D McArdle is a/an 78 year old male assessed by the dietitian for Admission Nutrition Risk Screen for positive poor appetite and weight loss.    HPI: 78 year old male with medical Hx of colon cancer (s/p colectomy with colostomy), COPD, Hypothyroidism, CKD, HTN, Hx PE,  and recent recurrence of recto-anal cancer, presenting with 3 days of nausea and vomiting and abdominal pain, admitted for management of small bowel obstruction secondary to stomal hernia.      NG place and GGC performed-signs of obstructions resolved    NUTRITION HISTORY  Attempted interview, pt sleeping, did not wake.    CURRENT NUTRITION ORDERS  Diet: Regular  Intake/Tolerance: no record of intake, diet just advanced    LABS  Labs reviewed    MEDICATIONS  Medications reviewed    ANTHROPOMETRICS  Height: 162.6 cm (5' 4\")  Most Recent Weight: 73.8 kg (162 lb 11.2 oz)    IBW: 59.1 kg  BMI: Overweight BMI 25-29.9  Weight History:   Wt Readings from Last 20 Encounters:   03/12/24 73.8 kg (162 lb 11.2 oz)   02/16/24 78.5 kg (173 lb)   01/17/24 79.2 kg (174 lb 9.6 oz)   12/12/23 78.4 kg (172 lb 12.8 oz)   08/25/23 75.5 kg (166 lb 6.4 oz)   05/10/23 78 kg (172 lb)   10/18/22 76.3 kg (168 lb 3.2 oz)   08/30/22 74.2 kg (163 lb 9.6 oz)   08/02/22 77.2 kg (170 lb 3.2 oz)   05/14/20 87.1 kg (192 lb)   03/12/20 91 kg (200 lb 9.6 oz)   08/28/19 91 kg (200 lb 9.6 oz)   07/31/19 90.3 kg (199 lb)   07/29/19 88.5 kg (195 lb)   07/19/19 90.4 kg (199 lb 6.4 oz)   06/19/19 89.2 kg (196 lb 9.6 oz)   05/30/19 88 kg (194 lb)   05/23/19 88 kg (194 lb)   05/11/19 92.5 kg (204 lb)   04/26/19 91.6 kg (202 lb) "   Weight loss of 12# (6.9%) x 2 months    Dosing Weight: 62.8 kg (adj wt)    ASSESSED NUTRITION NEEDS  Estimated Energy Needs: 7454-0558 kcals/day (25 - 30 kcals/kg)  Justification: Maintenance  Estimated Protein Needs: 50-63 grams protein/day (0.8 - 1 grams of pro/kg)  Justification: CKD  Estimated Fluid Needs: 0728-0076 mL/day (1 mL/kcal)   Justification: Maintenance    PHYSICAL FINDINGS  See malnutrition section below.  Colostomy    MALNUTRITION:  % Weight Loss:  > 5% in 1 month (severe malnutrition)  % Intake:  need nutrition hx  Subcutaneous Fat Loss:  unable  Muscle Loss:  unable  Fluid Retention:  None noted    Malnutrition Diagnosis: Unable to determine due to need nutrition hx and NFPE    NUTRITION DIAGNOSIS  Unintended weight loss related to altered GI function as evidenced by weight loss of 6.9% x 2 months      INTERVENTIONS  Implementation  Nutrition Education: Will be provided if education needs arise   No interventions at this time    Goals  Patient to consume % of nutritionally adequate meals three times per day, or the equivalent with supplements/snacks.     Monitoring/Evaluation  Progress toward goals will be monitored and evaluated per protocol.

## 2024-03-12 NOTE — PLAN OF CARE
Alert and oriented. Pt. Very upset this morning. Daughter called and arrived with larger ileostomy bags. WOC replaced bag. Pt. Able to vent bag, independent with it at home. Pt. Repeatedly asked about leaving hospital. Order for ng removal, after tolerating clear liquids. Pt. Felt much better. Able to stand for weight but weak.

## 2024-03-12 NOTE — PROGRESS NOTES
[facility]    Progress Note - Hospitalist Service       Date of Admission:  3/11/2024    Assessment & Plan   Michael D McArdle is a 78 year old male with medical Hx of colon cancer (s/p colectomy with colostomy), COPD (PRN albuterol and symbicort inhalers), Hypothyroidism, CKD, HTN, Hx PE,  and recent recurrence of recto-anal cancer, presenting with 3 days of nausea and vomiting and abdominal pain, admitted on 3/11/2024 for management of small bowel obstruction secondary to stomal hernia.         Parastomal hernia with evidence of small bowel obstruction  General surgery consulted.  NG decompression followed by gastrografin challenge done on 3/11.  Signs of obstruction resolved on subsequent imaging.  Abdomen exam improved on 3/12.  Will defer management of parastomal hernia to surgery, appreciate recommendations.   - NG removed on 3/12  - clear liquids advance as tolerated  - mIVF     Nausea/vomiting/Diarrhea  3 days of cold like symptoms and N/V/D.  Suspect originating from viral illness.  Likely contributing to SBO since his stomal hernia has been bulging out into the colostomy bag more often and he is constantly needing to push it back in.  - s/p 1L bolus IVF     Adenocarcinoma of rectum   Stoma, with colostomy   Recurrence of rectal cancer involving the rectum and anus  See oncology note on 3/5/24.  Plans to start chemo for 3 months and then do surgery.     JOELLE on CKD  Baseline Cr 1.3-1.5.  Cr 1.79  on admission. Potentially dehydrated. Recheck on 3/12 shows worsening JOELLE.  Giving another fluid bolus and will check BMP prior to discharge.     Hypothyroidism due to acquired atrophy of thyroid   - Continued PTA levothyroxine     Chronic Meds  - continued atorvastatin  - continued inhalers  - held aspirin  - held amlodipine  - continued MgGluconate        Diet:  NPO  DVT Prophylaxis: Enoxaparin (Lovenox) SQ  Estimated Creatinine Clearance: 31.7 mL/min (A) (based on SCr of 1.79 mg/dL  "(H)).  Chakraborty Catheter: Not present  Fluids: mIVF  Lines: None     Cardiac Monitoring: None  Code Status:  Full Code    Clinically Significant Risk Factors Present on Admission        # Hyperkalemia: Highest K = 5.5 mmol/L in last 2 days, will monitor as appropriate   # Hypocalcemia: Lowest Ca = 8.4 mg/dL in last 2 days, will monitor and replace as appropriate       # Drug Induced Platelet Defect: home medication list includes an antiplatelet medication        # Overweight: Estimated body mass index is 29.7 kg/m  as calculated from the following:    Height as of this encounter: 1.626 m (5' 4\").    Weight as of this encounter: 78.5 kg (173 lb).       # Financial/Environmental Concerns: none  # Asthma: noted on problem list        Disposition Plan     Expected Discharge Date: 03/13/2024                The patient's care was discussed with the Attending Physician, Dr. Berrios .    Ryan Porter MD  Hospitalist Service  [facility]  Securely message with [employer] (more info)  Text page via [employer] Paging/Directory   ______________________________________________________________________    Interval History   Improving abdominal pain.  Ostomy bag with leak.  New bags that are the right size brought in by daughter. Still having loose stool in bag.  ROS otherwise negative.    Physical Exam   Vital Signs: Temp: 98.8  F (37.1  C) Temp src: Oral BP: (!) 164/84 Pulse: 94   Resp: 20 SpO2: 94 % O2 Device: None (Room air)    Weight: 173 lbs 0 oz    Constitutional: awake, alert, cooperative, no apparent distress, and appears stated age  Eyes: Lids and lashes normal, pupils equal, round, extra ocular muscles intact, sclera clear, conjunctiva normal  ENT: without obvious abnormality  Respiratory: No increased work of breathing, good air exchange, clear to auscultation bilaterally, no crackles or wheezing  Cardiovascular: regular rate and rhythm  GI: colostomy bag in place, unchanged from yesterday, small " amounts of loose brown stool in bag, normal bowel sounds, soft, non-distended, non-tender  Skin: normal skin color, texture, turgor  Musculoskeletal: no lower extremity pitting edema present  Neurologic: Awake, alert, oriented        Data     I have personally reviewed the following data over the past 24 hrs:    3.5 (L)  \   10.1 (L)   / 210     141 110 (H) 34.3 (H) /  103 (H)   4.3 22 1.84 (H) \     ALT: 10 AST: 22 AP: 83 TBILI: 0.5   ALB: 3.9 TOT PROTEIN: 7.5 LIPASE: N/A     Trop: N/A BNP: 351     Procal: N/A CRP: N/A Lactic Acid: 0.9         Imaging results reviewed over the past 24 hrs:   Recent Results (from the past 24 hour(s))   XR Chest Port 1 View    Narrative    EXAM: XR CHEST PORT 1 VIEW  LOCATION: [facility]  DATE: 3/11/2024    INDICATION: cough  COMPARISON: 05/09/2022      Impression    IMPRESSION: Negative chest.   CT Abdomen Pelvis w/o Contrast    Narrative    EXAM: CT ABDOMEN PELVIS W/O CONTRAST  LOCATION: [facility]  DATE: 3/11/2024    INDICATION: abdominal pain, vomiting  COMPARISON: CT chest, abdomen and pelvis 02/16/2024  TECHNIQUE: CT scan of the abdomen and pelvis was performed without IV contrast. Multiplanar reformats were obtained. Dose reduction techniques were used.  CONTRAST: None.    FINDINGS:   LOWER CHEST: Dependent atelectasis.    HEPATOBILIARY: Unchanged unenhanced liver contours. Gallbladder is contracted. No biliary ductal dilation.    PANCREAS: Normal.    SPLEEN: Normal.    ADRENAL GLANDS: Stable adenomatoid changes bilaterally.    KIDNEYS/BLADDER: Right renal sinus cysts. No urolithiasis or hydronephrosis. Bladder is minimally distended.    BOWEL: Postoperative findings from low anterior resection and left lower quadrant ostomy. There is a large parastomal hernia containing proximal jejunum which is resulting in high-grade small bowel obstruction with dilation of the upstream small bowel   and stomach. No pneumatosis or  pneumoperitoneum.    The large recurrent rectal/anal mass is not significantly changed from and better evaluated on recent pelvic MRI. Additionally, the gas containing presacral collection is unchanged, again suspicious for fistulous communication.    LYMPH NODES: Unchanged prominent left inguinal lymph nodes.    VASCULATURE: Abdominal aorta is nonaneurysmal with moderate atheromatous disease.    PELVIC ORGANS: Unremarkable.    MUSCULOSKELETAL: No destructive osseous lesions. Multiple small fat-containing ventral wall hernias as well as fat-containing right inguinal hernia.      Impression    IMPRESSION:   1.  Parastomal hernia containing proximal jejunum with resultant high-grade small bowel obstruction and upstream dilatation of the small bowel and stomach. No pneumatosis or evidence of perforation.  2.  The recurrent rectal/anal mass is unchanged from and better evaluated on recent pelvic MRI.  3.  Unchanged small presacral gas containing fluid collection, which as before is suspicious for fistulous communication with bowel.  4.  Additional details in the findings.     XR Abdomen Port 1 View    Narrative    EXAM: XR ABDOMEN PORT 1 VIEW  LOCATION: [facility]  DATE: 3/11/2024    INDICATION: NG placement verification  COMPARISON: 08/22/2022      Impression    IMPRESSION: The enteric tube is coiled within the stomach with the tip pointing near the gastroesophageal junction. Nonobstructive bowel gas pattern.   XR Gastrografin Challenge    Narrative    EXAM: XR GASTROGRAFIN CHALLENGE  LOCATION: [facility]  DATE: 3/12/2024    INDICATION: s p colon resection for colon cancer.  COMPARISON: CT abdomen 03/11/2024.  TECHNIQUE: Routine water soluble contrast follow-through challenge.      Impression    IMPRESSION: Nasogastric tube terminates in the stomach. Previously administered enteric contrast is distributed throughout normal caliber colon, evidence against bowel  obstruction. No pneumatosis or pneumoperitoneum visualized.

## 2024-03-12 NOTE — CONSULTS
"[facility] Nurse Inpatient Assessment     Consulted for: Type of Ostomy: s/p hemicolectomy for colon cancer. Reportedly we do not have the correct ostomy bag size, he brought what we are using from home    Summary: 3/12 Patient in bed, pouch was patched multiple times.  Per patient request, pouch cut at 70 mm but that is a little oversized.  Barrier ring used to fill in gaps as needed    Patient History (according to provider note(s):        1. 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 Vomiting and diarrhea    3. Generalized weakness    4. Acute kidney injury (H24)       78-year-old man with a history of colon cancer has a left-sided ileostomy with a peristomal hernia.  He presents with symptoms of a small bowel obstruction.    Assessment:      Areas visualized during today's visit: Abdomen    Assessment of established loop Colostomy:  Diagnosis Pertinent to Stoma: Cancer - Colon or Rectum      Pouching system in place on assessment today: Dale two piece, ostomy paste, and barrier ring   Pouch barrier status:  100% melted  Pouch last changed/wear time: 2 days  Reason for pouch change today: leakage  Effectiveness of current pouching/ supply plan: Recommend continuing current plan  Change made with ostomy management today: No  Pouching system placed today: Mary Kate two piece, ostomy paste, and barrier ring   Supplies: at bedside      Last photo: 3/12  Stoma location: Q  Stoma size: 2 3/4 inches,   Stoma appearance: viable, healthy, beefy red, oval, and moist  Mucocutaneous junction:  intact  Peristomal complication(s): none   Output: liquid  Abdominal assessment:  hernia    Face to face time: 30 minutes          Treatment Plan:     LLQ Colostomy pouching plan:   Pouching system: ostomy supplies pouches: Mary Kate 2 pc [id number]  ostomy supplies barrier: mary kate CTF [id number] not available in stores  Accessories used: [facility] ostomy accessories: 2\" Cera Barrier Ring " ([id number]) and [employer] Paste (050754) per patient preference  Frequency of pouch changes: Twice weekly and PRN leakage  WOC follow up plan: Notify WOC if leakage occurs  Bedside RN interventions: Change pouch PRN if leaking using the supplies above, Empty pouch when 1/3 to 1/2 full, ensure to clean pouch outlet after emptying to prevent odor, Notify WOC for ongoing pouch leakage, and Document stoma appearance and output volume, color, and consistency every shift     Orders: Written    RECOMMEND PRIMARY TEAM ORDER: None, at this time  Education provided: None needed  Discussed plan of care with: Patient  WOC nurse follow-up plan: signing off  Notify WOC if wound(s) deteriorate.  Nursing to notify the Provider(s) and re-consult the WOC Nurse if new skin concern.    DATA:     Current support surface: Standard  Standard gel/foam mattress (IsoFlex, Atmos air, etc)  Containment of urine/stool: Continent of bladder and Colostomy pouch  BMI: Body mass index is 29.7 kg/m .   Active diet order: Orders Placed This Encounter      Clear Liquid Diet     Output: I/O last 3 completed shifts:  In: 1150 [NG/GT:150; IV Piggyback:1000]  Out: 2150 [Urine:275; Emesis/NG output:1875]     Labs:   Recent Labs   Lab 03/12/24  0713 03/11/24  1110   ALBUMIN  --  3.9   HGB 10.1* 11.6*   WBC 3.5* 9.6     Pressure injury risk assessment:   Sensory Perception: 3-->slightly limited  Moisture: 3-->occasionally moist  Activity: 2-->chairfast  Mobility: 2-->very limited  Nutrition: 2-->probably inadequate  Friction and Shear: 2-->potential problem  King Score: 14    ANASTACIA DianaN RN WOC services  Pager no longer in use, please contact through [employer] group: [facility] [employer]

## 2024-03-12 NOTE — PLAN OF CARE
Problem: Adult Inpatient Plan of Care  Goal: Optimal Comfort and Wellbeing  Outcome: Progressing     Problem: Risk for Delirium  Goal: Improved Behavioral Control  Outcome: Progressing   Goal Outcome Evaluation:    - VSS, denies N/V  - Suction to LIS @ 2330, Gastrografin administered @ 2130. X-ray aware of 7792-5827 timeframe for first x-ray set 8-10 hours out  - Ileostomy stoma hanging partially in bag    Marybel Benavidez, RN

## 2024-03-13 ENCOUNTER — MEDICAL CORRESPONDENCE (OUTPATIENT)
Dept: HEALTH INFORMATION MANAGEMENT | Facility: CLINIC | Age: 79
End: 2024-03-13
Payer: COMMERCIAL

## 2024-03-13 ENCOUNTER — DOCUMENTATION ONLY (OUTPATIENT)
Dept: FAMILY MEDICINE | Facility: CLINIC | Age: 79
End: 2024-03-13
Payer: COMMERCIAL

## 2024-03-13 ENCOUNTER — PATIENT OUTREACH (OUTPATIENT)
Dept: ONCOLOGY | Facility: CLINIC | Age: 79
End: 2024-03-13
Payer: COMMERCIAL

## 2024-03-13 ENCOUNTER — TELEPHONE (OUTPATIENT)
Dept: FAMILY MEDICINE | Facility: CLINIC | Age: 79
End: 2024-03-13

## 2024-03-13 DIAGNOSIS — C20 ADENOCARCINOMA OF RECTUM (H): Primary | ICD-10-CM

## 2024-03-13 NOTE — TELEPHONE ENCOUNTER
Spoke with Minna from home care regarding orders. Pt was discharged from hospital yesterday.    Verbal orders provided on behalf of Dr Nowak.        Orders for nursing and PT evaul and treat.    GARTH Mello, BSN

## 2024-03-13 NOTE — TELEPHONE ENCOUNTER
[facility] phone call message - order or referral request for patient:     Order or referral being requested: orders      Additional Comments: orders for nursing and PT evaul and treat     OK to leave a message on voice mail? Yes    Primary language: English      needed? No    Call taken on March 13, 2024 at 10:47 AM by Ana Coburn

## 2024-03-13 NOTE — PROGRESS NOTES
To be completed in Nursing note:    Please reference list for forms that require a visit for completion.  Please remind patients that providers are given 3-5 business days to complete and return forms.      Form type:  [facility]    Date form received: 24    Date form completed by Physician:24    How was form returned to patient (mailed, faxed, or at  for patient to ):  Faxed to   Date form mailed/faxed/left at  for patient and sent to HIM for scannin24, sent to HIM for scanning      Once form is left for patient, faxed, or mailed PCS will then close the documentation only encounter.     Jacob Hatfield MA

## 2024-03-14 ENCOUNTER — TELEPHONE (OUTPATIENT)
Dept: WOUND CARE | Facility: CLINIC | Age: 79
End: 2024-03-14
Payer: COMMERCIAL

## 2024-03-14 NOTE — TELEPHONE ENCOUNTER
Pt called. He wants to cancel his appointment in the wound clinic. He states he already has a wound care nurse who comes to his home and he prefers no other visits. He states has no problems with his wounds and things are going fine. Ángela Go RN

## 2024-03-15 ENCOUNTER — DOCUMENTATION ONLY (OUTPATIENT)
Dept: FAMILY MEDICINE | Facility: CLINIC | Age: 79
End: 2024-03-15
Payer: COMMERCIAL

## 2024-03-15 ENCOUNTER — MEDICAL CORRESPONDENCE (OUTPATIENT)
Dept: HEALTH INFORMATION MANAGEMENT | Facility: CLINIC | Age: 79
End: 2024-03-15
Payer: COMMERCIAL

## 2024-03-15 DIAGNOSIS — K21.9 GASTROESOPHAGEAL REFLUX DISEASE WITHOUT ESOPHAGITIS: ICD-10-CM

## 2024-03-15 RX ORDER — FAMOTIDINE 20 MG/1
20 TABLET, FILM COATED ORAL
Qty: 90 TABLET | Refills: 1 | Status: SHIPPED | OUTPATIENT
Start: 2024-03-15

## 2024-03-15 RX ORDER — FAMOTIDINE 40 MG/1
40 TABLET, FILM COATED ORAL DAILY
Qty: 90 TABLET | Refills: 3 | OUTPATIENT
Start: 2024-03-15

## 2024-03-15 NOTE — PROGRESS NOTES
To be completed in Nursing note:    Please reference list for forms that require a visit for completion.  Please remind patients that providers are given 3-5 business days to complete and return forms.      Form type:[facility]    Date form received: 03/15/24    Date form completed by Physician:03/15/24    How was form returned to patient (mailed, faxed, or at  for patient to ):  Faxed to   Date form mailed/faxed/left at  for patient and sent to HIM for scannin/15/24, sent to HIM for scanning    Once form is left for patient, faxed, or mailed PCS will then close the documentation only encounter.     Jacob Hatfield MA

## 2024-03-18 ENCOUNTER — TELEPHONE (OUTPATIENT)
Dept: ONCOLOGY | Facility: CLINIC | Age: 79
End: 2024-03-18
Payer: COMMERCIAL

## 2024-03-18 ENCOUNTER — TELEPHONE (OUTPATIENT)
Dept: FAMILY MEDICINE | Facility: CLINIC | Age: 79
End: 2024-03-18
Payer: COMMERCIAL

## 2024-03-18 DIAGNOSIS — N18.31 STAGE 3A CHRONIC KIDNEY DISEASE (H): ICD-10-CM

## 2024-03-18 DIAGNOSIS — E03.4 HYPOTHYROIDISM DUE TO ACQUIRED ATROPHY OF THYROID: Primary | ICD-10-CM

## 2024-03-18 DIAGNOSIS — I10 ESSENTIAL HYPERTENSION: ICD-10-CM

## 2024-03-18 NOTE — TELEPHONE ENCOUNTER
Message noted.  If patient is getting around okay and does not want PT, that is fine.      Jacqueline Nowak MD

## 2024-03-18 NOTE — TELEPHONE ENCOUNTER
LVM for pt to confirm follow-up with Dr. Palacio. Unable to reach patient or leave voicemail prior to today on either number (numerous attempts made over the last two weeks).     Raegan TOLENTINO, Lead Clinic Coordinator  [facility]  [phone], Option 5, Option 2

## 2024-03-18 NOTE — TELEPHONE ENCOUNTER
[facility] phone call message- general phone call:    Reason for call: Ela saw pt today for PT eval. Pt does not want additional PT    Return call needed: No    OK to leave a message on voice mail? No    Primary language: English      needed? No    Call taken on March 18, 2024 at 11:54 AM by Prema Hatfield

## 2024-03-20 ENCOUNTER — DOCUMENTATION ONLY (OUTPATIENT)
Dept: FAMILY MEDICINE | Facility: CLINIC | Age: 79
End: 2024-03-20

## 2024-03-20 ENCOUNTER — MEDICAL CORRESPONDENCE (OUTPATIENT)
Dept: HEALTH INFORMATION MANAGEMENT | Facility: CLINIC | Age: 79
End: 2024-03-20

## 2024-03-20 ENCOUNTER — OFFICE VISIT (OUTPATIENT)
Dept: FAMILY MEDICINE | Facility: CLINIC | Age: 79
End: 2024-03-20
Payer: COMMERCIAL

## 2024-03-20 VITALS
DIASTOLIC BLOOD PRESSURE: 68 MMHG | HEIGHT: 64 IN | SYSTOLIC BLOOD PRESSURE: 143 MMHG | RESPIRATION RATE: 18 BRPM | OXYGEN SATURATION: 98 % | WEIGHT: 170.6 LBS | TEMPERATURE: 97.5 F | BODY MASS INDEX: 29.12 KG/M2 | HEART RATE: 77 BPM

## 2024-03-20 DIAGNOSIS — R29.6 FALLS FREQUENTLY: ICD-10-CM

## 2024-03-20 DIAGNOSIS — I10 ESSENTIAL HYPERTENSION: ICD-10-CM

## 2024-03-20 DIAGNOSIS — F33.1 MODERATE RECURRENT MAJOR DEPRESSION (H): ICD-10-CM

## 2024-03-20 DIAGNOSIS — Z29.11 NEED FOR VACCINATION AGAINST RESPIRATORY SYNCYTIAL VIRUS: ICD-10-CM

## 2024-03-20 DIAGNOSIS — C20 ADENOCARCINOMA OF RECTUM (H): Primary | ICD-10-CM

## 2024-03-20 DIAGNOSIS — N18.31 STAGE 3A CHRONIC KIDNEY DISEASE (H): ICD-10-CM

## 2024-03-20 DIAGNOSIS — E03.4 HYPOTHYROIDISM DUE TO ACQUIRED ATROPHY OF THYROID: ICD-10-CM

## 2024-03-20 PROCEDURE — 99215 OFFICE O/P EST HI 40 MIN: CPT | Performed by: STUDENT IN AN ORGANIZED HEALTH CARE EDUCATION/TRAINING PROGRAM

## 2024-03-20 PROCEDURE — 84439 ASSAY OF FREE THYROXINE: CPT | Performed by: STUDENT IN AN ORGANIZED HEALTH CARE EDUCATION/TRAINING PROGRAM

## 2024-03-20 PROCEDURE — 36415 COLL VENOUS BLD VENIPUNCTURE: CPT | Performed by: STUDENT IN AN ORGANIZED HEALTH CARE EDUCATION/TRAINING PROGRAM

## 2024-03-20 PROCEDURE — 84443 ASSAY THYROID STIM HORMONE: CPT | Performed by: STUDENT IN AN ORGANIZED HEALTH CARE EDUCATION/TRAINING PROGRAM

## 2024-03-20 PROCEDURE — 80048 BASIC METABOLIC PNL TOTAL CA: CPT | Performed by: STUDENT IN AN ORGANIZED HEALTH CARE EDUCATION/TRAINING PROGRAM

## 2024-03-20 RX ORDER — OXYCODONE AND ACETAMINOPHEN 5; 325 MG/1; MG/1
1 TABLET ORAL 2 TIMES DAILY PRN
Qty: 40 TABLET | Refills: 0 | Status: SHIPPED | OUTPATIENT
Start: 2024-03-20 | End: 2024-04-19

## 2024-03-20 RX ORDER — RESPIRATORY SYNCYTIAL VIRUS VACCINE 120MCG/0.5
0.5 KIT INTRAMUSCULAR ONCE
Qty: 1 EACH | Refills: 0 | Status: CANCELLED | OUTPATIENT
Start: 2024-03-20 | End: 2024-03-20

## 2024-03-20 ASSESSMENT — PATIENT HEALTH QUESTIONNAIRE - PHQ9
SUM OF ALL RESPONSES TO PHQ QUESTIONS 1-9: 8
SUM OF ALL RESPONSES TO PHQ QUESTIONS 1-9: 8
10. IF YOU CHECKED OFF ANY PROBLEMS, HOW DIFFICULT HAVE THESE PROBLEMS MADE IT FOR YOU TO DO YOUR WORK, TAKE CARE OF THINGS AT HOME, OR GET ALONG WITH OTHER PEOPLE: SOMEWHAT DIFFICULT

## 2024-03-20 NOTE — PROGRESS NOTES
There are no exam notes on file for this visit.    ASSESSMENT AND PLAN:      Isiah was seen today for follow-up of multiple issues.    Diagnoses and all orders for this visit:    Hospital follow-up:  Recent small bowel obstruction.  This is resolved.  Is doing well with home care and stoma support.  Has the supplies that he needs now.  I am happy to write for any other DME equipment for care for his stoma and wound support as needed.  He greatly appreciates the care from his home and wound nurses.    Adenocarcinoma of rectum (H)  Known adenocarcinoma of the rectum.  He continues to meet with surgery and oncology teams.  Poor prognosis at this time.  He has consistently wanted to continue to fight this, but does not want to do chemotherapy, and surgery teams has concerns about his potential as a surgical candidate.  He is having significant daily pain, especially with dressing changes and sitting.  Has had some improvement with Percocet.  I did prescribe this again for him today.  Discussed that he needs to come in regularly if he is needing chronic pain medications.  Discussed the importance of selecting 1 cancer care team so that the efforts are coordinated, and that he is aware of all the options.  Discussed he does not have a lot of time to be making these decisions and needs to move forward quickly.  Relatively poor insight around next steps.  -     oxyCODONE-acetaminophen (PERCOCET) 5-325 MG tablet; Take 1 tablet by mouth 2 times daily as needed for pain    Stage 3a chronic kidney disease (H)  Renal function has improved from hospitalization.  Continue to push fluids.  -     Basic metabolic panel    Essential hypertension  Blood pressure is appropriately controlled today.  -     Basic metabolic panel    Hypothyroidism due to acquired atrophy of thyroid  Thyroid levels are still low.  Will increase his levothyroxine to 175 mcg.  -     TSH with free T4 reflex  -     T4 free  -     levothyroxine  (SYNTHROID/LEVOTHROID) 175 MCG tablet; Take 1 tablet (175 mcg) by mouth every evening    Moderate recurrent major depression (H)  Worsening mental health in the context of continued progression of his known cancer, with worsening pain as well as new dynamic changes in his relationship.  He is well-connected with his therapist which has been helpful, and would really like to restart his Prozac today.  This was prescribed.  We will continue to titrate up.  -     FLUoxetine (PROZAC) 20 MG capsule; Take 1 capsule (20 mg) by mouth daily    Falls frequently  He is getting weaker and is at risk for falls.  Would benefit from a 4 wheeled walker.  This was prescribed today.  -     Walker Order    I will discuss patient at our eye team meeting this week, as he would benefit from additional support around next steps for his cancer, considering options, improved pain control, and better treatment of depression.    Patient Instructions   DME order for walker    Restart anti-depressant, Fluoxetine 20mg daily.     Refill pain medications to help with severe pain    Continue to see you psychologist regularly.     Recheck labs today.     Make sure you are following up with the oncology team - this is important!  You need to have all of the right information to make the right decision on next steps.      Follow up in 3-4 weeks.      Jacqueline Nowak MD    SUBJECTIVE  Michael D McArdle is a 78 year old male with past medical history significant for    Patient Active Problem List   Diagnosis    Chronic Reflux esophagitis    Moderate recurrent major depression (H)    Diastolic Dysfunction     Fainting (Syncope)    smoking     Prediabetes    Asthma    Hyperlipidemia    Obese    Adenocarcinoma of rectum (H)    Pulmonary embolism and infarction (H)    Esophageal reflux    Hypothyroidism due to acquired atrophy of thyroid    ACP (advance care planning)    CKD (chronic kidney disease) stage 3, GFR 30-59 ml/min (H)    Decreased vision in  both eyes    Bilateral hearing loss, unspecified hearing loss type    Ventral hernia    Unsteady gait    Ileostomy care (H)    Ileostomy status (H)    Carpal tunnel syndrome of right wrist    Cataract of both eyes, unspecified cataract type    Chronic obstructive pulmonary disease, unspecified COPD type (H)    Generalized weakness    Acute kidney injury (H24)    Vomiting and diarrhea    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   The following problems were addressed during his hospitalization:     Parastomal hernia with evidence of small bowel obstruction  General surgery consulted.  NG decompression followed by gastrografin challenge done on 3/11.  Signs of obstruction resolved on subsequent imaging.  Abdomen exam improved on 3/12.  Management of parastomal hernia to be addressed as an outpatient by colorectal referral as deemed appropriate by oncology and primary care team.  - NG removed on 3/12  - Tolerating diet on 3/12     Nausea/vomiting/Diarrhea  3 days of cold like symptoms and N/V/D.  Suspect originating from viral illness.  Likely contributing to SBO since his stomal hernia has been bulging out into the colostomy bag more often and he is constantly needing to push it back in.     Adenocarcinoma of rectum   Stoma, with colostomy   Recurrence of rectal cancer involving the rectum and anus  See oncology note on 3/5/24.  Plans to start chemo for  3 months and then do surgery.     JOELLE on CKD  Baseline Cr 1.3-1.5.  Cr 1.79  on admission. Potentially dehydrated. Recheck on 3/12 shows worsening JOELLE.  This should be followed up as an outpatient.    Patient presents today for review of multiple concerns.  He was recently hospitalized at [facility] for small bowel obstruction secondary to stomal hernia.  Discharge summary above was reviewed.  He shares that his bag broke, and then he ended up in the hospital.  He shares that his dentures were lost during the hospitalization and it has been challenging for him to eat.  He expresses frustration with this, as eating was already a challenge and reports that he will never return to [facility].    He has been having normal stool output since discharge.  He is being seen regularly at home by his wound care team, and they found a new option for bag that is big enough to fit over his stoma, that he can change the bag himself, and they can change the base when they come in 3 times a week.  This is going well.  He does continue to have significant protrusion of that hernia and discomfort from the bag.  He would like the hernia repaired, as he worries about bag breakage, worries that he may have another obstruction, and feels his quality of life is greatly affected by his current colostomy.    He also continues to have pain in his rectal area.  The pain is constant.  It is getting worse.  It is hard for him to sit.  Hard for him to get comfortable and sleep.  Wound care is helping with this area as well.  He notices regular drainage from the area.  He would like to have this mass removed if possible.  He is going through pull-ups like crazy, needing to change them at least 2-3 times per day.  Has been sitting on a donut.    We reviewed his next steps for his oncology.  He remains frustrated with the options available.  He does not want to see an oncologist at [place] because he no longer trusts [facility].   "He does not want to do chemotherapy.  He wants to have surgery but has been told that surgery is not a good option for him.  He wants to continue to fight this cancer.  Has been told he has 3 to 6 months left to live, and again wants to continue to fight this, however he is not liking the options available.  Wants to go back to [place] [facility].  Is getting some support from his daughter around this but feels like the decision making is challenging.    He also endorses worsening mental health.  He is feeling more depressed.  Does see his therapist regularly.  This has been helpful.  They have a good relationship.  He brings in his old bottle of Prozac.  Previously took this for his mood.  Feels like he needs to restart it again.  Is feeling more hopeless.  Pain is making him feel sad and uncomfortable and impacting his sleep.    Also shares that his relationship has been strained.  His partner's daughter is now involved in her life, and he feels excluded.  The partner wants him to come and stay overnight and take care of her but does not want him around during the day.  He sees his daughter somewhat regularly but she is working as an LPN at a different home.  Is contemplating moving to be at the location where she works.    He is requesting a walker.  Has noticed decreased exercise capacity.  No recent falls but worries that he may fall.  Having more difficulty managing fluid status with his stoma.  He is able to engage with friends, family and move around his senior community with a 4 wheeled walker with seat.    OBJECTIVE:  Vitals: BP (!) 143/68   Pulse 77   Temp 97.5  F (36.4  C) (Oral)   Resp 18   Ht 1.626 m (5' 4\")   Wt 77.4 kg (170 lb 9.6 oz)   SpO2 98%   BMI 29.28 kg/m    BMI= Body mass index is 29.28 kg/m .  Objective:    Vitals:  Vitals are reviewed and are within the normal range  Gen:  Alert, pleasant,  is emotional and tearful during her visit.  Cardiac:  Regular rate and rhythm, no murmurs, " rubs or gallops  Respiratory: Lungs show scattered wheezes but good airflow  Abdomen: Bilateral hernias present.  Stoma bag is in place.  Mild protrusion of the stoma.  It is easily reducible.  I did not check his rectal area today as we did not have time and the nurses have been checking this 3 times weekly.  Extremities: Gets up slowly.  Walks somewhat hunched over.  Has to take breaks while walking down the hallway today.    Labs were rechecked and his creatinine and BUN are improved from hospital discharge, and approaching his baseline.    TSH is mildly elevated.    Results for orders placed or performed in visit on 03/20/24   Basic metabolic panel     Status: Abnormal   Result Value Ref Range    Sodium 142 135 - 145 mmol/L    Potassium 4.3 3.4 - 5.3 mmol/L    Chloride 106 98 - 107 mmol/L    Carbon Dioxide (CO2) 25 22 - 29 mmol/L    Anion Gap 11 7 - 15 mmol/L    Urea Nitrogen 19.2 8.0 - 23.0 mg/dL    Creatinine 1.59 (H) 0.67 - 1.17 mg/dL    GFR Estimate 44 (L) >60 mL/min/1.73m2    Calcium 8.8 8.8 - 10.2 mg/dL    Glucose 106 (H) 70 - 99 mg/dL   TSH with free T4 reflex     Status: Abnormal   Result Value Ref Range    TSH 7.15 (H) 0.30 - 4.20 uIU/mL   T4 free     Status: Normal   Result Value Ref Range    Free T4 1.23 0.90 - 1.70 ng/dL           Patient Instructions   DME order for walker    Restart anti-depressant, Fluoxetine 20mg daily.     Refill pain medications to help with severe pain    Continue to see you psychologist regularly.     Recheck labs today.     Make sure you are following up with the oncology team - this is important!  You need to have all of the right information to make the right decision on next steps.      Follow up in 3-4 weeks.      Jacqueline Nowak MD        DME (Durable Medical Equipment) Orders and Documentation  Orders Placed This Encounter   Procedures    Walker Order        The patient was assessed and it was determined the patient is in need of the following listed DME  Supplies/Equipment. Please complete supporting documentation below to demonstrate medical necessity.         Answers submitted by the patient for this visit:  Patient Health Questionnaire (Submitted on 3/20/2024)  If you checked off any problems, how difficult have these problems made it for you to do your work, take care of things at home, or get along with other people?: Somewhat difficult  PHQ9 TOTAL SCORE: 8

## 2024-03-20 NOTE — PATIENT INSTRUCTIONS
DME order for walker    Restart anti-depressant, Fluoxetine 20mg daily.     Refill pain medications to help with severe pain    Continue to see you psychologist regularly.     Recheck labs today.     Make sure you are following up with the oncology team - this is important!  You need to have all of the right information to make the right decision on next steps.      Follow up in 3-4 weeks.

## 2024-03-21 LAB
ANION GAP SERPL CALCULATED.3IONS-SCNC: 11 MMOL/L (ref 7–15)
BUN SERPL-MCNC: 19.2 MG/DL (ref 8–23)
CALCIUM SERPL-MCNC: 8.8 MG/DL (ref 8.8–10.2)
CHLORIDE SERPL-SCNC: 106 MMOL/L (ref 98–107)
CREAT SERPL-MCNC: 1.59 MG/DL (ref 0.67–1.17)
DEPRECATED HCO3 PLAS-SCNC: 25 MMOL/L (ref 22–29)
EGFRCR SERPLBLD CKD-EPI 2021: 44 ML/MIN/1.73M2
GLUCOSE SERPL-MCNC: 106 MG/DL (ref 70–99)
POTASSIUM SERPL-SCNC: 4.3 MMOL/L (ref 3.4–5.3)
SODIUM SERPL-SCNC: 142 MMOL/L (ref 135–145)
T4 FREE SERPL-MCNC: 1.23 NG/DL (ref 0.9–1.7)
TSH SERPL DL<=0.005 MIU/L-ACNC: 7.15 UIU/ML (ref 0.3–4.2)

## 2024-03-21 RX ORDER — LEVOTHYROXINE SODIUM 175 UG/1
175 TABLET ORAL EVERY EVENING
Qty: 90 TABLET | Refills: 1 | Status: SHIPPED | OUTPATIENT
Start: 2024-03-21

## 2024-03-21 RX ORDER — LEVOTHYROXINE SODIUM 175 UG/1
175 TABLET ORAL EVERY EVENING
Qty: 90 TABLET | Refills: 1 | COMMUNITY
Start: 2024-03-21 | End: 2024-03-21

## 2024-03-21 NOTE — RESULT ENCOUNTER NOTE
Michael D McArdle-    Your thyroid levels came back low again.  I would like to increase the dose of your thyroid medication and have sent in a new prescription to the pharmacy. Please call the clinic at [phone] if you have any questions.      MD Kelly Valenzuela/Norma can you call patient and let him know?  Thank you!

## 2024-03-25 ENCOUNTER — DOCUMENTATION ONLY (OUTPATIENT)
Dept: FAMILY MEDICINE | Facility: CLINIC | Age: 79
End: 2024-03-25

## 2024-03-25 ENCOUNTER — MEDICAL CORRESPONDENCE (OUTPATIENT)
Dept: HEALTH INFORMATION MANAGEMENT | Facility: CLINIC | Age: 79
End: 2024-03-25

## 2024-03-25 NOTE — PROGRESS NOTES
To be completed in Nursing note:    Please reference list for forms that require a visit for completion.  Please remind patients that providers are given 3-5 business days to complete and return forms.      Form type:[facility]    Date form received: 24    Date form completed by Physician:  24  How was form returned to patient (mailed, faxed, or at  for patient to ):  Faxed to   Date form mailed/faxed/left at  for patient and sent to HIM for scannin24, sent to HIM for scanning    Once form is left for patient, faxed, or mailed PCS will then close the documentation only encounter.     Jacob Hatfield MA

## 2024-03-26 ENCOUNTER — DOCUMENTATION ONLY (OUTPATIENT)
Dept: FAMILY MEDICINE | Facility: CLINIC | Age: 79
End: 2024-03-26
Payer: COMMERCIAL

## 2024-03-26 ENCOUNTER — TELEPHONE (OUTPATIENT)
Dept: FAMILY MEDICINE | Facility: CLINIC | Age: 79
End: 2024-03-26
Payer: COMMERCIAL

## 2024-03-26 NOTE — PROGRESS NOTES
Interprofessional Team Consultation Note     Requesting Provider: Dr. Nowak    Consultants:  Behavioral Health: Dr. Robert (s)  Care Coordination: Claribel Green Shannon, [place]  PharmD: Sai). [place]  Family Medicine Physicians: Sai). Vincenzo    Identifying Data/Reason for Referral:  Patient Michael D McArdle, preferred name Isiah, is 78 year old male who is cared for by Dr. Nowak. Provider is requesting consultation related to development of care plan. Relevant clinical information obtained from requesting provider, interprofessional team members noted above, and review of the medical record. Jacqueline Nowak is the primary care provider assigned in Epic.    Patient Active Problem List   Diagnosis    Chronic Reflux esophagitis    Moderate recurrent major depression (H)    Diastolic Dysfunction     Fainting (Syncope)    smoking     Prediabetes    Asthma    Hyperlipidemia    Obese    Adenocarcinoma of rectum (H)    Pulmonary embolism and infarction (H)    Esophageal reflux    Hypothyroidism due to acquired atrophy of thyroid    ACP (advance care planning)    CKD (chronic kidney disease) stage 3, GFR 30-59 ml/min (H)    Decreased vision in both eyes    Bilateral hearing loss, unspecified hearing loss type    Ventral hernia    Unsteady gait    Ileostomy care (H)    Ileostomy status (H)    Carpal tunnel syndrome of right wrist    Cataract of both eyes, unspecified cataract type    Chronic obstructive pulmonary disease, unspecified COPD type (H)    Generalized weakness    Acute kidney injury (H24)    Vomiting and diarrhea    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     Current Outpatient Medications   Medication    albuterol (PROAIR HFA/PROVENTIL HFA/VENTOLIN HFA) 108 (90 Base) MCG/ACT inhaler    albuterol (PROVENTIL) (2.5 MG/3ML) 0.083% neb solution    amLODIPine (NORVASC) 5 MG tablet    aspirin (ASA) 81 MG chewable tablet    atorvastatin (LIPITOR) 80 MG tablet    baclofen (LIORESAL) 20 MG tablet     budesonide-formoterol (SYMBICORT) 80-4.5 MCG/ACT Inhaler    famotidine (PEPCID) 20 MG tablet    FLUoxetine (PROZAC) 20 MG capsule    levothyroxine (SYNTHROID/LEVOTHROID) 175 MCG tablet    Magnesium Cl-Calcium Carbonate 71.5-119 MG TBEC    oxyCODONE-acetaminophen (PERCOCET) 5-325 MG tablet     No current facility-administered medications for this visit.     Topics Discussed:  Patient has chronic medical and mental health concerns.  Currently being treated for cancer.  Seen by [facility].  Ended up with colostomy and hernia.  Has had problems managing this.  Did not return to prior oncology team due to what he perceived as a negative outcome.  Did consult with [facility] team but then decided to go back to prior oncology team (for care with colon and rectum cancer).  Declining chemotherapy as he doesn't think this will help but he is not a surgical candidate.  He is unhappy with the options being presented to him.  Dr. Nowak is worried he is not following up due to this.    Wondering about health literacy/mental health and how this impacts decision making.  Patient does understands he has been told if he does nothing he has 3-6 month expectancy.  Still wants to fight/engage in active treatment but unhappy with options.    Reflected on current social support and who else could be included in this conversation.      Daughter, Chantal, is involved in care and could be looped into conversation.    Has in home therapist he has seen for a long time/trusts.  Unclear what current MH diagnoses may be (depression? Anxiety?).  May be helpful to get DOMENIC/records to clarify diagnoses and how this may be impacting care.        8/25/2023     2:10 PM 12/12/2023     3:53 PM 3/20/2024     9:07 AM   PHQ   PHQ-9 Total Score 2 7 8   Q9: Thoughts of better off dead/self-harm past 2 weeks Not at all Not at all Not at all         6/19/2019    11:10 AM 8/25/2023     2:10 PM 12/12/2023     3:53 PM   COLTON-7 SCORE   Total Score 0 1 1      Recommendations/Action Items:  Follow up with Dr. Nowak is scheduled for 4/17/24.  Prior to visit, Dr. Nowak will call patient and ask about getting DOMENIC/records from therapist to clarify diagnoses and how this may be impacting care as well as to engage.  May also be helpful to engage therapist in dilemma to see if she could assist with this conversation.  At the time of this outreach, Dr. Nowak will also ask patient if it would be OK to invite daughter Chantal to visit on 4/17/24.    Chantal Jones, PhD, LP     Disclaimer:  The above treatment recommendations are based on consultation with the patient's primary care provider and a review of relevant information in EPIC. I have not personally examined the patient. All recommendations should be implemented with considerations of the patient's relevant prior history and current clinical status. Please contact me with any questions about the care of this patient.

## 2024-03-26 NOTE — TELEPHONE ENCOUNTER
Patient's care coordinator calling stating that [facility] did not receive DME Rx for Walker yet. This writer check with patient's provider and a copy of DME Rx reprint and faxed to [facility] at [phone]. Praveena Hatfield, [facility]

## 2024-03-27 ENCOUNTER — PATIENT OUTREACH (OUTPATIENT)
Dept: ONCOLOGY | Facility: CLINIC | Age: 79
End: 2024-03-27
Payer: COMMERCIAL

## 2024-03-28 NOTE — TELEPHONE ENCOUNTER
[facility]: Cancer Care                                                                                          Pt last saw Dr. Palacio 2/16/24 and at the time was undecided whether he wanted to pursue palliative treatment or sign on to hospice. Follow-up appointment was arranged for 3/18/24 which he no showed. Telephone mailbox continues to be full and unable to leave messages for pt. He does not use [employer] and had not signed consent to communicate with anyone else.    Signature:  Maris Moore RN

## 2024-03-29 ENCOUNTER — DOCUMENTATION ONLY (OUTPATIENT)
Dept: FAMILY MEDICINE | Facility: CLINIC | Age: 79
End: 2024-03-29
Payer: COMMERCIAL

## 2024-03-29 ENCOUNTER — MEDICAL CORRESPONDENCE (OUTPATIENT)
Dept: HEALTH INFORMATION MANAGEMENT | Facility: CLINIC | Age: 79
End: 2024-03-29
Payer: COMMERCIAL

## 2024-03-29 NOTE — PROGRESS NOTES
To be completed in Nursing note:    Please reference list for forms that require a visit for completion.  Please remind patients that providers are given 3-5 business days to complete and return forms.      Form type:[employer]    Date form received: 24    Date form completed by Physician:24    How was form returned to patient (mailed, faxed, or at  for patient to ):  Faxed to   Date form mailed/faxed/left at  for patient and sent to HIM for scannin24, sent to HIM for scanning    Once form is left for patient, faxed, or mailed PCS will then close the documentation only encounter.     Jacob Hatfield MA

## 2024-04-01 ENCOUNTER — TRANSFERRED RECORDS (OUTPATIENT)
Dept: HEALTH INFORMATION MANAGEMENT | Facility: CLINIC | Age: 79
End: 2024-04-01
Payer: COMMERCIAL

## 2024-04-02 NOTE — TELEPHONE ENCOUNTER
"Reached pt by phone to offer follow-up appointment in Oncology. Pt stated he will not be returning to [facility], writer offered empathetic listening as pt stated the following:    \"You don't need to keep repeating stuff over and over again, I'm going to die anyway\"    \"I want to see somebody else besides him\"    \"I'll find somebody else, stop calling me\"    Writer attempted several times to clarify if he was referring to Dr. Palacio in Oncology or another provider, he did not answer and continued to repeat he would find his own doctor. Thanked pt for his time and will close out encounter.  "

## 2024-04-05 ENCOUNTER — TRANSFERRED RECORDS (OUTPATIENT)
Dept: HEALTH INFORMATION MANAGEMENT | Facility: CLINIC | Age: 79
End: 2024-04-05
Payer: COMMERCIAL

## 2024-04-05 NOTE — PROGRESS NOTES
Patient was recently seen at [facility], where he has previously received care.  There is a plan in place to continue care there.  Will check labs, [id number] testing, and has been referred to palliative care for better pain management.  They are looking into chemotherapy options, with consideration of his current renal function and pre-existing peripheral neuropathy.     Jacqueline Nowak MD    I will review and discuss with patient at our 4/17 appointment.

## 2024-04-11 ENCOUNTER — TRANSFERRED RECORDS (OUTPATIENT)
Dept: HEALTH INFORMATION MANAGEMENT | Facility: CLINIC | Age: 79
End: 2024-04-11
Payer: COMMERCIAL

## 2024-04-15 ENCOUNTER — TRANSFERRED RECORDS (OUTPATIENT)
Dept: HEALTH INFORMATION MANAGEMENT | Facility: CLINIC | Age: 79
End: 2024-04-15
Payer: COMMERCIAL

## 2024-04-15 ENCOUNTER — MEDICAL CORRESPONDENCE (OUTPATIENT)
Dept: HEALTH INFORMATION MANAGEMENT | Facility: CLINIC | Age: 79
End: 2024-04-15
Payer: COMMERCIAL

## 2024-04-15 ENCOUNTER — DOCUMENTATION ONLY (OUTPATIENT)
Dept: FAMILY MEDICINE | Facility: CLINIC | Age: 79
End: 2024-04-15
Payer: COMMERCIAL

## 2024-04-15 NOTE — PROGRESS NOTES
To be completed in Nursing note:    Please reference list for forms that require a visit for completion.  Please remind patients that providers are given 3-5 business days to complete and return forms.      Form type: [employer] - Standard Written Order - [facility]     Date form received: 4/15/24    Date form completed by Physician: 4/15/24    How was form returned to patient (mailed, faxed, or at  for patient to ): fax to  [employer]  [phone]    Date form mailed/faxed/left at  for patient and sent to HIM for scanning: faxed to [phone] from [facility] at 9:20 am       Once form is left for patient, faxed, or mailed PCS will then close the documentation only encounter.

## 2024-04-15 NOTE — PROGRESS NOTES
To be completed in Nursing note:    Please reference list for forms that require a visit for completion.  Please remind patients that providers are given 3-5 business days to complete and return forms.      Form type: [facility] - Verbal Order     Date form received: 4/15/24    Date form completed by Physician: 4/15/24    How was form returned to patient (mailed, faxed, or at  for patient to ): fax to  [facility] [phone]    Date form mailed/faxed/left at  for patient and sent to HIM for scanning:faxed to [phone] from [facility] at 9:20 am.       Once form is left for patient, faxed, or mailed PCS will then close the documentation only encounter.

## 2024-04-16 ENCOUNTER — DOCUMENTATION ONLY (OUTPATIENT)
Dept: FAMILY MEDICINE | Facility: CLINIC | Age: 79
End: 2024-04-16
Payer: COMMERCIAL

## 2024-04-16 ENCOUNTER — MEDICAL CORRESPONDENCE (OUTPATIENT)
Dept: HEALTH INFORMATION MANAGEMENT | Facility: CLINIC | Age: 79
End: 2024-04-16
Payer: COMMERCIAL

## 2024-04-16 NOTE — PROGRESS NOTES
To be completed in Nursing note:    Please reference list for forms that require a visit for completion.  Please remind patients that providers are given 3-5 business days to complete and return forms.      Form type:[facility]    Date form received: 24    Date form completed by Physician:24    How was form returned to patient (mailed, faxed, or at  for patient to ):  Faxed to   Date form mailed/faxed/left at  for patient and sent to HIM for scannin24, sent to HIM for scanning    Once form is left for patient, faxed, or mailed PCS will then close the documentation only encounter.     Jacob Hatfield MA

## 2024-04-17 ENCOUNTER — OFFICE VISIT (OUTPATIENT)
Dept: FAMILY MEDICINE | Facility: CLINIC | Age: 79
End: 2024-04-17
Payer: COMMERCIAL

## 2024-04-17 VITALS
WEIGHT: 166 LBS | DIASTOLIC BLOOD PRESSURE: 74 MMHG | HEART RATE: 74 BPM | RESPIRATION RATE: 22 BRPM | HEIGHT: 64 IN | SYSTOLIC BLOOD PRESSURE: 122 MMHG | BODY MASS INDEX: 28.34 KG/M2 | TEMPERATURE: 97.5 F | OXYGEN SATURATION: 97 %

## 2024-04-17 DIAGNOSIS — R29.6 FALLS FREQUENTLY: ICD-10-CM

## 2024-04-17 DIAGNOSIS — F33.1 MODERATE RECURRENT MAJOR DEPRESSION (H): ICD-10-CM

## 2024-04-17 DIAGNOSIS — C20 ADENOCARCINOMA OF RECTUM (H): Primary | ICD-10-CM

## 2024-04-17 DIAGNOSIS — N18.32 STAGE 3B CHRONIC KIDNEY DISEASE (H): ICD-10-CM

## 2024-04-17 PROCEDURE — 99214 OFFICE O/P EST MOD 30 MIN: CPT | Performed by: STUDENT IN AN ORGANIZED HEALTH CARE EDUCATION/TRAINING PROGRAM

## 2024-04-17 RX ORDER — FLUOXETINE 40 MG/1
40 CAPSULE ORAL DAILY
Qty: 90 CAPSULE | Refills: 0 | Status: SHIPPED | OUTPATIENT
Start: 2024-04-17 | End: 2024-05-15

## 2024-04-17 RX ORDER — RESPIRATORY SYNCYTIAL VIRUS VACCINE 120MCG/0.5
0.5 KIT INTRAMUSCULAR ONCE
Qty: 1 EACH | Refills: 0 | Status: CANCELLED | OUTPATIENT
Start: 2024-04-17 | End: 2024-04-17

## 2024-04-17 NOTE — PATIENT INSTRUCTIONS
Keep up with your regular routines  Keep moving and getting out of the house  Keep using your inhaler  Keep seeing your therapist.     Increase your Prozac to 20mg to 40mg.  Follow up in 1 month to recheck the thyroid levels and blood sugars.  We can talk more about the neuropathy at that time too.     Let your team know what you are feeling  Please have them reach out to Dr. Nowak as needed.

## 2024-04-18 NOTE — PROGRESS NOTES
There are no exam notes on file for this visit.    ASSESSMENT AND PLAN:      Isiah was seen today for recheck.  We reviewed the plan of care for his cancer and he is feeling good about the plan with [facility] in the next steps.    Diagnoses and all orders for this visit:    Adenocarcinoma of rectum (H)  -- Following with [facility].  -- Pain control through palliative care with them, he is seeing [facility].  -- Continues to have regular wound care which has been helpful.  -- Has already started initial oral chemo and will get a port and do IV chemo as well.  -- His family and support team is present and supporting him.  -- Mood is stable, and he is seeing his therapist regularly.  -- Currently working on advanced directive.    Moderate recurrent major depression (H)  He is working through his prognosis and next steps.  Did notice some improvement with starting the Prozac and would like to titrate up.  Will increase from 20-40 today.  DOMENIC signed for his therapist, Gladis Bloom.  He will continue to see her every 2 weeks as he has been doing.  -     FLUoxetine (PROZAC) 40 MG capsule; Take 1 capsule (40 mg) by mouth daily    Falls frequently  -Continues to use his walker regularly.  Working on oral intake.  We did sign a release with his  through [employer], in case he needs additional DME or supplies.    Follow-up in 1 month.  We will plan to do an A1c and TSH at that time.      Patient Instructions   Keep up with your regular routines  Keep moving and getting out of the house  Keep using your inhaler  Keep seeing your therapist.     Increase your Prozac to 20mg to 40mg.  Follow up in 1 month to recheck the thyroid levels and blood sugars.  We can talk more about the neuropathy at that time too.     Let your team know what you are feeling  Please have them reach out to Dr. Nowak as needed.          Jacqueline Nowak MD    SUBJECTIVE  Michael D McArdle is a 78 year old male with  past medical history significant for    Patient Active Problem List   Diagnosis    Chronic Reflux esophagitis    Moderate recurrent major depression (H)    Diastolic Dysfunction     Fainting (Syncope)    smoking     Prediabetes    Asthma    Hyperlipidemia    Obese    Adenocarcinoma of rectum (H)    Pulmonary embolism and infarction (H)    Esophageal reflux    Hypothyroidism due to acquired atrophy of thyroid    ACP (advance care planning)    CKD (chronic kidney disease) stage 3, GFR 30-59 ml/min (H)    Decreased vision in both eyes    Bilateral hearing loss, unspecified hearing loss type    Ventral hernia    Unsteady gait    Ileostomy care (H)    Ileostomy status (H)    Carpal tunnel syndrome of right wrist    Cataract of both eyes, unspecified cataract type    Chronic obstructive pulmonary disease, unspecified COPD type (H)    Generalized weakness    Acute kidney injury (H24)    Vomiting and diarrhea    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    He shares that he is establish care at [facility], where he received his previous care.  They have been working on a plan and he is already started chemo.  He is taking capecitabine 3 pills twice daily.  They also setting him up with a port and he is going to be getting IV chemo.    He shares that he had a good meeting with the palliative care nurse practitioner from the group, and they started him on a fentanyl patch, 12 mcg/day, with as needed oxycodone.  He still having quite a bit of pain, but is hoping things will get better.  He continues to have significant drainage from his rectal area.  The nurse comes out 3 times to get changed dressings but he has to change his depends 3 times a day.  It is frustrating, painful, and  "quite irritating.    He is noting difficulty with appetite.  Is trying to eat but has to work hard at it.  Has been more tired and sleeping more.  His oncology team has shared that they will start him on Ensure if needed.    His daughter Chantal checks on him weekly, and helps fill his med box.  She also stopped by as needed after work.  He is starting to work on his advance directive and is identified a long-range goal of seeing his granddaughter Sera graduated from high school.  She is currently 9.  Things have stabilized with his girlfriend of 6 years, they are spending time together and evenings and staying together in the same place at night which feels comfortable, and she is spending time with her daughter during the day.  He feels good about this set up.    Mood has been low but he feels like he gets good support.  Continues to see his therapist Gladis regularly.  She comes out every 2 weeks.  Notes some improvement with taking the Prozac and is open to increasing the dose.    He is aware of the severity of his cancer, that is in his spine, and the potential prognosis.    He does worry some about his breathing.  Still smokes a pipe.  Uses his inhalers regularly and finds them helpful.  He is usually using his albuterol for breakthrough, and his Symbicort daily.    He has gotten his walker and is using it regularly.  No recent falls but does feel more unsteady on his feet.  Worried about his neuropathy getting worse with the chemo.    OBJECTIVE:  Vitals: /74   Pulse 74   Temp 97.5  F (36.4  C) (Oral)   Resp 22   Ht 1.626 m (5' 4\")   Wt 75.3 kg (166 lb)   SpO2 97%   BMI 28.49 kg/m    BMI= Body mass index is 28.49 kg/m .  Objective:    Vitals:  Vitals are reviewed and are within the normal range O2 sats are stable.  Gen:  Alert, pleasant, no acute distress, appears moderately fatigued  Cardiac:  Regular rate and rhythm, no murmurs, rubs or gallops  Respiratory:  Lungs clear to auscultation " bilaterally  Psych: Mood and affect are down, moderately flat, somewhat depressed.  Good insight and thought process today.      Patient Instructions   Keep up with your regular routines  Keep moving and getting out of the house  Keep using your inhaler  Keep seeing your therapist.     Increase your Prozac to 20mg to 40mg.  Follow up in 1 month to recheck the thyroid levels and blood sugars.  We can talk more about the neuropathy at that time too.     Let your team know what you are feeling  Please have them reach out to Dr. Nowak as needed.          Jacqueline Nowak MD

## 2024-04-22 ENCOUNTER — TELEPHONE (OUTPATIENT)
Dept: FAMILY MEDICINE | Facility: CLINIC | Age: 79
End: 2024-04-22
Payer: COMMERCIAL

## 2024-04-22 NOTE — TELEPHONE ENCOUNTER
Verbal orders for:      Order or referral being requested: ORDERS        Additional Comments: skilled nursing 3x9 with 6 PRN     Given.     GARTH Marcano

## 2024-04-22 NOTE — TELEPHONE ENCOUNTER
[facility] phone call message - order or referral request for patient:     Order or referral being requested: ORDERS      Additional Comments: skilled nursing 3x9 with 6 PRN     OK to leave a message on voice mail? Yes    Primary language: English      needed? No    Call taken on April 22, 2024 at 9:28 AM by Ana Coburn

## 2024-04-24 ENCOUNTER — MEDICAL CORRESPONDENCE (OUTPATIENT)
Dept: HEALTH INFORMATION MANAGEMENT | Facility: CLINIC | Age: 79
End: 2024-04-24
Payer: COMMERCIAL

## 2024-04-24 ENCOUNTER — DOCUMENTATION ONLY (OUTPATIENT)
Dept: FAMILY MEDICINE | Facility: CLINIC | Age: 79
End: 2024-04-24
Payer: COMMERCIAL

## 2024-04-24 NOTE — PROGRESS NOTES
To be completed in Nursing note:    Please reference list for forms that require a visit for completion.  Please remind patients that providers are given 3-5 business days to complete and return forms.      Form type:[facility]    Date form received: 24    Date form completed by Physician:22    How was form returned to patient (mailed, faxed, or at  for patient to ):faxed to     Date form mailed/faxed/left at  for patient and sent to HIM for scannin24 sent to HIM for scanning      Once form is left for patient, faxed, or mailed PCS will then close the documentation only encounter.     Jacob Hatfield MA

## 2024-04-25 ENCOUNTER — TRANSFERRED RECORDS (OUTPATIENT)
Dept: HEALTH INFORMATION MANAGEMENT | Facility: CLINIC | Age: 79
End: 2024-04-25
Payer: COMMERCIAL

## 2024-05-02 ENCOUNTER — TRANSFERRED RECORDS (OUTPATIENT)
Dept: HEALTH INFORMATION MANAGEMENT | Facility: CLINIC | Age: 79
End: 2024-05-02
Payer: COMMERCIAL

## 2024-05-03 ENCOUNTER — DOCUMENTATION ONLY (OUTPATIENT)
Dept: FAMILY MEDICINE | Facility: CLINIC | Age: 79
End: 2024-05-03

## 2024-05-03 DIAGNOSIS — Z53.9 DIAGNOSIS NOT YET DEFINED: Primary | ICD-10-CM

## 2024-05-03 PROCEDURE — G0179 MD RECERTIFICATION HHA PT: HCPCS | Performed by: STUDENT IN AN ORGANIZED HEALTH CARE EDUCATION/TRAINING PROGRAM

## 2024-05-03 NOTE — PROGRESS NOTES
To be completed in Nursing note:    Please reference list for forms that require a visit for completion.  Please remind patients that providers are given 3-5 business days to complete and return forms.      Form type: Home Health Care Certification and Plan of care  - [facility]    Date form received: 5/3/24     Date form completed by Physician:    How was form returned to patient (mailed, faxed, or at  for patient to ): fax to- [facility] - [phone] ([employer])     Date form mailed/faxed/left at  for patient and sent to HIM for [phone] ([employer])     Once form is left for patient, faxed, or mailed PCS will then close the documentation only encounter.

## 2024-05-13 ENCOUNTER — MEDICAL CORRESPONDENCE (OUTPATIENT)
Dept: HEALTH INFORMATION MANAGEMENT | Facility: CLINIC | Age: 79
End: 2024-05-13
Payer: COMMERCIAL

## 2024-05-13 ENCOUNTER — DOCUMENTATION ONLY (OUTPATIENT)
Dept: FAMILY MEDICINE | Facility: CLINIC | Age: 79
End: 2024-05-13
Payer: COMMERCIAL

## 2024-05-13 NOTE — PROGRESS NOTES
To be completed in Nursing note:    Please reference list for forms that require a visit for completion.  Please remind patients that providers are given 3-5 business days to complete and return forms.      Form type:[facility]contin[facility] Order prescription    Date form received: 05/10/24    Date form completed by Physician:24    How was form returned to patient (mailed, faxed, or at  for patient to ):  Faxed to   Date form mailed/faxed/left at  for patient and sent to HIM for scannin24, sent to HIM for scanning    Once form is left for patient, faxed, or mailed PCS will then close the documentation only encounter.     Jacob Hatfield MA

## 2024-05-15 ENCOUNTER — OFFICE VISIT (OUTPATIENT)
Dept: FAMILY MEDICINE | Facility: CLINIC | Age: 79
End: 2024-05-15
Payer: COMMERCIAL

## 2024-05-15 VITALS
OXYGEN SATURATION: 99 % | TEMPERATURE: 98.2 F | RESPIRATION RATE: 20 BRPM | HEIGHT: 64 IN | WEIGHT: 165 LBS | SYSTOLIC BLOOD PRESSURE: 117 MMHG | BODY MASS INDEX: 28.17 KG/M2 | HEART RATE: 83 BPM | DIASTOLIC BLOOD PRESSURE: 69 MMHG

## 2024-05-15 DIAGNOSIS — I26.99 PULMONARY EMBOLISM AND INFARCTION (H): ICD-10-CM

## 2024-05-15 DIAGNOSIS — F33.1 MODERATE RECURRENT MAJOR DEPRESSION (H): ICD-10-CM

## 2024-05-15 DIAGNOSIS — I10 ESSENTIAL HYPERTENSION: ICD-10-CM

## 2024-05-15 DIAGNOSIS — R39.15 URINARY URGENCY: ICD-10-CM

## 2024-05-15 DIAGNOSIS — E03.4 HYPOTHYROIDISM DUE TO ACQUIRED ATROPHY OF THYROID: ICD-10-CM

## 2024-05-15 DIAGNOSIS — R25.2 LEG CRAMPS: ICD-10-CM

## 2024-05-15 DIAGNOSIS — I50.33 DIASTOLIC CHF, ACUTE ON CHRONIC (H): ICD-10-CM

## 2024-05-15 DIAGNOSIS — C20 ADENOCARCINOMA OF RECTUM (H): Primary | ICD-10-CM

## 2024-05-15 DIAGNOSIS — N18.31 STAGE 3A CHRONIC KIDNEY DISEASE (H): ICD-10-CM

## 2024-05-15 DIAGNOSIS — N18.32 STAGE 3B CHRONIC KIDNEY DISEASE (H): ICD-10-CM

## 2024-05-15 PROCEDURE — 84443 ASSAY THYROID STIM HORMONE: CPT | Performed by: STUDENT IN AN ORGANIZED HEALTH CARE EDUCATION/TRAINING PROGRAM

## 2024-05-15 PROCEDURE — G2211 COMPLEX E/M VISIT ADD ON: HCPCS | Performed by: STUDENT IN AN ORGANIZED HEALTH CARE EDUCATION/TRAINING PROGRAM

## 2024-05-15 PROCEDURE — 99214 OFFICE O/P EST MOD 30 MIN: CPT | Performed by: STUDENT IN AN ORGANIZED HEALTH CARE EDUCATION/TRAINING PROGRAM

## 2024-05-15 PROCEDURE — 36415 COLL VENOUS BLD VENIPUNCTURE: CPT | Performed by: STUDENT IN AN ORGANIZED HEALTH CARE EDUCATION/TRAINING PROGRAM

## 2024-05-15 RX ORDER — FLUOXETINE 40 MG/1
40 CAPSULE ORAL DAILY
Qty: 90 CAPSULE | Refills: 0 | Status: SHIPPED | OUTPATIENT
Start: 2024-05-15

## 2024-05-15 RX ORDER — ASPIRIN 81 MG/1
81 TABLET, CHEWABLE ORAL EVERY EVENING
Qty: 90 TABLET | Refills: 3 | Status: SHIPPED | OUTPATIENT
Start: 2024-05-15

## 2024-05-15 RX ORDER — BACLOFEN 20 MG/1
TABLET ORAL
Qty: 45 TABLET | Refills: 1 | Status: SHIPPED | OUTPATIENT
Start: 2024-05-15

## 2024-05-15 RX ORDER — RESPIRATORY SYNCYTIAL VIRUS VACCINE 120MCG/0.5
0.5 KIT INTRAMUSCULAR ONCE
Qty: 1 EACH | Refills: 0 | Status: CANCELLED | OUTPATIENT
Start: 2024-05-15 | End: 2024-05-15

## 2024-05-15 ASSESSMENT — ANXIETY QUESTIONNAIRES
6. BECOMING EASILY ANNOYED OR IRRITABLE: NOT AT ALL
GAD7 TOTAL SCORE: 0
GAD7 TOTAL SCORE: 0
2. NOT BEING ABLE TO STOP OR CONTROL WORRYING: NOT AT ALL
1. FEELING NERVOUS, ANXIOUS, OR ON EDGE: NOT AT ALL
7. FEELING AFRAID AS IF SOMETHING AWFUL MIGHT HAPPEN: NOT AT ALL
3. WORRYING TOO MUCH ABOUT DIFFERENT THINGS: NOT AT ALL
5. BEING SO RESTLESS THAT IT IS HARD TO SIT STILL: NOT AT ALL
IF YOU CHECKED OFF ANY PROBLEMS ON THIS QUESTIONNAIRE, HOW DIFFICULT HAVE THESE PROBLEMS MADE IT FOR YOU TO DO YOUR WORK, TAKE CARE OF THINGS AT HOME, OR GET ALONG WITH OTHER PEOPLE: NOT DIFFICULT AT ALL

## 2024-05-15 ASSESSMENT — PATIENT HEALTH QUESTIONNAIRE - PHQ9
SUM OF ALL RESPONSES TO PHQ QUESTIONS 1-9: 10
5. POOR APPETITE OR OVEREATING: NOT AT ALL

## 2024-05-15 NOTE — LETTER
May 17, 2024      Michael D McArdle  [address]   [place] [address]        Dear Mr.McArdle,    We are writing to inform you of your test results.    Your thyroid level is stable - right where we want it.  Please continue taking the 175mcg pill daily.  Please call the clinic at [phone] if you have any questions.       Resulted Orders   TSH with free T4 reflex   Result Value Ref Range    TSH 2.02 0.30 - 4.20 uIU/mL       If you have any questions or concerns, please call the clinic at the number listed above.       Sincerely,      Jacqueline Nowak MD

## 2024-05-15 NOTE — PATIENT INSTRUCTIONS
Refills sent in for the aspirin and baclofen.      Stop in the lab for repeat thyroid testing.      Take the forms to your elderly waiver manager and work on completing them for [employer]    Continue with same dose of the Fluoxetine.     Follow up in 2 month.

## 2024-05-15 NOTE — PROGRESS NOTES
There are no exam notes on file for this visit.    ASSESSMENT AND PLAN:      Isiah was seen today for follow up.    Diagnoses and all orders for this visit:    Adenocarcinoma of rectum (H)  Currently undergoing chemo with regular monitoring from his oncologist.  Getting appropriate pain control  Regular wound care currently.  He continues to get good support from family and significant other.    CKD (chronic kidney disease) stage 3, GFR 30-59 ml/min (H)  Recheck renal function  -     Albumin Random Urine Quantitative with Creat Ratio; Future  -     Cancel: Albumin Random Urine Quantitative with Creat Ratio    Moderate recurrent major depression (H)  PHQ-9 slightly worse but overall stable.  Continue with the fluoxetine 40.  Seeing his therapist every 2 weeks due to schedules, but this is also helpful.  No thoughts of self-harm.  He is getting some auditory hallucinations, but he and I both think this is chemo related and it is not concerning to him right now.  -     FLUoxetine (PROZAC) 40 MG capsule; Take 1 capsule (40 mg) by mouth daily    Leg cramps  Baclofen has been helpful for cramps.  This was refilled today.  -     baclofen (LIORESAL) 20 MG tablet; TAKE ONE TABLET BY MOUTH TWICE A DAY AS NEEDED FOR MUSCLE SPASMS IN THE EVENING    Hypothyroidism due to acquired atrophy of thyroid  Due for thyroid recheck.  This was restarted today.  -     TSH with free T4 reflex; Future  -     TSH with free T4 reflex    Pulmonary embolism and infarction (H)  Has been chronically on aspirin since his pulmonary embolism secondary to cancer previously.  This was refilled today.  -     aspirin (ASA) 81 MG chewable tablet; Take 1 tablet (81 mg) by mouth every evening    Urinary urgency  He is having increased urgency, frequency and changes in his urine.  Will check UA for infection.  Treat if needed.  -     UA Macroscopic with reflex to Microscopic and Culture; Future  -     Cancel: UA Macroscopic with reflex to Microscopic and  Culture    Essential hypertension  Blood pressure well-controlled      Patient Instructions   Refills sent in for the aspirin and baclofen.      Stop in the lab for repeat thyroid testing.      Take the forms to your elderly waiver manager and work on completing them for [employer]    Continue with same dose of the Fluoxetine.     Follow up in 2 month.      Jacqueline Nowak MD    SUBJECTIVE  Isiah CARROLL McArdle is a 79 year old male with past medical history significant for    Patient Active Problem List   Diagnosis    Chronic Reflux esophagitis    Moderate recurrent major depression (H)    Diastolic Dysfunction     Fainting (Syncope)    smoking     Prediabetes    Asthma    Hyperlipidemia    Obese    Adenocarcinoma of rectum (H)    Pulmonary embolism and infarction (H)    Esophageal reflux    Hypothyroidism due to acquired atrophy of thyroid    ACP (advance care planning)    CKD (chronic kidney disease) stage 3, GFR 30-59 ml/min (H)    Decreased vision in both eyes    Bilateral hearing loss, unspecified hearing loss type    Ventral hernia    Unsteady gait    Ileostomy care (H)    Ileostomy status (H)    Carpal tunnel syndrome of right wrist    Cataract of both eyes, unspecified cataract type    Chronic obstructive pulmonary disease, unspecified COPD type (H)    Generalized weakness    Acute kidney injury (H24)    Vomiting and diarrhea    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    He continues to undergo chemo treatment through [facility].  Notices significant side effects.  Poor appetite, jaundice after treatments, and increased fatigue.  Is taking both IV chemo and oral chemo.  Wants to continue, but does not enjoy the side effects.  He even describes some nighttime hallucinations.  Having some difficulty  "getting to appointments and so was told by his  to fill out the application for [employer].    Pain continues to be an issue.  He is taking Percocet 2 pills every 4 hours, and this seems to work okay.  The wound nurses coming on changing dressings 3 times a week.  This is going okay.  He continues to have difficulty with sitting due to pain from the cancer, and that is been a challenge.    Mood is okay but not great.  Is taking the fluoxetine 40 mg and feels like this is helping.  Does not feel like he needs to increase the dose.  He is seeing a psychologist only every 2 weeks because of all his other appointments.  Does get good support from his cat, is spending time with his family and his granddaughter Sera, and things are going well with his significant other right now.  He recently celebrated a birthday and this point well.    He does have increased difficulty with his urination.  Notices increased frequency of dribbling, increased urge to go, and some change in color.  He continues to have to get up at night multiple times to go and feels like he cannot fully empty.    OBJECTIVE:  Vitals: /69   Pulse 83   Temp 98.2  F (36.8  C) (Oral)   Resp 20   Ht 1.626 m (5' 4\")   Wt 74.8 kg (165 lb)   SpO2 99%   BMI 28.32 kg/m    BMI= Body mass index is 28.32 kg/m .  Objective:    Vitals:  Vitals are reviewed and are within the normal range  Gen:  Alert, pleasant, no acute distress  Psych: Mood and affect are mildly down, thought process logical.  He does endorse auditory hallucinations having at night, which is new when he attributes to the chemotherapy drugs.        12/12/2023     3:53 PM 3/20/2024     9:07 AM 5/15/2024    10:57 AM   PHQ   PHQ-9 Total Score 7 8 10   Q9: Thoughts of better off dead/self-harm past 2 weeks Not at all Not at all Not at all        Pending labs include a BMP, UA, and thyroid.      Patient Instructions   Refills sent in for the aspirin and baclofen.      Stop in the " lab for repeat thyroid testing.      Take the forms to your elderly waiver manager and work on completing them for [employer]    Continue with same dose of the Fluoxetine.     Follow up in 2 month.      Jcaqueline Nowak MD

## 2024-05-16 ENCOUNTER — TRANSFERRED RECORDS (OUTPATIENT)
Dept: HEALTH INFORMATION MANAGEMENT | Facility: CLINIC | Age: 79
End: 2024-05-16
Payer: COMMERCIAL

## 2024-05-16 PROBLEM — N18.32 STAGE 3B CHRONIC KIDNEY DISEASE (H): Status: ACTIVE | Noted: 2017-03-22

## 2024-05-16 LAB — TSH SERPL DL<=0.005 MIU/L-ACNC: 2.02 UIU/ML (ref 0.3–4.2)

## 2024-05-17 NOTE — RESULT ENCOUNTER NOTE
Michael D McArdle-    Your thyroid level is stable - right where we want it.  Please continue taking the 175mcg pill daily.  Please call the clinic at [phone] if you have any questions.      Jacqueline Nowak MD    Please send results to patient.

## 2024-05-28 ENCOUNTER — MEDICAL CORRESPONDENCE (OUTPATIENT)
Dept: HEALTH INFORMATION MANAGEMENT | Facility: CLINIC | Age: 79
End: 2024-05-28
Payer: COMMERCIAL

## 2024-05-29 ENCOUNTER — DOCUMENTATION ONLY (OUTPATIENT)
Dept: FAMILY MEDICINE | Facility: CLINIC | Age: 79
End: 2024-05-29
Payer: COMMERCIAL

## 2024-05-29 NOTE — PROGRESS NOTES
September 20, 2023      Lawanda Katz  [address] [place] [address]        To Whom It May Concern:    Lawanda Katz was seen in our clinic. She may return to work on 09/26/23.      Sincerely,        Alexa Terrell, CNP  [facility].           To be completed in Nursing note:    Please reference list for forms that require a visit for completion.  Please remind patients that providers are given 3-5 business days to complete and return forms.      Form type:[facility]    Date form received: 24    Date form completed by Physician:24    How was form returned to patient (mailed, faxed, or at  for patient to ):  Faxed to   Date form mailed/faxed/left at  for patient and sent to HIM for scannin24, sent to HIM for scanning    Once form is left for patient, faxed, or mailed PCS will then close the documentation only encounter.     Jacob Hatfield MA

## 2024-06-06 ENCOUNTER — TRANSFERRED RECORDS (OUTPATIENT)
Dept: HEALTH INFORMATION MANAGEMENT | Facility: CLINIC | Age: 79
End: 2024-06-06
Payer: COMMERCIAL

## 2024-06-19 ENCOUNTER — MEDICAL CORRESPONDENCE (OUTPATIENT)
Dept: HEALTH INFORMATION MANAGEMENT | Facility: CLINIC | Age: 79
End: 2024-06-19
Payer: COMMERCIAL

## 2024-06-19 ENCOUNTER — DOCUMENTATION ONLY (OUTPATIENT)
Dept: FAMILY MEDICINE | Facility: CLINIC | Age: 79
End: 2024-06-19
Payer: COMMERCIAL

## 2024-06-19 NOTE — PROGRESS NOTES
To be completed in Nursing note:    Please reference list for forms that require a visit for completion.  Please remind patients that providers are given 3-5 business days to complete and return forms.      Form type:[facility]    Date form received: 24    Date form completed by Physician:  24  How was form returned to patient (mailed, faxed, or at  for patient to ):  Faxed to   Date form mailed/faxed/left at  for patient and sent to HIM for scannin24, sent to HIM for scanning    Once form is left for patient, faxed, or mailed PCS will then close the documentation only encounter.     Jacob Hatfield MA

## 2024-06-27 ENCOUNTER — DOCUMENTATION ONLY (OUTPATIENT)
Dept: FAMILY MEDICINE | Facility: CLINIC | Age: 79
End: 2024-06-27
Payer: COMMERCIAL

## 2024-06-27 ENCOUNTER — TRANSFERRED RECORDS (OUTPATIENT)
Dept: HEALTH INFORMATION MANAGEMENT | Facility: CLINIC | Age: 79
End: 2024-06-27
Payer: COMMERCIAL

## 2024-06-27 DIAGNOSIS — Z53.9 DIAGNOSIS NOT YET DEFINED: Primary | ICD-10-CM

## 2024-06-27 NOTE — PROGRESS NOTES
To be completed in Nursing note:    Please reference list for forms that require a visit for completion.  Please remind patients that providers are given 3-5 business days to complete and return forms.      Form type:[facility]    Date form received: 24    Date form completed by Physician:24    How was form returned to patient (mailed, faxed, or at  for patient to ):  Faxed to [phone]  Date form mailed/faxed/left at  for patient and sent to HIM for scannin24 sent to HIM for scanning    Once form is left for patient, faxed, or mailed PCS will then close the documentation only encounter.     Jacob Hatfield MA

## 2024-06-28 DIAGNOSIS — Z53.9 DIAGNOSIS NOT YET DEFINED: Primary | ICD-10-CM

## 2024-07-01 ENCOUNTER — DOCUMENTATION ONLY (OUTPATIENT)
Dept: FAMILY MEDICINE | Facility: CLINIC | Age: 79
End: 2024-07-01
Payer: COMMERCIAL

## 2024-07-11 ENCOUNTER — HOSPITAL ENCOUNTER (OUTPATIENT)
Dept: MRI IMAGING | Facility: HOSPITAL | Age: 79
Discharge: HOME | End: 2024-07-11
Attending: INTERNAL MEDICINE | Admitting: INTERNAL MEDICINE
Payer: COMMERCIAL

## 2024-07-11 DIAGNOSIS — C20 MALIGNANT NEOPLASM OF RECTUM (H): ICD-10-CM

## 2024-07-11 DIAGNOSIS — D47.2 MONOCLONAL GAMMOPATHIES: ICD-10-CM

## 2024-07-11 PROCEDURE — A9585 GADOBUTROL INJECTION: HCPCS | Performed by: INTERNAL MEDICINE

## 2024-07-11 PROCEDURE — 250N000011 HC RX IP 250 OP 636: Performed by: INTERNAL MEDICINE

## 2024-07-11 PROCEDURE — 255N000002 HC RX 255 OP 636: Performed by: INTERNAL MEDICINE

## 2024-07-11 PROCEDURE — 74183 MRI ABD W/O CNTR FLWD CNTR: CPT

## 2024-07-11 RX ORDER — GADOBUTROL 604.72 MG/ML
7.5 INJECTION INTRAVENOUS ONCE
Status: COMPLETED | OUTPATIENT
Start: 2024-07-11 | End: 2024-07-11

## 2024-07-11 RX ORDER — HEPARIN SODIUM (PORCINE) LOCK FLUSH IV SOLN 100 UNIT/ML 100 UNIT/ML
5 SOLUTION INTRAVENOUS ONCE
Status: COMPLETED | OUTPATIENT
Start: 2024-07-11 | End: 2024-07-11

## 2024-07-11 RX ADMIN — Medication 5 ML: at 18:00

## 2024-07-11 RX ADMIN — GADOBUTROL 7.5 ML: 604.72 INJECTION INTRAVENOUS at 18:00

## 2024-07-13 DIAGNOSIS — N18.31 STAGE 3A CHRONIC KIDNEY DISEASE (H): Primary | ICD-10-CM

## 2024-07-13 DIAGNOSIS — E03.4 HYPOTHYROIDISM DUE TO ACQUIRED ATROPHY OF THYROID: ICD-10-CM

## 2024-07-18 ENCOUNTER — TRANSFERRED RECORDS (OUTPATIENT)
Dept: HEALTH INFORMATION MANAGEMENT | Facility: CLINIC | Age: 79
End: 2024-07-18
Payer: COMMERCIAL

## 2024-08-13 ENCOUNTER — TRANSFERRED RECORDS (OUTPATIENT)
Dept: HEALTH INFORMATION MANAGEMENT | Facility: CLINIC | Age: 79
End: 2024-08-13
Payer: COMMERCIAL

## 2024-08-14 ENCOUNTER — TELEPHONE (OUTPATIENT)
Dept: FAMILY MEDICINE | Facility: CLINIC | Age: 79
End: 2024-08-14
Payer: COMMERCIAL

## 2024-08-14 NOTE — TELEPHONE ENCOUNTER
Order/Referral Request    Who is requesting: [facility]    Orders being requested: SN 3 X A WK WITH 6 PRN    Reason service is needed/diagnosis: WOUND CARE    When are orders needed by: ASAP    Has this been discussed with Provider: No    Does patient have a preference on a Group/Provider/Facility? [facility]    Does patient have an appointment scheduled?: No    Where to send orders:  [facility]    Okay to leave a detailed message?: Yes at Other phone number: [phone]    Does patient or caller know when to expect a call? Yes, Nurses will return call within 2-3 business hours.       Morgan Ca on 8/14/2024 at 4:37 PM

## 2024-08-15 DIAGNOSIS — K21.9 GASTROESOPHAGEAL REFLUX DISEASE WITHOUT ESOPHAGITIS: ICD-10-CM

## 2024-08-15 RX ORDER — FAMOTIDINE 40 MG/1
40 TABLET, FILM COATED ORAL DAILY
Qty: 90 TABLET | Refills: 3 | Status: SHIPPED | OUTPATIENT
Start: 2024-08-15

## 2024-08-15 NOTE — TELEPHONE ENCOUNTER
Name from pharmacy: FAMOTIDINE 40MG TABS          Will file in chart as: famotidine (PEPCID) 40 MG tablet    The original prescription was discontinued on 3/11/2024 by Eddy Grey for the following reason: Med Rec(No AVS / No eCancel). Renewing this prescription may not be appropriate.    Sig: TAKE ONE TABLET BY MOUTH EVERY DAY    Disp: 90 tablet    Refills: 3    Start: 8/15/2024    Class: E-Prescribe    For: Gastroesophageal reflux disease without esophagitis    Last ordered: 1 year ago (2/14/2023) by Jacqueline Nowak MD    Last refill: 9/22/2023    Rx #: [id number]    H2 Blockers Protocol Dubeve03/15/2024 09:16 AM   Protocol Details Patient is age 12 or older    Medication is active on med list    Medication indicated for associated diagnosis    Recent (12 mo) or future (90 days) visit within the authorizing provider's specialty        GARTH Mello, BSN

## 2024-08-20 ENCOUNTER — DOCUMENTATION ONLY (OUTPATIENT)
Dept: FAMILY MEDICINE | Facility: CLINIC | Age: 79
End: 2024-08-20
Payer: COMMERCIAL

## 2024-08-20 ENCOUNTER — MEDICAL CORRESPONDENCE (OUTPATIENT)
Dept: HEALTH INFORMATION MANAGEMENT | Facility: CLINIC | Age: 79
End: 2024-08-20
Payer: COMMERCIAL

## 2024-08-20 NOTE — PROGRESS NOTES
To be completed in Nursing note:    Please reference list for forms that require a visit for completion.  Please remind patients that providers are given 3-5 business days to complete and return forms.      Form type: [facility]    Date form received: 24    Date form completed by Physician:24    How was form returned to patient (mailed, faxed, or at  for patient to ):faxed to     Date form mailed/faxed/left at  for patient and sent to HIM for scannin24, sent to HIM for scanning      Once form is left for patient, faxed, or mailed PCS will then close the documentation only encounter.       Jacob Hatfield MA

## 2024-08-26 ENCOUNTER — TELEPHONE (OUTPATIENT)
Dept: FAMILY MEDICINE | Facility: CLINIC | Age: 79
End: 2024-08-26

## 2024-08-26 DIAGNOSIS — C20 ADENOCARCINOMA OF RECTUM (H): Primary | ICD-10-CM

## 2024-08-26 NOTE — TELEPHONE ENCOUNTER
Message reviewed.     I am fine with approving and signing hospice order for this patient.      I have been following with his heme/onc notes, and was not aware that he was stopping treatment and moving to hospice.     It would be great to check in with the patient and ensure he's got all of the supports that he needs - although I'm sure that the hospice team will reach out if needed.     Let me know if he/they are needing anything.     Jacqueline Nowak MD    Routed to BETSY Kimball/GARTH Mendoza

## 2024-08-26 NOTE — TELEPHONE ENCOUNTER
Incoming call from [facility] staff requesting hospice orders. For any questions, please call [phone].    Fax: [phone]      BETSY Snider

## 2024-08-27 NOTE — TELEPHONE ENCOUNTER
Order Request    Who is requesting: [facility]     Orders being requested: Eval order and treatment      Where to send orders: Fax to [phone] or call [phone]    Okay to leave a detailed message?: Yes at phone number:  [phone]

## 2024-08-28 NOTE — TELEPHONE ENCOUNTER
Hospice order placed.  I indictated that the order was for [facility] - I'm assuming that is the correct hospice provider.      I have not seen this patient since May 2024, and I'm not sure about his current functional status, supports needed, etc.  I'm okay with signing and approving order per the hospice RN and SW, but if he is strong enough, able to come in to clinic, it would be helpful to check in and ensure we're providing the supports he needs.     Can the team see if that would be possible, and then could we get him scheduled?    Thank you!    Dr. Nowak    Routed to Kelly RN, and BETSY Kimball.

## 2024-09-03 ENCOUNTER — TRANSFERRED RECORDS (OUTPATIENT)
Dept: HEALTH INFORMATION MANAGEMENT | Facility: CLINIC | Age: 79
End: 2024-09-03
Payer: COMMERCIAL

## 2024-09-10 ENCOUNTER — MEDICAL CORRESPONDENCE (OUTPATIENT)
Dept: HEALTH INFORMATION MANAGEMENT | Facility: CLINIC | Age: 79
End: 2024-09-10
Payer: COMMERCIAL

## 2024-09-10 DIAGNOSIS — Z53.9 DIAGNOSIS NOT YET DEFINED: Primary | ICD-10-CM

## 2024-09-10 PROCEDURE — G0179 MD RECERTIFICATION HHA PT: HCPCS | Performed by: STUDENT IN AN ORGANIZED HEALTH CARE EDUCATION/TRAINING PROGRAM

## 2024-09-11 ENCOUNTER — DOCUMENTATION ONLY (OUTPATIENT)
Dept: FAMILY MEDICINE | Facility: CLINIC | Age: 79
End: 2024-09-11
Payer: COMMERCIAL

## 2024-09-11 NOTE — PROGRESS NOTES
To be completed in Nursing note:    Please reference list for forms that require a visit for completion.  Please remind patients that providers are given 3-5 business days to complete and return forms.      Form type:Home Health Care Certification and Plan of Care    Date form received: 24    Date form completed by Physician:09/10/24    How was form returned to patient (mailed, faxed, or at  for patient to ):faxed to     Date form mailed/faxed/left at  for patient and sent to HIM for scannin24, sent to HIM for scanning      Once form is left for patient, faxed, or mailed PCS will then close the documentation only encounter.       Jacob Hatfield MA

## 2024-09-11 NOTE — PROGRESS NOTES
To be completed in Nursing note:    Please reference list for forms that require a visit for completion.  Please remind patients that providers are given 3-5 business days to complete and return forms.      Form type:[facility]    Date form received: 24    Date form completed by Physician:09/10/24    How was form returned to patient (mailed, faxed, or at  for patient to ):faxed to     Date form mailed/faxed/left at  for patient and sent to HIM for scannin24, sent to HIM for scanning      Once form is left for patient, faxed, or mailed PCS will then close the documentation only encounter.       Jacob Hatfield MA

## 2024-09-23 ENCOUNTER — TRANSFERRED RECORDS (OUTPATIENT)
Dept: HEALTH INFORMATION MANAGEMENT | Facility: CLINIC | Age: 79
End: 2024-09-23
Payer: COMMERCIAL

## 2024-10-08 ENCOUNTER — MEDICAL CORRESPONDENCE (OUTPATIENT)
Dept: HEALTH INFORMATION MANAGEMENT | Facility: CLINIC | Age: 79
End: 2024-10-08
Payer: COMMERCIAL

## 2024-10-08 ENCOUNTER — DOCUMENTATION ONLY (OUTPATIENT)
Dept: FAMILY MEDICINE | Facility: CLINIC | Age: 79
End: 2024-10-08
Payer: COMMERCIAL

## 2024-10-08 NOTE — PROGRESS NOTES
To be completed in Nursing note:    Please reference list for forms that require a visit for completion.  Please remind patients that providers are given 3-5 business days to complete and return forms.      Form type:[facility]    Date form received: 10/07/24    Date form completed by Physician:10/08/24    How was form returned to patient (mailed, faxed, or at  for patient to ):faxed to     Date form mailed/faxed/left at  for patient and sent to HIM for scanning:10/08/24, sent to HIM for scanning      Once form is left for patient, faxed, or mailed PCS will then close the documentation only encounter.       Jacob Hatfield MA

## 2024-10-21 ENCOUNTER — MEDICAL CORRESPONDENCE (OUTPATIENT)
Dept: HEALTH INFORMATION MANAGEMENT | Facility: CLINIC | Age: 79
End: 2024-10-21
Payer: COMMERCIAL

## 2024-10-21 ENCOUNTER — TELEPHONE (OUTPATIENT)
Dept: FAMILY MEDICINE | Facility: CLINIC | Age: 79
End: 2024-10-21
Payer: COMMERCIAL

## 2024-10-21 NOTE — TELEPHONE ENCOUNTER
Order/Referral Request    Who is requesting: [facility]    Orders being requested: Requesting Treat and Eval for SN,OT and Speech.  Also orders for PT for 3 x a wk for 1 wk, 2 x a wk for 2 wks and 1 x a wk for 2 wks.    Reason service is needed/diagnosis: pt was hospitalize for aspiration pneumonia and chronic wounds from rectal cancer    When are orders needed by: asap    Has this been discussed with Provider: No    Does patient have a preference on a Group/Provider/Facility? [facility]    Does patient have an appointment scheduled?: No    Where to send orders:  verbal    Okay to leave a detailed message?: Yes at Other phone number:  [phone]      Does patient or caller know when to expect a call? Yes, Nurses will return call within 2-3 business hours.       Morgan Ca on 10/21/2024 at 8:08 AM

## 2024-10-21 NOTE — TELEPHONE ENCOUNTER
VERBAL ORDERS GIVEN TO CARROLL:    Treat and Eval for SN,OT and Speech.    PT for 3 x a wk for 1 wk, 2 x a wk for 2 wks and 1 x a wk for 2 wks.       Justin Barajas RN, MSN

## 2024-10-23 ENCOUNTER — DOCUMENTATION ONLY (OUTPATIENT)
Dept: FAMILY MEDICINE | Facility: CLINIC | Age: 79
End: 2024-10-23
Payer: COMMERCIAL

## 2024-10-23 DIAGNOSIS — E03.4 HYPOTHYROIDISM DUE TO ACQUIRED ATROPHY OF THYROID: ICD-10-CM

## 2024-10-23 NOTE — PROGRESS NOTES
To be completed in Nursing note:    Please reference list for forms that require a visit for completion.  Please remind patients that providers are given 3-5 business days to complete and return forms.      Form type:[facility]    Date form received: 10/21/24    Date form completed by Physician:10/22/24    How was form returned to patient (mailed, faxed, or at  for patient to ):  Faxed to 10/23/24  Date form mailed/faxed/left at  for patient and sent to HIM for scanning:  10/23/24, sent to HIM for scanning    Once form is left for patient, faxed, or mailed PCS will then close the documentation only encounter.     Jacob Hatfield MA

## 2024-10-24 ENCOUNTER — TELEPHONE (OUTPATIENT)
Dept: FAMILY MEDICINE | Facility: CLINIC | Age: 79
End: 2024-10-24
Payer: COMMERCIAL

## 2024-10-24 RX ORDER — LEVOTHYROXINE SODIUM 175 UG/1
175 TABLET ORAL EVERY MORNING
Qty: 90 TABLET | Refills: 1 | Status: SHIPPED | OUTPATIENT
Start: 2024-10-24

## 2024-10-24 NOTE — TELEPHONE ENCOUNTER
Order/Referral Request    Who is requesting: orders    Orders being requested: verbal orders speech therapy for dysthagia [id number]     Reason service is needed/diagnosis: pneumonia    When are orders needed by: this week    Has this been discussed with Provider: No    Does patient have a preference on a Group/Provider/Facility? [facility]     Does patient have an appointment scheduled?: No    Where to send orders:  verbal    Okay to leave a detailed message?: Yes at Home number on file [phone] (home)    Does patient or caller know when to expect a call? Yes, Nurses will return call within 2-3 business hours.       Ana Coburn on 10/24/2024 at 2:13 PM

## 2024-10-24 NOTE — TELEPHONE ENCOUNTER
Order/Referral Request     Who is requesting: orders     Orders given: verbal orders speech therapy for dysthagia [id number]      Reason service is needed/diagnosis: pneumonia      Justin Barajas, RN, MSN

## 2024-10-24 NOTE — TELEPHONE ENCOUNTER
Name from pharmacy: LEVOTHYROXINE SODIUM 175MCG TABS          Will file in chart as: levothyroxine (SYNTHROID/LEVOTHROID) 175 MCG tablet    Sig: TAKE ONE TABLET BY MOUTH EVERY EVENING    Disp: 90 tablet    Refills: 1    Start: 10/23/2024    Class: E-Prescribe    For: Hypothyroidism due to acquired atrophy of thyroid    Last ordered: 7 months ago (3/21/2024) by Jacqueline Nowak MD    Last refill: 6/28/2024    Rx #: [id number]    Thyroid Protocol Nqamzs10/23/2024 11:48 AM   Protocol Details Patient is 12 years or older    Medication is active on med list    Recent (12 mo) or future (90 days) visit within the authorizing provider's specialty    Medication indicated for associated diagnosis    Normal TSH on file in past 12 months        TSH   Date Value Ref Range Status   05/15/2024 2.02 0.30 - 4.20 uIU/mL Final   04/21/2021 0.20 (L) 0.30 - 5.00 uIU/mL Final       Prescription approved per [facility] Refill Protocol.    Justin Barajas, RN, MSN

## 2024-10-25 ENCOUNTER — MEDICAL CORRESPONDENCE (OUTPATIENT)
Dept: HEALTH INFORMATION MANAGEMENT | Facility: CLINIC | Age: 79
End: 2024-10-25

## 2024-10-25 ENCOUNTER — OFFICE VISIT (OUTPATIENT)
Dept: FAMILY MEDICINE | Facility: CLINIC | Age: 79
End: 2024-10-25
Payer: COMMERCIAL

## 2024-10-25 ENCOUNTER — DOCUMENTATION ONLY (OUTPATIENT)
Dept: FAMILY MEDICINE | Facility: CLINIC | Age: 79
End: 2024-10-25

## 2024-10-25 VITALS
DIASTOLIC BLOOD PRESSURE: 68 MMHG | RESPIRATION RATE: 16 BRPM | SYSTOLIC BLOOD PRESSURE: 105 MMHG | BODY MASS INDEX: 25.95 KG/M2 | HEART RATE: 105 BPM | OXYGEN SATURATION: 97 % | TEMPERATURE: 97.7 F | WEIGHT: 152 LBS | HEIGHT: 64 IN

## 2024-10-25 DIAGNOSIS — Z92.89 HISTORY OF RECENT HOSPITALIZATION: Primary | ICD-10-CM

## 2024-10-25 DIAGNOSIS — N18.32 STAGE 3B CHRONIC KIDNEY DISEASE (H): ICD-10-CM

## 2024-10-25 DIAGNOSIS — J69.0 ASPIRATION PNEUMONIA DUE TO INHALATION OF VOMITUS (H): ICD-10-CM

## 2024-10-25 DIAGNOSIS — M54.2 NECK PAIN: ICD-10-CM

## 2024-10-25 DIAGNOSIS — F33.1 MODERATE RECURRENT MAJOR DEPRESSION (H): ICD-10-CM

## 2024-10-25 LAB
ANION GAP SERPL CALCULATED.3IONS-SCNC: 13 MMOL/L (ref 7–15)
BUN SERPL-MCNC: 27.5 MG/DL (ref 8–23)
CALCIUM SERPL-MCNC: 9.1 MG/DL (ref 8.8–10.4)
CHLORIDE SERPL-SCNC: 104 MMOL/L (ref 98–107)
CREAT SERPL-MCNC: 1.69 MG/DL (ref 0.67–1.17)
CREAT UR-MCNC: 202 MG/DL
EGFRCR SERPLBLD CKD-EPI 2021: 41 ML/MIN/1.73M2
GLUCOSE SERPL-MCNC: 131 MG/DL (ref 70–99)
HCO3 SERPL-SCNC: 24 MMOL/L (ref 22–29)
MICROALBUMIN UR-MCNC: 25.9 MG/L
MICROALBUMIN/CREAT UR: 12.82 MG/G CR (ref 0–17)
PHOSPHATE SERPL-MCNC: 2.4 MG/DL (ref 2.5–4.5)
POTASSIUM SERPL-SCNC: 4.4 MMOL/L (ref 3.4–5.3)
PTH-INTACT SERPL-MCNC: 85 PG/ML (ref 15–65)
SODIUM SERPL-SCNC: 141 MMOL/L (ref 135–145)

## 2024-10-25 PROCEDURE — 80048 BASIC METABOLIC PNL TOTAL CA: CPT

## 2024-10-25 PROCEDURE — 99214 OFFICE O/P EST MOD 30 MIN: CPT | Mod: GC

## 2024-10-25 PROCEDURE — 82043 UR ALBUMIN QUANTITATIVE: CPT

## 2024-10-25 PROCEDURE — 82570 ASSAY OF URINE CREATININE: CPT

## 2024-10-25 PROCEDURE — 83970 ASSAY OF PARATHORMONE: CPT

## 2024-10-25 PROCEDURE — 36415 COLL VENOUS BLD VENIPUNCTURE: CPT

## 2024-10-25 PROCEDURE — 84100 ASSAY OF PHOSPHORUS: CPT

## 2024-10-25 ASSESSMENT — PATIENT HEALTH QUESTIONNAIRE - PHQ9
10. IF YOU CHECKED OFF ANY PROBLEMS, HOW DIFFICULT HAVE THESE PROBLEMS MADE IT FOR YOU TO DO YOUR WORK, TAKE CARE OF THINGS AT HOME, OR GET ALONG WITH OTHER PEOPLE: NOT DIFFICULT AT ALL
SUM OF ALL RESPONSES TO PHQ QUESTIONS 1-9: 2
SUM OF ALL RESPONSES TO PHQ QUESTIONS 1-9: 2

## 2024-10-25 NOTE — PROGRESS NOTES
Preceptor Attestation:    I discussed the patient with the resident and evaluated the patient in person. I have verified the content of the note, which accurately reflects my assessment of the patient and the plan of care.   Supervising Physician:  Jose Kang MD.

## 2024-10-25 NOTE — PROGRESS NOTES
"  Assessment & Plan     History of recent hospitalization  Aspiration pneumonia due to inhalation of vomitus (H)  Hospitalized 10/18 for aspiration pneumonia and treated with antibiotics. Recovery has been slow and patient needed reassurance. Has a strong desire to live for his grandchild. Patient has been vitally stable for some time. Patient was reassured that it takes a lot of time to recover from pneumonia and that he should make sure to get plenty of rest, nutrition, hydration, and fresh air with activity as tolerated.    Neck pain  Severely restricted motion and exquisitely tender to palpation over left levator scapula muscle. Patient was amenable to trying OMT to alleviate pain.Performed [employer], Soft tissue techniques. Teaching for daily stretches. Advised patient to address neck pain with PT as well when they visit next week.    Stage 3b chronic kidney disease (H)  Patient is seen by nephrology. Drinks a lot of water every day, to the point where he is urinating very frequently.  - Albumin Random Urine Quantitative with Creat Ratio  - BASIC METABOLIC PANEL  - Parathyroid Hormone Intact  - Phosphorus    Moderate recurrent major depression (H)  Patient is seen by a psychiatrist via home visits for depression. Has strong will to live and see his grandchild. Patient has been going through a lot with all of his health issues and is understandably having a tough time with it.      Nicotine/Tobacco Cessation  He reports that he has been smoking pipe. He has been exposed to tobacco smoke. He has never used smokeless tobacco.      BMI  Estimated body mass index is 26.09 kg/m  as calculated from the following:    Height as of this encounter: 1.626 m (5' 4\").    Weight as of this encounter: 68.9 kg (152 lb).     Return in about 1 week (around 11/1/2024) for Follow up.    Gaviota Joyce is a 79 year old, presenting for the following health issues:  RECHECK (F/U ER PNEUMONIA )        10/25/2024    10:47 AM " "  Additional Questions   Roomed by KAMALJIT   Accompanied by SELF         10/25/2024    Information    services provided? No      HPI   Two day stay in the ER 10/18/24. Voice changes due to a tube down his throat for suctioning; issues with swallowing while at the hospital. Cough with phlegm persists. Given Levofloxacin at hospital, but is no longer taking. Using inhalers, helps. SOB walking 100 feet. Stoma draining well. Changes 2-3 times/day. Drinking 8 glasses of water a day. Peeing a lot. Interrupting sleep at night. Percocet for pain. Stopped chemo until feeling better.     Patient also having acute left-sided neck pain that is severely restricting range of motion. Patient was amenable to attempting OMT to address the problem until he is seen by PT next week.    Open sore by the rectum (dressing changes x2 week) draining fluid. Has PT and OT Speech and wound care coming to his house. He has a home visit appointment with a psychiatrist in 2 weeks for depression. Lost top teeth at [facility]; in the process of litigation. Medical Assistance has run out.    Review of Systems  Negative unless otherwise noted in HPI.      Objective    /68 (BP Location: Left arm, Patient Position: Sitting, Cuff Size: Adult Regular)   Pulse 105   Temp 97.7  F (36.5  C) (Oral)   Resp 16   Ht 1.626 m (5' 4\")   Wt 68.9 kg (152 lb)   SpO2 97%   BMI 26.09 kg/m    Body mass index is 26.09 kg/m .  Physical Exam   GENERAL: alert, mild distress, occasional wincing due to pain,  HEAD: edentulous, unable to contain saliva in mouth, difficult oration  NECK: no adenopathy, no asymmetry, masses, or scars  RESP: lungs clear to auscultation - no rales, rhonchi or wheezes  CV: regular rate and rhythm, normal S1 S2, no S3 or S4, no murmur, click or rub, no peripheral edema  ABDOMEN: soft, nontender, no hepatosplenomegaly, no masses and bowel sounds normal  MS: no gross musculoskeletal defects noted, no edema. Acutely " tender and severely restricted range of motion of the neck, particularly along the left levator scapula muscle.  PSYCH: mentation appears normal, affect normal/bright  LYMPH: no cervical, supraclavicular, axillary, or inguinal adenopathy          Signed Electronically by: Raymond Cooney DO

## 2024-10-25 NOTE — PROGRESS NOTES
To be completed in Nursing note:    Please reference list for forms that require a visit for completion.  Please remind patients that providers are given 3-5 business days to complete and return forms.      Form type:[facility]    Date form received: 10/18/24    Date form completed by Physician:10/25/24    How was form returned to patient (mailed, faxed, or at  for patient to ):  Faxed to   Date form mailed/faxed/left at  for patient and sent to HIM for scanning:  10/25/24, sent to HIM for scanning    Once form is left for patient, faxed, or mailed PCS will then close the documentation only encounter.     Jacob Hatfield MA

## 2024-10-25 NOTE — PROGRESS NOTES
To be completed in Nursing note:    Please reference list for forms that require a visit for completion.  Please remind patients that providers are given 3-5 business days to complete and return forms.      Form type:[facility]    Date form received: 10/23/24-10/25/24    Date form completed by Physician:10/25/24    How was form returned to patient (mailed, faxed, or at  for patient to ):  Faxed   Date form mailed/faxed/left at  for patient and sent to HIM for scanning:  10/25/24, sent to HIM for scanning    Once form is left for patient, faxed, or mailed PCS will then close the documentation only encounter.     Jacob Hatfield MA

## 2024-10-25 NOTE — LETTER
November 1, 2024      Isiah CARLY McArdle  [address]   [place] [address]        Dear Mr.McArdle,    We are writing to inform you of your test results.    Urine microalbumin within normal limits. BMP shows decreased kidney function at patient's baseline. Phosphorus just below normal range. Parathyroid hormone elevated at 85 (normal 15-65). Will discuss lab results at next clinic visit.     Resulted Orders   Phosphorus   Result Value Ref Range    Phosphorus 2.4 (L) 2.5 - 4.5 mg/dL   Parathyroid Hormone Intact   Result Value Ref Range    Parathyroid Hormone Intact 85 (H) 15 - 65 pg/mL    Narrative    This result was obtained with the Roche Elecsys PTH STAT assay.   This reference range differs from PTH assays used in other [facility] laboratories.   BASIC METABOLIC PANEL   Result Value Ref Range    Sodium 141 135 - 145 mmol/L    Potassium 4.4 3.4 - 5.3 mmol/L    Chloride 104 98 - 107 mmol/L    Carbon Dioxide (CO2) 24 22 - 29 mmol/L    Anion Gap 13 7 - 15 mmol/L    Urea Nitrogen 27.5 (H) 8.0 - 23.0 mg/dL    Creatinine 1.69 (H) 0.67 - 1.17 mg/dL    GFR Estimate 41 (L) >60 mL/min/1.73m2      Comment:      eGFR calculated using 2021 CKD-EPI equation.    Calcium 9.1 8.8 - 10.4 mg/dL      Comment:      Reference intervals for this test were updated on 7/16/2024 to reflect our healthy population more accurately. There may be differences in the flagging of prior results with similar values performed with this method. Those prior results can be interpreted in the context of the updated reference intervals.    Glucose 131 (H) 70 - 99 mg/dL   Albumin Random Urine Quantitative with Creat Ratio   Result Value Ref Range    Creatinine Urine mg/dL 202.0 mg/dL      Comment:      The reference ranges have not been established in urine creatinine. The results should be integrated into the clinical context for interpretation.    Albumin Urine mg/L 25.9 mg/L      Comment:      The reference ranges have not been  established in urine albumin. The results should be integrated into the clinical context for interpretation.    Albumin Urine mg/g Cr 12.82 0.00 - 17.00 mg/g Cr      Comment:      Microalbuminuria is defined as an albumin:creatinine ratio of 17 to 299 for males and 25 to 299 for females. A ratio of albumin:creatinine of 300 or higher is indicative of overt proteinuria.  Due to biologic variability, positive results should be confirmed by a second, first-morning random or 24-hour timed urine specimen. If there is discrepancy, a third specimen is recommended. When 2 out of 3 results are in the microalbuminuria range, this is evidence for incipient nephropathy and warrants increased efforts at glucose control, blood pressure control, and institution of therapy with an angiotensin-converting-enzyme (ACE) inhibitor (if the patient can tolerate it).         If you have any questions or concerns, please call the clinic at the number listed above.       Sincerely,      Jose Kang MD

## 2024-10-28 ENCOUNTER — DOCUMENTATION ONLY (OUTPATIENT)
Dept: FAMILY MEDICINE | Facility: CLINIC | Age: 79
End: 2024-10-28
Payer: COMMERCIAL

## 2024-10-28 ENCOUNTER — TELEPHONE (OUTPATIENT)
Dept: FAMILY MEDICINE | Facility: CLINIC | Age: 79
End: 2024-10-28
Payer: COMMERCIAL

## 2024-10-28 NOTE — TELEPHONE ENCOUNTER
Order/Referral verbal given      Who is requesting: HOME CARE      Orders given: OT 1 time a week for the 1st week and 2 X a week for 5 weeks     Justin Barajas, RN, MSN

## 2024-10-28 NOTE — PROGRESS NOTES
To be completed in Nursing note:    Please reference list for forms that require a visit for completion.  Please remind patients that providers are given 3-5 business days to complete and return forms.      Form type:[facility]    Date form received: 10/25/24    Date form completed by Physician:10/28/24    How was form returned to patient (mailed, faxed, or at  for patient to ):  Faxed to   Date form mailed/faxed/left at  for patient and sent to HIM for scanning:  10/28/24, sent to HIM for scanning    Once form is left for patient, faxed, or mailed PCS will then close the documentation only encounter.     Jacob Hatfield MA

## 2024-10-28 NOTE — TELEPHONE ENCOUNTER
Order/Referral Request    Who is requesting: HOME CARE     Orders being requested: OT 1 time a week for the 1st week and 2 X a week for 5 weeks     Reason service is needed/diagnosis:     When are orders needed by: asap    Has this been discussed with Provider: Yes    Does patient have a preference on a Group/Provider/Facility?     Does patient have an appointment scheduled?: No    Where to send orders:  call back     Okay to leave a detailed message?: Yes at Cell number on file:    Telephone Information:   Mobile [phone]        Does patient or caller know when to expect a call? Yes, Nurses will return call within 2-3 business hours.       Sascha Casarez on 10/28/2024 at 3:21 PM

## 2024-10-31 ENCOUNTER — DOCUMENTATION ONLY (OUTPATIENT)
Dept: FAMILY MEDICINE | Facility: CLINIC | Age: 79
End: 2024-10-31
Payer: COMMERCIAL

## 2024-10-31 ENCOUNTER — MEDICAL CORRESPONDENCE (OUTPATIENT)
Dept: HEALTH INFORMATION MANAGEMENT | Facility: CLINIC | Age: 79
End: 2024-10-31
Payer: COMMERCIAL

## 2024-10-31 DIAGNOSIS — Z53.9 DIAGNOSIS NOT YET DEFINED: Primary | ICD-10-CM

## 2024-10-31 NOTE — PROGRESS NOTES
To be completed in Nursing note:    Please reference list for forms that require a visit for completion.  Please remind patients that providers are given 3-5 business days to complete and return forms.      Form type:  [facility]    Date form received: 10/30/24    Date form completed by Physician:  10/31/24  How was form returned to patient (mailed, faxed, or at  for patient to ):  Faxed to [phone]  Date form mailed/faxed/left at  for patient and sent to HIM for scanning:  10/31/24, sent to HIM for scanning    Once form is left for patient, faxed, or mailed PCS will then close the documentation only encounter.     Jacob Hatfield MA

## 2024-11-13 ENCOUNTER — MEDICAL CORRESPONDENCE (OUTPATIENT)
Dept: HEALTH INFORMATION MANAGEMENT | Facility: CLINIC | Age: 79
End: 2024-11-13
Payer: COMMERCIAL

## 2024-11-13 ENCOUNTER — DOCUMENTATION ONLY (OUTPATIENT)
Dept: FAMILY MEDICINE | Facility: CLINIC | Age: 79
End: 2024-11-13
Payer: COMMERCIAL

## 2024-11-13 NOTE — PROGRESS NOTES
To be completed in Nursing note:    Please reference list for forms that require a visit for completion.  Please remind patients that providers are given 3-5 business days to complete and return forms.      Form type:[facility]    Date form received: 24    Date form completed by Physician:24    How was form returned to patient (mailed, faxed, or at  for patient to ):  Faxed to [phone]  Date form mailed/faxed/left at  for patient and sent to HIM for scannin24 sent to HIM for scanning    Once form is left for patient, faxed, or mailed PCS will then close the documentation only encounter.     Jacob Hatfield MA

## 2024-11-14 ENCOUNTER — TRANSFERRED RECORDS (OUTPATIENT)
Dept: HEALTH INFORMATION MANAGEMENT | Facility: CLINIC | Age: 79
End: 2024-11-14
Payer: COMMERCIAL

## 2024-11-15 ENCOUNTER — TELEPHONE (OUTPATIENT)
Dept: FAMILY MEDICINE | Facility: CLINIC | Age: 79
End: 2024-11-15
Payer: COMMERCIAL

## 2024-11-15 DIAGNOSIS — C20 ADENOCARCINOMA OF RECTUM (H): Primary | ICD-10-CM

## 2024-11-15 NOTE — TELEPHONE ENCOUNTER
Shannon calling also wanted to discuss and request a Hospice consult evaluation and treat order for pt. Would require one from PCP. If okay, she needs orders faxed to [phone] once completed.     Routed to Dr. Nowak.    Justin Barajas RN, MSN

## 2024-11-15 NOTE — TELEPHONE ENCOUNTER
If patient is wanting hospice, I can sign hospice orders for him.     Jacqueline Nowak MD    Routed to GARTH Anderson

## 2024-11-18 NOTE — TELEPHONE ENCOUNTER
Called and discussed with [facility] coordinator. For Isiah, they need an updated hospice evaluation and treat order and then they will handle the rest from there, including cares and meds.    Once order is in, will fax to [phone].    Routed to PCP.    Justin Barajas RN, MSN

## 2024-11-18 NOTE — TELEPHONE ENCOUNTER
Can they send us a copy of the hospice evaluation form?  I was looking through the chart and hoping to find a copy in there, but I don't see one.      I am happy to sign - I just need the right forms!    Dr. Nowak    Routed to GARTH Anderson

## 2024-11-18 NOTE — TELEPHONE ENCOUNTER
I'm not sure if those orders are still valid, but we can try sending them over.  Otherwise it would be okay to give a verbal or request that the hospice agency send over new updated orders.  I'm happy to sign them as needed.      Jacqueline Nowak MD    Routed to GARTH Anderson

## 2024-11-19 ENCOUNTER — TELEPHONE (OUTPATIENT)
Dept: FAMILY MEDICINE | Facility: CLINIC | Age: 79
End: 2024-11-19
Payer: COMMERCIAL

## 2024-11-19 NOTE — TELEPHONE ENCOUNTER
Here's the information I have from the previous referral:    Comment: Hospice eligibility overview: prognosis of 6 months or less, end stage disease, patient goals are comfort only, patient is not seeking curative treatment.      If you do not hear from Hospice, or you would like to call to schedule, please call the referring place of service that your provider has referred you to.   ______________________________________________________________________      PLEASE Schedule Hospice consult for 24 - 48 hours.   Please call if there is need for a variance to this timeline.      Current Outpatient Medications:   albuterol (PROAIR HFA/PROVENTIL HFA/VENTOLIN HFA) 108 (90 Base) MCG/ACT inhaler, Inhale 2 puffs into the lungs every 6 hours, Disp: 18 g, Rfl: 1   albuterol (PROVENTIL) (2.5 MG/3ML) 0.083% neb solution, Take 1 vial (2.5 mg) by nebulization every 6 hours as needed for shortness of breath / dyspnea or wheezing, Disp: 90 mL, Rfl: 1   amLODIPine (NORVASC) 5 MG tablet, Take 5 mg by mouth every evening, Disp: , Rfl:   aspirin (ASA) 81 MG chewable tablet, Take 1 tablet (81 mg) by mouth every evening, Disp: 90 tablet, Rfl: 3   atorvastatin (LIPITOR) 80 MG tablet, Take 80 mg by mouth every evening, Disp: , Rfl:   baclofen (LIORESAL) 20 MG tablet, TAKE ONE TABLET BY MOUTH TWICE A DAY AS NEEDED FOR MUSCLE SPASMS IN THE EVENING, Disp: 45 tablet, Rfl: 1   budesonide-formoterol (SYMBICORT) 80-4.5 MCG/ACT Inhaler, Take 2 puff 2x daily and 1-2 puffs as needed with shortness of breath., Disp: 20.4 g, Rfl: 3   famotidine (PEPCID) 20 MG tablet, Take 1 tablet (20 mg) by mouth nightly as needed, Disp: 90 tablet, Rfl: 1   famotidine (PEPCID) 40 MG tablet, TAKE ONE TABLET BY MOUTH EVERY DAY, Disp: 90 tablet, Rfl: 3   FLUoxetine (PROZAC) 40 MG capsule, Take 1 capsule (40 mg) by mouth daily, Disp: 90 capsule, Rfl: 0   levothyroxine (SYNTHROID/LEVOTHROID) 175 MCG tablet, Take 1 tablet (175 mcg) by mouth every evening, Disp: 90 tablet,  Rfl: 1   Magnesium Cl-Calcium Carbonate 71.5-119 MG TBEC, Take 71.5 mg by mouth 2 times daily, Disp: , Rfl:         Patient Active Problem List:      Chronic Reflux esophagitis      Moderate recurrent major depression (H)      Diastolic Dysfunction      Fainting (Syncope)      smoking      Prediabetes      Asthma      Hyperlipidemia      Obese      Adenocarcinoma of rectum (H)      Pulmonary embolism and infarction (H)      Esophageal reflux      Hypothyroidism due to acquired atrophy of thyroid      ACP (advance care planning)      Stage 3b chronic kidney disease (H)      Decreased vision in both eyes      Bilateral hearing loss, unspecified hearing loss type      Ventral hernia      Unsteady gait      Ileostomy care (H)      Ileostomy status (H)      Carpal tunnel syndrome of right wrist      Cataract of both eyes, unspecified cataract type      Chronic obstructive pulmonary disease, unspecified COPD type (H)      Generalized weakness      Acute kidney injury (H24)      Vomiting and diarrhea      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 This patient will be followed by a physician who will periodically review the plan of care.      Physician/Provider to provide follow up care: Jacqueline Nowak      Please be aware that coverage of these services is subject to the terms and limitations of your health insurance plan.  Call member services at your health plan with any benefit or coverage questions.   Other - Use Comments   [facility] (current provider)   Please call to schedule your appointment     I placed a new hospice referral in [facility].  I am worried it won't be accepted because I have not seen the patient in over 6 months, but we'll see.  I'm also happy to see him and do an assessment if needed.      Jacqueline Nowak MD    Routed to GARTH Queen

## 2024-11-19 NOTE — TELEPHONE ENCOUNTER
Referral Faxed: [facility] [phone]     Justin Barajas, RN, MSN

## 2024-11-19 NOTE — TELEPHONE ENCOUNTER
Order/Referral Request    Who is requesting: orders    Orders being requested: hospice consult     Reason service is needed/diagnosis: cancer    When are orders needed by: asap    Has this been discussed with Provider: No    Does patient have a preference on a Group/Provider/Facility? [facility]    Does patient have an appointment scheduled?: No    Where to send orders: Fax [phone]    Okay to leave a detailed message?: Yes at [phone]     Does patient or caller know when to expect a call? Yes, Nurses will return call within 2-3 business hours.       Ana Coburn on 11/19/2024 at 3:44 PM

## 2024-11-19 NOTE — TELEPHONE ENCOUNTER
Kelly from Hospice care talked to their medical records and they are unable to provide us with a blank.They are wondering if we can populate a referral similar to the previous one that  ( Orders from 24).    Please advise.    JH

## 2024-11-20 NOTE — TELEPHONE ENCOUNTER
[facility] ([facility]) called back.  Unfortunately, what they received was not what they needed.  It needs to state eval and treat for hospice.

## 2024-12-03 ENCOUNTER — TELEPHONE (OUTPATIENT)
Dept: FAMILY MEDICINE | Facility: CLINIC | Age: 79
End: 2024-12-03
Payer: COMMERCIAL

## 2024-12-03 NOTE — TELEPHONE ENCOUNTER
[facility], the Hospice  called back to inform PCP that they would like to know after meeting with the patient if she will place the hospice order or not.    I shared that I was unable to contact the patient and she made note that the next time there is a nurse going out to do a visit, they will assist with scheduling a visit back at [place] with PCP.      BETSY Snider

## 2024-12-03 NOTE — TELEPHONE ENCOUNTER
Order/Referral Request    Who is requesting: femi Wallace Hospice Coordinator at [facility]     Orders being requested: Hospice Orders for Eval and Treat. [facility] would also like PCP to follow patient through Hospice     Does patient have an appointment scheduled?: No, patient has been contacted by [facility] staff to get an appointment scheduled in clinic    Where to send orders: Fax [phone]      BETSY Snider

## 2024-12-03 NOTE — TELEPHONE ENCOUNTER
Attempted to contact pt to schedule visit with Dr. Nowak, did not reach patient and I was unable to leave a vm.    BETSY Snider

## 2024-12-27 ENCOUNTER — TELEPHONE (OUTPATIENT)
Dept: FAMILY MEDICINE | Facility: CLINIC | Age: 79
End: 2024-12-27

## 2024-12-27 ENCOUNTER — MEDICAL CORRESPONDENCE (OUTPATIENT)
Dept: HEALTH INFORMATION MANAGEMENT | Facility: CLINIC | Age: 79
End: 2024-12-27
Payer: COMMERCIAL

## 2024-12-27 NOTE — TELEPHONE ENCOUNTER
Called both line.  No answer.  Unable to leave message.  Will try again later.  Need to schedule for followup visit.    Jacob Hatfield MA

## 2025-01-08 ENCOUNTER — DOCUMENTATION ONLY (OUTPATIENT)
Dept: FAMILY MEDICINE | Facility: CLINIC | Age: 80
End: 2025-01-08
Payer: COMMERCIAL

## 2025-01-08 NOTE — PROGRESS NOTES
To be completed in Nursing note:    Please reference list for forms that require a visit for completion.  Please remind patients that providers are given 3-5 business days to complete and return forms.      Form type:  [facility]    Date form received: 25    Date form completed by Physician:25    How was form returned to patient (mailed, faxed, or at  for patient to ):  faxed to     Date form mailed/faxed/left at  for patient and sent to HIM for scannin25, sent to HIM for scanning      Once form is left for patient, faxed, or mailed PCS will then close the documentation only encounter.     Jacob Hatfield MA

## 2025-01-22 ENCOUNTER — DOCUMENTATION ONLY (OUTPATIENT)
Dept: FAMILY MEDICINE | Facility: CLINIC | Age: 80
End: 2025-01-22
Payer: COMMERCIAL

## 2025-01-22 DIAGNOSIS — Z53.9 DIAGNOSIS NOT YET DEFINED: Primary | ICD-10-CM

## 2025-01-22 PROCEDURE — G0179 MD RECERTIFICATION HHA PT: HCPCS | Performed by: STUDENT IN AN ORGANIZED HEALTH CARE EDUCATION/TRAINING PROGRAM

## 2025-01-22 NOTE — PROGRESS NOTES
To be completed in Nursing note:    Please reference list for forms that require a visit for completion.  Please remind patients that providers are given 3-5 business days to complete and return forms.      Form type: [facility]    Date form received: 25    Date form completed by Physician: 25    How was form returned to patient (mailed, faxed, or at  for patient to ): faxed to     Date form mailed/faxed/left at  for patient and sent to HIM for scannin25 faxed and sent to HIM for scanning      Once form is left for patient, faxed, or mailed PCS will then close the documentation only encounter.     Jacob Htafield MA

## 2025-01-26 DIAGNOSIS — E03.4 HYPOTHYROIDISM DUE TO ACQUIRED ATROPHY OF THYROID: ICD-10-CM

## 2025-01-26 DIAGNOSIS — I10 ESSENTIAL HYPERTENSION: ICD-10-CM

## 2025-01-26 DIAGNOSIS — D50.9 IRON DEFICIENCY ANEMIA, UNSPECIFIED IRON DEFICIENCY ANEMIA TYPE: ICD-10-CM

## 2025-01-26 DIAGNOSIS — N18.32 STAGE 3B CHRONIC KIDNEY DISEASE (H): Primary | ICD-10-CM

## 2025-01-29 ENCOUNTER — OFFICE VISIT (OUTPATIENT)
Dept: FAMILY MEDICINE | Facility: CLINIC | Age: 80
End: 2025-01-29
Payer: COMMERCIAL

## 2025-01-29 VITALS
HEART RATE: 74 BPM | SYSTOLIC BLOOD PRESSURE: 137 MMHG | OXYGEN SATURATION: 98 % | BODY MASS INDEX: 27.39 KG/M2 | DIASTOLIC BLOOD PRESSURE: 77 MMHG | WEIGHT: 160.4 LBS | TEMPERATURE: 97 F | HEIGHT: 64 IN | RESPIRATION RATE: 18 BRPM

## 2025-01-29 DIAGNOSIS — F33.1 MODERATE RECURRENT MAJOR DEPRESSION (H): ICD-10-CM

## 2025-01-29 DIAGNOSIS — J45.40 MODERATE PERSISTENT ASTHMA, UNCOMPLICATED: ICD-10-CM

## 2025-01-29 DIAGNOSIS — I10 ESSENTIAL HYPERTENSION: ICD-10-CM

## 2025-01-29 DIAGNOSIS — I26.99 PULMONARY EMBOLISM AND INFARCTION (H): ICD-10-CM

## 2025-01-29 DIAGNOSIS — J44.9 CHRONIC OBSTRUCTIVE PULMONARY DISEASE, UNSPECIFIED COPD TYPE (H): ICD-10-CM

## 2025-01-29 DIAGNOSIS — N18.32 STAGE 3B CHRONIC KIDNEY DISEASE (H): ICD-10-CM

## 2025-01-29 DIAGNOSIS — D50.9 IRON DEFICIENCY ANEMIA, UNSPECIFIED IRON DEFICIENCY ANEMIA TYPE: ICD-10-CM

## 2025-01-29 DIAGNOSIS — K21.9 GASTROESOPHAGEAL REFLUX DISEASE WITHOUT ESOPHAGITIS: ICD-10-CM

## 2025-01-29 DIAGNOSIS — C20 ADENOCARCINOMA OF RECTUM (H): Primary | ICD-10-CM

## 2025-01-29 DIAGNOSIS — Z29.11 NEED FOR VACCINATION AGAINST RESPIRATORY SYNCYTIAL VIRUS: ICD-10-CM

## 2025-01-29 DIAGNOSIS — E03.4 HYPOTHYROIDISM DUE TO ACQUIRED ATROPHY OF THYROID: ICD-10-CM

## 2025-01-29 DIAGNOSIS — R25.2 LEG CRAMPS: ICD-10-CM

## 2025-01-29 LAB
ANION GAP SERPL CALCULATED.3IONS-SCNC: 10 MMOL/L (ref 7–15)
BUN SERPL-MCNC: 29.1 MG/DL (ref 8–23)
CALCIUM SERPL-MCNC: 9.6 MG/DL (ref 8.8–10.4)
CHLORIDE SERPL-SCNC: 107 MMOL/L (ref 98–107)
CHOLEST SERPL-MCNC: 195 MG/DL
CREAT SERPL-MCNC: 1.34 MG/DL (ref 0.67–1.17)
EGFRCR SERPLBLD CKD-EPI 2021: 54 ML/MIN/1.73M2
ERYTHROCYTE [DISTWIDTH] IN BLOOD BY AUTOMATED COUNT: 14 % (ref 10–15)
FASTING STATUS PATIENT QL REPORTED: ABNORMAL
FASTING STATUS PATIENT QL REPORTED: ABNORMAL
GLUCOSE SERPL-MCNC: 105 MG/DL (ref 70–99)
HCO3 SERPL-SCNC: 22 MMOL/L (ref 22–29)
HCT VFR BLD AUTO: 31.2 % (ref 40–53)
HDLC SERPL-MCNC: 37 MG/DL
HGB BLD-MCNC: 10.1 G/DL (ref 13.3–17.7)
LDLC SERPL CALC-MCNC: 142 MG/DL
MCH RBC QN AUTO: 30.6 PG (ref 26.5–33)
MCHC RBC AUTO-ENTMCNC: 32.4 G/DL (ref 31.5–36.5)
MCV RBC AUTO: 95 FL (ref 78–100)
NONHDLC SERPL-MCNC: 158 MG/DL
PLATELET # BLD AUTO: 219 10E3/UL (ref 150–450)
POTASSIUM SERPL-SCNC: 4.3 MMOL/L (ref 3.4–5.3)
RBC # BLD AUTO: 3.3 10E6/UL (ref 4.4–5.9)
SODIUM SERPL-SCNC: 139 MMOL/L (ref 135–145)
TRIGL SERPL-MCNC: 81 MG/DL
TSH SERPL DL<=0.005 MIU/L-ACNC: 0.53 UIU/ML (ref 0.3–4.2)
WBC # BLD AUTO: 3.2 10E3/UL (ref 4–11)

## 2025-01-29 PROCEDURE — 84443 ASSAY THYROID STIM HORMONE: CPT | Performed by: STUDENT IN AN ORGANIZED HEALTH CARE EDUCATION/TRAINING PROGRAM

## 2025-01-29 PROCEDURE — 36415 COLL VENOUS BLD VENIPUNCTURE: CPT | Performed by: STUDENT IN AN ORGANIZED HEALTH CARE EDUCATION/TRAINING PROGRAM

## 2025-01-29 PROCEDURE — 80061 LIPID PANEL: CPT | Performed by: STUDENT IN AN ORGANIZED HEALTH CARE EDUCATION/TRAINING PROGRAM

## 2025-01-29 PROCEDURE — G2211 COMPLEX E/M VISIT ADD ON: HCPCS | Performed by: STUDENT IN AN ORGANIZED HEALTH CARE EDUCATION/TRAINING PROGRAM

## 2025-01-29 PROCEDURE — 85027 COMPLETE CBC AUTOMATED: CPT | Performed by: STUDENT IN AN ORGANIZED HEALTH CARE EDUCATION/TRAINING PROGRAM

## 2025-01-29 PROCEDURE — 80048 BASIC METABOLIC PNL TOTAL CA: CPT | Performed by: STUDENT IN AN ORGANIZED HEALTH CARE EDUCATION/TRAINING PROGRAM

## 2025-01-29 PROCEDURE — 99214 OFFICE O/P EST MOD 30 MIN: CPT | Performed by: STUDENT IN AN ORGANIZED HEALTH CARE EDUCATION/TRAINING PROGRAM

## 2025-01-29 RX ORDER — LEVOTHYROXINE SODIUM 175 UG/1
175 TABLET ORAL EVERY MORNING
Qty: 90 TABLET | Refills: 1 | Status: SHIPPED | OUTPATIENT
Start: 2025-01-29

## 2025-01-29 RX ORDER — ASPIRIN 81 MG/1
81 TABLET, CHEWABLE ORAL EVERY EVENING
Qty: 90 TABLET | Refills: 3 | Status: SHIPPED | OUTPATIENT
Start: 2025-01-29

## 2025-01-29 RX ORDER — HYDROMORPHONE HYDROCHLORIDE 2 MG/1
2 TABLET ORAL EVERY 6 HOURS PRN
Qty: 30 TABLET | Refills: 0 | Status: SHIPPED | OUTPATIENT
Start: 2025-01-29 | End: 2025-02-08

## 2025-01-29 RX ORDER — FAMOTIDINE 40 MG/1
40 TABLET, FILM COATED ORAL DAILY
Qty: 90 TABLET | Refills: 3 | Status: SHIPPED | OUTPATIENT
Start: 2025-01-29

## 2025-01-29 RX ORDER — ALBUTEROL SULFATE 0.83 MG/ML
2.5 SOLUTION RESPIRATORY (INHALATION) EVERY 6 HOURS PRN
Qty: 90 ML | Refills: 1 | Status: SHIPPED | OUTPATIENT
Start: 2025-01-29

## 2025-01-29 RX ORDER — ALBUTEROL SULFATE 90 UG/1
2 INHALANT RESPIRATORY (INHALATION) EVERY 6 HOURS
Qty: 18 G | Refills: 1 | Status: SHIPPED | OUTPATIENT
Start: 2025-01-29

## 2025-01-29 RX ORDER — FLUOXETINE HYDROCHLORIDE 40 MG/1
40 CAPSULE ORAL DAILY
Qty: 90 CAPSULE | Refills: 1 | Status: SHIPPED | OUTPATIENT
Start: 2025-01-29

## 2025-01-29 RX ORDER — BACLOFEN 20 MG/1
TABLET ORAL
Qty: 45 TABLET | Refills: 1 | Status: SHIPPED | OUTPATIENT
Start: 2025-01-29

## 2025-01-29 NOTE — PROGRESS NOTES
There are no exam notes on file for this visit.    ASSESSMENT AND PLAN:      Isiah was seen today for recheck.    Diagnoses and all orders for this visit:    Adenocarcinoma of rectum (H)  Leg cramps  Cancer associated pain  He has metastatic adenocarcinoma of the rectum.  Worsening continued drainage and pain.  Has been told by his oncologist that there are no further chemoradiation options.  He is not yet ready for hospice.  We continue to explore this.  He does share that others in his life have had a negative experience and is concerned about the care he would receive.  I do think he benefit from talking from them.  He is meeting with palliative care through his oncology office.  He is going to schedule follow-up with them.  I switched him from Percocet to Dilaudid and gave him 30 tabs of 2 mg of Dilaudid to last the next 10 to 14 days.  He will schedule follow-up appointment with his daughter, and we can continue to explore next steps in his goals at this point of his life.  He continues to want to pursue aggressive treatments as of now.  -     HYDROmorphone (DILAUDID) 2 MG tablet; Take 1 tablet (2 mg) by mouth every 6 hours as needed for pain or moderate to severe pain (cancer pain).  -     Adult Mental Health  Referral; Future  -     Primary Care - Care Coordination Referral; Future  -     baclofen (LIORESAL) 20 MG tablet; TAKE ONE TABLET BY MOUTH TWICE A DAY AS NEEDED FOR MUSCLE SPASMS IN THE EVENING    Moderate recurrent major depression (H)  Increased sadness, much associated with his terminal illness, but also associated with separation from his girlfriend and significant other of multiple years.  I will see if we have options to help him get connected with her, especially if this is something she desires as well.  Both are approaching end-of-life and have been an excellent support for each other.  We will continue on his Prozac and place a mental health referral for additional therapy.  Has  done well with a CPM would like to work with them again.  -     FLUoxetine (PROZAC) 40 MG capsule; Take 1 capsule (40 mg) by mouth daily.  -     Adult Mental Health  Referral; Future  -     Primary Care - Care Coordination Referral; Future    Stage 3b chronic kidney disease (H)  Essential hypertension  Renal function is relatively stable.  Blood pressure is appropriate.  He on amlodipine 5 currently.  Is on atorvastatin 80, but would recommend discontinuing this if we move more towards palliative and hospice.  -     Lipid Profile  -     Basic metabolic panel    Iron deficiency anemia, unspecified iron deficiency anemia type  Hemoglobin is stable.  -     CBC with platelets    Hypothyroidism due to acquired atrophy of thyroid  Thyroid levels are stable.  Will continue on the same dose of levothyroxine.  -     TSH  -     levothyroxine (SYNTHROID/LEVOTHROID) 175 MCG tablet; Take 1 tablet (175 mcg) by mouth every morning.    Moderate persistent asthma, uncomplicated  Chronic obstructive pulmonary disease, unspecified COPD type (H)  Refilled albuterol.  He continues to smoke.  -     albuterol (PROAIR HFA/PROVENTIL HFA/VENTOLIN HFA) 108 (90 Base) MCG/ACT inhaler; Inhale 2 puffs into the lungs every 6 hours.    Chronic obstructive pulmonary disease, unspecified COPD type (H)  -     albuterol (PROVENTIL) (2.5 MG/3ML) 0.083% neb solution; Take 1 vial (2.5 mg) by nebulization every 6 hours as needed for shortness of breath or wheezing.    Gastroesophageal reflux disease without esophagitis  -     famotidine (PEPCID) 40 MG tablet; Take 1 tablet (40 mg) by mouth daily.    Pulmonary embolism and infarction (H)  He would like to continue to take aspirin.  -     aspirin (ASA) 81 MG chewable tablet; Take 1 tablet (81 mg) by mouth every evening.    The longitudinal plan of care for the diagnosis(es)/condition(s) as documented were addressed during this visit. Due to the added complexity in care, I will continue to support  Isiah in the subsequent management and with ongoing continuity of care.    Patient Instructions   Keep follow up with oncology.      Start taking dilaudid, 1 pill 3x per day as needed for pain.  #30 pills.     You need to get scheduled for therapy.  We can help with setting you back up with [facility].     Follow up in 2-4 weeks, and bring your daughter with you.      Start dilaudid 3x per day as needed for pain.      We will make sure the nurse continues to come out to help with wound care.      Start thinking about hospice - I know you've had some negative experience, but this can help you get more support and services.      Jacqueline Nowak MD    SUBJECTIVE  Isiah Clarkle is a 79 year old male with past medical history significant for    Patient Active Problem List   Diagnosis    Chronic Reflux esophagitis    Moderate recurrent major depression (H)    Diastolic Dysfunction     Fainting (Syncope)    smoking     Prediabetes    Asthma    Hyperlipidemia    Obese    Adenocarcinoma of rectum (H)    Pulmonary embolism and infarction (H)    Esophageal reflux    Hypothyroidism due to acquired atrophy of thyroid    [facility] (advance care planning)    Stage 3b chronic kidney disease (H)    Decreased vision in both eyes    Bilateral hearing loss, unspecified hearing loss type    Ventral hernia    Unsteady gait    Ileostomy care (H)    Ileostomy status (H)    Carpal tunnel syndrome of right wrist    Cataract of both eyes, unspecified cataract type    Chronic obstructive pulmonary disease, unspecified COPD type (H)    Generalized weakness    Acute kidney injury    Vomiting and diarrhea    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  He presents to follow-up  "on multiple things.    He shares with me that he is no longer a candidate for chemo or radiation.  He has been told that the cancer may have spread to his kidneys and his kidneys cannot take it.  His last visit to the oncologist was in the first part of December.  He describes getting a treatment of chemotherapy that was contaminated,, and that he \"went crazy \"and fell down on the floor and they had to call the paramedics.  Has not gone back since then.  He was going to see pain/palliative, it is unclear to me if this was through the oncology clinic or through [facility].  He has been taking Percocet every 6 hours as needed, but notes that it is no longer been effective.  Still has significant pain in his bottom and has to sit on 1 side of his buttocks or the other.  Has regular leaking and drainage and has to wear pull-ups for this.  He has a nurse that comes out twice a week to do dressing changes and to help with cares.    Has been increasingly stressed due to the health of his significant other, Sabina.  She has invasive bladder cancer, and was recently hospitalized and moved to a nursing home in [place].  He has not been able to talk with her, as her daughter will not let him know where she is or how she can communicate with her.  Has been taking care of Sabina's cats at their previous assisted living, as well as has been paying the phone bill.  He is not taking the phone back, is trying to get the daughter to take care of the cats, and is moving all of his stuff out of her apartment.  He is worried about Sabina.  Cannot speak with her.  Feels like the daughter is inappropriately keeping them away from each other but both of them are quite sick.    Previously had a home therapist that was coming out to see him regularly.  This was canceled, and he would like to get in with a new therapist.  Has seen [facility] in the past and would like to continue with them.  Does feel like his mood is more down, but " "knows this is reflective of the situation.  Continues to get good support from his cat.  His daughter has also been hugely supportive.  He becomes quite tearful when talking about all the stress that she is going through.  His ex-wife, his daughter's mother, currently has stage IV lung cancer.  Feels like it is too many things all at once.    He is can go back and meet with his oncologist.  Is getting some palliative care support for them.  He does not feel like he is ready to stop fighting.  Does not feel like he is ready for hospice.  We explored that sometimes even when we are not ready, we may run out of options that have potential for good.  He is not yet interested in scaling back on any health care at this time.    OBJECTIVE:  Vitals: /77   Pulse 74   Temp 97  F (36.1  C) (Oral)   Resp 18   Ht 1.626 m (5' 4\")   Wt 72.8 kg (160 lb 6.4 oz)   SpO2 98%   BMI 27.53 kg/m    BMI= Body mass index is 27.53 kg/m .  Objective:    Vitals:  Vitals are reviewed and are within the normal range  Gen:  Alert, pleasant, no acute distress  Psych: Mood and affect are down, sad, depressed.  He is tearful throughout the visit.        3/20/2024     9:07 AM 5/15/2024    10:57 AM 10/25/2024    10:47 AM   PHQ   PHQ-9 Total Score 8 10 2    Q9: Thoughts of better off dead/self-harm past 2 weeks Not at all  Not at all Not at all        Proxy-reported          8/25/2023     2:10 PM 12/12/2023     3:53 PM 5/15/2024    10:57 AM   COLTON-7 SCORE   Total Score 1 1 0          Results for orders placed or performed in visit on 01/29/25   Lipid Profile     Status: Abnormal   Result Value Ref Range    Cholesterol 195 <200 mg/dL    Triglycerides 81 <150 mg/dL    Direct Measure HDL 37 (L) >=40 mg/dL    LDL Cholesterol Calculated 142 (H) <100 mg/dL    Non HDL Cholesterol 158 (H) <130 mg/dL    Patient Fasting > 8hrs? Unknown     Narrative    Cholesterol  Desirable: < 200 mg/dL  Borderline High: 200 - 239 mg/dL  High: >= 240 " mg/dL    Triglycerides  Normal: < 150 mg/dL  Borderline High: 150 - 199 mg/dL  High: 200-499 mg/dL  Very High: >= 500 mg/dL    Direct Measure HDL  Female: >= 50 mg/dL   Male: >= 40 mg/dL    LDL Cholesterol  Desirable: < 100 mg/dL  Above Desirable: 100 - 129 mg/dL   Borderline High: 130 - 159 mg/dL   High:  160 - 189 mg/dL   Very High: >= 190 mg/dL    Non HDL Cholesterol  Desirable: < 130 mg/dL  Above Desirable: 130 - 159 mg/dL  Borderline High: 160 - 189 mg/dL  High: 190 - 219 mg/dL  Very High: >= 220 mg/dL   Basic metabolic panel     Status: Abnormal   Result Value Ref Range    Sodium 139 135 - 145 mmol/L    Potassium 4.3 3.4 - 5.3 mmol/L    Chloride 107 98 - 107 mmol/L    Carbon Dioxide (CO2) 22 22 - 29 mmol/L    Anion Gap 10 7 - 15 mmol/L    Urea Nitrogen 29.1 (H) 8.0 - 23.0 mg/dL    Creatinine 1.34 (H) 0.67 - 1.17 mg/dL    GFR Estimate 54 (L) >60 mL/min/1.73m2    Calcium 9.6 8.8 - 10.4 mg/dL    Glucose 105 (H) 70 - 99 mg/dL    Patient Fasting > 8hrs? Unknown    CBC with platelets     Status: Abnormal   Result Value Ref Range    WBC Count 3.2 (L) 4.0 - 11.0 10e3/uL    RBC Count 3.30 (L) 4.40 - 5.90 10e6/uL    Hemoglobin 10.1 (L) 13.3 - 17.7 g/dL    Hematocrit 31.2 (L) 40.0 - 53.0 %    MCV 95 78 - 100 fL    MCH 30.6 26.5 - 33.0 pg    MCHC 32.4 31.5 - 36.5 g/dL    RDW 14.0 10.0 - 15.0 %    Platelet Count 219 150 - 450 10e3/uL   TSH     Status: Normal   Result Value Ref Range    TSH 0.53 0.30 - 4.20 uIU/mL           Patient Instructions   Keep follow up with oncology.      Start taking dilaudid, 1 pill 3x per day as needed for pain.  #30 pills.     You need to get scheduled for therapy.  We can help with setting you back up with [facility].     Follow up in 2-4 weeks, and bring your daughter with you.      Start dilaudid 3x per day as needed for pain.      We will make sure the nurse continues to come out to help with wound care.      Start thinking about hospice - I know you've had some negative experience, but this can  help you get more support and services.      Jacqueline Nowak MD

## 2025-01-29 NOTE — LETTER
January 31, 2025      Michael D McArdle  [address]   [place] [address]        Dear Mr.McArdle,    We are writing to inform you of your test results.    It was good to see you in clinic.  I am so sorry you are going through so much right now.  Here is a copy of your lab reports.  Your cholesterol levels are stable.  Your kidney tests are stable.  Blood sugars are good.  Blood counts, including hemoglobin and white count are a little low, but are stable.  Thyroid levels are normal.  Please call the clinic at [phone] if you have any questions.     Resulted Orders   Lipid Profile   Result Value Ref Range    Cholesterol 195 <200 mg/dL    Triglycerides 81 <150 mg/dL    Direct Measure HDL 37 (L) >=40 mg/dL    LDL Cholesterol Calculated 142 (H) <100 mg/dL    Non HDL Cholesterol 158 (H) <130 mg/dL    Patient Fasting > 8hrs? Unknown     Narrative    Cholesterol  Desirable: < 200 mg/dL  Borderline High: 200 - 239 mg/dL  High: >= 240 mg/dL    Triglycerides  Normal: < 150 mg/dL  Borderline High: 150 - 199 mg/dL  High: 200-499 mg/dL  Very High: >= 500 mg/dL    Direct Measure HDL  Female: >= 50 mg/dL   Male: >= 40 mg/dL    LDL Cholesterol  Desirable: < 100 mg/dL  Above Desirable: 100 - 129 mg/dL   Borderline High: 130 - 159 mg/dL   High:  160 - 189 mg/dL   Very High: >= 190 mg/dL    Non HDL Cholesterol  Desirable: < 130 mg/dL  Above Desirable: 130 - 159 mg/dL  Borderline High: 160 - 189 mg/dL  High: 190 - 219 mg/dL  Very High: >= 220 mg/dL   Basic metabolic panel   Result Value Ref Range    Sodium 139 135 - 145 mmol/L    Potassium 4.3 3.4 - 5.3 mmol/L    Chloride 107 98 - 107 mmol/L    Carbon Dioxide (CO2) 22 22 - 29 mmol/L    Anion Gap 10 7 - 15 mmol/L    Urea Nitrogen 29.1 (H) 8.0 - 23.0 mg/dL    Creatinine 1.34 (H) 0.67 - 1.17 mg/dL    GFR Estimate 54 (L) >60 mL/min/1.73m2      Comment:      eGFR calculated using 2021 CKD-EPI equation.    Calcium 9.6 8.8 - 10.4 mg/dL    Glucose 105 (H) 70 - 99 mg/dL     Patient Fasting > 8hrs? Unknown    CBC with platelets   Result Value Ref Range    WBC Count 3.2 (L) 4.0 - 11.0 10e3/uL    RBC Count 3.30 (L) 4.40 - 5.90 10e6/uL    Hemoglobin 10.1 (L) 13.3 - 17.7 g/dL    Hematocrit 31.2 (L) 40.0 - 53.0 %    MCV 95 78 - 100 fL    MCH 30.6 26.5 - 33.0 pg    MCHC 32.4 31.5 - 36.5 g/dL    RDW 14.0 10.0 - 15.0 %    Platelet Count 219 150 - 450 10e3/uL   TSH   Result Value Ref Range    TSH 0.53 0.30 - 4.20 uIU/mL       If you have any questions or concerns, please call the clinic at the number listed above.       Sincerely,      Jacqueline Nowak MD    Electronically signed

## 2025-01-29 NOTE — PATIENT INSTRUCTIONS
Keep follow up with oncology.      Start taking dilaudid, 1 pill 3x per day as needed for pain.  #30 pills.     You need to get scheduled for therapy.  We can help with setting you back up with [facility].     Follow up in 2-4 weeks, and bring your daughter with you.      Start dilaudid 3x per day as needed for pain.      We will make sure the nurse continues to come out to help with wound care.      Start thinking about hospice - I know you've had some negative experience, but this can help you get more support and services.

## 2025-01-30 NOTE — RESULT ENCOUNTER NOTE
Michael D McArdle-    It was good to see you in clinic.  I am so sorry you are going through so much right now.  Here is a copy of your lab reports.  Your cholesterol levels are stable.  Your kidney tests are stable.  Blood sugars are good.  Blood counts, including hemoglobin and white count are a little low, but are stable.  Thyroid levels are normal.  Please call the clinic at [phone] if you have any questions.      Jacqueline Nowak MD    Please send results to patient.

## 2025-02-03 ENCOUNTER — TELEPHONE (OUTPATIENT)
Dept: FAMILY MEDICINE | Facility: CLINIC | Age: 80
End: 2025-02-03
Payer: COMMERCIAL

## 2025-02-03 ENCOUNTER — TELEPHONE (OUTPATIENT)
Dept: CARE COORDINATION | Facility: CLINIC | Age: 80
End: 2025-02-03
Payer: COMMERCIAL

## 2025-02-03 NOTE — TELEPHONE ENCOUNTER
Care Coordination: 2/3/2025    Dr. Nowak presented this one to Care Coordination for a referral to: [facility].    Reason for referral:  The family /  is not able to speak with his wife/ Mother after she was placed in a long-term care center by another child. Before she was placed in the care center. The patient was caring for his wife while she was also battling Cancer. They are reporting all communication has ended.    Patient Details:  Michael D. McArdle - MRN: [id number]  Male, 79 years old, 1945  Phone: [phone] ( Home Phone)  [phone] (Mobile)  [phone] (Daughter Chantal Tenorio)    [address] [address]      Constantine Car Sr.  Clinic Care Coordinator   [address] [place] [address]  Main Clinic Line: [phone]  Direct:[phone]  Fax: [phone]

## 2025-02-04 NOTE — TELEPHONE ENCOUNTER
Jacob, can you call patient and see if there is an alternative pharmacy we could use that might have the medication?  Otherwise, I will send in a prescription for an increased dose of percocet.    Thank you!    Dr. Nowak    Routed to SHANEKA James.

## 2025-02-05 NOTE — TELEPHONE ENCOUNTER
Attempt to called patient many time.  No answer, unable to leave message, mailbox is full.    Jacob Hatfield MA

## 2025-02-12 ENCOUNTER — TELEPHONE (OUTPATIENT)
Dept: FAMILY MEDICINE | Facility: CLINIC | Age: 80
End: 2025-02-12
Payer: COMMERCIAL

## 2025-02-12 NOTE — TELEPHONE ENCOUNTER
LVM for Dom RN with verbal orders as requested:     Order/Referral Request     Who is requesting: DOM LANGSTON              Orders being requested: SN 2 x a wk for 8 wks     Reason service is needed/diagnosis: wound care       Riaz, RN, BSN

## 2025-02-12 NOTE — TELEPHONE ENCOUNTER
Order/Referral Request    Who is requesting: CHRISTIANNE - NURSE     Orders being requested: SN 2 x a wk for 8 wks    Reason service is needed/diagnosis: wound care    When are orders needed by: asap    Has this been discussed with Provider: No    Does patient have a preference on a Group/Provider/Facility? unsure    Does patient have an appointment scheduled?: Yes: 02/20    Where to send orders:  verbal    Okay to leave a detailed message?: Yes at Other phone number: [phone] christianne      Does this patient currently have active insurance coverage?  Yes, Pt has active insurance coverage.     Does patient or caller know when to expect a call? Yes, Nurses will return call within 2-3 business hours.    Morgan Ca on 2/12/2025 at 1:49 PM

## 2025-02-13 NOTE — TELEPHONE ENCOUNTER
Home Care is calling regarding an established patient with [facility].  Requesting orders from: Jacqueline NowakI: RN able to provide verbal orders.  Sending as FYI only.  Is this a request for a temporary pause in the home care episode?  No    Orders Requested    Skilled Nursing  Request for initial certification (first set of orders)   Frequency: SN 2 x a wk for 8 wks   RN gave verbal order: Yes        Caller: christianne  [facility]  Phone number Home Care can be reached at: [phone]   Okay to leave a detailed message?: Yes    Justin Barajas RN

## 2025-02-20 ENCOUNTER — TELEPHONE (OUTPATIENT)
Dept: FAMILY MEDICINE | Facility: CLINIC | Age: 80
End: 2025-02-20

## 2025-02-20 ENCOUNTER — OFFICE VISIT (OUTPATIENT)
Dept: FAMILY MEDICINE | Facility: CLINIC | Age: 80
End: 2025-02-20
Payer: COMMERCIAL

## 2025-02-20 ENCOUNTER — MEDICAL CORRESPONDENCE (OUTPATIENT)
Dept: HEALTH INFORMATION MANAGEMENT | Facility: CLINIC | Age: 80
End: 2025-02-20

## 2025-02-20 VITALS
SYSTOLIC BLOOD PRESSURE: 155 MMHG | WEIGHT: 166.4 LBS | BODY MASS INDEX: 28.41 KG/M2 | TEMPERATURE: 97.2 F | HEIGHT: 64 IN | OXYGEN SATURATION: 97 % | DIASTOLIC BLOOD PRESSURE: 79 MMHG | HEART RATE: 84 BPM | RESPIRATION RATE: 18 BRPM

## 2025-02-20 DIAGNOSIS — G89.3 CHRONIC PAIN DUE TO NEOPLASM: ICD-10-CM

## 2025-02-20 DIAGNOSIS — K62.89 RECTAL PAIN: ICD-10-CM

## 2025-02-20 DIAGNOSIS — G89.3 CHRONIC PAIN DUE TO NEOPLASM: Primary | ICD-10-CM

## 2025-02-20 DIAGNOSIS — Z93.3 COLOSTOMY STATUS (H): ICD-10-CM

## 2025-02-20 DIAGNOSIS — C20 RECTAL CARCINOMA (H): Primary | ICD-10-CM

## 2025-02-20 RX ORDER — OXYCODONE HYDROCHLORIDE 5 MG/1
5 TABLET ORAL EVERY 6 HOURS PRN
Qty: 120 TABLET | Refills: 0 | Status: SHIPPED | OUTPATIENT
Start: 2025-02-20 | End: 2025-02-20

## 2025-02-20 RX ORDER — OXYCODONE HYDROCHLORIDE 5 MG/1
5 TABLET ORAL EVERY 6 HOURS PRN
Qty: 120 TABLET | Refills: 0 | Status: SHIPPED | OUTPATIENT
Start: 2025-02-20

## 2025-02-20 RX ORDER — POLYETHYLENE GLYCOL 3350 17 G/17G
POWDER, FOR SOLUTION ORAL
Qty: 510 G | Refills: 1 | Status: SHIPPED | OUTPATIENT
Start: 2025-02-20

## 2025-02-20 NOTE — TELEPHONE ENCOUNTER
Prescription resent with [facility] with chronic ne[facility].     Jacqueline Nowak MD    Routed to [facility] for FYI.

## 2025-02-20 NOTE — PATIENT INSTRUCTIONS
Change back to the oxycodone without the tylenol. Up to 4 pills per day as needed for pain.  Switch to 1/4 cap of miralax everyday to balance stools.     Think about meeting with hospice just to talk about what services they can offer you.    The pain control, wound care, supplies and support are important.     Check back in 1 month.

## 2025-02-20 NOTE — PROGRESS NOTES
There are no exam notes on file for this visit.    ASSESSMENT AND PLAN:      Isiah was seen today for recheck.    Diagnoses and all orders for this visit:    Rectal carcinoma (H)  Chronic pain due to neoplasm  Rectal pain  Patient seen today for follow-up of his rectal carcinoma.  This is no longer amenable to treatments, either surgery or chemo.  However patient is not feeling ready for hospice.  We explored some of his concerns and brainstormed maybe trying an alternative hospice agency, meeting with them to learn more about the program, or chatting with his daughter and son-in-law.  He is more open to the idea of hospice than he was previously.  Is still wanting to stay alive if at all possible, but wanting improved pain and symptom control.  He was not able to get the Dilaudid prescribed last time so I will switch him to oxycodone, and increase the dose.  We will do MiraLAX quarter Once daily to try to balance out his stools.  Continue with antidepressants and regular therapy.  He will follow-up in 1 month.  Will continue to be available to talk about goals of care as well as symptom management.  -     Discontinue: oxyCODONE (ROXICODONE) 5 MG tablet; Take 1 tablet (5 mg) by mouth every 6 hours as needed for pain.  -     polyethylene glycol (MIRALAX) 17 GM/Dose powder; Take 1/4 cap daily.    The longitudinal plan of care for the diagnosis(es)/condition(s) as documented were addressed during this visit. Due to the added complexity in care, I will continue to support Isiah in the subsequent management and with ongoing continuity of care.  Patient Instructions   Change back to the oxycodone without the tylenol. Up to 4 pills per day as needed for pain.  Switch to 1/4 cap of miralax everyday to balance stools.     Think about meeting with hospice just to talk about what services they can offer you.    The pain control, wound care, supplies and support are important.     Check back in 1 month.      Jacqueline MORROW  MD Eliu    SUBJECTIVE  Michael D McArdle is a 79 year old male with past medical history significant for    Patient Active Problem List   Diagnosis    Chronic Reflux esophagitis    Moderate recurrent major depression (H)    Diastolic Dysfunction     Fainting (Syncope)    smoking     Prediabetes    Asthma    Hyperlipidemia    Obese    Adenocarcinoma of rectum (H)    Pulmonary embolism and infarction (H)    Esophageal reflux    Hypothyroidism due to acquired atrophy of thyroid    ACP (advance care planning)    Stage 3b chronic kidney disease (H)    Decreased vision in both eyes    Bilateral hearing loss, unspecified hearing loss type    Ventral hernia    Unsteady gait    Ileostomy care (H)    Ileostomy status (H)    Carpal tunnel syndrome of right wrist    Cataract of both eyes, unspecified cataract type    Chronic obstructive pulmonary disease, unspecified COPD type (H)    Generalized weakness    Acute kidney injury    Vomiting and diarrhea    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     Others present at the visit:  None    Presents for   Chief Complaint   Patient presents with    RECHECK     Follow-up last visit     Patient presents today for follow-up.  He shares that things continue to be challenging.  Biggest concern currently is that he has had a home nurse who comes initially 3 times per week and now 2 times per week to change dressings and clean the wound on his backside we are his rectal cancer is protruding through his rectum.  He has been told that they will stop coming in 2 months, and the only way that they will be able to continue his if he goes on to hospice.     He is worried about how he might be able to get his wound care.  Is also worried about getting his colostomy supplies.  Previously had a supply from [employer] that were low quality and did not work.  He gets his med-line colostomy supplies through [facility] and these have worked much better.  He has fewer leakage and is  "using fewer bags.  He can do the colostomy bag management on his own but cannot take care of the wound.  He reports significant pain in his backside.  Has difficulty sitting.  Has pain at nighttime.  Has difficulties with diarrhea.  Has been taking MiraLAX every other day but then often times will have accidents.  Is wearing pull-ups most of the time.     He remains concerned about hospice.  He is aware that he is not a candidate for further surgeries.  He also is aware that there are no additional chemo options.  He is not ready to give up however.  He is also concerned about the quality of hospice care as he has a partner who had difficulty with a catheter placed by hospice nurse.  He has a son-in-law who is on hospice currently with liver cancer, and this has been a better experience.  Also wonders about how this is affecting his daughter, Ivory, who cares for him with his cancer, her  who has terminal liver cancer, and her mother who has lung cancer.  Ivory also has a 9-year-old daughter Sera.  She is a trained LPN, and may be able to help with some of his wound care.  He is worried he will be a burden on her and that going on hospice would be another challenge that she would have to manage.  He is considering moving and perhaps moving in with her in the future.    Has still been unable to see his long-term partner Sabina.  Found out the nursing home where she was living, but was escorted out by police.  Sabina's daughter has blocked any phone calls and messages, and is not allowing him to see her.  This causes him a lot of worry and sadness as well.    He does continue to take his antidepressants.  Has no thoughts of being better off dead or wanting to harm himself.  He is back connected with his therapist again and this has been helpful.    OBJECTIVE:  Vitals: BP (!) 155/79   Pulse 84   Temp 97.2  F (36.2  C) (Oral)   Resp 18   Ht 1.626 m (5' 4\")   Wt 75.5 kg (166 lb 6.4 oz)   SpO2 97%   " BMI 28.56 kg/m    BMI= Body mass index is 28.56 kg/m .  Objective:    Vitals:  Vitals are reviewed and blood pressure is mildly elevated  Gen:  Alert, pleasant, sad, tearful  Cardiac:  Regular rate and rhythm, no murmurs, rubs or gallops  Respiratory:  Lungs clear to auscultation bilaterally  Abdomen: Colostomy bag is in place.  It is soft and nontender.        3/20/2024     9:07 AM 5/15/2024    10:57 AM 10/25/2024    10:47 AM   PHQ   PHQ-9 Total Score 8 10 2    Q9: Thoughts of better off dead/self-harm past 2 weeks Not at all  Not at all Not at all        Proxy-reported          8/25/2023     2:10 PM 12/12/2023     3:53 PM 5/15/2024    10:57 AM   COLTON-7 SCORE   Total Score 1 1 0          Patient Instructions   Change back to the oxycodone without the tylenol. Up to 4 pills per day as needed for pain.  Switch to 1/4 cap of miralax everyday to balance stools.     Think about meeting with hospice just to talk about what services they can offer you.    The pain control, wound care, supplies and support are important.     Check back in 1 month.      Jacqueline Nowak MD

## 2025-02-20 NOTE — TELEPHONE ENCOUNTER
The pharmacy called and they need a new prescription.  Some where on there they need to to say for chronic pain.

## 2025-02-25 ENCOUNTER — MEDICAL CORRESPONDENCE (OUTPATIENT)
Dept: HEALTH INFORMATION MANAGEMENT | Facility: CLINIC | Age: 80
End: 2025-02-25
Payer: COMMERCIAL

## 2025-02-27 ENCOUNTER — DOCUMENTATION ONLY (OUTPATIENT)
Dept: FAMILY MEDICINE | Facility: CLINIC | Age: 80
End: 2025-02-27
Payer: COMMERCIAL

## 2025-02-27 NOTE — PROGRESS NOTES
To be completed in Nursing note:    Please reference list for forms that require a visit for completion.  Please remind patients that providers are given 3-5 business days to complete and return forms.      Form type: [facility]    Date form received: 25    Date form completed by Physician:25    How was form returned to patient (mailed, faxed, or at  for patient to ): faxed to     Date form mailed/faxed/left at  for patient and sent to HIM for scannin25, sent to HIM for scanning      Once form is left for patient, faxed, or mailed PCS will then close the documentation only encounter.     Jacob Hatfield MA

## 2025-03-18 ENCOUNTER — DOCUMENTATION ONLY (OUTPATIENT)
Dept: FAMILY MEDICINE | Facility: CLINIC | Age: 80
End: 2025-03-18
Payer: COMMERCIAL

## 2025-03-18 DIAGNOSIS — Z53.9 DIAGNOSIS NOT YET DEFINED: Primary | ICD-10-CM

## 2025-03-18 NOTE — PROGRESS NOTES
To be completed in Nursing note:    Please reference list for forms that require a visit for completion.  Please remind patients that providers are given 3-5 business days to complete and return forms.      Form type:Home Health Care Certification and Plan of Care    Date form received: 25    Date form completed by Physician:25    How was form returned to patient (mailed, faxed, or at  for patient to ):faxed to     Date form mailed/faxed/left at  for patient and sent to HIM for scannin25, sent to HIM for scanning      Once form is left for patient, faxed, or mailed PCS will then close the documentation only encounter.     Jacob Hatfield MA

## 2025-03-24 DIAGNOSIS — K62.89 RECTAL PAIN: ICD-10-CM

## 2025-03-24 DIAGNOSIS — G89.3 CHRONIC PAIN DUE TO NEOPLASM: ICD-10-CM

## 2025-03-24 RX ORDER — OXYCODONE HYDROCHLORIDE 5 MG/1
5 TABLET ORAL EVERY 6 HOURS PRN
Qty: 120 TABLET | Refills: 0 | OUTPATIENT
Start: 2025-03-24

## 2025-03-26 ENCOUNTER — OFFICE VISIT (OUTPATIENT)
Dept: FAMILY MEDICINE | Facility: CLINIC | Age: 80
End: 2025-03-26
Payer: COMMERCIAL

## 2025-03-26 VITALS
DIASTOLIC BLOOD PRESSURE: 74 MMHG | RESPIRATION RATE: 18 BRPM | SYSTOLIC BLOOD PRESSURE: 164 MMHG | OXYGEN SATURATION: 97 % | HEART RATE: 85 BPM | TEMPERATURE: 97.5 F

## 2025-03-26 DIAGNOSIS — K43.9 VENTRAL HERNIA WITHOUT OBSTRUCTION OR GANGRENE: ICD-10-CM

## 2025-03-26 DIAGNOSIS — Z43.2 ILEOSTOMY CARE (H): ICD-10-CM

## 2025-03-26 DIAGNOSIS — K62.89 RECTAL PAIN: ICD-10-CM

## 2025-03-26 DIAGNOSIS — C20 RECTAL CARCINOMA (H): ICD-10-CM

## 2025-03-26 DIAGNOSIS — Z29.11 NEED FOR VACCINATION AGAINST RESPIRATORY SYNCYTIAL VIRUS: ICD-10-CM

## 2025-03-26 DIAGNOSIS — G89.3 CHRONIC PAIN DUE TO NEOPLASM: Primary | ICD-10-CM

## 2025-03-26 DIAGNOSIS — N18.32 STAGE 3B CHRONIC KIDNEY DISEASE (H): ICD-10-CM

## 2025-03-26 PROCEDURE — G2211 COMPLEX E/M VISIT ADD ON: HCPCS | Performed by: STUDENT IN AN ORGANIZED HEALTH CARE EDUCATION/TRAINING PROGRAM

## 2025-03-26 PROCEDURE — 99214 OFFICE O/P EST MOD 30 MIN: CPT | Performed by: STUDENT IN AN ORGANIZED HEALTH CARE EDUCATION/TRAINING PROGRAM

## 2025-03-26 RX ORDER — OXYCODONE HYDROCHLORIDE 5 MG/1
5-10 TABLET ORAL EVERY 6 HOURS PRN
Qty: 180 TABLET | Refills: 0 | Status: SHIPPED | OUTPATIENT
Start: 2025-03-26

## 2025-03-26 SDOH — HEALTH STABILITY: PHYSICAL HEALTH: ON AVERAGE, HOW MANY DAYS PER WEEK DO YOU ENGAGE IN MODERATE TO STRENUOUS EXERCISE (LIKE A BRISK WALK)?: 7 DAYS

## 2025-03-26 ASSESSMENT — PATIENT HEALTH QUESTIONNAIRE - PHQ9
10. IF YOU CHECKED OFF ANY PROBLEMS, HOW DIFFICULT HAVE THESE PROBLEMS MADE IT FOR YOU TO DO YOUR WORK, TAKE CARE OF THINGS AT HOME, OR GET ALONG WITH OTHER PEOPLE: NOT DIFFICULT AT ALL
SUM OF ALL RESPONSES TO PHQ QUESTIONS 1-9: 7
SUM OF ALL RESPONSES TO PHQ QUESTIONS 1-9: 7

## 2025-03-26 ASSESSMENT — SOCIAL DETERMINANTS OF HEALTH (SDOH): HOW OFTEN DO YOU GET TOGETHER WITH FRIENDS OR RELATIVES?: THREE TIMES A WEEK

## 2025-03-26 NOTE — PROGRESS NOTES
Preventive Care Visit  [facility]  Jacqueline Nowak MD, Family Medicine  Mar 26, 2025  {Provider  Link to SmartSet :385932}    {PROVIDER CHARTING PREFERENCE:822317}    Gaviota Joyce is a 79 year old, presenting for the following:  RECHECK (F/U)        3/26/2025    10:42 AM   Additional Questions   Roomed by KAMALJIT   Accompanied by SELF         3/26/2025    Information    services provided? No   {ROOMER positive Fall Risk- Gait Speed Test is required click here to document the Gait Speed Test and then refresh the note to pull in results  :559232}  {ROOMER if patient is in their first year of Medicare a vision screen is required click here to document the Vison screen and then refresh the note to pull in results  :180923}      HPI  ***   {MA/LPN/RN Pre-Provider Visit Orders- hCG/UA/Strep (Optional):623789}  {SUPERLIST (Optional):033581}  {additonal problems for provider to add (Optional):269127}  Advance Care Planning  Patient has a Health Care Directive on file  {(AWV REQUIRED) Advance Care Planning Reviewed:753586}      3/26/2025   General Health   How would you rate your overall physical health? (!) FAIR   Feel stress (tense, anxious, or unable to sleep) Rather much   (!) STRESS CONCERN      3/26/2025   Nutrition   Diet: Regular (no restrictions)         3/26/2025   Exercise   Days per week of moderate/strenous exercise 7 days         3/26/2025   Social Factors   Frequency of gathering with friends or relatives Three times a week   Worry food won't last until get money to buy more No   Food not last or not have enough money for food? No   Do you have housing? (Housing is defined as stable permanent housing and does not include staying ouside in a car, in a tent, in an abandoned building, in an overnight shelter, or couch-surfing.) No   Are you worried about losing your housing? No   Lack of transportation? No   Unable to get utilities (heat,electricity)? No    Want help with housing or utility concern? No   (!) HOUSING CONCERN PRESENT      3/26/2025   Fall Risk   Fallen 2 or more times in the past year? Yes    No   Trouble with walking or balance? Yes    No       Multiple values from one day are sorted in reverse-chronological order     {Positive Fall Risk- Gait Speed Test is required and was not documented before note was started.  If results do not appear, ask staff to complete.  Once completed, refresh note to pull in results Click here to link Gait Speed Test  :814016}      3/26/2025   Activities of Daily Living- Home Safety   Needs help with the following daily activites None of the above   Safety concerns in the home None of the above         3/26/2025   Dental   Dentist two times every year? (!) NO         3/26/2025   Hearing Screening   Hearing concerns? (!) I NEED TO ASK PEOPLE TO SPEAK UP OR REPEAT THEMSELVES.         3/26/2025   Driving Risk Screening   Patient/family members have concerns about driving No         3/26/2025   General Alertness/Fatigue Screening   Have you been more tired than usual lately? (!) YES         3/26/2025   Urinary Incontinence Screening   Bothered by leaking urine in past 6 months Yes         Today's PHQ-9 Score:       3/26/2025    10:34 AM   PHQ-9 SCORE   PHQ-9 Total Score MyChart 7 (Mild depression)   PHQ-9 Total Score 7        Patient-reported         3/26/2025   Substance Use   If I could quit smoking, I would Somewhat disagree   I want to quit somking, worry about health affects Completely disagree   Willing to make a plan to quit smoking Completely disagree   Willing to cut down before quitting Completely disagree   Alcohol more than 3/day or more than 7/wk No   Do you have a current opioid prescription? (!) YES   How severe/bad is pain from 1 to 10? 7/10   Do you use any other substances recreationally? No   {Provider  Link to Opioid Risk Tool  Assess risk of opioid use disorder :856369}  {AWV REQUIRED- Pull in ORT  Results:689363}  Social History     Tobacco Use    Smoking status: Some Days     Types: Pipe     Passive exposure: Current    Smokeless tobacco: Never    Tobacco comments:     has been cut down a lot, is on the nicotine patch   Vaping Use    Vaping status: Never Used   Substance Use Topics    Alcohol use: Yes     Alcohol/week: 0.0 standard drinks of alcohol    Drug use: No     {Provider  If there are gaps in the social history shown above, please follow the link to update and then refresh the note Link to Social and Substance History :831128}    {Link to Fracture Risk Assessment Tool (Optional):453542}    {Provider  REQUIRED FOR AWV Use the storyboard to review patient history, after sections have been marked as reviewed, refresh note to capture documentation:625399}  {Provider   REQUIRED AWV use this link to review and update sexual activity history  after section has been marked as reviewed, refresh note to capture documentation:239931}  Reviewed and updated as needed this visit by Provider                    {HISTORY OPTIONS (Optional):610427}  Current providers sharing in care for this patient include:  Patient Care Team:  Jacqueline Nowak MD as PCP - General (Family Medicine)  [facility] ([facility]armacist)  Makayla Resendiz [facility] as Pharmacist (Pharmacist)  Jacqueline Nowak MD as Assigned PCP  Lisa - , [facility] GARTH Miles as Clinic Care Coordinator  Mina Cordero MD as MD (Medical Oncology)  Chester Palacio MD as Maris Dasilva RN as Specialty Care Coordinator (Hematology & Oncology)  Jerrod Asencio MD as MD (Hospice And Palliative Care)  Chester Palacio MD as Assigned Cancer Care Provider  Antonino Valencia MD as Assigned Surgical Provider  Rhonda Alicea RN as Care Manager  Shona Matt as Continuity Care Coordinator    The following health maintenance items are reviewed in Epic and correct as of today:  Health  "Maintenance   Topic Date Due    HF ACTION PLAN  Never done    COPD ACTION PLAN  Never done    DEPRESSION ACTION PLAN  Never done    ZOSTER IMMUNIZATION (1 of 2) Never done    RSV VACCINE (1 - 1-dose 75+ series) Never done    MEDICARE ANNUAL WELLNESS VISIT  05/10/2024    INFLUENZA VACCINE (1) 09/01/2024    COVID-19 Vaccine (6 - 2024-25 season) 09/01/2024    ALT  03/11/2025    MICROALBUMIN  04/25/2025    DEPRESSION 12 MO INDEX REPEAT PHQ-9  04/09/2025    BMP  07/29/2025    PHQ-9  09/26/2025    NICOTINE/TOBACCO CESSATION COUNSELING Q 1 YR  10/25/2025    LIPID  01/29/2026    CBC  01/29/2026    HEMOGLOBIN  01/29/2026    FALL RISK ASSESSMENT  03/26/2026    DIABETES SCREENING  01/29/2028    ADVANCE CARE PLANNING  05/16/2028    DTAP/TDAP/TD IMMUNIZATION (3 - Td or Tdap) 10/18/2032    PARATHYROID  Completed    PHOSPHORUS  Completed    TSH W/FREE T4 REFLEX  Completed    SPIROMETRY  Completed    Pneumococcal Vaccine: 50+ Years  Completed    URINALYSIS  Completed    ALK PHOS  Completed    HPV IMMUNIZATION  Aged Out    MENINGITIS IMMUNIZATION  Aged Out    COLORECTAL CANCER SCREENING  Discontinued    LUNG CANCER SCREENING  Discontinued       {ROS Picklists (Optional):384330}     Objective    Exam  BP (!) 164/74   Pulse 85   Temp 97.5  F (36.4  C) (Oral)   Resp 18   SpO2 97%    Estimated body mass index is 28.56 kg/m  as calculated from the following:    Height as of 2/20/25: 1.626 m (5' 4\").    Weight as of 2/20/25: 75.5 kg (166 lb 6.4 oz).    Physical Exam  {Exam Choices (Optional):821696}  {Provider  The Mini-Cog is incomplete, use link to complete and refresh note Link to Mini-Cog :309433}       No data to display              {A Mini-Cog total score of 0-2 suggests the possibility of dementia, score of 3-5 suggests no dementia:371859}         Signed Electronically by: Jacqueline Nowak MD  {Email feedback regarding this note to [email or web address]   :013069}  Answers submitted by the patient " for this visit:  Patient Health Questionnaire (Submitted on 3/26/2025)  If you checked off any problems, how difficult have these problems made it for you to do your work, take care of things at home, or get along with other people?: Not difficult at all  PHQ9 TOTAL SCORE: 7

## 2025-03-26 NOTE — PROGRESS NOTES
There are no exam notes on file for this visit.    ASSESSMENT AND PLAN:      Isiah was seen today for recheck.    Diagnoses and all orders for this visit:    Chronic pain due to neoplasm  Rectal pain  Ventral hernia without obstruction or gangrene  Ileostomy care (H)  He has known rectal cancer, has been following with [place] [facility], and is not a candidate for further surgeries nor radiation or chemotherapy.  Hospice has been discussed on multiple occasions but he does not feel ready for this.  Unfortunately, his current home and wound care will not continue with him unless he moves to hospice.  He is having significant mucousy drainage from his rectum as well as protrusion of the cancer tumor and has had difficulty with skin breakdown and pain in that area.  We again discussed long-term prognosis, he is concerned about whether hospice is the right choice for him.  He is going to meet with some hospice folks that took care of his son-in-law to explore options further.  Considering moving in with his daughter as well.  I placed a new order for wound care, we will refill his oxycodone at a higher prescription, continue with the MiraLAX.  Overall his colostomy bag is doing well but he worries about access to appropriately fitting bags without wound care support.  -     Wound Care Referral; Future  -     oxyCODONE (ROXICODONE) 5 MG tablet; Take 1-2 tablets (5-10 mg) by mouth every 6 hours as needed for pain.    We did not adjust blood pressure medications today, as I think this is related to pain.    He continues to get good support from his home psychotherapist through [facility].    He will follow-up in 1 month.  Will continue to explore his concerns about hospice, as it seems that no other options are available at this time, and I would like to really focus on improving quality of life with the time he has left.    The longitudinal plan of care for the diagnosis(es)/condition(s) as documented were addressed during  this visit. Due to the added complexity in care, I will continue to support Isiah in the subsequent management and with ongoing continuity of care.      Patient Instructions   I am so sorry for your loss.  Take care of yourself and each other during this time.     Refill pain medication .  INcrease to 6 pills per day as needed for pain.     We placed a new referral for wound care - Please let us know if you don't hear anything!.     Make sure you are getting enough fluids.     Follow up in 1 month for pain medication refill.      Jacqueline Nowak MD    SUBJECTIVE  Isiah Clarkle is a 79 year old male with past medical history significant for    Patient Active Problem List   Diagnosis    Chronic Reflux esophagitis    Moderate recurrent major depression (H)    Diastolic Dysfunction     Fainting (Syncope)    smoking     Prediabetes    Asthma    Hyperlipidemia    Obese    Adenocarcinoma of rectum (H)    Pulmonary embolism and infarction (H)    Esophageal reflux    Hypothyroidism due to acquired atrophy of thyroid    [facility] (advance care planning)    Stage 3b chronic kidney disease (H)    Decreased vision in both eyes    Bilateral hearing loss, unspecified hearing loss type    Ventral hernia    Unsteady gait    Ileostomy care (H)    Ileostomy status (H)    Carpal tunnel syndrome of right wrist    Cataract of both eyes, unspecified cataract type    Chronic obstructive pulmonary disease, unspecified COPD type (H)    Generalized weakness    Acute kidney injury    Vomiting and diarrhea    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     Others present at the visit:  None    Presents for   Chief Complaint   Patient presents with    RECHECK     F/U     Patient shares that the last month has been very challenging.  His long-term significant other, Sabina passed away.  Additionally he lost his son in law to liver cancer.  He does continue to see his psychologist every 2 weeks.  They are working on the challenges  "of these recent losses in his feelings about his current cancer status.  Focusing a lot on grief.    He remains very worried about his current home and wound versus quitting.  He has been getting care in his senior facility through [facility], but has been told they will not be able to continue to see him unless he transitions to hospice.  He is not feeling ready for hospice.  Does not think his daughter can handle additional loss.  He is considering moving in with her, to help her in the home with the loss of her .    Still has significant daily regular drainage from his rectum.  It is difficult to clean and wipe himself.  Mass is still present.  Has to wear depends all the time.  Can get wounds around the cancer mass as well.    His colostomy bag is working okay.  Now that he has the right size bags to fit over it he is not having leakage or skin breakdown.  Still has quite a bit of pain with the abdominal hernia and protrusion of the bag.  Very much wants this to be surgically repaired, but knows that he has been told he is not a good candidate for this.  Still has some drainage and mucus out of his rectum.    He last saw his oncologist 1 month ago.  They have told him that there are no further surgical or chemotherapy options for him.    He has daily pain.  Does not they feel like his pain medicines are helping.  Has to take 2 pills instead of 1.  Has been sitting on a foam cushion.  He is trying to change positions which is helpful.    He describes an episode of blacking out.  Took his walker to go to the [facility], and became dizzy and lightheaded.  He has been eating okay.  Describes eating \"a gallon of ice cream every other day \", and his weight has been stable.    He has been using his MiraLAX to keep bowels moving this has been working well.  The back is not quite so watery and he does not demonstrate quite as often.    He notes continued numbness in his fingers.    Becomes quite tearful during her " visit as he describes not being ready to stop fighting but not having many options available to him right now.      OBJECTIVE:  Vitals: BP (!) 164/74   Pulse 85   Temp 97.5  F (36.4  C) (Oral)   Resp 18   SpO2 97%   BMI= There is no height or weight on file to calculate BMI.  Objective:    Vitals:  Vitals are reviewed.  Blood pressure is moderately elevated today.  On recheck it is 152/68.  Weight is stable  Gen:  Alert, pleasant, sad and tearful, appropriate to situation  Cardiac:  Regular rate and rhythm, no murmurs, rubs or gallops  Respiratory: Lungs with scattered rhonchi and wheezes  Abdomen: On his abdomen, he has bilateral ventral hernias.  The colostomy bag is on the left side skin around it is intact, bag fits appropriately.  He describes discomfort with protrusion of the bag but no pain with palpation around the area.  I did not examine his rectum today.  Extremities:  Warm, well-perfused, pulses 2+/4, 1+ bilateral lower extremity edema      Patient Instructions   I am so sorry for your loss.  Take care of yourself and each other during this time.     Refill pain medication .  INcrease to 6 pills per day as needed for pain.     We placed a new referral for wound care - Please let us know if you don't hear anything!.     Make sure you are getting enough fluids.     Follow up in 1 month for pain medication refill.      Jacqueline Nowak MD      Answers submitted by the patient for this visit:  Patient Health Questionnaire (Submitted on 3/26/2025)  If you checked off any problems, how difficult have these problems made it for you to do your work, take care of things at home, or get along with other people?: Not difficult at all  PHQ9 TOTAL SCORE: 7

## 2025-03-26 NOTE — PROGRESS NOTES
Preventive Care Visit  [facility]  Jacqueline Nowak MD, Family Medicine  Mar 26, 2025        Gaviota Joyce is a 79 year old, presenting for the following:  RECHECK (F/U)        3/26/2025    10:42 AM   Additional Questions   Roomed by KAMALJIT   Accompanied by SELF         3/26/2025    Information    services provided? No       Advance Care Planning  Patient has a Health Care Directive on file  Advance care planning document is on file and is current.      3/26/2025   General Health   How would you rate your overall physical health? (!) FAIR   Feel stress (tense, anxious, or unable to sleep) Rather much   (!) STRESS CONCERN      3/26/2025   Nutrition   Diet: Regular (no restrictions)         3/26/2025   Exercise   Days per week of moderate/strenous exercise 7 days         3/26/2025   Social Factors   Frequency of gathering with friends or relatives Three times a week   Worry food won't last until get money to buy more No   Food not last or not have enough money for food? No   Do you have housing? (Housing is defined as stable permanent housing and does not include staying ouside in a car, in a tent, in an abandoned building, in an overnight shelter, or couch-surfing.) No   Are you worried about losing your housing? No   Lack of transportation? No   Unable to get utilities (heat,electricity)? No   Want help with housing or utility concern? No   (!) HOUSING CONCERN PRESENT      3/26/2025   Fall Risk   Fallen 2 or more times in the past year? Yes    No   Trouble with walking or balance? Yes    No   Reason Gait Speed Test Not Completed Patient declines   Reason for decline Difficulty with balance, uses walker       Multiple values from one day are sorted in reverse-chronological order          3/26/2025   Activities of Daily Living- Home Safety   Needs help with the following daily activites None of the above   Safety concerns in the home None of the above          3/26/2025   Dental   Dentist two times every year? (!) NO         3/26/2025   Hearing Screening   Hearing concerns? (!) I NEED TO ASK PEOPLE TO SPEAK UP OR REPEAT THEMSELVES.         3/26/2025   Driving Risk Screening   Patient/family members have concerns about driving No         3/26/2025   General Alertness/Fatigue Screening   Have you been more tired than usual lately? (!) YES         3/26/2025   Urinary Incontinence Screening   Bothered by leaking urine in past 6 months Yes         Today's PHQ-9 Score:       3/26/2025    10:34 AM   PHQ-9 SCORE   PHQ-9 Total Score MyChart 7 (Mild depression)   PHQ-9 Total Score 7        Patient-reported         3/26/2025   Substance Use   If I could quit smoking, I would Somewhat disagree   I want to quit somking, worry about health affects Completely disagree   Willing to make a plan to quit smoking Completely disagree   Willing to cut down before quitting Completely disagree   Alcohol more than 3/day or more than 7/wk No   Do you have a current opioid prescription? (!) YES   How severe/bad is pain from 1 to 10? 7/10   Do you use any other substances recreationally? No          No data to display              Low Risk (0-3)  Moderate Risk (4-7)  High Risk (>8)  Social History     Tobacco Use    Smoking status: Some Days     Types: Pipe     Passive exposure: Current    Smokeless tobacco: Never    Tobacco comments:     has been cut down a lot, is on the nicotine patch   Vaping Use    Vaping status: Never Used   Substance Use Topics    Alcohol use: Yes     Alcohol/week: 0.0 standard drinks of alcohol    Drug use: No     Reviewed and updated as needed this visit by Provider   Tobacco     Med Hx  Surg Hx  Fam Hx  Soc Hx Sexual Activity            Current providers sharing in care for this patient include:  Patient Care Team:  Jacqueline Nowak MD as PCP - General (Family Medicine)  Therese Spencer, [facility] as Pharmacist (Pharmacist)  Makayla Resendiz [facility] as  Pharmacist (Pharmacist)  Jacqueline Nowak MD as Assigned PCP  Lisa - , [facility]  [facility] Ginger, RN as Clinic Care Coordinator  Mina Cordero MD as MD (Medical Oncology)  Chester Palacio MD as Maris Dasilva, RN as Specialty Care Coordinator (Hematology & Oncology)  Jerrod Asencio MD as MD (Hospice And Palliative Care)  Chester Palacio MD as Assigned Cancer Care Provider  Antonino Valencia MD as Assigned Surgical Provider  Rhonda Alicea RN as Care Manager  Shona Matt as Continuity Care Coordinator    The following health maintenance items are reviewed in Epic and correct as of today:  Health Maintenance   Topic Date Due    HF ACTION PLAN  Never done    COPD ACTION PLAN  Never done    DEPRESSION ACTION PLAN  Never done    ZOSTER IMMUNIZATION (1 of 2) Never done    RSV VACCINE (1 - 1-dose 75+ series) Never done    MEDICARE ANNUAL WELLNESS VISIT  05/10/2024    INFLUENZA VACCINE (1) 09/01/2024    COVID-19 Vaccine (6 - 2024-25 season) 09/01/2024    ALT  03/11/2025    MICROALBUMIN  04/25/2025    DEPRESSION 12 MO INDEX REPEAT PHQ-9  04/09/2025    BMP  07/29/2025    PHQ-9  09/26/2025    NICOTINE/TOBACCO CESSATION COUNSELING Q 1 YR  10/25/2025    LIPID  01/29/2026    CBC  01/29/2026    HEMOGLOBIN  01/29/2026    FALL RISK ASSESSMENT  03/26/2026    DIABETES SCREENING  01/29/2028    ADVANCE CARE PLANNING  03/26/2030    DTAP/TDAP/TD IMMUNIZATION (3 - Td or Tdap) 10/18/2032    PARATHYROID  Completed    PHOSPHORUS  Completed    TSH W/FREE T4 REFLEX  Completed    SPIROMETRY  Completed    Pneumococcal Vaccine: 50+ Years  Completed    URINALYSIS  Completed    ALK PHOS  Completed    HPV IMMUNIZATION  Aged Out    MENINGITIS IMMUNIZATION  Aged Out    COLORECTAL CANCER SCREENING  Discontinued    LUNG CANCER SCREENING  Discontinued            Objective    Exam  BP (!) 164/74   Pulse 85   Temp 97.5  F (36.4  C) (Oral)   Resp 18   SpO2 97%    Estimated body mass index is 28.56 kg/m  as  "calculated from the following:    Height as of 2/20/25: 1.626 m (5' 4\").    Weight as of 2/20/25: 75.5 kg (166 lb 6.4 oz).    Physical Exam  See acute visit note.          3/26/2025   Mini Cog   Clock Draw Score 2 Normal   3 Item Recall 2 objects recalled   Mini Cog Total Score 4        Pt declined gait test due to difficulty with balance/ walking    Signed Electronically by: Jacqueline Nowak MD    Answers submitted by the patient for this visit:  Patient Health Questionnaire (Submitted on 3/26/2025)  If you checked off any problems, how difficult have these problems made it for you to do your work, take care of things at home, or get along with other people?: Not difficult at all  PHQ9 TOTAL SCORE: 7    "

## 2025-03-26 NOTE — PATIENT INSTRUCTIONS
I am so sorry for your loss.  Take care of yourself and each other during this time.     Refill pain medication .  INcrease to 6 pills per day as needed for pain.     We placed a new referral for wound care - Please let us know if you don't hear anything!.     Make sure you are getting enough fluids.     Follow up in 1 month for pain medication refill.

## 2025-03-31 ENCOUNTER — TELEPHONE (OUTPATIENT)
Dept: WOUND CARE | Facility: CLINIC | Age: 80
End: 2025-03-31
Payer: COMMERCIAL

## 2025-03-31 NOTE — TELEPHONE ENCOUNTER
"Noted wound care referral request for \"chronic cancer/radiation wound of buttock\", per comments patient is not a surgical candidate but wanting to still pursue cancer treatment. Previously had home wound care, would prefer this again.  Also has colostomy bag. No wound details noted in visit notes and unclear if ostomy consult is needed as well. Attempted to contact pt to further discuss to ensure he is scheduled with appropriate provider. Voicemail is full on cell number and calls will not go through on listed phone number, pt is not on QA on Request. Do not see that pt has signed any consents to communicate with anyone. Routing referral note to referring provider.     Kriss Aguirre RN, CWOCN   "

## 2025-04-01 NOTE — TELEPHONE ENCOUNTER
Thank you for your reply. Homecare should assist him with a plan for ordering and obtaining needed ostomy supplies when they close him to services. We can also see him for follow-up in the outpatient ostomy clinic when closed to homecare as needed if he is having issues but would hold off on that for now as long as he has a plan that is working for him regarding his ostomy.     From a wound care stand point, I am not sure there would be much to offer him. He should be using a barrier cream on his affected skin. Tough situation.     I did try calling again today and still not able to reach him as there is no answer with a full mailbox. When you see him again please let him know he can call our clinic to be scheduled to see an ostomy nurse at [phone].

## 2025-04-07 ENCOUNTER — TELEPHONE (OUTPATIENT)
Dept: FAMILY MEDICINE | Facility: CLINIC | Age: 80
End: 2025-04-07
Payer: COMMERCIAL

## 2025-04-07 DIAGNOSIS — C20 ADENOCARCINOMA OF RECTUM (H): Primary | ICD-10-CM

## 2025-04-07 NOTE — TELEPHONE ENCOUNTER
LVM for Lillian from home care with verbal orders as requested    Order/Referral Request     Who is requesting: home  care      Orders being requested: hospice care needs to say hospice eval and treat      Reason service is needed/diagnosis:      When are orders needed by: arnold Mello, RN, BSN

## 2025-04-07 NOTE — TELEPHONE ENCOUNTER
Order/Referral Request    Who is requesting: home  care     Orders being requested: hospice care needs to say hospice eval and treat     Reason service is needed/diagnosis:     When are orders needed by: asap    Has this been discussed with Provider: No    Does patient have a preference on a Group/Provider/Facility?     Does patient have an appointment scheduled?: No    Where to send orders:  [facility]     Okay to leave a detailed message?: Yes at Cell number on file:    Telephone Information:   Mobile [phone]  Fax number [phone]         Does this patient currently have active insurance coverage?  Yes, Pt has active insurance coverage.     Does patient or caller know when to expect a call? Yes, Nurses will return call within 2-3 business hours.    Sascha Casarez on 4/7/2025 at 3:52 PM

## 2025-04-08 NOTE — TELEPHONE ENCOUNTER
The Director of Nursing at [facility] called wishing to speak to the PCP about patient's hospice orders. She has two concerns that she would like to address:    1) She has concerns surrounding the sudden request for the hospice need as the patient was very against it before this, and even when his wife was put onto hospice. She is wondering if it has to do with wound care. Verified that PCP spoke with patient about this yesterday before signing the order, however, she would like a call back from the PCP if able to discuss patient's care plans for hospice more in depth.     2) [facility] is backed up and easily over a week+ out before being able to accept any referrals. She suggested an in-house hospice if the referral is really needed.     She can reached at [phone].    Routed to PCP.    Justin Barajas, RN, MSN

## 2025-04-08 NOTE — TELEPHONE ENCOUNTER
Reviewed request for hospice referral.    I am 100% in support of this patient moving forward with hospice, however the request for this referral does not match what I heard from the patient at his last visit.  At that time he was not ready for hospice.  He was not feeling trustworthy of the hospice provided through [facility] at his current assisted living, and he was looking into maybe moving in with his daughter, and looking into other hospice organizations.    I am very much in support of placing this referral, however I want to make sure that this is actually approved and in alignment with what the patient wants.    Called patient at [phone].  Confirmed that he does in fact want to move forward with the hospice referral with [facility] and that I should place the referral.     Jacqueline Nowak MD    Referral placed.     Routed to GARTH Anderson

## 2025-04-09 ENCOUNTER — MEDICAL CORRESPONDENCE (OUTPATIENT)
Dept: HEALTH INFORMATION MANAGEMENT | Facility: CLINIC | Age: 80
End: 2025-04-09
Payer: COMMERCIAL

## 2025-04-10 NOTE — TELEPHONE ENCOUNTER
Spoke with [place], director of nursing.  She plans to meet with the patient tomorrow, 4/10, but is not sure if he is really interested in hospice, vs just needing woundcare, and does not understand why he would not be eligible for continued wound care support.  Is also concerned that given his current stability that he would not be eligible for hospice.     Jacqueline Nowak MD    Routed to GARTH Anderson for FYI.

## 2025-04-14 ENCOUNTER — DOCUMENTATION ONLY (OUTPATIENT)
Dept: FAMILY MEDICINE | Facility: CLINIC | Age: 80
End: 2025-04-14
Payer: COMMERCIAL

## 2025-04-14 NOTE — PROGRESS NOTES
To be completed in Nursing note:    Please reference list for forms that require a visit for completion.  Please remind patients that providers are given 3-5 business days to complete and return forms.      Form type: Prescription request: Bath seat with back 400lb cap    Date form received: 4/8/25    Date form completed by Physician:4/9/25    How was form returned to patient (mailed, faxed, or at  for patient to ): Fax to [employer] at [phone]    Date form mailed/faxed/left at  for patient and sent to HIM for scanning: Faxed 4/14/25 at 8:15am      Once form is left for patient, faxed, or mailed PCS will then close the documentation only encounter.

## 2025-04-15 DIAGNOSIS — I10 ESSENTIAL HYPERTENSION: Primary | ICD-10-CM

## 2025-04-15 DIAGNOSIS — K62.89 RECTAL PAIN: ICD-10-CM

## 2025-04-15 DIAGNOSIS — G89.3 CHRONIC PAIN DUE TO NEOPLASM: ICD-10-CM

## 2025-04-15 RX ORDER — AMLODIPINE BESYLATE 5 MG/1
5 TABLET ORAL
Qty: 90 TABLET | Refills: 3 | Status: SHIPPED | OUTPATIENT
Start: 2025-04-15

## 2025-04-15 RX ORDER — OXYCODONE HYDROCHLORIDE 5 MG/1
5-10 TABLET ORAL EVERY 6 HOURS PRN
Qty: 180 TABLET | Refills: 0 | OUTPATIENT
Start: 2025-04-15

## 2025-04-15 NOTE — TELEPHONE ENCOUNTER
Name from pharmacy: OXYCODONE HCL 5MG TABS          Will file in chart as: oxyCODONE (ROXICODONE) 5 MG tablet    Sig: TAKE ONE TO TWO TABLETS BY MOUTH EVERY 6 HOURS AS NEEDED FOR PAIN    Disp: 180 tablet    Refills: 0    Start: 4/15/2025    Earliest Fill Date: 4/15/2025    Class: E-Prescribe    Non-formulary For: Rectal pain, Chronic pain due to neoplasm    Last ordered: 2 weeks ago (3/26/2025) by Jacqueline Nowak MD    Last refill: 3/26/2025    Rx #: [id number]    Rx Protocol Controlled Substance At[facility]z24/15/2025 10:21 AM   Protocol Details Urine drug screeen results on file in past 12 months    Medication not refilled in past 28 days    Controlled Substance Agreement on file in last 12 months    Auto Fail - Please forward to Provider    Naloxone on active med list    COLTON-7 done in past year    Visit with relevant provider in past 3 months or upcoming 3 months    Medication is active on med list and the sig matches    No Benzodiazepines on acive med list    PHQ9 done in past year       Name from pharmacy: AMLODIPINE BESYLATE 5MG TABS         Will file in chart as: amLODIPine (NORVASC) 5 MG tablet    Sig: TAKE ONE TABLET BY MOUTH EVERY DAY    Disp: 90 tablet    Refills: 3    Start: 4/15/2025    Class: E-Prescribe    Non-formulary    Last ordered: 1 year ago (3/11/2024) by Patient Reported    Last refill: 10/23/2024    Rx #: [id number]    Calcium Channel Blockers Protocol  Ysbkgn32/15/2025 10:21 AM   Protocol Details Most recent blood pressure under 140/90 in past 12 months    Medication is active on med list and the sig matches. RN to manually verify dose and sig if red X/fail.    Medication is indicated for associated diagnosis    GFR is on file in the past 12 months and most recent GFR is normal          Justin Barajas, RN, MSN

## 2025-04-15 NOTE — TELEPHONE ENCOUNTER
Oxycodone needs to be filled in office visit.  He has appt scheduled for 4/29.  If he needs early refill, needs to come in and see someone else in person. We also talked about a palliative referral for pain management for cancer if not well controlled - if he is needing more aggressive pain control.      Jacqueline Nowak MD    Routed to Justin James for FYI.

## 2025-04-22 ENCOUNTER — DOCUMENTATION ONLY (OUTPATIENT)
Dept: FAMILY MEDICINE | Facility: CLINIC | Age: 80
End: 2025-04-22
Payer: COMMERCIAL

## 2025-04-22 NOTE — PROGRESS NOTES
To be completed in Nursing note:    Please reference list for forms that require a visit for completion.  Please remind patients that providers are given 3-5 business days to complete and return forms.      Form type: [facility]    Date form received: 25    Date form completed by Physician:25    How was form returned to patient (mailed, faxed, or at  for patient to ): faxed to     Date form mailed/faxed/left at  for patient and sent to HIM for scannin25, sent to HIM for scanning      Once form is left for patient, faxed, or mailed PCS will then close the documentation only encounter.     Jacob Hatfield MA

## 2025-04-29 ENCOUNTER — DOCUMENTATION ONLY (OUTPATIENT)
Dept: FAMILY MEDICINE | Facility: CLINIC | Age: 80
End: 2025-04-29

## 2025-04-29 ENCOUNTER — OFFICE VISIT (OUTPATIENT)
Dept: FAMILY MEDICINE | Facility: CLINIC | Age: 80
End: 2025-04-29
Payer: COMMERCIAL

## 2025-04-29 VITALS
OXYGEN SATURATION: 98 % | RESPIRATION RATE: 18 BRPM | HEART RATE: 77 BPM | SYSTOLIC BLOOD PRESSURE: 155 MMHG | BODY MASS INDEX: 30.52 KG/M2 | DIASTOLIC BLOOD PRESSURE: 80 MMHG | TEMPERATURE: 97.4 F | HEIGHT: 64 IN | WEIGHT: 178.8 LBS

## 2025-04-29 DIAGNOSIS — N18.32 STAGE 3B CHRONIC KIDNEY DISEASE (H): ICD-10-CM

## 2025-04-29 DIAGNOSIS — R53.83 OTHER FATIGUE: ICD-10-CM

## 2025-04-29 DIAGNOSIS — I10 ESSENTIAL HYPERTENSION: ICD-10-CM

## 2025-04-29 DIAGNOSIS — R25.2 LEG CRAMPS: ICD-10-CM

## 2025-04-29 DIAGNOSIS — E03.4 HYPOTHYROIDISM DUE TO ACQUIRED ATROPHY OF THYROID: ICD-10-CM

## 2025-04-29 DIAGNOSIS — G89.29 OTHER CHRONIC PAIN: ICD-10-CM

## 2025-04-29 DIAGNOSIS — Z53.9 DIAGNOSIS NOT YET DEFINED: Primary | ICD-10-CM

## 2025-04-29 DIAGNOSIS — I26.99 PULMONARY EMBOLISM AND INFARCTION (H): ICD-10-CM

## 2025-04-29 DIAGNOSIS — E03.9 HYPOTHYROIDISM, UNSPECIFIED TYPE: ICD-10-CM

## 2025-04-29 DIAGNOSIS — C20 ADENOCARCINOMA OF RECTUM (H): Primary | Chronic | ICD-10-CM

## 2025-04-29 DIAGNOSIS — N18.32 CHRONIC KIDNEY DISEASE, STAGE 3B (H): ICD-10-CM

## 2025-04-29 LAB
ALT SERPL W P-5'-P-CCNC: 9 U/L (ref 0–70)
ANION GAP SERPL CALCULATED.3IONS-SCNC: 11 MMOL/L (ref 7–15)
BUN SERPL-MCNC: 29.2 MG/DL (ref 8–23)
CALCIUM SERPL-MCNC: 8.9 MG/DL (ref 8.8–10.4)
CHLORIDE SERPL-SCNC: 105 MMOL/L (ref 98–107)
CREAT SERPL-MCNC: 1.61 MG/DL (ref 0.67–1.17)
CREAT UR-MCNC: 81.5 MG/DL
EGFRCR SERPLBLD CKD-EPI 2021: 43 ML/MIN/1.73M2
ERYTHROCYTE [DISTWIDTH] IN BLOOD BY AUTOMATED COUNT: 13.8 % (ref 10–15)
FERRITIN SERPL-MCNC: 73 NG/ML (ref 31–409)
GLUCOSE SERPL-MCNC: 98 MG/DL (ref 70–99)
HCO3 SERPL-SCNC: 24 MMOL/L (ref 22–29)
HCT VFR BLD AUTO: 35.8 % (ref 40–53)
HGB BLD-MCNC: 11.5 G/DL (ref 13.3–17.7)
MCH RBC QN AUTO: 30.4 PG (ref 26.5–33)
MCHC RBC AUTO-ENTMCNC: 32.1 G/DL (ref 31.5–36.5)
MCV RBC AUTO: 95 FL (ref 78–100)
MICROALBUMIN UR-MCNC: <12 MG/L
MICROALBUMIN/CREAT UR: NORMAL MG/G{CREAT}
PLATELET # BLD AUTO: 240 10E3/UL (ref 150–450)
POTASSIUM SERPL-SCNC: 4.4 MMOL/L (ref 3.4–5.3)
RBC # BLD AUTO: 3.78 10E6/UL (ref 4.4–5.9)
SODIUM SERPL-SCNC: 140 MMOL/L (ref 135–145)
T4 FREE SERPL-MCNC: 1.03 NG/DL (ref 0.9–1.7)
TSH SERPL DL<=0.005 MIU/L-ACNC: 13.4 UIU/ML (ref 0.3–4.2)
WBC # BLD AUTO: 4.8 10E3/UL (ref 4–11)

## 2025-04-29 RX ORDER — ASPIRIN 81 MG/1
81 TABLET, CHEWABLE ORAL EVERY EVENING
Qty: 90 TABLET | Refills: 3 | Status: SHIPPED | OUTPATIENT
Start: 2025-04-29

## 2025-04-29 RX ORDER — AMLODIPINE BESYLATE 10 MG/1
10 TABLET ORAL
Qty: 90 TABLET | Refills: 1 | Status: SHIPPED | OUTPATIENT
Start: 2025-04-29

## 2025-04-29 RX ORDER — LEVOTHYROXINE SODIUM 200 UG/1
200 TABLET ORAL EVERY MORNING
Qty: 90 TABLET | Refills: 1 | Status: SHIPPED | OUTPATIENT
Start: 2025-04-29

## 2025-04-29 RX ORDER — OXYCODONE AND ACETAMINOPHEN 5; 325 MG/1; MG/1
1 TABLET ORAL EVERY 4 HOURS PRN
Qty: 180 TABLET | Refills: 0 | Status: SHIPPED | OUTPATIENT
Start: 2025-04-29 | End: 2025-05-29

## 2025-04-29 RX ORDER — BACLOFEN 20 MG/1
TABLET ORAL
Qty: 45 TABLET | Refills: 1 | Status: SHIPPED | OUTPATIENT
Start: 2025-04-29

## 2025-04-29 ASSESSMENT — ANXIETY QUESTIONNAIRES
GAD7 TOTAL SCORE: 0
7. FEELING AFRAID AS IF SOMETHING AWFUL MIGHT HAPPEN: NOT AT ALL
6. BECOMING EASILY ANNOYED OR IRRITABLE: NOT AT ALL
3. WORRYING TOO MUCH ABOUT DIFFERENT THINGS: NOT AT ALL
1. FEELING NERVOUS, ANXIOUS, OR ON EDGE: NOT AT ALL
GAD7 TOTAL SCORE: 0
2. NOT BEING ABLE TO STOP OR CONTROL WORRYING: NOT AT ALL
7. FEELING AFRAID AS IF SOMETHING AWFUL MIGHT HAPPEN: NOT AT ALL
GAD7 TOTAL SCORE: 0
4. TROUBLE RELAXING: NOT AT ALL
5. BEING SO RESTLESS THAT IT IS HARD TO SIT STILL: NOT AT ALL

## 2025-04-29 ASSESSMENT — PATIENT HEALTH QUESTIONNAIRE - PHQ9
SUM OF ALL RESPONSES TO PHQ QUESTIONS 1-9: 6
10. IF YOU CHECKED OFF ANY PROBLEMS, HOW DIFFICULT HAVE THESE PROBLEMS MADE IT FOR YOU TO DO YOUR WORK, TAKE CARE OF THINGS AT HOME, OR GET ALONG WITH OTHER PEOPLE: NOT DIFFICULT AT ALL
SUM OF ALL RESPONSES TO PHQ QUESTIONS 1-9: 6

## 2025-04-29 NOTE — PROGRESS NOTES
There are no exam notes on file for this visit.    ASSESSMENT AND PLAN:      Isiah was seen today for recheck.    Diagnoses and all orders for this visit:    Adenocarcinoma of rectum (H)  Other chronic pain  Continues to have pain, difficulty sitting, drainage, and now bleeding from his rectum.  Follow-up scheduled with oncology tomorrow.  Getting twice weekly wound care.  I am wondering if perhaps a hybrid visit to the wound clinic in conjunction with home visits may be appropriate.  Refilled Percocet for pain.  I also think palliative care would be helpful in better pain management planning.  Stools have been stable.  Colostomy bags are working well.  He really wants to explore surgical options for his hernia and colostomy bags.  Has been told this is not an option in the past.  -     oxyCODONE-acetaminophen (PERCOCET) 5-325 MG tablet; Take 1 tablet by mouth every 4 hours as needed for pain.    Stage 3b chronic kidney disease (H)  Essential hypertension  Recheck kidney function and urine protein today.  Additionally his blood pressure has been high.  Will increase the amlodipine from 5 mg daily to 10 mg daily.  -     Albumin Random Urine Quantitative with Creat Ratio; Future  -     Basic metabolic panel; Future  -     Albumin Random Urine Quantitative with Creat Ratio  -     Basic metabolic panel  -     amLODIPine (NORVASC) 10 MG tablet; Take 1 tablet (10 mg) by mouth daily at 2 pm.    Other fatigue  Hypothyroidism, unspecified type  Increased fatigue.  Will recheck thyroid levels.  Check a CBC with ferritin given his increased frequency of rectal bleeding and mild anemia previously.  Recheck renal function.  -     TSH with free T4 reflex; Future  -     TSH with free T4 reflex  -     CBC with platelets; Future  -     Ferritin; Future  -     Basic metabolic panel; Future  -     CBC with platelets  -     Ferritin  -     Basic metabolic panel    Follow-up in 1 month.    The longitudinal plan of care for the  diagnosis(es)/condition(s) as documented were addressed during this visit. Due to the added complexity in care, I will continue to support Isiah in the subsequent management and with ongoing continuity of care.    Patient Instructions   Check labs today for fatigue:  thyroid, blood counts, iron levels.     Refill pain medication - change over from the plain oxycodone to the percocet - combined with tylenol.     NO additional tylenol  NO ibuprofen    Continue with wound changes.   Follow up with [facility].  Chat with the palliative folks about better pain control and wound control options for you.     Great work with the weight gain.   Continue to push fluids.      Jacqueline Nowak MD    SUBJECTIVE  Isiah CARLY McArdle is a 79 year old male with past medical history significant for    Patient Active Problem List   Diagnosis    Chronic Reflux esophagitis    Moderate recurrent major depression (H)    Diastolic Dysfunction     Fainting (Syncope)    smoking     Prediabetes    Asthma    Hyperlipidemia    Obese    Adenocarcinoma of rectum (H)    Pulmonary embolism and infarction (H)    Esophageal reflux    Hypothyroidism due to acquired atrophy of thyroid    [facility] ([facility] care [facility])    Stage 3b chronic kidney disease (H)    Decreased vision in both eyes    Bilateral hearing loss, unspecified hearing loss type    Ventral hernia    Unsteady gait    Ileostomy care (H)    Ileostomy status (H)    Carpal tunnel syndrome of right wrist    Cataract of both eyes, unspecified cataract type    Chronic obstructive pulmonary disease, unspecified COPD type (H)    Generalized weakness    Acute kidney injury    Vomiting and diarrhea    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  He  shares that pain has been worse.  En did not have the full supply of his oxycodone, so he has been out for a week.  He finds it difficult to sit, difficult to lean back, and difficult to sleep due to the pain.  He notes that the tumor is growing larger, and is now 2 to 3 inches in length.  It has been more inflamed and has been having increased bleeding from his bottom.  Continues to have discharge.  He is currently getting wound care 2 times per week at home.  Medicare is cut down on the services and he is worried they will cut down further.  His daughter is coming when the nurse is going to be there this week to learn a little more about the dressing changes.  He has been taking ibuprofen because he ran out of the oxycodone, but is been told to stop this because it can make the bleeding worse.  Ideally he takes the oxycodone 3 times a day and then an extra dose is night as needed.    He does have upcoming appointment later this week at the oncology office to check-in and review next steps.  He has been told that he is not a candidate for surgery, and is not a candidate for further chemo or radiation.  Was also told by hospice that he is not a hospice candidate either.  His goals are to feel as good as he can and be around as long as possible to help support his daughter and granddaughter.      Plans to continue to stay in his current housing.  Goes 2 nights per week to stay at his daughters house to take care of his granddaughter while ana is working overnight shift.  Ex-wife is taking care of the other 3 nights.      He feels tired all of the time.  Keeps taking his thyroid medication, but wonders if he needs a dose change.      Mood has been low.  Depression medications helping.  Therapist from [facility] coming to his house every 2 weeks and this has been helpful.      His hernia and colostomy bag continue to bother him.  Binders have not been helpful - they risk breaking the bag.  Feels tugging and pulling  "and pain from the bag.  Output has been good.  Changes bags frequently, including during the night.  Using miralax every other day to keep stools soft. Urination has been good.  Is cutting down on coffee and increasing water intake.      OBJECTIVE:  Vitals: BP (!) 155/80   Pulse 77   Temp 97.4  F (36.3  C) (Oral)   Resp 18   Ht 1.626 m (5' 4\")   Wt 81.1 kg (178 lb 12.8 oz)   SpO2 98%   BMI 30.69 kg/m    BMI= Body mass index is 30.69 kg/m .  Objective:    Vitals:  Vitals are reviewed and are within the normal range  Gen:  Alert, pleasant, no acute distress  Extremities:  No tremor.   Psyche:  Mood and affect are down, sad, he is tearful through mulitple parts of the visit.         10/25/2024    10:47 AM 3/26/2025    10:34 AM 4/29/2025     9:11 AM   PHQ   PHQ-9 Total Score 2  7  6    Q9: Thoughts of better off dead/self-harm past 2 weeks Not at all  Not at all Not at all       Patient-reported    Proxy-reported          12/12/2023     3:53 PM 5/15/2024    10:57 AM 4/29/2025     9:12 AM   COLTON-7 SCORE   Total Score   0 (minimal anxiety)   Total Score 1 0 0        Proxy-reported          Results for orders placed or performed in visit on 04/29/25   ALT (Today)     Status: Normal   Result Value Ref Range    ALT 9 0 - 70 U/L   Albumin Random Urine Quantitative with Creat Ratio     Status: None   Result Value Ref Range    Creatinine Urine mg/dL 81.5 mg/dL    Albumin Urine mg/L <12.0 mg/L    Albumin Urine mg/g Cr     TSH with free T4 reflex     Status: Abnormal   Result Value Ref Range    TSH 13.40 (H) 0.30 - 4.20 uIU/mL   CBC with platelets     Status: Abnormal   Result Value Ref Range    WBC Count 4.8 4.0 - 11.0 10e3/uL    RBC Count 3.78 (L) 4.40 - 5.90 10e6/uL    Hemoglobin 11.5 (L) 13.3 - 17.7 g/dL    Hematocrit 35.8 (L) 40.0 - 53.0 %    MCV 95 78 - 100 fL    MCH 30.4 26.5 - 33.0 pg    MCHC 32.1 31.5 - 36.5 g/dL    RDW 13.8 10.0 - 15.0 %    Platelet Count 240 150 - 450 10e3/uL   Ferritin     Status: Normal "   Result Value Ref Range    Ferritin 73 31 - 409 ng/mL   Basic metabolic panel     Status: Abnormal   Result Value Ref Range    Sodium 140 135 - 145 mmol/L    Potassium 4.4 3.4 - 5.3 mmol/L    Chloride 105 98 - 107 mmol/L    Carbon Dioxide (CO2) 24 22 - 29 mmol/L    Anion Gap 11 7 - 15 mmol/L    Urea Nitrogen 29.2 (H) 8.0 - 23.0 mg/dL    Creatinine 1.61 (H) 0.67 - 1.17 mg/dL    GFR Estimate 43 (L) >60 mL/min/1.73m2    Calcium 8.9 8.8 - 10.4 mg/dL    Glucose 98 70 - 99 mg/dL   T4 free     Status: Normal   Result Value Ref Range    Free T4 1.03 0.90 - 1.70 ng/dL           Patient Instructions   Check labs today for fatigue:  thyroid, blood counts, iron levels.     Refill pain medication - change over from the plain oxycodone to the percocet - combined with tylenol.     NO additional tylenol  NO ibuprofen    Continue with wound changes.   Follow up with [facility].  Chat with the palliative folks about better pain control and wound control options for you.     Great work with the weight gain.   Continue to push fluids.      Jacqueline Nowak MD    Answers submitted by the patient for this visit:  Patient Health Questionnaire (Submitted on 4/29/2025)  If you checked off any problems, how difficult have these problems made it for you to do your work, take care of things at home, or get along with other people?: Not difficult at all  PHQ9 TOTAL SCORE: 6  Patient Health Questionnaire (G7) (Submitted on 4/29/2025)  COLTON 7 TOTAL SCORE: 0

## 2025-04-29 NOTE — LETTER
April 30, 2025      Isiah D McArdle  [address]   [facility] [address]        Dear Mr.McArdle,    We are writing to inform you of your test results.    Your thyroid levels are low.  This can explain why you are feeling so tired.  I have sent in a new prescription for a higher dose.  Your kidney tests are stable.  Blood counts are good.  Liver tests are normal.  Ferritin/iron levels are normal.  Your protein is normal.  This is good news.  We should check in in about 6-8 weeks to recheck your thyroid tests.  Please call the clinic at [phone] if you have any questions.     Resulted Orders   ALT (Today)   Result Value Ref Range    ALT 9 0 - 70 U/L   Albumin Random Urine Quantitative with Creat Ratio   Result Value Ref Range    Creatinine Urine mg/dL 81.5 mg/dL      Comment:      The reference ranges have not been established in urine creatinine. The results should be integrated into the clinical context for interpretation.    Albumin Urine mg/L <12.0 mg/L      Comment:      The reference ranges have not been established in urine albumin. The results should be integrated into the clinical context for interpretation.    Albumin Urine mg/g Cr        Comment:      Unable to calculate, urine albumin and/or urine creatinine is outside detectable limits.  Microalbuminuria is defined as an albumin:creatinine ratio of 17 to 299 for males and 25 to 299 for females. A ratio of albumin:creatinine of 300 or higher is indicative of overt proteinuria.  Due to biologic variability, positive results should be confirmed by a second, first-morning random or 24-hour timed urine specimen. If there is discrepancy, a third specimen is recommended. When 2 out of 3 results are in the microalbuminuria range, this is evidence for incipient nephropathy and warrants increased efforts at glucose control, blood pressure control, and institution of therapy with an angiotensin-converting-enzyme (ACE) inhibitor (if the patient can  tolerate it).     TSH with free T4 reflex   Result Value Ref Range    TSH 13.40 (H) 0.30 - 4.20 uIU/mL   CBC with platelets   Result Value Ref Range    WBC Count 4.8 4.0 - 11.0 10e3/uL    RBC Count 3.78 (L) 4.40 - 5.90 10e6/uL    Hemoglobin 11.5 (L) 13.3 - 17.7 g/dL    Hematocrit 35.8 (L) 40.0 - 53.0 %    MCV 95 78 - 100 fL    MCH 30.4 26.5 - 33.0 pg    MCHC 32.1 31.5 - 36.5 g/dL    RDW 13.8 10.0 - 15.0 %    Platelet Count 240 150 - 450 10e3/uL   Ferritin   Result Value Ref Range    Ferritin 73 31 - 409 ng/mL   Basic metabolic panel   Result Value Ref Range    Sodium 140 135 - 145 mmol/L    Potassium 4.4 3.4 - 5.3 mmol/L    Chloride 105 98 - 107 mmol/L    Carbon Dioxide (CO2) 24 22 - 29 mmol/L    Anion Gap 11 7 - 15 mmol/L    Urea Nitrogen 29.2 (H) 8.0 - 23.0 mg/dL    Creatinine 1.61 (H) 0.67 - 1.17 mg/dL    GFR Estimate 43 (L) >60 mL/min/1.73m2      Comment:      eGFR calculated using 2021 CKD-EPI equation.    Calcium 8.9 8.8 - 10.4 mg/dL    Glucose 98 70 - 99 mg/dL   T4 free   Result Value Ref Range    Free T4 1.03 0.90 - 1.70 ng/dL       If you have any questions or concerns, please call the clinic at the number listed above.       Sincerely,      Jacqueline Nowak MD    Electronically signed

## 2025-04-29 NOTE — PATIENT INSTRUCTIONS
Check labs today for fatigue:  thyroid, blood counts, iron levels.     Refill pain medication - change over from the plain oxycodone to the percocet - combined with tylenol.     NO additional tylenol  NO ibuprofen    Continue with wound changes.   Follow up with [facility].  Chat with the palliative folks about better pain control and wound control options for you.     Great work with the weight gain.   Continue to push fluids.

## 2025-04-30 NOTE — RESULT ENCOUNTER NOTE
Michael D McArdle-    Your thyroid levels are low.  This can explain why you are feeling so tired.  I have sent in a new prescription for a higher dose.  Your kidney tests are stable.  Blood counts are good.  Liver tests are normal.  Ferritin/iron levels are normal.  Your protein is normal.  This is good news.  We should check in in about 6-8 weeks to recheck your thyroid tests.  Please call the clinic at [phone] if you have any questions.      Jacqueline Nowak MD    Please send results to patient.

## 2025-05-21 NOTE — TELEPHONE ENCOUNTER
----- Message from Radha Paul NP sent at 5/21/2025 11:50 AM CDT -----  Please inform patient of results.    1. Diabetic eye exam shows no apparent diabetic retinopathy.  Repeat diabetic eye exam with primary care in 12 months.  ----- Message -----  From: Kendall Eldridge OD  Sent: 5/21/2025  11:16 AM CDT  To: Rahda Paul NP     Message reviewed.     Thanks for passing along the message Rehana!    I have tried to place hospice orders in EPIC, requested forms to sign through [facility] ([facility]).  If patient is wanting hospice, I do not want to delay that, but I'm not sure what the patient is wanting, and when I last saw him, he was not yet ready for hospice.     Let me know if there are other things I can do in the meantime.  I'm sure his oncologist could help with the hospice consult as well - he is seeing palliative care through them current.     Thanks!    Jacqueline Nowak MD    Routed to BETSY Kimball

## 2025-05-29 ENCOUNTER — TRANSFERRED RECORDS (OUTPATIENT)
Dept: HEALTH INFORMATION MANAGEMENT | Facility: CLINIC | Age: 80
End: 2025-05-29
Payer: COMMERCIAL

## 2025-06-01 DIAGNOSIS — D50.9 IRON DEFICIENCY ANEMIA, UNSPECIFIED IRON DEFICIENCY ANEMIA TYPE: ICD-10-CM

## 2025-06-01 DIAGNOSIS — C20 ADENOCARCINOMA OF RECTUM (H): Primary | ICD-10-CM

## 2025-06-09 DIAGNOSIS — G89.29 OTHER CHRONIC PAIN: ICD-10-CM

## 2025-06-09 DIAGNOSIS — C20 ADENOCARCINOMA OF RECTUM (H): Chronic | ICD-10-CM

## 2025-06-09 RX ORDER — OXYCODONE AND ACETAMINOPHEN 5; 325 MG/1; MG/1
1 TABLET ORAL EVERY 4 HOURS PRN
Qty: 180 TABLET | Refills: 0 | OUTPATIENT
Start: 2025-06-09

## 2025-06-17 ENCOUNTER — MEDICAL CORRESPONDENCE (OUTPATIENT)
Dept: HEALTH INFORMATION MANAGEMENT | Facility: CLINIC | Age: 80
End: 2025-06-17
Payer: COMMERCIAL

## 2025-06-17 ENCOUNTER — DOCUMENTATION ONLY (OUTPATIENT)
Dept: FAMILY MEDICINE | Facility: CLINIC | Age: 80
End: 2025-06-17
Payer: COMMERCIAL

## 2025-06-17 NOTE — PROGRESS NOTES
To be completed in Nursing note:    Please reference list for forms that require a visit for completion.  Please remind patients that providers are given 3-5 business days to complete and return forms.      Form type:[facility]    Date form received: 25    Date form completed by Physician:25    How was form returned to patient (mailed, faxed, or at  for patient to ):faxed to     Date form mailed/faxed/left at  for patient and sent to HIM for scannin25, sent to HIM for scanning      Once form is left for patient, faxed, or mailed PCS will then close the documentation only encounter.     Jacob Hatfield MA

## 2025-06-19 ENCOUNTER — TRANSFERRED RECORDS (OUTPATIENT)
Dept: HEALTH INFORMATION MANAGEMENT | Facility: CLINIC | Age: 80
End: 2025-06-19
Payer: COMMERCIAL

## 2025-06-25 ENCOUNTER — DOCUMENTATION ONLY (OUTPATIENT)
Dept: FAMILY MEDICINE | Facility: CLINIC | Age: 80
End: 2025-06-25
Payer: COMMERCIAL

## 2025-06-25 ENCOUNTER — TRANSFERRED RECORDS (OUTPATIENT)
Dept: HEALTH INFORMATION MANAGEMENT | Facility: CLINIC | Age: 80
End: 2025-06-25
Payer: COMMERCIAL

## 2025-07-02 ENCOUNTER — DOCUMENTATION ONLY (OUTPATIENT)
Dept: FAMILY MEDICINE | Facility: CLINIC | Age: 80
End: 2025-07-02
Payer: COMMERCIAL

## 2025-07-02 ENCOUNTER — MEDICAL CORRESPONDENCE (OUTPATIENT)
Dept: HEALTH INFORMATION MANAGEMENT | Facility: CLINIC | Age: 80
End: 2025-07-02
Payer: COMMERCIAL

## 2025-07-02 NOTE — PROGRESS NOTES
To be completed in Nursing note:    Please reference list for forms that require a visit for completion.  Please remind patients that providers are given 3-5 business days to complete and return forms.      Form type:[facility]    Date form received: 25    Date form completed by Physician:25    How was form returned to patient (mailed, faxed, or at  for patient to ):faxed to     Date form mailed/faxed/left at  for patient and sent to HIM for scannin25, sent to HIM for scanning      Once form is left for patient, faxed, or mailed PCS will then close the documentation only encounter.     Jacob Hatfield MA

## 2025-07-02 NOTE — PROGRESS NOTES
To be completed in Nursing note:    Please reference list for forms that require a visit for completion.  Please remind patients that providers are given 3-5 business days to complete and return forms.      Form type:[facility]    Date form received: 25    Date form completed by Physician:25    How was form returned to patient (mailed, faxed, or at  for patient to ):faxed to     Date form mailed/faxed/left at  for patient and sent to HIM for scannin25, sent to HIM for scanning      Once form is left for patient, faxed, or mailed PCS will then close the documentation only encounter.     Jacob Hatfield MA

## 2025-07-10 ENCOUNTER — TRANSFERRED RECORDS (OUTPATIENT)
Dept: HEALTH INFORMATION MANAGEMENT | Facility: CLINIC | Age: 80
End: 2025-07-10
Payer: COMMERCIAL

## 2025-07-23 ENCOUNTER — DOCUMENTATION ONLY (OUTPATIENT)
Dept: FAMILY MEDICINE | Facility: CLINIC | Age: 80
End: 2025-07-23
Payer: COMMERCIAL

## 2025-07-23 DIAGNOSIS — Z53.9 DIAGNOSIS NOT YET DEFINED: Primary | ICD-10-CM

## 2025-07-23 NOTE — PROGRESS NOTES
To be completed in Nursing note:    Please reference list for forms that require a visit for completion.  Please remind patients that providers are given 3-5 business days to complete and return forms.      Form type: [facility]    Date form received:     Date form completed by Physician:25    How was form returned to patient (mailed, faxed, or at  for patient to ):faxed to     Date form mailed/faxed/left at  for patient and sent to HIM for scannin25, sent to HIM for scanning    Once form is left for patient, faxed, or mailed PCS will then close the documentation only encounter.     Jacob Hatfield MA

## 2025-07-31 ENCOUNTER — TRANSFERRED RECORDS (OUTPATIENT)
Dept: HEALTH INFORMATION MANAGEMENT | Facility: CLINIC | Age: 80
End: 2025-07-31
Payer: COMMERCIAL

## 2025-08-05 ENCOUNTER — TELEPHONE (OUTPATIENT)
Dept: VASCULAR SURGERY | Facility: CLINIC | Age: 80
End: 2025-08-05
Payer: COMMERCIAL

## 2025-08-05 DIAGNOSIS — Z51.89 VISIT FOR WOUND CARE: Primary | ICD-10-CM

## 2025-08-21 ENCOUNTER — TRANSFERRED RECORDS (OUTPATIENT)
Dept: HEALTH INFORMATION MANAGEMENT | Facility: CLINIC | Age: 80
End: 2025-08-21
Payer: COMMERCIAL

## 2025-08-22 ENCOUNTER — TELEPHONE (OUTPATIENT)
Dept: FAMILY MEDICINE | Facility: CLINIC | Age: 80
End: 2025-08-22
Payer: COMMERCIAL

## 2025-08-23 DIAGNOSIS — N18.32 STAGE 3B CHRONIC KIDNEY DISEASE (H): ICD-10-CM

## 2025-08-23 DIAGNOSIS — I10 ESSENTIAL HYPERTENSION: ICD-10-CM

## 2025-08-23 DIAGNOSIS — E03.9 HYPOTHYROIDISM, UNSPECIFIED TYPE: ICD-10-CM

## 2025-08-23 DIAGNOSIS — D50.9 IRON DEFICIENCY ANEMIA, UNSPECIFIED IRON DEFICIENCY ANEMIA TYPE: Primary | ICD-10-CM

## 2025-08-26 ENCOUNTER — OFFICE VISIT (OUTPATIENT)
Dept: FAMILY MEDICINE | Facility: CLINIC | Age: 80
End: 2025-08-26
Payer: COMMERCIAL

## 2025-08-26 VITALS
HEIGHT: 64 IN | OXYGEN SATURATION: 98 % | TEMPERATURE: 97.7 F | HEART RATE: 86 BPM | RESPIRATION RATE: 16 BRPM | WEIGHT: 172.2 LBS | BODY MASS INDEX: 29.4 KG/M2 | SYSTOLIC BLOOD PRESSURE: 135 MMHG | DIASTOLIC BLOOD PRESSURE: 64 MMHG

## 2025-08-26 DIAGNOSIS — K21.9 GASTROESOPHAGEAL REFLUX DISEASE WITHOUT ESOPHAGITIS: ICD-10-CM

## 2025-08-26 DIAGNOSIS — F33.1 MODERATE RECURRENT MAJOR DEPRESSION (H): ICD-10-CM

## 2025-08-26 DIAGNOSIS — D50.9 IRON DEFICIENCY ANEMIA, UNSPECIFIED IRON DEFICIENCY ANEMIA TYPE: ICD-10-CM

## 2025-08-26 DIAGNOSIS — Z23 NEED FOR VACCINATION: ICD-10-CM

## 2025-08-26 DIAGNOSIS — E03.9 HYPOTHYROIDISM, UNSPECIFIED TYPE: ICD-10-CM

## 2025-08-26 DIAGNOSIS — R25.2 LEG CRAMPS: ICD-10-CM

## 2025-08-26 DIAGNOSIS — E03.4 HYPOTHYROIDISM DUE TO ACQUIRED ATROPHY OF THYROID: ICD-10-CM

## 2025-08-26 DIAGNOSIS — C20 ADENOCARCINOMA OF RECTUM (H): ICD-10-CM

## 2025-08-26 DIAGNOSIS — I26.99 PULMONARY EMBOLISM AND INFARCTION (H): ICD-10-CM

## 2025-08-26 DIAGNOSIS — N18.32 STAGE 3B CHRONIC KIDNEY DISEASE (H): ICD-10-CM

## 2025-08-26 DIAGNOSIS — I10 ESSENTIAL HYPERTENSION: ICD-10-CM

## 2025-08-26 LAB
ANION GAP SERPL CALCULATED.3IONS-SCNC: 18 MMOL/L (ref 7–15)
BASOPHILS # BLD AUTO: <0.04 10E3/UL (ref 0–0.2)
BASOPHILS NFR BLD AUTO: 0.7 %
BUN SERPL-MCNC: 23.4 MG/DL (ref 8–23)
CALCIUM SERPL-MCNC: 8.6 MG/DL (ref 8.8–10.4)
CHLORIDE SERPL-SCNC: 103 MMOL/L (ref 98–107)
CREAT SERPL-MCNC: 1.57 MG/DL (ref 0.67–1.17)
EGFRCR SERPLBLD CKD-EPI 2021: 44 ML/MIN/1.73M2
EOSINOPHIL # BLD AUTO: 0.11 10E3/UL (ref 0–0.7)
EOSINOPHIL NFR BLD AUTO: 2.6 %
ERYTHROCYTE [DISTWIDTH] IN BLOOD BY AUTOMATED COUNT: 15.3 % (ref 10–15)
FERRITIN SERPL-MCNC: 56 NG/ML (ref 31–409)
GLUCOSE SERPL-MCNC: 129 MG/DL (ref 70–99)
HCO3 SERPL-SCNC: 21 MMOL/L (ref 22–29)
HCT VFR BLD AUTO: 33.2 % (ref 40–53)
HGB BLD-MCNC: 10.7 G/DL (ref 13.3–17.7)
IMM GRANULOCYTES # BLD: <0.04 10E3/UL
IMM GRANULOCYTES NFR BLD: 0 %
LYMPHOCYTES # BLD AUTO: 0.93 10E3/UL (ref 0.8–5.3)
LYMPHOCYTES NFR BLD AUTO: 21.7 %
MCH RBC QN AUTO: 31.1 PG (ref 26.5–33)
MCHC RBC AUTO-ENTMCNC: 32.2 G/DL (ref 31.5–36.5)
MCV RBC AUTO: 96.5 FL (ref 78–100)
MONOCYTES # BLD AUTO: 0.42 10E3/UL (ref 0–1.3)
MONOCYTES NFR BLD AUTO: 9.8 %
NEUTROPHILS # BLD AUTO: 2.8 10E3/UL (ref 1.6–8.3)
NEUTROPHILS NFR BLD AUTO: 65.2 %
PLATELET # BLD AUTO: 265 10E3/UL (ref 150–450)
POTASSIUM SERPL-SCNC: 4.3 MMOL/L (ref 3.4–5.3)
RBC # BLD AUTO: 3.44 10E6/UL (ref 4.4–5.9)
SODIUM SERPL-SCNC: 142 MMOL/L (ref 135–145)
TSH SERPL DL<=0.005 MIU/L-ACNC: 7.21 UIU/ML (ref 0.3–4.2)
WBC # BLD AUTO: 4.29 10E3/UL (ref 4–11)

## 2025-08-26 RX ORDER — ATORVASTATIN CALCIUM 80 MG/1
80 TABLET, FILM COATED ORAL EVERY EVENING
Qty: 90 TABLET | Refills: 1 | Status: SHIPPED | OUTPATIENT
Start: 2025-08-26

## 2025-08-26 RX ORDER — BUPRENORPHINE 5 UG/H
1 PATCH TRANSDERMAL
COMMUNITY
Start: 2025-08-26

## 2025-08-26 RX ORDER — BEVACIZUMAB 100 MG/4ML
INJECTION, SOLUTION INTRAVENOUS
COMMUNITY
Start: 2025-08-26

## 2025-08-26 RX ORDER — AMLODIPINE BESYLATE 10 MG/1
10 TABLET ORAL
Qty: 90 TABLET | Refills: 1 | Status: SHIPPED | OUTPATIENT
Start: 2025-08-26

## 2025-08-26 RX ORDER — TRIFLURIDINE AND TIPIRACIL 15; 6.14 MG/1; MG/1
45 TABLET, FILM COATED ORAL 2 TIMES DAILY
COMMUNITY
Start: 2025-08-26

## 2025-08-26 RX ORDER — BACLOFEN 20 MG/1
20 TABLET ORAL AT BEDTIME
Qty: 90 TABLET | Refills: 1 | Status: SHIPPED | OUTPATIENT
Start: 2025-08-26 | End: 2025-08-26

## 2025-08-26 RX ORDER — FLUOXETINE HYDROCHLORIDE 40 MG/1
40 CAPSULE ORAL DAILY
Qty: 90 CAPSULE | Refills: 1 | Status: SHIPPED | OUTPATIENT
Start: 2025-08-26

## 2025-08-26 RX ORDER — ASPIRIN 81 MG/1
81 TABLET, CHEWABLE ORAL EVERY EVENING
Qty: 90 TABLET | Refills: 3 | Status: SHIPPED | OUTPATIENT
Start: 2025-08-26

## 2025-08-26 RX ORDER — LEVOTHYROXINE SODIUM 200 UG/1
200 TABLET ORAL EVERY MORNING
Qty: 90 TABLET | Refills: 1 | Status: SHIPPED | OUTPATIENT
Start: 2025-08-26 | End: 2025-08-27

## 2025-08-26 RX ORDER — BACLOFEN 20 MG/1
20 TABLET ORAL AT BEDTIME
Qty: 90 TABLET | Refills: 1 | Status: SHIPPED | OUTPATIENT
Start: 2025-08-26

## 2025-08-26 RX ORDER — FAMOTIDINE 40 MG/1
40 TABLET, FILM COATED ORAL DAILY
Qty: 90 TABLET | Refills: 3 | Status: SHIPPED | OUTPATIENT
Start: 2025-08-26

## 2025-08-26 RX ORDER — OXYCODONE AND ACETAMINOPHEN 5; 325 MG/1; MG/1
1 TABLET ORAL 3 TIMES DAILY
Qty: 42 TABLET | Refills: 0 | Status: SHIPPED | OUTPATIENT
Start: 2025-08-26 | End: 2025-09-09

## 2025-08-28 ENCOUNTER — MEDICAL CORRESPONDENCE (OUTPATIENT)
Dept: HEALTH INFORMATION MANAGEMENT | Facility: CLINIC | Age: 80
End: 2025-08-28

## 2025-08-28 ENCOUNTER — DOCUMENTATION ONLY (OUTPATIENT)
Dept: FAMILY MEDICINE | Facility: CLINIC | Age: 80
End: 2025-08-28
Payer: COMMERCIAL

## 2025-08-28 ENCOUNTER — OFFICE VISIT (OUTPATIENT)
Dept: VASCULAR SURGERY | Facility: CLINIC | Age: 80
End: 2025-08-28
Attending: STUDENT IN AN ORGANIZED HEALTH CARE EDUCATION/TRAINING PROGRAM
Payer: COMMERCIAL

## 2025-08-28 VITALS — OXYGEN SATURATION: 98 % | SYSTOLIC BLOOD PRESSURE: 156 MMHG | HEART RATE: 99 BPM | DIASTOLIC BLOOD PRESSURE: 87 MMHG

## 2025-08-28 DIAGNOSIS — T14.8XXA OPEN WOUND: ICD-10-CM

## 2025-08-28 DIAGNOSIS — Z43.3 COLOSTOMY CARE (H): ICD-10-CM

## 2025-08-28 DIAGNOSIS — C20 ADENOCARCINOMA OF RECTUM (H): Primary | Chronic | ICD-10-CM

## 2025-08-28 PROCEDURE — G0463 HOSPITAL OUTPT CLINIC VISIT: HCPCS

## 2025-08-28 ASSESSMENT — PAIN SCALES - GENERAL: PAINLEVEL_OUTOF10: SEVERE PAIN (8)

## (undated) RX ORDER — HEPARIN SODIUM (PORCINE) LOCK FLUSH IV SOLN 100 UNIT/ML 100 UNIT/ML
SOLUTION INTRAVENOUS
Status: DISPENSED
Start: 2024-07-11